# Patient Record
Sex: FEMALE | Race: WHITE | NOT HISPANIC OR LATINO | Employment: FULL TIME | URBAN - METROPOLITAN AREA
[De-identification: names, ages, dates, MRNs, and addresses within clinical notes are randomized per-mention and may not be internally consistent; named-entity substitution may affect disease eponyms.]

---

## 2017-06-30 ENCOUNTER — ALLSCRIPTS OFFICE VISIT (OUTPATIENT)
Dept: OTHER | Facility: OTHER | Age: 57
End: 2017-06-30

## 2017-07-03 ENCOUNTER — TRANSCRIBE ORDERS (OUTPATIENT)
Dept: ADMINISTRATIVE | Facility: HOSPITAL | Age: 57
End: 2017-07-03

## 2017-07-03 DIAGNOSIS — G47.30 SLEEP APNEA, UNSPECIFIED TYPE: Primary | ICD-10-CM

## 2017-07-11 ENCOUNTER — TRANSCRIBE ORDERS (OUTPATIENT)
Dept: SLEEP CENTER | Facility: CLINIC | Age: 57
End: 2017-07-11

## 2017-07-11 DIAGNOSIS — G47.33 OSA (OBSTRUCTIVE SLEEP APNEA): Primary | ICD-10-CM

## 2017-08-03 ENCOUNTER — TRANSCRIBE ORDERS (OUTPATIENT)
Dept: SLEEP CENTER | Facility: CLINIC | Age: 57
End: 2017-08-03

## 2017-08-03 ENCOUNTER — HOSPITAL ENCOUNTER (OUTPATIENT)
Dept: SLEEP CENTER | Facility: CLINIC | Age: 57
Discharge: HOME | End: 2017-08-03
Payer: COMMERCIAL

## 2017-08-03 DIAGNOSIS — G47.30 SLEEP APNEA, UNSPECIFIED TYPE: ICD-10-CM

## 2017-08-03 DIAGNOSIS — G47.33 OSA (OBSTRUCTIVE SLEEP APNEA): Primary | ICD-10-CM

## 2017-08-25 ENCOUNTER — HOSPITAL ENCOUNTER (OUTPATIENT)
Dept: SLEEP CENTER | Facility: CLINIC | Age: 57
Discharge: HOME | End: 2017-08-25
Payer: COMMERCIAL

## 2017-08-25 DIAGNOSIS — G47.33 OSA (OBSTRUCTIVE SLEEP APNEA): ICD-10-CM

## 2017-08-25 PROCEDURE — 95810 POLYSOM 6/> YRS 4/> PARAM: CPT

## 2017-08-30 ENCOUNTER — TRANSCRIBE ORDERS (OUTPATIENT)
Dept: SLEEP CENTER | Facility: CLINIC | Age: 57
End: 2017-08-30

## 2017-10-03 ENCOUNTER — TRANSCRIBE ORDERS (OUTPATIENT)
Dept: SLEEP CENTER | Facility: CLINIC | Age: 57
End: 2017-10-03

## 2017-10-03 ENCOUNTER — HOSPITAL ENCOUNTER (OUTPATIENT)
Dept: SLEEP CENTER | Facility: CLINIC | Age: 57
Discharge: HOME | End: 2017-10-03
Payer: COMMERCIAL

## 2017-10-03 DIAGNOSIS — G47.33 OSA (OBSTRUCTIVE SLEEP APNEA): Primary | ICD-10-CM

## 2017-10-03 DIAGNOSIS — G47.33 OSA (OBSTRUCTIVE SLEEP APNEA): ICD-10-CM

## 2017-10-03 NOTE — PROGRESS NOTES
Progress Note - Sleep Center   Nadine Gonzales [facility]:[birth year] MRN: [id number]      Reason for Visit:  59 y  o female here for follow-up after testing    Assessment:  Doing well on current therapy of CPAP 11 cm for obstructive sleep apnea  There is no clear explanation for the patient's dramatic improvement in apnea-hypopnea index compared with 2003  Never the less, she continues to drive benefit from using CPAP and I will make arrangements so that she may continue to use it  I will order her a new machine and set it at a range of APAP 11 to 15 cm  Plan:  New APAP with a range of 11 to 15 cm  Follow up: One year    History of Present Illness:  History of SJ on PAP therapy of 11 cm, increased from 9 cm empirically  She underwent repeat diagnostic polysomnography, her first since 14 years ago and had an insignificant degree of SJ  Her AHI = 3 9  Her previous AHI was 79  That study is not available  Since the increase in pressure, the patient feels much better  She reports that she cannot sleep without the CPAP  It seemed to have a beneficial effect on her hypertension  Historical Information    Past Medical History:   Past Medical History:   Diagnosis Date    Diabetes mellitus     type 2    Murmur, cardiac          Past Surgical History:   Past Surgical History:   Procedure Laterality Date    APPENDECTOMY      BREAST LUMPECTOMY      CHOLECYSTECTOMY      HYSTERECTOMY           Social History - see chart  History   Alcohol use: Not on file     History   Drug Use Not on file     History   Smoking Status    Not on file   Smokeless Tobacco    Not on file     Family History: No family history on file      Medications/Allergies:      Current Outpatient Prescriptions:     aspirin 81 mg chewable tablet, Chew 81 mg daily, Disp: , Rfl:     Cholecalciferol (VITAMIN D3) 1000 UNITS CAPS, Take 2,000 Units by mouth daily, Disp: , Rfl:     Cinnamon 500 MG TABS, Take 500 mg by mouth daily, Disp: , Rfl:    Cyanocobalamin (VITAMIN B12 PO), Take 2,500 mcg by mouth daily, Disp: , Rfl:     fenofibrate (TRIGLIDE) 160 MG tablet, Take 160 mg by mouth daily, Disp: , Rfl:     gabapentin (NEURONTIN) 300 mg capsule, Take 300 mg by mouth 3 (three) times a day, Disp: , Rfl:     glimepiride (AMARYL) 2 mg tablet, Take 2 mg by mouth 2 (two) times a day, Disp: , Rfl:     GLUTATHIONE PO, Take 250 mg by mouth 3 (three) times a day, Disp: , Rfl:     metFORMIN (GLUCOPHAGE) 500 mg tablet, Take 500 mg by mouth 2 (two) times a day with meals, Disp: , Rfl:     metoprolol tartrate (LOPRESSOR) 25 mg tablet, Take 50 mg by mouth daily, Disp: , Rfl:     multivitamin (THERAGRAN) TABS, Take 1 tablet by mouth daily, Disp: , Rfl:     potassium chloride (MICRO-K) 10 MEQ CR capsule, Take 10 mEq by mouth daily, Disp: , Rfl:     pravastatin (PRAVACHOL) 10 mg tablet, Take 10 mg by mouth daily, Disp: , Rfl:     pyridoxine (VITAMIN B6) 100 mg tablet, Take 100 mg by mouth daily, Disp: , Rfl:     Red Yeast Rice 600 MG TABS, Take 1,200 mg by mouth daily, Disp: , Rfl:       Objective    Vital Signs:   See Chart    [place] Sleepiness Scale:   3    Physical Exam:    General: Alert, appropriate, cooperative, overweight    Head: NC/AT, no retrognathia    Skin: Warm, dry    Neuro: No motor abnormalities, cranial nerves appear intact    Extremity: No clubbing, cyanosis    PAP settin cm  DME Provider: [facility]    Counseling / Coordination of Care  Total clinic time spent today 15 minutes  Greater than 50% of total time was spent with the patient and / or family counseling and / or coordination of care  A description of the counseling / coordination of care: Discussed equipment and response to treatment  DAVIE Lux    Board Certified Sleep Specialist

## 2018-01-15 NOTE — MISCELLANEOUS
Provider Comments  Pt no showed for appointment on 6/30/17        Signatures   Electronically signed by : Tee Wiseman, ; Jun 30 2017 11:23AM EST                       (Author)

## 2018-10-03 ENCOUNTER — OFFICE VISIT (OUTPATIENT)
Dept: SLEEP CENTER | Facility: CLINIC | Age: 58
End: 2018-10-03
Payer: COMMERCIAL

## 2018-10-03 VITALS
HEART RATE: 72 BPM | BODY MASS INDEX: 33.13 KG/M2 | HEIGHT: 63 IN | SYSTOLIC BLOOD PRESSURE: 128 MMHG | DIASTOLIC BLOOD PRESSURE: 60 MMHG | WEIGHT: 187 LBS | RESPIRATION RATE: 16 BRPM

## 2018-10-03 DIAGNOSIS — G47.33 OSA (OBSTRUCTIVE SLEEP APNEA): Primary | ICD-10-CM

## 2018-10-03 PROCEDURE — 99213 OFFICE O/P EST LOW 20 MIN: CPT | Performed by: INTERNAL MEDICINE

## 2018-10-03 RX ORDER — METOPROLOL TARTRATE 50 MG/1
50 TABLET, FILM COATED ORAL
COMMUNITY
Start: 2011-09-29 | End: 2018-10-03 | Stop reason: ALTCHOICE

## 2018-10-03 RX ORDER — FLUCONAZOLE 150 MG/1
TABLET ORAL
COMMUNITY
Start: 2011-03-07 | End: 2018-10-03 | Stop reason: ALTCHOICE

## 2018-10-03 RX ORDER — VALSARTAN AND HYDROCHLOROTHIAZIDE 80; 12.5 MG/1; MG/1
TABLET, FILM COATED ORAL
COMMUNITY
Start: 2011-05-17 | End: 2018-10-03 | Stop reason: ALTCHOICE

## 2018-10-03 RX ORDER — PRAVASTATIN SODIUM 10 MG
10 TABLET ORAL
COMMUNITY
End: 2018-10-03 | Stop reason: SDUPTHER

## 2018-10-03 RX ORDER — FENOFIBRATE 150 MG/1
CAPSULE ORAL
COMMUNITY
Start: 2011-05-17 | End: 2019-07-22

## 2018-10-03 NOTE — PROGRESS NOTES
Progress Note - Sleep Center   Santhosh Valladares [facility]:[birth year] MRN: [id number]      Reason for Visit:  59 y  o female here for annual follow-up    Assessment:  Doing well on current therapy of APAP 11 to 15 cm for very mild SJ (AHI = 3 9)  The patient had more severe SJ in the past   She feels that the pressure is too high however review of her pressure data shows that her ninetieth percentile is 14 7, suggesting that 15 cm is necessary  I will therefore change her to APAP of 9 to 15 cm  Plan:  Lower minimum APAP    Follow up: One year    History of Present Illness:  History of SJ on PAP therapy  Fully compliant and deriving benefit  She feels at times that the pressure is too high  Her breast cancer is in remission  Review of Systems      Genitourinary none   Cardiology none   Gastrointestinal none   Neurology numbness/tingling of an extremity   Constitutional none   Integumentary none   Psychiatry none   Musculoskeletal none   Pulmonary snoring   ENT throat clearing   Endocrine none   Hematological none         Historical Information    Past Medical History:   Past Medical History:   Diagnosis Date    Cancer ([facility] )     Diabetes mellitus ([facility] )     type 2    Murmur, cardiac          Past Surgical History:   Past Surgical History:   Procedure Laterality Date    APPENDECTOMY      BREAST LUMPECTOMY      CHOLECYSTECTOMY      HYSTERECTOMY         Social History:   Social History     Social History    Marital status: /Civil Union     Spouse name: N/A    Number of children: N/A    Years of education: N/A     Social History Main Topics    Smoking status: Never Smoker    Smokeless tobacco: Never Used    Alcohol use No    Drug use: No    Sexual activity: Yes     Partners: Male     Other Topics Concern    None     Social History Narrative    None       Family History: History reviewed  No pertinent family history      Medications/Allergies:      Current Outpatient Prescriptions:    aspirin 81 mg chewable tablet, Chew 81 mg daily, Disp: , Rfl:     Cholecalciferol (VITAMIN D3) 1000 UNITS CAPS, Take 2,000 Units by mouth daily, Disp: , Rfl:     Cyanocobalamin (VITAMIN B12 PO), Take 2,500 mcg by mouth daily, Disp: , Rfl:     Fenofibrate (LIPOFEN) 150 MG CAPS, Take by mouth, Disp: , Rfl:     glimepiride (AMARYL) 2 mg tablet, Take 2 mg by mouth 2 (two) times a day, Disp: , Rfl:     Insulin Glargine (TOUJEO) 300 units/mL CONCETRATED U-300 injection pen, Inject 20 Units under the skin, Disp: , Rfl:     multivitamin (THERAGRAN) TABS, Take 1 tablet by mouth daily, Disp: , Rfl:     pravastatin (PRAVACHOL) 10 mg tablet, Take 10 mg by mouth daily, Disp: , Rfl:     pyridoxine (VITAMIN B6) 100 mg tablet, Take 100 mg by mouth daily, Disp: , Rfl:           Objective      Vital Signs:   Vitals:    10/03/18 1500   BP: 128/60   Pulse: 72   Resp: 16     [place] Sleepiness Scale: Total score: 3        Physical Exam:    General: Alert, appropriate, cooperative, overweight    Head: NC/AT    Skin: Warm, dry    Neuro: No motor abnormalities, cranial nerves appear intact    Extremity: No clubbing, cyanosis      DME Provider: [facility]    Counseling / Coordination of Care  Total clinic time spent today 15 minutes  Greater than 50% of total time was spent with the patient and / or family counseling and / or coordination of care  A description of the counseling / coordination of care: Discussed equipment and response to treatment  DAVIE Haile    Board Certified Sleep Specialist

## 2018-10-04 ENCOUNTER — TELEPHONE (OUTPATIENT)
Dept: SLEEP CENTER | Facility: CLINIC | Age: 58
End: 2018-10-04

## 2019-06-05 ENCOUNTER — CONSULT (OUTPATIENT)
Dept: CARDIOLOGY CLINIC | Facility: CLINIC | Age: 59
End: 2019-06-05
Payer: COMMERCIAL

## 2019-06-05 VITALS
SYSTOLIC BLOOD PRESSURE: 148 MMHG | BODY MASS INDEX: 34.34 KG/M2 | HEART RATE: 75 BPM | HEIGHT: 63 IN | WEIGHT: 193.8 LBS | DIASTOLIC BLOOD PRESSURE: 76 MMHG

## 2019-06-05 DIAGNOSIS — E11.8 TYPE 2 DIABETES MELLITUS WITH COMPLICATION, WITHOUT LONG-TERM CURRENT USE OF INSULIN (HCC): ICD-10-CM

## 2019-06-05 DIAGNOSIS — E78.2 MIXED HYPERLIPIDEMIA: ICD-10-CM

## 2019-06-05 DIAGNOSIS — I35.0 AORTIC STENOSIS, MODERATE: Primary | ICD-10-CM

## 2019-06-05 DIAGNOSIS — R00.2 HEART PALPITATIONS: ICD-10-CM

## 2019-06-05 DIAGNOSIS — I10 ESSENTIAL HYPERTENSION: ICD-10-CM

## 2019-06-05 PROCEDURE — 93000 ELECTROCARDIOGRAM COMPLETE: CPT | Performed by: INTERNAL MEDICINE

## 2019-06-05 PROCEDURE — 99244 OFF/OP CNSLTJ NEW/EST MOD 40: CPT | Performed by: INTERNAL MEDICINE

## 2019-06-05 RX ORDER — METOPROLOL TARTRATE 50 MG/1
25 TABLET, FILM COATED ORAL EVERY 12 HOURS SCHEDULED
COMMUNITY
End: 2020-10-14

## 2019-06-05 RX ORDER — AMLODIPINE BESYLATE 2.5 MG/1
2.5 TABLET ORAL DAILY
Qty: 30 TABLET | Refills: 11 | Status: SHIPPED | OUTPATIENT
Start: 2019-06-05 | End: 2020-02-05 | Stop reason: SDUPTHER

## 2019-06-12 ENCOUNTER — PROCEDURE VISIT (OUTPATIENT)
Dept: CARDIOLOGY CLINIC | Facility: CLINIC | Age: 59
End: 2019-06-12
Payer: COMMERCIAL

## 2019-06-12 DIAGNOSIS — R00.2 PALPITATIONS: Primary | ICD-10-CM

## 2019-06-12 PROCEDURE — 93224 XTRNL ECG REC UP TO 48 HRS: CPT | Performed by: INTERNAL MEDICINE

## 2019-06-14 ENCOUNTER — HOSPITAL ENCOUNTER (OUTPATIENT)
Dept: NON INVASIVE DIAGNOSTICS | Facility: HOSPITAL | Age: 59
Discharge: HOME | End: 2019-06-14
Payer: COMMERCIAL

## 2019-06-14 DIAGNOSIS — R00.2 HEART PALPITATIONS: ICD-10-CM

## 2019-06-14 PROCEDURE — 93226 XTRNL ECG REC<48 HR SCAN A/R: CPT

## 2019-06-14 PROCEDURE — 93225 XTRNL ECG REC<48 HRS REC: CPT

## 2019-06-14 PROCEDURE — 93227 XTRNL ECG REC<48 HR R&I: CPT | Performed by: INTERNAL MEDICINE

## 2019-07-20 ENCOUNTER — APPOINTMENT (EMERGENCY)
Dept: RADIOLOGY | Facility: HOSPITAL | Age: 59
End: 2019-07-20
Payer: COMMERCIAL

## 2019-07-20 ENCOUNTER — HOSPITAL ENCOUNTER (EMERGENCY)
Facility: HOSPITAL | Age: 59
Discharge: HOME | End: 2019-07-20
Attending: EMERGENCY MEDICINE | Admitting: EMERGENCY MEDICINE
Payer: COMMERCIAL

## 2019-07-20 VITALS
RESPIRATION RATE: 18 BRPM | HEIGHT: 63 IN | HEART RATE: 87 BPM | DIASTOLIC BLOOD PRESSURE: 76 MMHG | BODY MASS INDEX: 34.34 KG/M2 | TEMPERATURE: 97.8 F | OXYGEN SATURATION: 93 % | SYSTOLIC BLOOD PRESSURE: 182 MMHG | WEIGHT: 193.78 LBS

## 2019-07-20 DIAGNOSIS — M25.511 ACUTE PAIN OF RIGHT SHOULDER: Primary | ICD-10-CM

## 2019-07-20 PROCEDURE — 73030 X-RAY EXAM OF SHOULDER: CPT

## 2019-07-20 PROCEDURE — 99283 EMERGENCY DEPT VISIT LOW MDM: CPT | Performed by: EMERGENCY MEDICINE

## 2019-07-20 PROCEDURE — 99283 EMERGENCY DEPT VISIT LOW MDM: CPT

## 2019-07-20 RX ORDER — NAPROXEN 250 MG/1
500 TABLET ORAL ONCE
Status: COMPLETED | OUTPATIENT
Start: 2019-07-20 | End: 2019-07-20

## 2019-07-20 RX ORDER — OXYCODONE HYDROCHLORIDE AND ACETAMINOPHEN 5; 325 MG/1; MG/1
1 TABLET ORAL ONCE
Status: COMPLETED | OUTPATIENT
Start: 2019-07-20 | End: 2019-07-20

## 2019-07-20 RX ORDER — METHYLPREDNISOLONE 4 MG/1
TABLET ORAL
Qty: 21 TABLET | Refills: 0 | Status: SHIPPED | OUTPATIENT
Start: 2019-07-20 | End: 2019-10-25 | Stop reason: ALTCHOICE

## 2019-07-20 RX ORDER — OXYCODONE HYDROCHLORIDE AND ACETAMINOPHEN 5; 325 MG/1; MG/1
1 TABLET ORAL EVERY 4 HOURS PRN
Qty: 10 TABLET | Refills: 0 | Status: SHIPPED | OUTPATIENT
Start: 2019-07-20 | End: 2019-07-30

## 2019-07-20 RX ORDER — NAPROXEN 500 MG/1
500 TABLET ORAL EVERY 12 HOURS PRN
Qty: 20 TABLET | Refills: 0 | Status: SHIPPED | OUTPATIENT
Start: 2019-07-20 | End: 2021-07-21

## 2019-07-20 RX ADMIN — NAPROXEN 500 MG: 250 TABLET ORAL at 19:31

## 2019-07-20 RX ADMIN — OXYCODONE HYDROCHLORIDE AND ACETAMINOPHEN 1 TABLET: 5; 325 TABLET ORAL at 19:31

## 2019-07-20 NOTE — DISCHARGE INSTRUCTIONS
Shoulder Pain   WHAT YOU NEED TO KNOW:   Shoulder pain is a common problem and can affect your daily activities  Pain can be caused by a problem within your shoulder  Shoulder pain may also be caused by pain that spreads to your shoulder from another part of your body  DISCHARGE INSTRUCTIONS:   Return to the emergency department if:   · You have severe pain  · You cannot move your arm or shoulder  · You have numbness or tingling in your shoulder or arm  Contact your healthcare provider if:   · Your pain gets worse or does not go away with treatment  · You have trouble moving your arm or shoulder  · You have questions or concerns about your condition or care  Medicines: You may need any of the following:  · Acetaminophen  decreases pain and fever  It is available without a doctor's order  Ask how much to take and how often to take it  Follow directions  Acetaminophen can cause liver damage if not taken correctly  · NSAIDs , such as ibuprofen, help decrease swelling, pain, and fever  This medicine is available with or without a doctor's order  NSAIDs can cause stomach bleeding or kidney problems in certain people  If you take blood thinner medicine, always ask your healthcare provider if NSAIDs are safe for you  Always read the medicine label and follow directions  · Take your medicine as directed  Contact your healthcare provider if you think your medicine is not helping or if you have side effects  Tell him of her if you are allergic to any medicine  Keep a list of the medicines, vitamins, and herbs you take  Include the amounts, and when and why you take them  Bring the list or the pill bottles to follow-up visits  Carry your medicine list with you in case of an emergency  Follow up with your healthcare provider or orthopedist as directed:  Write down your questions so you remember to ask them during your visits     Manage your symptoms:   · Apply ice  on your shoulder for 20 to 30 minutes every 2 hours or as directed  Use an ice pack, or put crushed ice in a plastic bag  Cover it with a towel  Ice helps prevent tissue damage and decreases swelling and pain  · Apply heat if ice does not help your symptoms  Apply heat on your shoulder for 20 to 30 minutes every 2 hours for as many days as directed  Heat helps decrease pain and muscle spasms  · Go to physical or occupational therapy as directed  A physical therapist teaches you exercises to help improve movement and strength, and to decrease pain  An occupational therapist teaches you skills to help with your daily activities  Prevent shoulder pain:   · Stretch and strengthen your shoulder  Use proper technique during exercises and sports  · Limit activities as directed  Try to avoid repeated overhead movements  © 2017 [facility] Information is for End User's use only and may not be sold, redistributed or otherwise used for commercial purposes  All illustrations and images included in CareNotes® are the copyrighted property of A D A M , Inc  or David Zambrano  The above information is an  only  It is not intended as medical advice for individual conditions or treatments  Talk to your doctor, nurse or pharmacist before following any medical regimen to see if it is safe and effective for you

## 2019-07-20 NOTE — ED PROVIDER NOTES
History  Chief Complaint   Patient presents with    Shoulder Pain     right shoulder pain  pt denies injury  pt denies pain radiating  Patient is a 75-year-old female with past medical history of insulin-dependent diabetes, right sided breast cancer status post partial mastectomy, lymph node resection with resulting right upper extremity lymphedema, status post chemo and radiation, in remission for the past 3 years, aortic stenosis secondary to radiation, hyperlipidemia, hypertension, appendectomy, cholecystectomy, hysterectomy, presents to the emergency department complaining of acute right shoulder pain that she 1st noticed this morning upon awakening  Patient denies any recent right shoulder upper extremity injury, heavy lifting or strenuous activity  She states she woke up and felt some mild pain in the right shoulder which has progressively worsened throughout the day  She states she cannot move the shoulder in any direction due to the pain  She localizes the pain to the anterior shoulder joint states occasionally she feels the pain in the posterior shoulder  Denies radiation of the pain  Denies any pain in the elbow, forearm or wrist joints  Denies any associated right upper extremity weakness or paresthesia  She states she wears compression stocking on the right arm chronically due to her lymphedema  She denies any swelling worse than baseline of the right upper extremity associated redness or warmth of the right shoulder joint or arm  She denies any fever, chills, headache, dizziness, neck or back pain, chest pain, palpitations, dyspnea, abdominal pain, nausea, vomiting, diarrhea, constipation, urinary symptoms, skin rash or color change or focal neurologic deficits  She states when she is being treated for her cancer she did develop adhesive capsulitis but denies any other prior shoulder problems  Patient is right hand dominant        History provided by:  Patient   used: No    Shoulder Pain   Associated symptoms: no back pain, no fever and no neck pain        Prior to Admission Medications   Prescriptions Last Dose Informant Patient Reported? Taking? Cholecalciferol (VITAMIN D3) 1000 UNITS CAPS  Self Yes No   Sig: Take 2,000 Units by mouth daily   Cyanocobalamin (VITAMIN B12 PO)  Self Yes No   Sig: Take 2,500 mcg by mouth daily   Empagliflozin-metFORMIN HCl ER (SYNJARDY XR)  MG TB24  Self Yes No   Sig: Take 1 tablet by mouth 2 (two) times a day   Fenofibrate (LIPOFEN) 150 MG CAPS  Self Yes No   Sig: Take by mouth    Icosapent Ethyl (VASCEPA PO)  Self Yes No   Sig: Take 2 capsules by mouth daily   Insulin Glargine (TOUJEO) 300 units/mL CONCETRATED U-300 injection pen  Self Yes No   Sig: Inject 40 Units under the skin daily at bedtime    amLODIPine (NORVASC) 2 5 mg tablet   No No   Sig: Take 1 tablet (2 5 mg total) by mouth daily   aspirin 81 mg chewable tablet  Self Yes No   Sig: Chew 81 mg daily   glimepiride (AMARYL) 2 mg tablet  Self Yes No   Sig: Take 2 mg by mouth 2 (two) times a day   metoprolol tartrate (LOPRESSOR) 50 mg tablet  Self Yes No   Sig: Take 25 mg by mouth every 12 (twelve) hours   multivitamin (THERAGRAN) TABS  Self Yes No   Sig: Take 1 tablet by mouth daily   pravastatin (PRAVACHOL) 10 mg tablet  Self Yes No   Sig: Take 10 mg by mouth daily   pyridoxine (VITAMIN B6) 100 mg tablet  Self Yes No   Sig: Take 100 mg by mouth daily      Facility-Administered Medications: None       Past Medical History:   Diagnosis Date    Cancer ([facility] [address] )     Diabetes mellitus (HCC)     type 2    Murmur, cardiac        Past Surgical History:   Procedure Laterality Date    APPENDECTOMY      BREAST LUMPECTOMY      CHOLECYSTECTOMY      HYSTERECTOMY         History reviewed  No pertinent family history  I have reviewed and agree with the history as documented      Social History     Tobacco Use    Smoking status: Never Smoker    Smokeless tobacco: Never Used   Substance Use Topics    Alcohol use: No    Drug use: No        Review of Systems   Constitutional: Negative for chills and fever  HENT: Negative for congestion, ear pain, rhinorrhea and sore throat  Eyes: Negative for pain and visual disturbance  Respiratory: Negative for cough, chest tightness, shortness of breath and wheezing  Cardiovascular: Negative for chest pain and palpitations  Gastrointestinal: Negative for abdominal pain, constipation, diarrhea, nausea and vomiting  Genitourinary: Negative for dysuria, flank pain, frequency and hematuria  Musculoskeletal: Positive for arthralgias  Negative for back pain, joint swelling and neck pain  +Right shoulder pain and decreased ROM  Skin: Negative for color change, pallor, rash and wound  Allergic/Immunologic: Negative for immunocompromised state  Neurological: Negative for dizziness, syncope, weakness, light-headedness, numbness and headaches  Hematological: Negative for adenopathy  Psychiatric/Behavioral: Negative for confusion and decreased concentration  All other systems reviewed and are negative  Physical Exam  Physical Exam   Constitutional: She is oriented to person, place, and time  She appears well-developed and well-nourished  No distress  HENT:   Head: Normocephalic and atraumatic  Mouth/Throat: Oropharynx is clear and moist    Eyes: Pupils are equal, round, and reactive to light  Conjunctivae and EOM are normal    Neck: Normal range of motion  Neck supple  No JVD present  Cardiovascular: Normal rate, regular rhythm, normal heart sounds and intact distal pulses  Exam reveals no gallop and no friction rub  No murmur heard  Pulmonary/Chest: Effort normal and breath sounds normal  No respiratory distress  She has no wheezes  She has no rales  She exhibits no tenderness  Abdominal: Soft  Bowel sounds are normal  She exhibits no distension  There is no tenderness  There is no rebound and no guarding  Musculoskeletal: She exhibits tenderness  She exhibits no edema or deformity  RIGHT UPPER EXTREMITY:  Right upper extremity neurovascularly intact with 2+ radial pulse  No gross motor or sensory deficits in the median, ulnar or radial nerve distribution  There is significant tenderness at the anterior and posterior shoulder joint  Significant decreased range of motion actively and passively in all planes at the right shoulder joint  All other joints of the right upper extremity has full range of motion and are nontender  Neurological: She is alert and oriented to person, place, and time  No gross motor or sensory deficits  Skin: Skin is warm and dry  No rash noted  She is not diaphoretic  No erythema  No pallor  Psychiatric: She has a normal mood and affect  Her behavior is normal    Nursing note and vitals reviewed  Vital Signs  ED Triage Vitals   Temperature Pulse Respirations Blood Pressure SpO2   07/20/19 1821 07/20/19 1819 07/20/19 1819 07/20/19 1819 07/20/19 1819   97 8 °F (36 6 °C) 87 18 (!) 182/76 93 %      Temp Source Heart Rate Source Patient Position - Orthostatic VS BP Location FiO2 (%)   07/20/19 1819 07/20/19 1819 07/20/19 1819 07/20/19 1819 --   Oral Monitor Sitting Left arm       Pain Score       07/20/19 1819       9         Vitals:    07/20/19 1819 07/20/19 1821   BP: (!) 182/76    BP Location: Left arm    Pulse: 87    Resp: 18    Temp:  97 8 °F (36 6 °C)   TempSrc: Oral Oral   SpO2: 93%    Weight: 87 9 kg (193 lb 12 6 oz)    Height: 5' 3" (1 6 m)        Visual Acuity      ED Medications  Medications   naproxen (NAPROSYN) tablet 500 mg (500 mg Oral Given 7/20/19 1931)   oxyCODONE-acetaminophen (PERCOCET) 5-325 mg per tablet 1 tablet (1 tablet Oral Given 7/20/19 1931)       Diagnostic Studies  Results Reviewed     None                 XR shoulder 2+ views RIGHT   ED Interpretation by Sandy Grayson DO (07/20 1952)   Degenerative changes but no acute osseous abnormality  Procedures  Procedures       ED Course  ED Course as of Jul 20 1957   Sat Jul 20, 2019 1956 Cautioned patient about Medrol Dosepak and it being a steroid that could temporarily increase her glucose  Patient is on insulin and advised her to check her glucose regularly and adjust her insulin as needed  If there are any complications or concerns she should stop the steroid immediately and consult with her primary care doctor or return to the ED  [facility]  Number of Diagnoses or Management Options  Diagnosis management comments: 57-year-old female presents with acute right shoulder pain and inability to move the shoulder without any inciting trauma or injury  Differential includes adhesive capsulitis, rotator cuff tear versus tendinitis, biceps tendinitis or tear, arthritis  Will obtain x-ray of the right shoulder, provide a sling for comfort but advised her not to use it 24/7 and to try to increase her range of motion as tolerated  Will give Naprosyn and Percocet for pain relief  Will ultimately refer to Sports Medicine and Orthopedics for further workup and possibly outpatient MRI  Amount and/or Complexity of Data Reviewed  Tests in the radiology section of CPT®: ordered and reviewed  Independent visualization of images, tracings, or specimens: yes        Disposition  Final diagnoses:   Acute pain of right shoulder     Time reflects when diagnosis was documented in both MDM as applicable and the Disposition within this note     Time User Action Codes Description Comment    7/20/2019  7:54 PM Marilynn WILKINSON Add [M27 [id number]] Acute pain of right shoulder       ED Disposition     ED Disposition Condition Date/Time Comment    Discharge Stable Sat Jul 20, 2019  7:54 PM Michelle Wade discharge to home/self care              Follow-up Information     Follow up With Specialties Details Why Contact Info Additional Information    Arleen Rendon DO General Practice Schedule an appointment as soon as possible for a visit   [address]  [facility] [address] [facility] Orthopedic Surgery Schedule an appointment as soon as possible for a visit   [address] [facility] [address]  [address] [facility], [id number] [place] [address], [address] [facility] Emergency Department Emergency Medicine Go to  If symptoms worsen [address] [facility] [address] [phone] [place] ED, [address] [place], [place], [address]          Patient's Medications   Discharge Prescriptions    METHYLPREDNISOLONE 4 MG TABLET THERAPY PACK    Use as directed on package       Start Date: 7/20/2019 End Date: --       Order Dose: --       Quantity: 21 tablet    Refills: 0    NAPROXEN (NAPROSYN) 500 MG TABLET    Take 1 tablet (500 mg total) by mouth every 12 (twelve) hours as needed for mild pain or moderate pain       Start Date: 7/20/2019 End Date: --       Order Dose: 500 mg       Quantity: 20 tablet    Refills: 0    OXYCODONE-ACETAMINOPHEN (PERCOCET) 5-325 MG PER TABLET    Take 1 tablet by mouth every 4 (four) hours as needed for severe pain for up to 10 daysMax Daily Amount: 6 tablets       Start Date: 7/20/2019 End Date: 7/30/2019       Order Dose: 1 tablet       Quantity: 10 tablet    Refills: 0     No discharge procedures on file      ED Provider  Electronically Signed by           Angie Bass,   07/20/19 [id number]

## 2019-07-22 ENCOUNTER — OFFICE VISIT (OUTPATIENT)
Dept: OBGYN CLINIC | Facility: MEDICAL CENTER | Age: 59
End: 2019-07-22
Payer: COMMERCIAL

## 2019-07-22 VITALS
WEIGHT: 190.6 LBS | HEART RATE: 89 BPM | SYSTOLIC BLOOD PRESSURE: 144 MMHG | DIASTOLIC BLOOD PRESSURE: 74 MMHG | HEIGHT: 63 IN | BODY MASS INDEX: 33.77 KG/M2

## 2019-07-22 DIAGNOSIS — M25.511 ACUTE PAIN OF RIGHT SHOULDER: ICD-10-CM

## 2019-07-22 DIAGNOSIS — M75.01 ADHESIVE CAPSULITIS OF RIGHT SHOULDER: Primary | ICD-10-CM

## 2019-07-22 PROCEDURE — 99203 OFFICE O/P NEW LOW 30 MIN: CPT | Performed by: FAMILY MEDICINE

## 2019-07-22 PROCEDURE — 20610 DRAIN/INJ JOINT/BURSA W/O US: CPT | Performed by: FAMILY MEDICINE

## 2019-07-22 RX ORDER — TRIAMCINOLONE ACETONIDE 40 MG/ML
40 INJECTION, SUSPENSION INTRA-ARTICULAR; INTRAMUSCULAR
Status: COMPLETED | OUTPATIENT
Start: 2019-07-22 | End: 2019-07-22

## 2019-07-22 RX ORDER — LIDOCAINE HYDROCHLORIDE 10 MG/ML
4 INJECTION, SOLUTION INFILTRATION; PERINEURAL
Status: COMPLETED | OUTPATIENT
Start: 2019-07-22 | End: 2019-07-22

## 2019-07-22 RX ADMIN — TRIAMCINOLONE ACETONIDE 40 MG: 40 INJECTION, SUSPENSION INTRA-ARTICULAR; INTRAMUSCULAR at 11:25

## 2019-07-22 RX ADMIN — LIDOCAINE HYDROCHLORIDE 4 ML: 10 INJECTION, SOLUTION INFILTRATION; PERINEURAL at 11:25

## 2019-07-22 NOTE — PROGRESS NOTES
Assessment:     1  Acute pain of right shoulder    2  Adhesive capsulitis of right shoulder        Plan:     Problem List Items Addressed This Visit        Musculoskeletal and Integument    Adhesive capsulitis of right shoulder    Relevant Orders    Ambulatory referral to Physical Therapy       Other    Acute pain of right shoulder - Primary    Relevant Orders    Ambulatory referral to Physical Therapy         Subjective:     Patient ID: Sherley Boeck is a 62 y o  female  Chief Complaint:  Patient is a 57-year-old female presenting today for evaluation of right shoulder pain  She reports beginning with pain approximately 3 days ago after mowing the lawn  Since that time she has had ongoing pain along the anterior lateral aspect the shoulder  Pain is reproduced with any attempted shoulder movements  Ice and anti-inflammatories provided minimal relief  She does have a history a frozen shoulder in the past and did respond well with a previous cortisone injection and physical therapy  She currently denies any shoulder joint swelling, numbness or tingling  She denies any warmth or crepitus      Allergy:  Allergies   Allergen Reactions    Fentanyl Rash    Niaspan [Niacin Er] Rash     Medications:  all current active meds have been reviewed  Past Medical History:  Past Medical History:   Diagnosis Date    [facility]     [facility]     type 2    Murmur, cardiac      Past Surgical History:  Past Surgical History:   Procedure Laterality Date    APPENDECTOMY      BREAST LUMPECTOMY      CHOLECYSTECTOMY      HYSTERECTOMY       Family History:  Family History   Problem Relation Age of Onset    COPD Mother     Heart disease Father     Heart disease Paternal Grandmother     Cancer Family      Social History:  Social History     Substance and Sexual Activity   Alcohol Use No     Social History     Substance and Sexual Activity   Drug Use No     Social History     Tobacco Use   Smoking Status Never Smoker   Smokeless Tobacco Never Used     Review of Systems      Objective:  BP Readings from Last 1 Encounters:   07/22/19 144/74      Wt Readings from Last 1 Encounters:   07/22/19 86 5 kg (190 lb 9 6 oz)      BMI:   Estimated body mass index is 33 76 kg/m² as calculated from the following:    Height as of this encounter: 5' 3" (1 6 m)  Weight as of this encounter: 86 5 kg (190 lb 9 6 oz)  BSA:   Estimated body surface area is 1 9 meters squared as calculated from the following:    Height as of this encounter: 5' 3" (1 6 m)  Weight as of this encounter: 86 5 kg (190 lb 9 6 oz)  Physical Exam   Constitutional: She is oriented to person, place, and time  Vital signs are normal  She appears well-developed  HENT:   Head: Normocephalic  Eyes: Pupils are equal, round, and reactive to light  Pulmonary/Chest: Effort normal    Neurological: She is alert and oriented to person, place, and time  Skin: Skin is warm and dry  Psychiatric: She has a normal mood and affect  Nursing note and vitals reviewed  Right Shoulder Exam     Tenderness   The patient is experiencing tenderness in the acromioclavicular joint, acromion and biceps tendon  Range of Motion   Active abduction: abnormal   Passive abduction: abnormal   Extension: abnormal   External rotation: abnormal   Forward flexion: abnormal   Internal rotation 0 degrees: abnormal   Internal rotation 90 degrees: abnormal     Muscle Strength   Abduction: 4/5   Internal rotation: 4/5   External rotation: 4/5   Supraspinatus: 4/5   Subscapularis: 4/5   Biceps: 4/5     Other   Erythema: absent      Left Shoulder Exam   Left shoulder exam is normal     Tenderness   The patient is experiencing no tenderness  Range of Motion   The patient has normal left shoulder ROM  I have personally reviewed pertinent films in PACS     No acute osseous abnormalities    Large joint arthrocentesis: R glenohumeral  Date/Time: 7/22/2019 11:25 AM  Consent given by: patient  Site marked: site marked  Supporting Documentation  Indications: pain and joint swelling   Procedure Details  Location: shoulder - R glenohumeral  Preparation: Patient was prepped and draped in the usual sterile fashion  Needle size: 22 G  Ultrasound guidance: no  Approach: posterior  Medications administered: 4 mL lidocaine 1 %; 40 mg triamcinolone acetonide 40 mg/mL    Patient tolerance: patient tolerated the procedure well with no immediate complications  Dressing:  Sterile dressing applied

## 2019-07-29 ENCOUNTER — EVALUATION (OUTPATIENT)
Dept: PHYSICAL THERAPY | Facility: CLINIC | Age: 59
End: 2019-07-29
Payer: COMMERCIAL

## 2019-07-29 DIAGNOSIS — M75.01 ADHESIVE CAPSULITIS OF RIGHT SHOULDER: ICD-10-CM

## 2019-07-29 DIAGNOSIS — M25.511 ACUTE PAIN OF RIGHT SHOULDER: ICD-10-CM

## 2019-07-29 PROCEDURE — 97112 NEUROMUSCULAR REEDUCATION: CPT | Performed by: PHYSICAL THERAPIST

## 2019-07-29 PROCEDURE — 97140 MANUAL THERAPY 1/> REGIONS: CPT | Performed by: PHYSICAL THERAPIST

## 2019-07-29 PROCEDURE — 97161 PT EVAL LOW COMPLEX 20 MIN: CPT | Performed by: PHYSICAL THERAPIST

## 2019-07-29 NOTE — LETTER
2019    [facility] [id number]    Patient: Hemal Emerson   YOB: 1960   Date of Visit: 2019     Encounter Diagnosis     ICD-10-CM    1  Acute pain of right shoulder M25 511 Ambulatory referral to Physical Therapy   2  Adhesive capsulitis of right shoulder M75 01 Ambulatory referral to Physical Therapy       Dear Dr Jose Galeana: Thank you for your recent referral of Hemal Emerson  Please review the attached evaluation summary from Michelle's recent visit  Please verify that you agree with the plan of care by signing the attached order  If you have any questions or concerns, please do not hesitate to call  I sincerely appreciate the opportunity to share in the care of one of your patients and hope to have another opportunity to work with you in the near future  Sincerely,    Juliana Spencer, PT      Referring Provider:      I certify that I have read the below Plan of Care and certify the need for these services furnished under this plan of treatment while under my care  DO Enrrique Membreno [id number]  [facility] [place]          PT Evaluation     Today's date: 2019  Patient name: Hemal Emerson  : 1960  MRN: [id number]  Referring provider: Gloria Garcia DO  Dx:   Encounter Diagnosis     ICD-10-CM    1  Acute pain of right shoulder M25 511 Ambulatory referral to Physical Therapy   2  Adhesive capsulitis of right shoulder M75 01 Ambulatory referral to Physical Therapy                  Assessment  Assessment details: Patient presents with right UE shoulder pain started 1 week ago  Had injection  And pain has decreased  Per MD adhesive capsulitis  No capsular pattern noted  Pain with IR aand tenderness at [facility] joint  Also trigger points in the hand and forearm    Patient instructed in HEP shoulder add and taping to decrease AC joint dysfunction  Patient can benefit from skilled PT to decrease pain and increase function  Impairments: poor posture     Goals  3 weeks  No pain with reaching to 120 degrees  Able to put jacket without pain  6 weeks  No pain with using computer mouse for 4-6 hours  Independent with HEP      Plan  Planned therapy interventions: manual therapy, strengthening, stretching and home exercise program  Frequency: 2x week  Duration in weeks: 6        Subjective Evaluation    History of Present Illness  Mechanism of injury: Increased right shoulder pain started 1 week ago at night  ER visit      Pain  Current pain ratin  At best pain ratin  At worst pain rating: 10  Quality: knife-like and dull ache  Aggravating factors: overhead activity    Treatments  Previous treatment: injection treatment  Patient Goals  Patient goals for therapy: decreased pain and return to sport/leisure activities          Objective     Active Range of Motion     Right Shoulder   Flexion: 150 degrees   Abduction: 130 degrees   External rotation 0°:  85 degrees   Internal rotation 0°:  50 degrees     Passive Range of Motion     Right Shoulder   Flexion: 160 degrees   Abduction: 150 degrees   External rotation 0°:  90 degrees   Internal rotation 0°:  50 degrees     Strength/Myotome Testing     Right Shoulder   Normal muscle strength             Precautions: Lymphedema right UE - no heat      Manual              MFR/ROM 10'                                                                    Exercise Diary              HEP stretching 15            shld add HEP            Pulleys to 90             UBE              grippers             Wrist 3-ways             Prone IT             laurie ball 4-ways             BTB rows 5 sec                                                                                                                                                                Modalities

## 2019-07-29 NOTE — PROGRESS NOTES
PT Evaluation     Today's date: 2019  Patient name: Marilynn Head  : 1960  MRN: [id number]  Referring provider: Cristo Marinelli DO  Dx:   Encounter Diagnosis     ICD-10-CM    1  Acute pain of right shoulder M25 511 Ambulatory referral to Physical Therapy   2  Adhesive capsulitis of right shoulder M75 01 Ambulatory referral to Physical Therapy                  Assessment  Assessment details: Patient presents with right UE shoulder pain started 1 week ago  Had injection  And pain has decreased  Per MD adhesive capsulitis  No capsular pattern noted  Pain with IR aand tenderness at [facility] joint  Also trigger points in the hand and forearm  Patient instructed in HEP shoulder add and taping to decrease AC joint dysfunction  Patient can benefit from skilled PT to decrease pain and increase function  Impairments: poor posture     Goals  3 weeks  No pain with reaching to 120 degrees  Able to put jacket without pain  6 weeks  No pain with using computer mouse for 4-6 hours  Independent with HEP      Plan  Planned therapy interventions: manual therapy, strengthening, stretching and home exercise program  Frequency: 2x week  Duration in weeks: 6        Subjective Evaluation    History of Present Illness  Mechanism of injury: Increased right shoulder pain started 1 week ago at night  ER visit      Pain  Current pain ratin  At best pain ratin  At worst pain rating: 10  Quality: knife-like and dull ache  Aggravating factors: overhead activity    Treatments  Previous treatment: injection treatment  Patient Goals  Patient goals for therapy: decreased pain and return to sport/leisure activities          Objective     Active Range of Motion     Right Shoulder   Flexion: 150 degrees   Abduction: 130 degrees   External rotation 0°: 85 degrees   Internal rotation 0°: 50 degrees     Passive Range of Motion     Right Shoulder   Flexion: 160 degrees   Abduction: 150 degrees   External rotation 0°: 90 degrees Internal rotation 0°: 50 degrees     Strength/Myotome Testing     Right Shoulder   Normal muscle strength             Precautions: Lymphedema right UE - no heat      Manual  7/29            MFR/ROM 10'                                                                    Exercise Diary  7/29            HEP stretching 15            shld add HEP            Pulleys to 90             UBE              grippers             Wrist 3-ways             Prone IT             laurie ball 4-ways             BTB rows 5 sec                                                                                                                                                                Modalities

## 2019-08-01 ENCOUNTER — OFFICE VISIT (OUTPATIENT)
Dept: PHYSICAL THERAPY | Facility: CLINIC | Age: 59
End: 2019-08-01
Payer: COMMERCIAL

## 2019-08-01 DIAGNOSIS — M75.01 ADHESIVE CAPSULITIS OF RIGHT SHOULDER: ICD-10-CM

## 2019-08-01 DIAGNOSIS — M25.511 ACUTE PAIN OF RIGHT SHOULDER: Primary | ICD-10-CM

## 2019-08-01 PROCEDURE — 97110 THERAPEUTIC EXERCISES: CPT | Performed by: PHYSICAL THERAPIST

## 2019-08-01 PROCEDURE — 97140 MANUAL THERAPY 1/> REGIONS: CPT | Performed by: PHYSICAL THERAPIST

## 2019-08-01 PROCEDURE — 97112 NEUROMUSCULAR REEDUCATION: CPT | Performed by: PHYSICAL THERAPIST

## 2019-08-01 NOTE — PROGRESS NOTES
Daily Note     Today's date: 2019  Patient name: Marilynn Head  : 1960  MRN: [id number]  Referring provider: Cristo Marinelli DO  Dx:   Encounter Diagnosis     ICD-10-CM    1  Acute pain of right shoulder M25 511    2  Adhesive capsulitis of right shoulder M75 01                   Subjective: Pt reports shoulder is feeling better  Objective: See treatment diary below    Assessment: Tolerated treatment well  Patient completed therex within restricted ROM with no pain  Tape reapplied  Added more laurie ball  IASTM to right shoulder UT to decrease pain and tightness  Plan: Continue per plan of care        Precautions: Lymphedema right UE - no heat      Manual             MFR/ROM 10'            IASTM  10'                                                      Exercise Diary             HEP stretching 15            shld add HEP            Pulleys to 90  30           UBE   3/2           grippers             Wrist 3-ways  20 3#           Prone IT             laurie ball 4-ways  20x           BTB rows 5 sec  5 sec  20x           BS rows  20                                                                                                                                                 Modalities              NONE Lymph

## 2019-08-06 ENCOUNTER — APPOINTMENT (OUTPATIENT)
Dept: PHYSICAL THERAPY | Facility: CLINIC | Age: 59
End: 2019-08-06
Payer: COMMERCIAL

## 2019-10-11 ENCOUNTER — ANESTHESIA EVENT (OUTPATIENT)
Dept: GASTROENTEROLOGY | Facility: AMBULARY SURGERY CENTER | Age: 59
End: 2019-10-11

## 2019-10-25 ENCOUNTER — HOSPITAL ENCOUNTER (OUTPATIENT)
Dept: GASTROENTEROLOGY | Facility: AMBULARY SURGERY CENTER | Age: 59
Setting detail: OUTPATIENT SURGERY
Discharge: HOME | End: 2019-10-25
Attending: COLON & RECTAL SURGERY
Payer: COMMERCIAL

## 2019-10-25 ENCOUNTER — ANESTHESIA (OUTPATIENT)
Dept: GASTROENTEROLOGY | Facility: AMBULARY SURGERY CENTER | Age: 59
End: 2019-10-25

## 2019-10-25 VITALS
HEART RATE: 69 BPM | TEMPERATURE: 97 F | SYSTOLIC BLOOD PRESSURE: 113 MMHG | BODY MASS INDEX: 32.6 KG/M2 | DIASTOLIC BLOOD PRESSURE: 56 MMHG | OXYGEN SATURATION: 94 % | HEIGHT: 63 IN | WEIGHT: 184 LBS | RESPIRATION RATE: 16 BRPM

## 2019-10-25 DIAGNOSIS — Z12.11 COLON CANCER SCREENING: ICD-10-CM

## 2019-10-25 LAB — GLUCOSE SERPL-MCNC: 139 MG/DL (ref 65–140)

## 2019-10-25 PROCEDURE — 82948 REAGENT STRIP/BLOOD GLUCOSE: CPT

## 2019-10-25 PROCEDURE — G0121 COLON CA SCRN NOT HI RSK IND: HCPCS | Performed by: COLON & RECTAL SURGERY

## 2019-10-25 PROCEDURE — 99213 OFFICE O/P EST LOW 20 MIN: CPT | Performed by: COLON & RECTAL SURGERY

## 2019-10-25 RX ORDER — PROPOFOL 10 MG/ML
INJECTION, EMULSION INTRAVENOUS AS NEEDED
Status: DISCONTINUED | OUTPATIENT
Start: 2019-10-25 | End: 2019-10-25 | Stop reason: SURG

## 2019-10-25 RX ORDER — SODIUM CHLORIDE, SODIUM LACTATE, POTASSIUM CHLORIDE, CALCIUM CHLORIDE 600; 310; 30; 20 MG/100ML; MG/100ML; MG/100ML; MG/100ML
125 INJECTION, SOLUTION INTRAVENOUS CONTINUOUS
Status: DISCONTINUED | OUTPATIENT
Start: 2019-10-25 | End: 2019-10-29 | Stop reason: HOSPADM

## 2019-10-25 RX ADMIN — SODIUM CHLORIDE, SODIUM LACTATE, POTASSIUM CHLORIDE, AND CALCIUM CHLORIDE 125 ML/HR: .6; .31; .03; .02 INJECTION, SOLUTION INTRAVENOUS at 07:32

## 2019-10-25 RX ADMIN — PROPOFOL 50 MG: 10 INJECTION, EMULSION INTRAVENOUS at 08:08

## 2019-10-25 RX ADMIN — PROPOFOL 30 MG: 10 INJECTION, EMULSION INTRAVENOUS at 08:12

## 2019-10-25 RX ADMIN — PROPOFOL 80 MG: 10 INJECTION, EMULSION INTRAVENOUS at 08:07

## 2019-10-25 RX ADMIN — SODIUM CHLORIDE, SODIUM LACTATE, POTASSIUM CHLORIDE, AND CALCIUM CHLORIDE: .6; .31; .03; .02 INJECTION, SOLUTION INTRAVENOUS at 08:03

## 2019-10-25 RX ADMIN — PROPOFOL 30 MG: 10 INJECTION, EMULSION INTRAVENOUS at 08:11

## 2019-10-25 RX ADMIN — PROPOFOL 50 MG: 10 INJECTION, EMULSION INTRAVENOUS at 08:10

## 2019-10-25 NOTE — ANESTHESIA POSTPROCEDURE EVALUATION
Post-Op Assessment Note    CV Status:  Stable  Pain Score: 0    Pain management: adequate     Mental Status:  Alert and awake   Hydration Status:  Euvolemic   PONV Controlled:  Controlled   Airway Patency:  Patent   Post Op Vitals Reviewed: Yes      Staff: Anesthesiologist, CRNA           BP   103/52   Temp     Pulse  76   Resp   14   SpO2   95

## 2019-10-25 NOTE — H&P
History and Physical   Colon and Rectal Surgery   Camryn Karimi 62 y o  female MRN: [id number]  Unit/Bed#:  Encounter: [id number]  10/25/19   @NOW    No chief complaint on file  History of Present Illness   HPI:  Camryn Karimi is a 62 y o  female who presents for screening colonoscopy  Denies any current symptoms    No significant family history      Historical Information   Past Medical History:   Diagnosis Date    [facility]     Diabetes mellitus ([facility] )     type 2    Diverticulitis     Murmur, cardiac      Past Surgical History:   Procedure Laterality Date    APPENDECTOMY      BREAST LUMPECTOMY      CHOLECYSTECTOMY      HYSTERECTOMY      KNEE ARTHROSCOPY Right     knee    TONSILLECTOMY         Meds/Allergies       (Not in a hospital admission)      Current Outpatient Medications:     amLODIPine (NORVASC) 2 5 mg tablet, Take 1 tablet (2 5 mg total) by mouth daily, Disp: 30 tablet, Rfl: 11    Cholecalciferol (VITAMIN D3) 1000 UNITS CAPS, Take 2,000 Units by mouth daily, Disp: , Rfl:     Cyanocobalamin (VITAMIN B12 PO), Take 2,500 mcg by mouth daily, Disp: , Rfl:     Empagliflozin-metFORMIN HCl ER (SYNJARDY XR)  MG TB24, Take 1 tablet by mouth 2 (two) times a day, Disp: , Rfl:     glimepiride (AMARYL) 2 mg tablet, Take 2 mg by mouth 2 (two) times a day, Disp: , Rfl:     Icosapent Ethyl (VASCEPA PO), Take 2 capsules by mouth daily, Disp: , Rfl:     Insulin Glargine (TOUJEO) 300 units/mL CONCETRATED U-300 injection pen, Inject 30 Units under the skin daily at bedtime , Disp: , Rfl:     multivitamin (THERAGRAN) TABS, Take 1 tablet by mouth daily, Disp: , Rfl:     pravastatin (PRAVACHOL) 10 mg tablet, Take 10 mg by mouth daily, Disp: , Rfl:     pyridoxine (VITAMIN B6) 100 mg tablet, Take 100 mg by mouth daily, Disp: , Rfl:     aspirin 81 mg chewable tablet, Chew 81 mg daily, Disp: , Rfl:     metoprolol tartrate (LOPRESSOR) 50 mg tablet, Take 25 mg by mouth every 12 (twelve) hours, Disp: , Rfl:     naproxen (NAPROSYN) 500 mg tablet, Take 1 tablet (500 mg total) by mouth every 12 (twelve) hours as needed for mild pain or moderate pain, Disp: 20 tablet, Rfl: 0    Current Facility-Administered Medications:     lactated ringers infusion, 125 mL/hr, Intravenous, Continuous, Paulina Pink MD, Last Rate: 125 mL/hr at 10/25/19 0732, 125 mL/hr at 10/25/19 0732    Allergies   Allergen Reactions    Fentanyl Rash    Niaspan [Niacin Er] Rash         Social History   Social History     Substance and Sexual Activity   Alcohol Use No     Social History     Substance and Sexual Activity   Drug Use No     Social History     Tobacco Use   Smoking Status Never Smoker   Smokeless Tobacco Never Used         Family History:   Family History   Problem Relation Age of Onset    COPD Mother     Heart disease Father     Heart disease Paternal Grandmother     Cancer Family          Objective     Current Vitals:   Blood Pressure: 131/64 (10/25/19 0725)  Pulse: 89 (10/25/19 0725)  Temperature: (!) 96 2 °F (35 7 °C) (10/25/19 0725)  Temp Source: Temporal (10/25/19 0725)  Respirations: 18 (10/25/19 0725)  Height: 5' 3" (160 cm) (10/25/19 0725)  Weight - Scale: 83 5 kg (184 lb) (10/25/19 0725)  SpO2: 96 % (10/25/19 0725)  No intake or output data in the 24 hours ending 10/25/19 0736    Physical Exam:  General:  Resting comfortably in bed   Eyes:Sclera anicteric  ENT: Trachea midline  Pulm:  Symmetric chest raise  No respiratory Distress  CV:  Regular on monitor  Abdomen:  Soft NT ND  Extremities:  No clubbing/ cyanosis/ edema    Lab Results: I have personally reviewed pertinent lab results  Imaging: I have personally reviewed pertinent reports  ASSESSMENT:  Maria Isabel Lawrence is a 62 y o  female who presents for outpatient colonoscopy        PLAN:  For colonoscopy    Risks/ Benefits reviewed to include but not limited to anesthesia, bleeding, missed lesions, and colonoscopic perforation requiring surgery

## 2019-10-25 NOTE — ANESTHESIA PREPROCEDURE EVALUATION
Review of Systems/Medical History  Patient summary reviewed  Chart reviewed  No history of anesthetic complications     Cardiovascular  Exercise tolerance (METS): >4,  Hyperlipidemia, Hypertension , Valvular heart disease (mild AS) , H/O Heart Murmur and aortic valve stenosis, No angina , No ZAMARRIPA,    Pulmonary  Not a smoker , No shortness of breath, No recent URI ,        GI/Hepatic    Bowel prep            Endo/Other  Diabetes type 2 Insulin,   Obesity    GYN    Breast cancer Right mastectomy and axillary node dissection       Hematology   Musculoskeletal    Arthritis     Neurology   Psychology           Physical Exam    Airway    Mallampati score: III  TM Distance: >3 FB  Neck ROM: full     Dental   upper dentures,     Cardiovascular  Cardiovascular exam normal    Pulmonary  Pulmonary exam normal     Other Findings        Anesthesia Plan  ASA Score- 3     Anesthesia Type- IV sedation with anesthesia with ASA Monitors  Additional Monitors:   Airway Plan:         Plan Factors-    Induction- intravenous  Postoperative Plan-     Informed Consent- Anesthetic plan and risks discussed with patient  I personally reviewed this patient with the CRNA  Discussed and agreed on the Anesthesia Plan with the CRNA  [place]

## 2019-12-11 ENCOUNTER — HOSPITAL ENCOUNTER (OUTPATIENT)
Dept: NON INVASIVE DIAGNOSTICS | Facility: CLINIC | Age: 59
Discharge: HOME | End: 2019-12-11
Payer: COMMERCIAL

## 2019-12-11 DIAGNOSIS — I35.0 AORTIC STENOSIS, MODERATE: ICD-10-CM

## 2019-12-11 PROCEDURE — 93306 TTE W/DOPPLER COMPLETE: CPT

## 2019-12-23 ENCOUNTER — TELEPHONE (OUTPATIENT)
Dept: CARDIOLOGY CLINIC | Facility: CLINIC | Age: 59
End: 2019-12-23

## 2019-12-24 NOTE — TELEPHONE ENCOUNTER
Let her know echo looks similar to old one  Aortic valve thickening is moderate same as before    1 year followup echo

## 2019-12-31 ENCOUNTER — OFFICE VISIT (OUTPATIENT)
Dept: URGENT CARE | Facility: CLINIC | Age: 59
End: 2019-12-31
Payer: COMMERCIAL

## 2019-12-31 ENCOUNTER — APPOINTMENT (OUTPATIENT)
Dept: RADIOLOGY | Facility: CLINIC | Age: 59
End: 2019-12-31
Payer: COMMERCIAL

## 2019-12-31 VITALS
TEMPERATURE: 98.7 F | OXYGEN SATURATION: 95 % | SYSTOLIC BLOOD PRESSURE: 160 MMHG | RESPIRATION RATE: 18 BRPM | DIASTOLIC BLOOD PRESSURE: 98 MMHG | HEIGHT: 63 IN | HEART RATE: 72 BPM | BODY MASS INDEX: 32.6 KG/M2 | WEIGHT: 184 LBS

## 2019-12-31 DIAGNOSIS — M79.645 PAIN OF FINGER OF LEFT HAND: ICD-10-CM

## 2019-12-31 DIAGNOSIS — M79.645 PAIN OF FINGER OF LEFT HAND: Primary | ICD-10-CM

## 2019-12-31 PROCEDURE — 99213 OFFICE O/P EST LOW 20 MIN: CPT | Performed by: PHYSICIAN ASSISTANT

## 2019-12-31 PROCEDURE — 73140 X-RAY EXAM OF FINGER(S): CPT

## 2019-12-31 NOTE — PROGRESS NOTES
[facility] [facility]        NAME: Camryn Karimi is a 62 y o  female  : 1960    MRN: [id number]  DATE: 2019  TIME: 12:24 PM    Assessment and Plan   Pain of finger of left hand [M79 645]  1  Pain of finger of left hand  XR finger left fourth digit-ring         Patient Instructions     Patient Instructions   1  Finger pain/swelling  -Xray shows arthritis  No acute fracture seen  If the radiology read differs I will call  You  -Do RICE protocol at home (rest, ice, compression, elevate)  -Use tylenol/motrin as needed  -Follow-up with PCP within 1 week if no improvement    Go to ER with worsening symptoms, worsening pain, or any signs of distress     Follow up with PCP in 3-5 days  Proceed to  ER if symptoms worsen  Chief Complaint     Chief Complaint   Patient presents with    Hand Pain     left ring finger pain that started yesterday morning, no injury          History of Present Illness       Patient is a 58-year-old female who presents today for evaluation of left 4th finger pain that started yesterday morning  Patient states that this morning she knows that her finger was swollen and she had trouble getting her rings off  She states that is  to the touch and she rates her pain as an 8/10 today  She is also having trouble bending her finger  She states that she is unsure of any injury or trauma however she states that on  she was doing a lot of cooking head is unsure if she may be injured it somehow  No fevers or chills  No numbness or tingling  Review of Systems   Review of Systems   Constitutional: Negative for chills and fever  Respiratory: Negative for shortness of breath  Cardiovascular: Negative for chest pain  Musculoskeletal: Positive for joint swelling  Skin: Negative for color change and rash  Neurological: Negative for weakness and numbness  All other systems reviewed and are negative          Current Medications       Current Outpatient Medications:     aspirin 81 mg chewable tablet, Chew 81 mg daily, Disp: , Rfl:     Cholecalciferol (VITAMIN D3) 1000 UNITS CAPS, Take 2,000 Units by mouth daily, Disp: , Rfl:     Cyanocobalamin (VITAMIN B12 PO), Take 2,500 mcg by mouth daily, Disp: , Rfl:     Empagliflozin-metFORMIN HCl ER (SYNJARDY XR)  MG TB24, Take 1 tablet by mouth 2 (two) times a day, Disp: , Rfl:     glimepiride (AMARYL) 2 mg tablet, Take 2 mg by mouth 2 (two) times a day, Disp: , Rfl:     Icosapent Ethyl (VASCEPA PO), Take 2 capsules by mouth daily, Disp: , Rfl:     Insulin Glargine (TOUJEO) 300 units/mL CONCETRATED U-300 injection pen, Inject 30 Units under the skin daily at bedtime , Disp: , Rfl:     multivitamin (THERAGRAN) TABS, Take 1 tablet by mouth daily, Disp: , Rfl:     pravastatin (PRAVACHOL) 10 mg tablet, Take 10 mg by mouth daily, Disp: , Rfl:     pyridoxine (VITAMIN B6) 100 mg tablet, Take 100 mg by mouth daily, Disp: , Rfl:     amLODIPine (NORVASC) 2 5 mg tablet, Take 1 tablet (2 5 mg total) by mouth daily (Patient not taking: Reported on 12/31/2019), Disp: 30 tablet, Rfl: 11    metoprolol tartrate (LOPRESSOR) 50 mg tablet, Take 25 mg by mouth every 12 (twelve) hours, Disp: , Rfl:     naproxen (NAPROSYN) 500 mg tablet, Take 1 tablet (500 mg total) by mouth every 12 (twelve) hours as needed for mild pain or moderate pain (Patient not taking: Reported on 12/31/2019), Disp: 20 tablet, Rfl: 0    Current Allergies     Allergies as of 12/31/2019 - Reviewed 12/31/2019   Allergen Reaction Noted    Fentanyl Rash 06/05/2019    Niaspan [niacin er] Rash 10/26/2016            The following portions of the patient's history were reviewed and updated as appropriate: allergies, current medications, past family history, past medical history, past social history, past surgical history and problem list      Past Medical History:   Diagnosis Date    [facility]     Diabetes mellitus ([facility] )     type 2    Diverticulitis     Murmur, cardiac        Past Surgical History:   Procedure Laterality Date    APPENDECTOMY      BREAST LUMPECTOMY      CHOLECYSTECTOMY      HYSTERECTOMY      KNEE ARTHROSCOPY Right     knee    TONSILLECTOMY         Family History   Problem Relation Age of Onset    COPD Mother     Heart disease Father     Heart disease Paternal Grandmother     Cancer Family          Medications have been verified  Objective   /98 (BP Location: Left arm, Patient Position: Sitting, Cuff Size: Standard)   Pulse 72   Temp 98 7 °F (37 1 °C) (Temporal)   Resp 18   Ht 5' 3" (1 6 m)   Wt 83 5 kg (184 lb)   SpO2 95%   BMI 32 59 kg/m²        Physical Exam     Physical Exam   Constitutional: She is oriented to person, place, and time  She appears well-developed and well-nourished  No distress  Cardiovascular: Normal rate  Murmur heard  Systolic murmur is present with a grade of 3/6  Pulmonary/Chest: Effort normal and breath sounds normal    Musculoskeletal:        Hands:  Neurological: She is alert and oriented to person, place, and time  She has normal strength  No sensory deficit  Skin: Skin is warm and dry  Capillary refill takes less than 2 seconds  Psychiatric: She has a normal mood and affect  Nursing note and vitals reviewed

## 2019-12-31 NOTE — PATIENT INSTRUCTIONS
1  Finger pain/swelling  -Xray shows arthritis  No acute fracture seen   If the radiology read differs I will call  You  -Do RICE protocol at home (rest, ice, compression, elevate)  -Use tylenol/motrin as needed  -Follow-up with PCP within 1 week if no improvement    Go to ER with worsening symptoms, worsening pain, or any signs of distress

## 2020-02-05 ENCOUNTER — OFFICE VISIT (OUTPATIENT)
Dept: CARDIOLOGY CLINIC | Facility: CLINIC | Age: 60
End: 2020-02-05
Payer: COMMERCIAL

## 2020-02-05 VITALS
WEIGHT: 187.1 LBS | DIASTOLIC BLOOD PRESSURE: 82 MMHG | BODY MASS INDEX: 33.15 KG/M2 | OXYGEN SATURATION: 97 % | SYSTOLIC BLOOD PRESSURE: 144 MMHG | HEART RATE: 91 BPM | HEIGHT: 63 IN

## 2020-02-05 DIAGNOSIS — I35.0 AORTIC STENOSIS, MODERATE: Primary | ICD-10-CM

## 2020-02-05 DIAGNOSIS — E78.2 MIXED HYPERLIPIDEMIA: ICD-10-CM

## 2020-02-05 DIAGNOSIS — I10 ESSENTIAL HYPERTENSION: ICD-10-CM

## 2020-02-05 DIAGNOSIS — R00.2 HEART PALPITATIONS: ICD-10-CM

## 2020-02-05 PROCEDURE — 99214 OFFICE O/P EST MOD 30 MIN: CPT | Performed by: INTERNAL MEDICINE

## 2020-02-05 RX ORDER — AMLODIPINE BESYLATE 5 MG/1
5 TABLET ORAL DAILY
Qty: 90 TABLET | Refills: 3 | Status: SHIPPED | OUTPATIENT
Start: 2020-02-05 | End: 2020-10-14 | Stop reason: SDUPTHER

## 2020-02-05 NOTE — PROGRESS NOTES
Nathaniel Julien  1960  [id number]  [facility] MARY Zhong  [phone]    1  Aortic stenosis, moderate     2  Essential hypertension  amLODIPine (NORVASC) 5 mg tablet   3  Heart palpitations     4  Mixed hyperlipidemia         Discussion/Summary:  Overall she has been doing well since her last visit  Aortic stenosis is moderate on recent echocardiogram   Follow-up yearly  Blood pressure remains a little elevated I have increased amlodipine to 5 mg a day  Lipids have been doing well  Palpitations have resolved  No further testing I will see her back in 8 months  Interval History:  53-year-old female with a history of moderate aortic stenosis, hypertension, hyperlipidemia, diabetes, statin intolerance presents to establish care with my practice  She is transitioning from a prior cardiologist who is retiring  Overall she has been doing well from a cardiac standpoint  She has good functional capacity and works long days  She denies any exertional chest pressure heaviness  Overall she has been doing well  She denies any chest pain, shortness of breath, palpitations, lightheadedness, dizziness, or syncope  There has been no lower extremity edema, PND, orthopnea  She does have some night sweats and is undergoing thyroid evaluation with biopsy  Problem List     Aortic stenosis, moderate    Essential hypertension    Mixed hyperlipidemia    Type 2 diabetes mellitus with complication, without long-term current use of insulin (HCC)    No results found for: HGBA1C    No results for input(s): POCGLU in the last 72 hours      Blood Sugar Average: Last 72 hrs:          Heart palpitations        Past Medical History:   Diagnosis Date    [facility] [address] [facility]     [facility]     type 2    Diverticulitis     Murmur, cardiac      Social History     Socioeconomic History    Marital status: /Civil Union Spouse name: Not on file    Number of children: Not on file    Years of education: Not on file    Highest education level: Not on file   Occupational History    Not on file   Social Needs    Financial resource strain: Not on file    Food insecurity:     Worry: Not on file     Inability: Not on file    Transportation needs:     Medical: Not on file     Non-medical: Not on file   Tobacco Use    Smoking status: Never Smoker    Smokeless tobacco: Never Used   Substance and Sexual Activity    Alcohol use: No    Drug use: No    Sexual activity: Yes     Partners: Male   Lifestyle    Physical activity:     Days per week: Not on file     Minutes per session: Not on file    Stress: Not on file   Relationships    Social connections:     Talks on phone: Not on file     Gets together: Not on file     Attends [employer] service: Not on file     Active member of club or organization: Not on file     Attends meetings of clubs or organizations: Not on file     Relationship status: Not on file    Intimate partner violence:     Fear of current or ex partner: Not on file     Emotionally abused: Not on file     Physically abused: Not on file     Forced sexual activity: Not on file   Other Topics Concern    Not on file   Social History Narrative    Not on file      Family History   Problem Relation Age of Onset    COPD Mother     Heart disease Father     Heart disease Paternal Grandmother     Cancer Family      Past Surgical History:   Procedure Laterality Date    APPENDECTOMY      BREAST LUMPECTOMY      CHOLECYSTECTOMY      HYSTERECTOMY      KNEE ARTHROSCOPY Right     knee    TONSILLECTOMY         Current Outpatient Medications:     amLODIPine (NORVASC) 5 mg tablet, Take 1 tablet (5 mg total) by mouth daily, Disp: 90 tablet, Rfl: 3    aspirin 81 mg chewable tablet, Chew 81 mg daily, Disp: , Rfl:     Cholecalciferol (VITAMIN D3) 1000 UNITS CAPS, Take 2,000 Units by mouth daily, Disp: , Rfl:    Cyanocobalamin (VITAMIN B12 PO), Take 2,500 mcg by mouth daily, Disp: , Rfl:     Empagliflozin-metFORMIN HCl ER (SYNJARDY XR)  MG TB24, Take 1 tablet by mouth daily , Disp: , Rfl:     glimepiride (AMARYL) 2 mg tablet, Take 2 mg by mouth 2 (two) times a day, Disp: , Rfl:     Icosapent Ethyl (VASCEPA PO), Take 2 capsules by mouth daily, Disp: , Rfl:     Insulin Glargine (TOUJEO) 300 units/mL CONCETRATED U-300 injection pen, Inject 40 Units under the skin daily at bedtime , Disp: , Rfl:     multivitamin (THERAGRAN) TABS, Take 1 tablet by mouth daily, Disp: , Rfl:     pravastatin (PRAVACHOL) 10 mg tablet, Take 10 mg by mouth daily, Disp: , Rfl:     pyridoxine (VITAMIN B6) 100 mg tablet, Take 100 mg by mouth daily, Disp: , Rfl:     metoprolol tartrate (LOPRESSOR) 50 mg tablet, Take 25 mg by mouth every 12 (twelve) hours, Disp: , Rfl:     naproxen (NAPROSYN) 500 mg tablet, Take 1 tablet (500 mg total) by mouth every 12 (twelve) hours as needed for mild pain or moderate pain (Patient not taking: Reported on 12/31/2019), Disp: 20 tablet, Rfl: 0  Allergies   Allergen Reactions    Fentanyl Rash    Niaspan [Niacin Er] Rash       Labs:     Chemistry    No results found for: NA, K, CL, CO2, BUN, CREATININE No results found for: CALCIUM, ALKPHOS, AST, ALT, BILITOT         No results found for: CHOL  No results found for: HDL  No results found for: LDLCALC  No results found for: TRIG  No results found for: CHOLHDL    Imaging: No results found  ECG:  Sinus rhythm nonspecific T-wave changes      Review of Systems   Constitution: Negative  HENT: Negative  Eyes: Negative  Cardiovascular: Positive for palpitations  Negative for chest pain  Respiratory: Negative  Endocrine: Negative  Hematologic/Lymphatic: Negative  Skin: Negative  Musculoskeletal: Negative  Gastrointestinal: Negative  Genitourinary: Negative  Neurological: Negative  Psychiatric/Behavioral: Negative          Vitals: 02/05/20 1655   BP: 144/82   Pulse: 91   SpO2: 97%     Vitals:    02/05/20 1655   Weight: 84 9 kg (187 lb 1 6 oz)     Height: 5' 3" (160 cm)   Body mass index is 33 14 kg/m²  Physical Exam:  Vital signs reviewed  General:  Alert and cooperative, appears stated age, no acute distress  HEENT:  PERRLA, EOMI, no scleral icterus, no conjunctival pallor  Neck:  No lymphadenopathy, no thyromegaly, no carotid bruits, no elevated JVP  Heart:  Regular rate and rhythm, normal S1/S2, no G1/A6,   To a 6 systolic ejection murmur, rubs or gallops  PMI nondisplaced  Lungs:  Clear to auscultation bilaterally, no wheezes rales or rhonchi  Abdomen:  Soft, non-tender, positive bowel sounds, no rebound or guarding,   no organomegaly   Extremities:  Normal range of motion    No clubbing, cyanosis or edema   Vascular:  2+ pedal pulses  Skin:  No rashes or lesions on exposed skin  Neurologic:  Cranial nerves II-XII grossly intact without focal deficits

## 2020-12-08 ENCOUNTER — HOSPITAL ENCOUNTER (OUTPATIENT)
Dept: NON INVASIVE DIAGNOSTICS | Facility: CLINIC | Age: 60
Discharge: HOME | End: 2020-12-08
Payer: COMMERCIAL

## 2020-12-08 DIAGNOSIS — I35.0 AORTIC STENOSIS, MODERATE: ICD-10-CM

## 2020-12-08 PROCEDURE — 93306 TTE W/DOPPLER COMPLETE: CPT | Performed by: INTERNAL MEDICINE

## 2020-12-08 PROCEDURE — 93306 TTE W/DOPPLER COMPLETE: CPT

## 2021-07-20 ENCOUNTER — APPOINTMENT (EMERGENCY)
Dept: CT IMAGING | Facility: HOSPITAL | Age: 61
End: 2021-07-20
Payer: COMMERCIAL

## 2021-07-20 ENCOUNTER — HOSPITAL ENCOUNTER (EMERGENCY)
Facility: HOSPITAL | Age: 61
Discharge: HOME | End: 2021-07-21
Attending: EMERGENCY MEDICINE | Admitting: EMERGENCY MEDICINE
Payer: COMMERCIAL

## 2021-07-20 DIAGNOSIS — K57.92 DIVERTICULITIS: Primary | ICD-10-CM

## 2021-07-20 LAB
ALBUMIN SERPL BCP-MCNC: 3.7 G/DL (ref 3.5–5)
ALP SERPL-CCNC: 127 U/L (ref 46–116)
ALT SERPL W P-5'-P-CCNC: 26 U/L (ref 12–78)
ANION GAP SERPL CALCULATED.3IONS-SCNC: 9 MMOL/L (ref 4–13)
AST SERPL W P-5'-P-CCNC: 14 U/L (ref 5–45)
BASOPHILS # BLD AUTO: 0.06 THOUSANDS/ΜL (ref 0–0.1)
BASOPHILS NFR BLD AUTO: 1 % (ref 0–1)
BILIRUB SERPL-MCNC: 0.44 MG/DL (ref 0.2–1)
BILIRUB UR QL STRIP: NEGATIVE
BUN SERPL-MCNC: 14 MG/DL (ref 5–25)
CALCIUM SERPL-MCNC: 8.9 MG/DL (ref 8.3–10.1)
CHLORIDE SERPL-SCNC: 102 MMOL/L (ref 100–108)
CLARITY UR: CLEAR
CO2 SERPL-SCNC: 27 MMOL/L (ref 21–32)
COLOR UR: YELLOW
CREAT SERPL-MCNC: 0.72 MG/DL (ref 0.6–1.3)
EOSINOPHIL # BLD AUTO: 0.22 THOUSAND/ΜL (ref 0–0.61)
EOSINOPHIL NFR BLD AUTO: 2 % (ref 0–6)
ERYTHROCYTE [DISTWIDTH] IN BLOOD BY AUTOMATED COUNT: 13.4 % (ref 11.6–15.1)
GFR SERPL CREATININE-BSD FRML MDRD: 89 ML/MIN/1.73SQ M
GLUCOSE SERPL-MCNC: 148 MG/DL (ref 65–140)
GLUCOSE UR STRIP-MCNC: ABNORMAL MG/DL
HCT VFR BLD AUTO: 40.2 % (ref 34.8–46.1)
HGB BLD-MCNC: 13.1 G/DL (ref 11.5–15.4)
HGB UR QL STRIP.AUTO: ABNORMAL
IMM GRANULOCYTES # BLD AUTO: 0.04 THOUSAND/UL (ref 0–0.2)
IMM GRANULOCYTES NFR BLD AUTO: 0 % (ref 0–2)
KETONES UR STRIP-MCNC: NEGATIVE MG/DL
LEUKOCYTE ESTERASE UR QL STRIP: ABNORMAL
LIPASE SERPL-CCNC: 100 U/L (ref 73–393)
LYMPHOCYTES # BLD AUTO: 2.9 THOUSANDS/ΜL (ref 0.6–4.47)
LYMPHOCYTES NFR BLD AUTO: 27 % (ref 14–44)
MCH RBC QN AUTO: 28.5 PG (ref 26.8–34.3)
MCHC RBC AUTO-ENTMCNC: 32.6 G/DL (ref 31.4–37.4)
MCV RBC AUTO: 88 FL (ref 82–98)
MONOCYTES # BLD AUTO: 0.83 THOUSAND/ΜL (ref 0.17–1.22)
MONOCYTES NFR BLD AUTO: 8 % (ref 4–12)
NEUTROPHILS # BLD AUTO: 6.54 THOUSANDS/ΜL (ref 1.85–7.62)
NEUTS SEG NFR BLD AUTO: 62 % (ref 43–75)
NITRITE UR QL STRIP: NEGATIVE
NRBC BLD AUTO-RTO: 0 /100 WBCS
PH UR STRIP.AUTO: 5.5 [PH]
PLATELET # BLD AUTO: 209 THOUSANDS/UL (ref 149–390)
PMV BLD AUTO: 11 FL (ref 8.9–12.7)
POTASSIUM SERPL-SCNC: 3.5 MMOL/L (ref 3.5–5.3)
PROT SERPL-MCNC: 8.1 G/DL (ref 6.4–8.2)
PROT UR STRIP-MCNC: NEGATIVE MG/DL
RBC # BLD AUTO: 4.59 MILLION/UL (ref 3.81–5.12)
SODIUM SERPL-SCNC: 138 MMOL/L (ref 136–145)
SP GR UR STRIP.AUTO: 1.01 (ref 1–1.03)
UROBILINOGEN UR QL STRIP.AUTO: 0.2 E.U./DL
WBC # BLD AUTO: 10.59 THOUSAND/UL (ref 4.31–10.16)

## 2021-07-20 PROCEDURE — 81001 URINALYSIS AUTO W/SCOPE: CPT | Performed by: EMERGENCY MEDICINE

## 2021-07-20 PROCEDURE — 36415 COLL VENOUS BLD VENIPUNCTURE: CPT | Performed by: EMERGENCY MEDICINE

## 2021-07-20 PROCEDURE — 85025 COMPLETE CBC W/AUTO DIFF WBC: CPT | Performed by: EMERGENCY MEDICINE

## 2021-07-20 PROCEDURE — 80053 COMPREHEN METABOLIC PANEL: CPT | Performed by: EMERGENCY MEDICINE

## 2021-07-20 PROCEDURE — 74177 CT ABD & PELVIS W/CONTRAST: CPT

## 2021-07-20 PROCEDURE — 83690 ASSAY OF LIPASE: CPT | Performed by: EMERGENCY MEDICINE

## 2021-07-20 PROCEDURE — 99284 EMERGENCY DEPT VISIT MOD MDM: CPT

## 2021-07-20 PROCEDURE — 96375 TX/PRO/DX INJ NEW DRUG ADDON: CPT

## 2021-07-20 PROCEDURE — 99284 EMERGENCY DEPT VISIT MOD MDM: CPT | Performed by: EMERGENCY MEDICINE

## 2021-07-20 RX ORDER — ONDANSETRON 2 MG/ML
4 INJECTION INTRAMUSCULAR; INTRAVENOUS ONCE
Status: COMPLETED | OUTPATIENT
Start: 2021-07-20 | End: 2021-07-20

## 2021-07-20 RX ORDER — HYDROMORPHONE HCL/PF 1 MG/ML
0.5 SYRINGE (ML) INJECTION ONCE
Status: COMPLETED | OUTPATIENT
Start: 2021-07-20 | End: 2021-07-20

## 2021-07-20 RX ADMIN — IOHEXOL 100 ML: 350 INJECTION, SOLUTION INTRAVENOUS at 23:47

## 2021-07-20 RX ADMIN — HYDROMORPHONE HYDROCHLORIDE 0.5 MG: 1 INJECTION, SOLUTION INTRAMUSCULAR; INTRAVENOUS; SUBCUTANEOUS at 23:21

## 2021-07-20 RX ADMIN — ONDANSETRON 4 MG: 2 INJECTION INTRAMUSCULAR; INTRAVENOUS at 23:21

## 2021-07-20 NOTE — Clinical Note
Maira Hany was seen and treated in our emergency department on 7/20/2021  Diagnosis:     [employer]  may return to work on return date  She may return on this date: 07/22/2021         If you have any questions or concerns, please don't hesitate to call        Trenton Hopkins RN    ______________________________           _______________          _______________  Hospital Representative                              Date                                Time

## 2021-07-21 VITALS
SYSTOLIC BLOOD PRESSURE: 136 MMHG | HEART RATE: 72 BPM | OXYGEN SATURATION: 94 % | HEIGHT: 63 IN | WEIGHT: 187 LBS | DIASTOLIC BLOOD PRESSURE: 63 MMHG | TEMPERATURE: 98.3 F | BODY MASS INDEX: 33.13 KG/M2 | RESPIRATION RATE: 18 BRPM

## 2021-07-21 LAB
BACTERIA UR QL AUTO: NORMAL /HPF
NON-SQ EPI CELLS URNS QL MICRO: NORMAL /HPF
RBC #/AREA URNS AUTO: NORMAL /HPF
WBC #/AREA URNS AUTO: NORMAL /HPF

## 2021-07-21 PROCEDURE — 96376 TX/PRO/DX INJ SAME DRUG ADON: CPT

## 2021-07-21 PROCEDURE — 96365 THER/PROPH/DIAG IV INF INIT: CPT

## 2021-07-21 RX ORDER — AMOXICILLIN AND CLAVULANATE POTASSIUM 875; 125 MG/1; MG/1
1 TABLET, FILM COATED ORAL EVERY 8 HOURS
Qty: 30 TABLET | Refills: 0 | Status: SHIPPED | OUTPATIENT
Start: 2021-07-21 | End: 2021-07-31

## 2021-07-21 RX ORDER — FLUCONAZOLE 150 MG/1
150 TABLET ORAL ONCE AS NEEDED
Qty: 1 TABLET | Refills: 0 | Status: SHIPPED | OUTPATIENT
Start: 2021-07-21 | End: 2021-08-04

## 2021-07-21 RX ORDER — NAPROXEN 500 MG/1
500 TABLET ORAL 2 TIMES DAILY PRN
Qty: 20 TABLET | Refills: 0 | Status: SHIPPED | OUTPATIENT
Start: 2021-07-21

## 2021-07-21 RX ORDER — ONDANSETRON 2 MG/ML
4 INJECTION INTRAMUSCULAR; INTRAVENOUS ONCE
Status: COMPLETED | OUTPATIENT
Start: 2021-07-21 | End: 2021-07-21

## 2021-07-21 RX ORDER — METRONIDAZOLE 500 MG/1
500 TABLET ORAL ONCE
Status: COMPLETED | OUTPATIENT
Start: 2021-07-21 | End: 2021-07-21

## 2021-07-21 RX ORDER — HYDROMORPHONE HCL/PF 1 MG/ML
0.5 SYRINGE (ML) INJECTION ONCE
Status: COMPLETED | OUTPATIENT
Start: 2021-07-21 | End: 2021-07-21

## 2021-07-21 RX ORDER — MORPHINE SULFATE 15 MG/1
15 TABLET ORAL EVERY 6 HOURS PRN
Qty: 7 TABLET | Refills: 0 | Status: SHIPPED | OUTPATIENT
Start: 2021-07-21 | End: 2021-07-28

## 2021-07-21 RX ORDER — ONDANSETRON 4 MG/1
4 TABLET, ORALLY DISINTEGRATING ORAL EVERY 8 HOURS PRN
Qty: 10 TABLET | Refills: 0 | Status: SHIPPED | OUTPATIENT
Start: 2021-07-21

## 2021-07-21 RX ADMIN — ONDANSETRON 4 MG: 2 INJECTION INTRAMUSCULAR; INTRAVENOUS at 01:42

## 2021-07-21 RX ADMIN — METRONIDAZOLE 500 MG: 500 TABLET ORAL at 01:42

## 2021-07-21 RX ADMIN — CEFTRIAXONE SODIUM 1000 MG: 10 INJECTION, POWDER, FOR SOLUTION INTRAVENOUS at 01:58

## 2021-07-21 RX ADMIN — HYDROMORPHONE HYDROCHLORIDE 0.5 MG: 1 INJECTION, SOLUTION INTRAMUSCULAR; INTRAVENOUS; SUBCUTANEOUS at 01:42

## 2021-07-21 NOTE — ED PROVIDER NOTES
History  Chief Complaint   Patient presents with    Abdominal Pain     Patient reports LLQ abd pain that began yesterday and has since got worse  Patient reports nausea  59 y o  female presents with one days of left lower quadrants abdominal pain without radiation  Patient describes pain like "my diverticulitis" that came on gradually in the morning, worsening and continues in the ER  Patient states movement aggravates the pain and nothing alleviates it  Patient notes prior history of diverticulitis but no complications  Patient states she took an amoxicillin this morning without improvement  Patient affirms nausea without vomiting  Patient denies diarrhea  Patient notes last bowel movement was in the morning and typical     Patient notes ongoing episodes of urinary frequency that are chronic and unchanged  Patient denies any dysuria or hematuria  Patient denies vaginal bleeding or discharge  ROS: Patient associates belching but denies fever/chills, dyspnea, chest pain, constipation, diaphoresis, groin pain, dysuria, hematuria, melena, or back/neck pain  All other systems reviewed and negative  Patient denies contacts with similar symptoms  Patient took an amoxicillin this morning; denies any recent use of other antibiotics, international travel, or trauma  Patient notes history of cholecystectomy, appendectomy, and hysterectomy  Focused Objective Exam:  Abdomen:  Inspection of an obese abdomen with previous abdominal surgical incisions noted without erythema, rashes or ecchymosis noted  No abdominal pulsations noted  Normal bowel sounds with no bruit auscultated  Soft abdomen  Palpitation noted tenderness in the left lower quadrant with tenderness with tenderness in the remainder that refers to the left lower quadrants; tenderness not over McBurney's point  No masses or pulsatile aorta noted on examination    No rebound or guarding noted on examination, non-peritoneal exam  Back:  No rash or signs of herpes zoster  Skin:  No ecchymosis of the umbilicus (negative Hamilton's sign) or flank (negative Grey Brunson's sign)  Warm and dry  Medical Decision Making  Abdominal pain with a broad differential   Will establish IV access and make patient NPO considering possibility of surgical intervention required  Will initiate symptomatic management  Differential includes significant likelihood of intra-abdominal pathology, including concerns for potential diverticulitis  Will obtain CBC to evaluate for anemia and leukocytosis  Will obtain CMP to evaluate electrolytes, renal, biliary, and hepatic function  Will obtain lipase to evaluate for potential pancreatitis  Will obtain urinalysis to evaluate for possible urinary infectious sources and ketones to evaluate potential hydration state  Based on patient's age, history, physical exam and presenting complaint, will obtain contrast enhanced CT imaging of patient's abdomen and pelvis to further evaluate for possible intraabdominal pathology  History provided by:  Patient  Abdominal Pain  Pain location:  LLQ  Pain quality comment:  "diverticulitis"  Pain radiates to:  Does not radiate  Pain severity:  Moderate  Onset quality:  Gradual  Timing:  Constant  Progression:  Worsening  Relieved by:  Nothing  Worsened by: Movement  Associated symptoms: belching and nausea    Associated symptoms: no anorexia, no chest pain, no chills, no constipation, no cough, no diarrhea, no dysuria, no fatigue, no fever, no hematemesis, no hematochezia, no hematuria, no melena, no shortness of breath, no sore throat, no vaginal bleeding, no vaginal discharge and no vomiting        Prior to Admission Medications   Prescriptions Last Dose Informant Patient Reported? Taking?    Cholecalciferol (VITAMIN D3) 1000 UNITS CAPS  Self Yes No   Sig: Take 2,000 Units by mouth daily   Cyanocobalamin (VITAMIN B12 PO)  Self Yes No   Sig: Take 2,500 mcg by mouth daily   Empagliflozin-metFORMIN HCl ER (SYNJARDY XR)  MG TB24  Self Yes No   Sig: Take 1 tablet by mouth daily    Icosapent Ethyl (VASCEPA PO)  Self Yes No   Sig: Take 2 capsules by mouth daily   Insulin Glargine (TOUJEO) 300 units/mL CONCETRATED U-300 injection pen  Self Yes No   Sig: Inject 40 Units under the skin daily at bedtime    amLODIPine (NORVASC) 5 mg tablet   No No   Sig: Take 1 tablet (5 mg total) by mouth daily   aspirin 81 mg chewable tablet  Self Yes No   Sig: Chew 81 mg daily   carvedilol (COREG) 6 25 mg tablet   No No   Sig: Take 1 tablet (6 25 mg total) by mouth 2 (two) times a day with meals   glimepiride (AMARYL) 2 mg tablet  Self Yes No   Sig: Take 4 mg by mouth 2 (two) times a day    multivitamin (THERAGRAN) TABS  Self Yes No   Sig: Take 1 tablet by mouth daily   pravastatin (PRAVACHOL) 20 mg tablet   No No   Sig: Take 1 tablet (20 mg total) by mouth daily   pyridoxine (VITAMIN B6) 100 mg tablet  Self Yes No   Sig: Take 100 mg by mouth daily      Facility-Administered Medications: None       Past Medical History:   Diagnosis Date    [facility] [address] [facility]     Diabetes mellitus (HCC)     type 2    Diverticulitis     Murmur, cardiac        Past Surgical History:   Procedure Laterality Date    APPENDECTOMY      BREAST LUMPECTOMY      CHOLECYSTECTOMY      HYSTERECTOMY      KNEE ARTHROSCOPY Right     knee    TONSILLECTOMY         Family History   Problem Relation Age of Onset    COPD Mother     Heart disease Father     Heart disease Paternal Grandmother     Cancer Family      I have reviewed and agree with the history as documented  E-Cigarette/Vaping     E-Cigarette/Vaping Substances     Social History     Tobacco Use    Smoking status: Never Smoker    Smokeless tobacco: Never Used   Substance Use Topics    Alcohol use: No    Drug use: No       Review of Systems   Constitutional: Negative for chills, fatigue and fever  HENT: Negative for sore throat      Respiratory: Negative for cough and shortness of breath  Cardiovascular: Negative for chest pain  Gastrointestinal: Positive for abdominal pain and nausea  Negative for anorexia, constipation, diarrhea, hematemesis, hematochezia, melena and vomiting  Genitourinary: Negative for dysuria, hematuria, vaginal bleeding and vaginal discharge  All other systems reviewed and are negative  Physical Exam  Physical Exam  Vitals reviewed  Constitutional:       Appearance: She is well-developed  Eyes:      Pupils: Pupils are equal, round, and reactive to light  Cardiovascular:      Rate and Rhythm: Normal rate and regular rhythm  Pulmonary:      Effort: Pulmonary effort is normal       Breath sounds: Normal breath sounds  Abdominal:      Palpations: Abdomen is soft  Tenderness: There is abdominal tenderness in the left lower quadrant  There is no guarding or rebound  Musculoskeletal:      Cervical back: Neck supple  Skin:     General: Skin is warm and dry  Neurological:      General: No focal deficit present  Mental Status: She is alert     Psychiatric:         Mood and Affect: Mood normal          Vital Signs  ED Triage Vitals   Temperature Pulse Respirations Blood Pressure SpO2   07/20/21 2148 07/20/21 2148 07/20/21 2148 07/20/21 2148 07/20/21 2148   98 3 °F (36 8 °C) 83 19 170/79 91 %      Temp Source Heart Rate Source Patient Position - Orthostatic VS BP Location FiO2 (%)   07/20/21 2148 07/20/21 2148 07/20/21 2148 07/20/21 2148 --   Oral Monitor Sitting Left arm       Pain Score       07/20/21 2321       9           Vitals:    07/20/21 2148 07/20/21 2335 07/21/21 0130   BP: 170/79 155/67 136/63   Pulse: 83 68 72   Patient Position - Orthostatic VS: Sitting Sitting Sitting         Visual Acuity      ED Medications  Medications   HYDROmorphone (DILAUDID) injection 0 5 mg (0 5 mg Intravenous Given 7/20/21 2321)   ondansetron (ZOFRAN) injection 4 mg (4 mg Intravenous Given 7/20/21 2321)   iohexol (OMNIPAQUE) 350 MG/ML injection (SINGLE-DOSE) 100 mL (100 mL Intravenous Given 7/20/21 2347)   ceftriaxone (ROCEPHIN) 1 g/50 mL in dextrose IVPB (0 mg Intravenous Stopped 7/21/21 0228)   metroNIDAZOLE (FLAGYL) tablet 500 mg (500 mg Oral Given 7/21/21 0142)   HYDROmorphone (DILAUDID) injection 0 5 mg (0 5 mg Intravenous Given 7/21/21 0142)   ondansetron (ZOFRAN) injection 4 mg (4 mg Intravenous Given 7/21/21 0142)       Diagnostic Studies  Results Reviewed     Procedure Component Value Units Date/Time    Urine Microscopic [[id number]]  (Normal) Collected: 07/20/21 2324    Lab Status: Final result Specimen: Urine, Clean Catch Updated: 07/21/21 0003     RBC, UA 2-4 /hpf      WBC, UA None Seen /hpf      Epithelial Cells Occasional /hpf      Bacteria, UA None Seen /hpf     CMP [[id number]]  (Abnormal) Collected: 07/20/21 2324    Lab Status: Final result Specimen: Blood from Arm, Right Updated: 07/20/21 2348     Sodium 138 mmol/L      Potassium 3 5 mmol/L      Chloride 102 mmol/L      CO2 27 mmol/L      ANION GAP 9 mmol/L      BUN 14 mg/dL      Creatinine 0 72 mg/dL      Glucose 148 mg/dL      Calcium 8 9 mg/dL      AST 14 U/L      ALT 26 U/L      Alkaline Phosphatase 127 U/L      Total Protein 8 1 g/dL      Albumin 3 7 g/dL      Total Bilirubin 0 44 mg/dL      eGFR 89 ml/min/1 73sq m     Narrative:      [place] guidelines for Chronic Kidney Disease (CKD):     Stage 1 with normal or high GFR (GFR > 90 mL/min/1 73 square meters)    Stage 2 Mild CKD (GFR = 60-89 mL/min/1 73 square meters)    Stage 3A Moderate CKD (GFR = 45-59 mL/min/1 73 square meters)    Stage 3B Moderate CKD (GFR = 30-44 mL/min/1 73 square meters)    Stage 4 Severe CKD (GFR = 15-29 mL/min/1 73 square meters)    Stage 5 End Stage CKD (GFR <15 mL/min/1 73 square meters)  Note: GFR calculation is accurate only with a steady state creatinine    Lipase [[id number]]  (Normal) Collected: 07/20/21 2324    Lab Status: Final result Specimen: Blood from Arm, Right Updated: 07/20/21 2348     Lipase 100 u/L     UA w Reflex to Microscopic w Reflex to Culture [[id number]]  (Abnormal) Collected: 07/20/21 2324    Lab Status: Final result Specimen: Urine, Clean Catch Updated: 07/20/21 2342     Color, UA Yellow     Clarity, UA Clear     Specific Gravity, UA 1 015     pH, UA 5 5     Leukocytes, UA Elevated glucose may cause decreased leukocyte values  See urine microscopic for [facility] result/     Nitrite, UA Negative     Protein, UA Negative mg/dl      Glucose, UA >=1000 (1%) mg/dl      Ketones, UA Negative mg/dl      Urobilinogen, UA 0 2 E U /dl      Bilirubin, UA Negative     Blood, UA Small    CBC and differential [[id number]]  (Abnormal) Collected: 07/20/21 2324    Lab Status: Final result Specimen: Blood from Arm, Right Updated: 07/20/21 2333     WBC 10 59 Thousand/uL      RBC 4 59 Million/uL      Hemoglobin 13 1 g/dL      Hematocrit 40 2 %      MCV 88 fL      MCH 28 5 pg      MCHC 32 6 g/dL      RDW 13 4 %      MPV 11 0 fL      Platelets 764 Thousands/uL      nRBC 0 /100 WBCs      Neutrophils Relative 62 %      Immat GRANS % 0 %      Lymphocytes Relative 27 %      Monocytes Relative 8 %      Eosinophils Relative 2 %      Basophils Relative 1 %      Neutrophils Absolute 6 54 Thousands/µL      Immature Grans Absolute 0 04 Thousand/uL      Lymphocytes Absolute 2 90 Thousands/µL      Monocytes Absolute 0 83 Thousand/µL      Eosinophils Absolute 0 22 Thousand/µL      Basophils Absolute 0 06 Thousands/µL                  CT abdomen pelvis with contrast   Final Result by Tito Xie MD (07/21 0034)      Acute uncomplicated diverticulitis of the distal descending colon/sigmoid colon  Workstation performed: [id number]                    Procedures  Procedures         ED Course  ED Course as of Jul 22 1658   Wed Jul 21, 2021   0137 Patient's CT with findings consistent with acute uncomplicated diverticulitis  Patient with minimal leukocytosis  Patient afebrile with otherwise normal vital signs  Will treat patient with single dose of antibiotics in the emergency room as is overnight and she will be unable to fill a prescription until the morning  Will continue patient on Augmentin  Discussed risks and benefits of all antibiotics with the patient  Discussed use of probiotics for avoidance antibiotic associated diarrhea  Discussed symptomatic management with the patient  Discussed use of anti-inflammatory medications and morphine as needed for breakthrough pain  Discussed risks of these medications, including specific risks associated with opiate pain medications  Discussed follow-up with gastroenterology for evaluation of repeat colonoscopy after symptoms improved  Discussed and emphasized return precautions in detail  SBIRT 22yo+      Most Recent Value   SBIRT (24 yo +)   In order to provide better care to our patients, we are screening all of our patients for alcohol and drug use  Would it be okay to ask you these screening questions? Yes Filed at: 07/21/2021 9916   Initial Alcohol Screen: US AUDIT-C    1  How often do you have a drink containing alcohol?  0 Filed at: 07/21/2021 0238   2  How many drinks containing alcohol do you have on a typical day you are drinking? 0 Filed at: 07/21/2021 0238   3a  Male UNDER 65: How often do you have five or more drinks on one occasion? 0 Filed at: 07/21/2021 0238   3b  FEMALE Any Age, or MALE 65+: How often do you have 4 or more drinks on one occassion? 0 Filed at: 07/21/2021 0238   Audit-C Score  0 Filed at: 07/21/2021 3240   FERNANDO: How many times in the past year have you    Used an illegal drug or used a prescription medication for non-medical reasons?   Never Filed at: 07/21/2021 9648                    MDM    Disposition  Final diagnoses:   Diverticulitis     Time reflects when diagnosis was documented in both MDM as applicable and the Disposition within this note     Time User Action Codes Description Comment    7/21/2021  1:23 AM Star Rodriguez [Y40 [id number]] Diverticulitis       ED Disposition     ED Disposition Condition Date/Time Comment    Discharge Stable Wed Jul 21, 2021  1:22 AM Hemal Emerson discharge to home/self care  Follow-up Information     Follow up With Specialties Details Why Contact Info Additional [address] [facility] Emergency Department Emergency Medicine Go to  If symptoms worsen [address] [facility] [id number] [facility] Emergency Department, [address] [place], [place], [address] Wellington Jiménez MD Colon and Rectal Surgery Call  After symptoms improved to discuss timing or evaluation for repeat colonoscopy   [address] [id number] [facility]             Discharge Medication List as of 7/21/2021  1:41 AM      START taking these medications    Details   amoxicillin-clavulanate (AUGMENTIN) 875-125 mg per tablet Take 1 tablet by mouth every 8 (eight) hours for 10 days, Starting Wed 7/21/2021, Until Sat 7/31/2021, Print      morphine (MSIR) 15 mg tablet Take 1 tablet (15 mg total) by mouth every 6 (six) hours as needed for severe pain for up to 7 daysMax Daily Amount: 60 mg, Starting Wed 7/21/2021, Until Wed 7/28/2021 at 2359, Print      naproxen (NAPROSYN) 500 mg tablet Take 1 tablet (500 mg total) by mouth 2 (two) times a day as needed for moderate pain for up to 20 doses, Starting Wed 7/21/2021, Print      ondansetron (ZOFRAN-ODT) 4 mg disintegrating tablet Take 1 tablet (4 mg total) by mouth every 8 (eight) hours as needed for nausea or vomiting, Starting Wed 7/21/2021, Print         CONTINUE these medications which have NOT CHANGED    Details   amLODIPine (NORVASC) 5 mg tablet Take 1 tablet (5 mg total) by mouth daily, Starting Wed 10/14/2020, Normal      aspirin 81 mg chewable tablet Chew 81 mg daily, Historical Med carvedilol (COREG) 6 25 mg tablet Take 1 tablet (6 25 mg total) by mouth 2 (two) times a day with meals, Starting Wed 10/14/2020, Normal      Cholecalciferol (VITAMIN D3) 1000 UNITS CAPS Take 2,000 Units by mouth daily, Historical Med      Cyanocobalamin (VITAMIN B12 PO) Take 2,500 mcg by mouth daily, Historical Med      Empagliflozin-metFORMIN HCl ER (SYNJARDY XR)  MG TB24 Take 1 tablet by mouth daily , Historical Med      glimepiride (AMARYL) 2 mg tablet Take 4 mg by mouth 2 (two) times a day , Historical Med      Icosapent Ethyl (VASCEPA PO) Take 2 capsules by mouth daily, Historical Med      Insulin Glargine (TOUJEO) 300 units/mL CONCETRATED U-300 injection pen Inject 40 Units under the skin daily at bedtime , Historical Med      multivitamin (THERAGRAN) TABS Take 1 tablet by mouth daily, Historical Med      pravastatin (PRAVACHOL) 20 mg tablet Take 1 tablet (20 mg total) by mouth daily, Starting Wed 10/14/2020, Normal      pyridoxine (VITAMIN B6) 100 mg tablet Take 100 mg by mouth daily, Historical Med           No discharge procedures on file      PDMP Review       Value Time User    PDMP Reviewed  Yes 7/21/2021  1:37 AM Nlie Gonzalez MD          ED Provider  Electronically Signed by           Nile Gonzalez MD  07/22/21 5628

## 2021-07-21 NOTE — ED NOTES
Patient returned from [facility], [address] [place] [address]  07/20/21 5154

## 2021-09-28 ENCOUNTER — OFFICE VISIT (OUTPATIENT)
Dept: CARDIOLOGY CLINIC | Facility: CLINIC | Age: 61
End: 2021-09-28
Payer: COMMERCIAL

## 2021-09-28 VITALS
OXYGEN SATURATION: 96 % | BODY MASS INDEX: 32.6 KG/M2 | SYSTOLIC BLOOD PRESSURE: 142 MMHG | WEIGHT: 184 LBS | HEIGHT: 63 IN | HEART RATE: 78 BPM | DIASTOLIC BLOOD PRESSURE: 64 MMHG

## 2021-09-28 DIAGNOSIS — I35.0 AORTIC STENOSIS, MODERATE: Primary | ICD-10-CM

## 2021-09-28 DIAGNOSIS — R42 LIGHTHEADEDNESS: ICD-10-CM

## 2021-09-28 DIAGNOSIS — R00.2 HEART PALPITATIONS: ICD-10-CM

## 2021-09-28 DIAGNOSIS — I10 ESSENTIAL HYPERTENSION: ICD-10-CM

## 2021-09-28 DIAGNOSIS — E78.2 MIXED HYPERLIPIDEMIA: ICD-10-CM

## 2021-09-28 PROCEDURE — 93000 ELECTROCARDIOGRAM COMPLETE: CPT | Performed by: INTERNAL MEDICINE

## 2021-09-28 PROCEDURE — 99214 OFFICE O/P EST MOD 30 MIN: CPT | Performed by: INTERNAL MEDICINE

## 2021-09-28 NOTE — PROGRESS NOTES
Chandana Julien  1960  [id number]  [facility] MARY Zhong  [phone]    1  Aortic stenosis, moderate  POCT ECG    ECHO COMPLETE WITH CONTRAST IF INDICATED (order if scheduling before 10/11/21)    ECHO COMPLETE WITH CONTRAST IF INDICATED (order if scheduling before 10/11/21)    Holter monitor - 48 hour   2  Essential hypertension  ECHO COMPLETE WITH CONTRAST IF INDICATED (order if scheduling before 10/11/21)    Holter monitor - 48 hour   3  Heart palpitations  ECHO COMPLETE WITH CONTRAST IF INDICATED (order if scheduling before 10/11/21)    Holter monitor - 48 hour   4  Mixed hyperlipidemia     5  Lightheadedness  Holter monitor - 48 hour       Discussion/Summary:  She has had some episodes of lightheadedness that sound more like vertigo when we discussed it  However she does have moderate to severe aortic stenosis on echo a year ago and she needs a repeat for follow-up  Blood pressures been well controlled  I have also ordered a Holter monitor to rule any José dysrhythmias  Continue current medications will follow-up after the studies  Interval History:  68-year-old female with a history of moderate aortic stenosis, hypertension, hyperlipidemia, diabetes, statin intolerance presents to establish care with my practice  She is transitioning from a prior cardiologist who is retiring  Overall she has been doing well from a cardiac standpoint  She has good functional capacity and works long days  She denies any exertional chest pressure heaviness  Overall she has been doing well  Functional capacity has been quite good  Over the last 2 weeks she has had some episodes that she describes as lightheadedness but it sounds to be more off balance and sometimes a sense of rotation  She feels like she is leaning sometimes and she has fallen  There has been no true syncope    Denies any lower extremity edema, PND, orthopnea  No anginal sounding symptoms  Problem List     Aortic stenosis, moderate    Essential hypertension    Mixed hyperlipidemia    Type 2 diabetes mellitus with complication, without long-term current use of insulin (HCC)    Lab Results   Component Value Date    HGBA1C 7 6 (H) 07/24/2021       No results for input(s): POCGLU in the last 72 hours  Blood Sugar Average: Last 72 hrs:          Heart palpitations        Past Medical History:   Diagnosis Date    Breast cancer ([facility] )     Diabetes mellitus ([facility] )     type 2    Diverticulitis     Murmur, cardiac      Social History     Socioeconomic History    Marital status: /Civil Union     Spouse name: Not on file    Number of children: Not on file    Years of education: Not on file    Highest education level: Not on file   Occupational History    Not on file   Tobacco Use    Smoking status: Never Smoker    Smokeless tobacco: Never Used   Substance and Sexual Activity    Alcohol use: No    Drug use: No    Sexual activity: Yes     Partners: Male   Other Topics Concern    Not on file   Social History Narrative    Not on file     Social Determinants of Health     Financial Resource Strain:     Difficulty of Paying Living Expenses:    Food Insecurity:     Worried About Running Out of Food in the Last Year:     [address] in the Last Year:    Transportation Needs:     Lack of Transportation (Medical):      Lack of Transportation (Non-Medical):    Physical Activity:     Days of Exercise per Week:     Minutes of Exercise per Session:    Stress:     Feeling of Stress :    Social Connections:     Frequency of Communication with Friends and Family:     Frequency of Social Gatherings with Friends and Family:     Attends Buddhist Services:     Active Member of Clubs or Organizations:     Attends Club or Organization Meetings:     Marital Status:    Intimate Partner Violence:     Fear of Current or Ex-Partner:     Emotionally Abused:     Physically Abused:     Sexually Abused:       Family History   Problem Relation Age of Onset    COPD Mother     Heart disease Father     Heart disease Paternal Grandmother     Cancer Family      Past Surgical History:   Procedure Laterality Date    APPENDECTOMY      BREAST LUMPECTOMY      CHOLECYSTECTOMY      HYSTERECTOMY      KNEE ARTHROSCOPY Right     knee    TONSILLECTOMY         Current Outpatient Medications:     amLODIPine (NORVASC) 5 mg tablet, Take 1 tablet (5 mg total) by mouth daily, Disp: 90 tablet, Rfl: 3    aspirin 81 mg chewable tablet, Chew 81 mg daily, Disp: , Rfl:     carvedilol (COREG) 6 25 mg tablet, Take 1 tablet (6 25 mg total) by mouth 2 (two) times a day with meals, Disp: 180 tablet, Rfl: 3    Cholecalciferol (VITAMIN D3) 1000 UNITS CAPS, Take 2,000 Units by mouth daily, Disp: , Rfl:     Cyanocobalamin (VITAMIN B12 PO), Take 2,500 mcg by mouth daily, Disp: , Rfl:     Empagliflozin-metFORMIN HCl ER (SYNJARDY XR)  MG TB24, Take 1 tablet by mouth daily , Disp: , Rfl:     glimepiride (AMARYL) 2 mg tablet, Take 4 mg by mouth 2 (two) times a day , Disp: , Rfl:     Icosapent Ethyl (VASCEPA PO), Take 2 capsules by mouth daily, Disp: , Rfl:     Insulin Glargine (TOUJEO) 300 units/mL CONCETRATED U-300 injection pen, Inject 20 Units under the skin daily at bedtime , Disp: , Rfl:     multivitamin (THERAGRAN) TABS, Take 1 tablet by mouth daily, Disp: , Rfl:     pravastatin (PRAVACHOL) 20 mg tablet, Take 1 tablet (20 mg total) by mouth daily, Disp: 90 tablet, Rfl: 3    pyridoxine (VITAMIN B6) 100 mg tablet, Take 100 mg by mouth daily, Disp: , Rfl:     naproxen (NAPROSYN) 500 mg tablet, Take 1 tablet (500 mg total) by mouth 2 (two) times a day as needed for moderate pain for up to 20 doses (Patient not taking: Reported on 9/28/2021), Disp: 20 tablet, Rfl: 0    ondansetron (ZOFRAN-ODT) 4 mg disintegrating tablet, Take 1 tablet (4 mg total) by mouth every 8 (eight) hours as needed for nausea or vomiting (Patient not taking: Reported on 9/28/2021), Disp: 10 tablet, Rfl: 0  Allergies   Allergen Reactions    Fentanyl Rash    Niaspan [Niacin Er] Rash       Labs:     Chemistry        Component Value Date/Time    K 3 5 07/20/2021 2324     07/20/2021 2324    CO2 27 07/20/2021 2324    BUN 14 07/20/2021 2324    CREATININE 0 72 07/20/2021 2324        Component Value Date/Time    CALCIUM 8 9 07/20/2021 2324    ALKPHOS 127 (H) 07/20/2021 2324    AST 14 07/20/2021 2324    ALT 26 07/20/2021 2324            No results found for: CHOL  No results found for: HDL  No results found for: LDLCALC  No results found for: TRIG  No results found for: CHOLHDL    Imaging: No results found  ECG:  Sinus rhythm nonspecific T-wave changes      Review of Systems   Constitutional: Negative  HENT: Negative  Eyes: Negative  Cardiovascular: Positive for palpitations  Negative for chest pain  Respiratory: Negative  Endocrine: Negative  Hematologic/Lymphatic: Negative  Skin: Negative  Musculoskeletal: Negative  Gastrointestinal: Negative  Genitourinary: Negative  Neurological: Negative  Psychiatric/Behavioral: Negative  Vitals:    09/28/21 1536   BP: 142/64   Pulse: 78   SpO2: 96%     Vitals:    09/28/21 1536   Weight: 83 5 kg (184 lb)     Height: 5' 3" (160 cm)   Body mass index is 32 59 kg/m²  Physical Exam:  Vital signs reviewed  General:  Alert and cooperative, appears stated age, no acute distress  HEENT:  PERRLA, EOMI, no scleral icterus, no conjunctival pallor  Neck:  No lymphadenopathy, no thyromegaly, no carotid bruits, no elevated JVP  Heart:  Regular rate and rhythm, normal S1/S2, no S3/S4,   2/6 late-peaking systolic ejection murmur    PMI nondisplaced  Lungs:  Clear to auscultation bilaterally, no wheezes rales or rhonchi  Abdomen:  Soft, non-tender, positive bowel sounds, no rebound or guarding,   no organomegaly   Extremities:  Normal range of motion    No clubbing, cyanosis or edema   Vascular:  2+ pedal pulses  Skin:  No rashes or lesions on exposed skin  Neurologic:  Cranial nerves II-XII grossly intact without focal deficits  Psych:  Normal mood and affect

## 2021-10-17 DIAGNOSIS — I35.0 AORTIC STENOSIS, MODERATE: ICD-10-CM

## 2021-10-17 DIAGNOSIS — I10 ESSENTIAL HYPERTENSION: ICD-10-CM

## 2021-10-18 RX ORDER — AMLODIPINE BESYLATE 5 MG/1
TABLET ORAL
Qty: 90 TABLET | Refills: 3 | Status: SHIPPED | OUTPATIENT
Start: 2021-10-18

## 2021-10-18 RX ORDER — PRAVASTATIN SODIUM 20 MG
TABLET ORAL
Qty: 90 TABLET | Refills: 3 | Status: SHIPPED | OUTPATIENT
Start: 2021-10-18

## 2021-10-18 RX ORDER — CARVEDILOL 6.25 MG/1
TABLET ORAL
Qty: 180 TABLET | Refills: 3 | Status: SHIPPED | OUTPATIENT
Start: 2021-10-18

## 2021-11-10 ENCOUNTER — HOSPITAL ENCOUNTER (OUTPATIENT)
Dept: NON INVASIVE DIAGNOSTICS | Facility: CLINIC | Age: 61
Discharge: HOME | End: 2021-11-10
Payer: COMMERCIAL

## 2021-11-10 VITALS
HEIGHT: 63 IN | SYSTOLIC BLOOD PRESSURE: 142 MMHG | DIASTOLIC BLOOD PRESSURE: 64 MMHG | HEART RATE: 69 BPM | BODY MASS INDEX: 32.6 KG/M2 | WEIGHT: 184 LBS

## 2021-11-10 DIAGNOSIS — R00.2 HEART PALPITATIONS: ICD-10-CM

## 2021-11-10 DIAGNOSIS — I35.0 AORTIC STENOSIS, MODERATE: ICD-10-CM

## 2021-11-10 DIAGNOSIS — I10 ESSENTIAL HYPERTENSION: ICD-10-CM

## 2021-11-10 DIAGNOSIS — R42 LIGHTHEADEDNESS: ICD-10-CM

## 2021-11-10 LAB
AORTIC ROOT: 3.1 CM
AORTIC VALVE MEAN VELOCITY: 26.3 M/S
APICAL FOUR CHAMBER EJECTION FRACTION: 64 %
ASCENDING AORTA: 3.3 CM
AV AREA BY CONTINUOUS VTI: 0.7 CM2
AV AREA PEAK VELOCITY: 0.8 CM2
AV LVOT MEAN GRADIENT: 2 MMHG
AV LVOT PEAK GRADIENT: 4 MMHG
AV MEAN GRADIENT: 32 MMHG
AV PEAK GRADIENT: 55 MMHG
AV REGURGITATION PRESSURE HALF TIME: 0.45 MS
AV VALVE AREA: 0.73 CM2
AV VELOCITY RATIO: 0.26
DOP CALC AO PEAK VEL: 3.8 M/S
DOP CALC AO VTI: 97.13 CM
DOP CALC LVOT AREA: 3.14 CM2
DOP CALC LVOT DIAMETER: 2 CM
DOP CALC LVOT PEAK VEL VTI: 22.43 CM
DOP CALC LVOT PEAK VEL: 0.99 M/S
DOP CALC LVOT STROKE INDEX: 35.8 ML/M2
DOP CALC LVOT STROKE VOLUME: 70.43 CM3
DOP CALC MV VTI: 34.58 CM
E WAVE DECELERATION TIME: 179 MS
FRACTIONAL SHORTENING: 39 % (ref 28–44)
INTERVENTRICULAR SEPTUM IN DIASTOLE (PARASTERNAL SHORT AXIS VIEW): 1.5 CM
LEFT ATRIUM AREA SYSTOLE SINGLE PLANE A4C: 20.8 CM2
LEFT INTERNAL DIMENSION IN SYSTOLE: 2.7 CM (ref 2.1–4)
LEFT VENTRICULAR INTERNAL DIMENSION IN DIASTOLE: 4.4 CM (ref 4.6–6.85)
LEFT VENTRICULAR POSTERIOR WALL IN END DIASTOLE: 1.3 CM
LEFT VENTRICULAR STROKE VOLUME: 59 ML
MV E'TISSUE VEL-SEP: 8 CM/S
MV MEAN GRADIENT: 3 MMHG
MV PEAK A VEL: 0.86 M/S
MV PEAK E VEL: 103 CM/S
MV PEAK GRADIENT: 9 MMHG
MV STENOSIS PRESSURE HALF TIME: 0 MS
MV VALVE AREA BY CONTINUITY EQUATION: 2.04 CM2
RIGHT ATRIUM AREA SYSTOLE A4C: 16 CM2
RIGHT VENTRICLE ID DIMENSION: 3.2 CM
SL CV AV DECELERATION TIME RETROGRADE: 1538 MS
SL CV AV PEAK GRADIENT RETROGRADE: 60 MMHG
SL CV LV EF: 65
SL CV PED ECHO LEFT VENTRICLE DIASTOLIC VOLUME (MOD BIPLANE) 2D: 86 ML
SL CV PED ECHO LEFT VENTRICLE SYSTOLIC VOLUME (MOD BIPLANE) 2D: 27 ML
TRICUSPID VALVE PEAK REGURGITATION VELOCITY: 2.63 M/S
TRICUSPID VALVE S': 1 CM/S
TV PEAK GRADIENT: 28 MMHG
Z-SCORE OF LEFT VENTRICULAR DIMENSION IN END SYSTOLE: -2.43

## 2021-11-10 PROCEDURE — 93225 XTRNL ECG REC<48 HRS REC: CPT

## 2021-11-10 PROCEDURE — 93226 XTRNL ECG REC<48 HR SCAN A/R: CPT

## 2021-11-10 PROCEDURE — 93306 TTE W/DOPPLER COMPLETE: CPT

## 2021-11-10 PROCEDURE — 93306 TTE W/DOPPLER COMPLETE: CPT | Performed by: INTERNAL MEDICINE

## 2021-11-22 PROCEDURE — 93227 XTRNL ECG REC<48 HR R&I: CPT | Performed by: INTERNAL MEDICINE

## 2022-02-17 ENCOUNTER — APPOINTMENT (EMERGENCY)
Dept: CT IMAGING | Facility: HOSPITAL | Age: 62
End: 2022-02-17
Payer: COMMERCIAL

## 2022-02-17 ENCOUNTER — HOSPITAL ENCOUNTER (EMERGENCY)
Facility: HOSPITAL | Age: 62
Discharge: HOME | End: 2022-02-17
Attending: EMERGENCY MEDICINE | Admitting: EMERGENCY MEDICINE
Payer: COMMERCIAL

## 2022-02-17 VITALS
HEART RATE: 84 BPM | RESPIRATION RATE: 16 BRPM | TEMPERATURE: 99.1 F | OXYGEN SATURATION: 98 % | SYSTOLIC BLOOD PRESSURE: 140 MMHG | DIASTOLIC BLOOD PRESSURE: 72 MMHG

## 2022-02-17 DIAGNOSIS — K57.92 DIVERTICULITIS: Primary | ICD-10-CM

## 2022-02-17 LAB
ALBUMIN SERPL BCP-MCNC: 3.9 G/DL (ref 3.5–5)
ALP SERPL-CCNC: 137 U/L (ref 46–116)
ALT SERPL W P-5'-P-CCNC: 27 U/L (ref 12–78)
ANION GAP SERPL CALCULATED.3IONS-SCNC: 8 MMOL/L (ref 4–13)
AST SERPL W P-5'-P-CCNC: 16 U/L (ref 5–45)
BASOPHILS # BLD AUTO: 0.07 THOUSANDS/ΜL (ref 0–0.1)
BASOPHILS NFR BLD AUTO: 1 % (ref 0–1)
BILIRUB SERPL-MCNC: 0.9 MG/DL (ref 0.2–1)
BUN SERPL-MCNC: 11 MG/DL (ref 5–25)
CALCIUM SERPL-MCNC: 9.2 MG/DL (ref 8.3–10.1)
CHLORIDE SERPL-SCNC: 101 MMOL/L (ref 100–108)
CO2 SERPL-SCNC: 26 MMOL/L (ref 21–32)
CREAT SERPL-MCNC: 0.59 MG/DL (ref 0.6–1.3)
EOSINOPHIL # BLD AUTO: 0.13 THOUSAND/ΜL (ref 0–0.61)
EOSINOPHIL NFR BLD AUTO: 1 % (ref 0–6)
ERYTHROCYTE [DISTWIDTH] IN BLOOD BY AUTOMATED COUNT: 14.1 % (ref 11.6–15.1)
GFR SERPL CREATININE-BSD FRML MDRD: 97 ML/MIN/1.73SQ M
GLUCOSE SERPL-MCNC: 102 MG/DL (ref 65–140)
HCT VFR BLD AUTO: 42.1 % (ref 34.8–46.1)
HGB BLD-MCNC: 13.6 G/DL (ref 11.5–15.4)
IMM GRANULOCYTES # BLD AUTO: 0.05 THOUSAND/UL (ref 0–0.2)
IMM GRANULOCYTES NFR BLD AUTO: 0 % (ref 0–2)
LIPASE SERPL-CCNC: 57 U/L (ref 73–393)
LYMPHOCYTES # BLD AUTO: 2.25 THOUSANDS/ΜL (ref 0.6–4.47)
LYMPHOCYTES NFR BLD AUTO: 15 % (ref 14–44)
MAGNESIUM SERPL-MCNC: 2.1 MG/DL (ref 1.6–2.6)
MCH RBC QN AUTO: 28.4 PG (ref 26.8–34.3)
MCHC RBC AUTO-ENTMCNC: 32.3 G/DL (ref 31.4–37.4)
MCV RBC AUTO: 88 FL (ref 82–98)
MONOCYTES # BLD AUTO: 1.01 THOUSAND/ΜL (ref 0.17–1.22)
MONOCYTES NFR BLD AUTO: 7 % (ref 4–12)
NEUTROPHILS # BLD AUTO: 11.3 THOUSANDS/ΜL (ref 1.85–7.62)
NEUTS SEG NFR BLD AUTO: 76 % (ref 43–75)
NRBC BLD AUTO-RTO: 0 /100 WBCS
PLATELET # BLD AUTO: 268 THOUSANDS/UL (ref 149–390)
PMV BLD AUTO: 10.7 FL (ref 8.9–12.7)
POTASSIUM SERPL-SCNC: 3.9 MMOL/L (ref 3.5–5.3)
PROT SERPL-MCNC: 8.5 G/DL (ref 6.4–8.2)
RBC # BLD AUTO: 4.79 MILLION/UL (ref 3.81–5.12)
SODIUM SERPL-SCNC: 135 MMOL/L (ref 136–145)
WBC # BLD AUTO: 14.81 THOUSAND/UL (ref 4.31–10.16)

## 2022-02-17 PROCEDURE — 99284 EMERGENCY DEPT VISIT MOD MDM: CPT

## 2022-02-17 PROCEDURE — 96376 TX/PRO/DX INJ SAME DRUG ADON: CPT

## 2022-02-17 PROCEDURE — 80053 COMPREHEN METABOLIC PANEL: CPT | Performed by: EMERGENCY MEDICINE

## 2022-02-17 PROCEDURE — 99285 EMERGENCY DEPT VISIT HI MDM: CPT | Performed by: EMERGENCY MEDICINE

## 2022-02-17 PROCEDURE — 74177 CT ABD & PELVIS W/CONTRAST: CPT

## 2022-02-17 PROCEDURE — 96375 TX/PRO/DX INJ NEW DRUG ADDON: CPT

## 2022-02-17 PROCEDURE — 85025 COMPLETE CBC W/AUTO DIFF WBC: CPT | Performed by: EMERGENCY MEDICINE

## 2022-02-17 PROCEDURE — 83690 ASSAY OF LIPASE: CPT | Performed by: EMERGENCY MEDICINE

## 2022-02-17 PROCEDURE — 96374 THER/PROPH/DIAG INJ IV PUSH: CPT

## 2022-02-17 PROCEDURE — 96361 HYDRATE IV INFUSION ADD-ON: CPT

## 2022-02-17 PROCEDURE — 36415 COLL VENOUS BLD VENIPUNCTURE: CPT | Performed by: EMERGENCY MEDICINE

## 2022-02-17 PROCEDURE — 83735 ASSAY OF MAGNESIUM: CPT | Performed by: EMERGENCY MEDICINE

## 2022-02-17 RX ORDER — MORPHINE SULFATE 4 MG/ML
4 INJECTION, SOLUTION INTRAMUSCULAR; INTRAVENOUS ONCE
Status: COMPLETED | OUTPATIENT
Start: 2022-02-17 | End: 2022-02-17

## 2022-02-17 RX ORDER — ONDANSETRON 2 MG/ML
4 INJECTION INTRAMUSCULAR; INTRAVENOUS ONCE
Status: COMPLETED | OUTPATIENT
Start: 2022-02-17 | End: 2022-02-17

## 2022-02-17 RX ORDER — AMOXICILLIN AND CLAVULANATE POTASSIUM 875; 125 MG/1; MG/1
1 TABLET, FILM COATED ORAL EVERY 12 HOURS
Qty: 20 TABLET | Refills: 0 | Status: SHIPPED | OUTPATIENT
Start: 2022-02-17 | End: 2022-02-27

## 2022-02-17 RX ORDER — MORPHINE SULFATE 15 MG/1
15 TABLET ORAL EVERY 4 HOURS PRN
Qty: 12 TABLET | Refills: 0 | Status: SHIPPED | OUTPATIENT
Start: 2022-02-17

## 2022-02-17 RX ORDER — AMOXICILLIN AND CLAVULANATE POTASSIUM 875; 125 MG/1; MG/1
1 TABLET, FILM COATED ORAL ONCE
Status: COMPLETED | OUTPATIENT
Start: 2022-02-17 | End: 2022-02-17

## 2022-02-17 RX ORDER — MORPHINE SULFATE 15 MG/1
15 TABLET ORAL EVERY 4 HOURS PRN
Qty: 12 TABLET | Refills: 0 | Status: SHIPPED | OUTPATIENT
Start: 2022-02-17 | End: 2022-02-17 | Stop reason: SDUPTHER

## 2022-02-17 RX ORDER — FLUCONAZOLE 150 MG/1
150 TABLET ORAL ONCE
Qty: 1 TABLET | Refills: 0 | Status: SHIPPED | OUTPATIENT
Start: 2022-02-17 | End: 2022-02-17

## 2022-02-17 RX ADMIN — MORPHINE SULFATE 4 MG: 4 INJECTION INTRAVENOUS at 16:26

## 2022-02-17 RX ADMIN — IOHEXOL 100 ML: 350 INJECTION, SOLUTION INTRAVENOUS at 15:28

## 2022-02-17 RX ADMIN — SODIUM CHLORIDE 1000 ML: 0.9 INJECTION, SOLUTION INTRAVENOUS at 14:40

## 2022-02-17 RX ADMIN — AMOXICILLIN AND CLAVULANATE POTASSIUM 1 TABLET: 875; 125 TABLET, FILM COATED ORAL at 16:26

## 2022-02-17 RX ADMIN — MORPHINE SULFATE 4 MG: 4 INJECTION INTRAVENOUS at 14:39

## 2022-02-17 RX ADMIN — ONDANSETRON 4 MG: 2 INJECTION INTRAMUSCULAR; INTRAVENOUS at 14:40

## 2022-02-17 NOTE — Clinical Note
Florecita Mcfarland was seen and treated in our emergency department on 2/17/2022  Diagnosis:     Milady  may return to work on return date  She may return on this date: 02/20/2022         If you have any questions or concerns, please don't hesitate to call        Kenrick Grove DO    ______________________________           _______________          _______________  Hospital Representative                              Date                                Time

## 2022-02-17 NOTE — ED PROVIDER NOTES
History  Chief Complaint   Patient presents with    Abdominal Pain     pt reports left lower abd pain that started last night, hx of diverticulitis  Patient is a 17-year-old female past medical history diabetes, hypertension, hyperlipidemia, breast cancer, aortic stenosis, diverticulitis presenting with abdominal pain  Patient states that beginning yesterday evening she began having left lower quadrant abdominal pain which has been constant, aching, nonradiating, worsening since that time  Not relieved by Tylenol or Aleve yesterday  Notes nausea but denies vomiting, constipation but does know 2 episodes of nonbloody diarrhea yesterday  Feels like her prior bouts of diverticulitis  Denies any fevers, chest pain, shortness breath, dizziness, urinary symptoms, vaginal discharge, vaginal bleeding, vision changes, rashes  Prior to Admission Medications   Prescriptions Last Dose Informant Patient Reported? Taking?    Cholecalciferol (VITAMIN D3) 1000 UNITS CAPS  Self Yes No   Sig: Take 2,000 Units by mouth daily   Cyanocobalamin (VITAMIN B12 PO)  Self Yes No   Sig: Take 2,500 mcg by mouth daily   Empagliflozin-metFORMIN HCl ER (SYNJARDY XR)  MG TB24  Self Yes No   Sig: Take 1 tablet by mouth daily    Icosapent Ethyl (VASCEPA PO)  Self Yes No   Sig: Take 2 capsules by mouth daily   Insulin Glargine (TOUJEO) 300 units/mL CONCETRATED U-300 injection pen  Self Yes No   Sig: Inject 20 Units under the skin daily at bedtime    amLODIPine (NORVASC) 5 mg tablet   No No   Sig: TAKE ONE TABLET BY MOUTH EVERY DAY   aspirin 81 mg chewable tablet  Self Yes No   Sig: Chew 81 mg daily   carvedilol (COREG) 6 25 mg tablet   No No   Sig: TAKE ONE TABLET BY MOUTH TWICE A DAY WITH MEALS   glimepiride (AMARYL) 2 mg tablet  Self Yes No   Sig: Take 4 mg by mouth 2 (two) times a day    multivitamin (THERAGRAN) TABS  Self Yes No   Sig: Take 1 tablet by mouth daily   naproxen (NAPROSYN) 500 mg tablet   No No   Sig: Take 1 tablet (500 mg total) by mouth 2 (two) times a day as needed for moderate pain for up to 20 doses   Patient not taking: Reported on 9/28/2021   ondansetron (ZOFRAN-ODT) 4 mg disintegrating tablet   No No   Sig: Take 1 tablet (4 mg total) by mouth every 8 (eight) hours as needed for nausea or vomiting   Patient not taking: Reported on 9/28/2021   pravastatin (PRAVACHOL) 20 mg tablet   No No   Sig: TAKE ONE TABLET BY MOUTH EVERY DAY   pyridoxine (VITAMIN B6) 100 mg tablet  Self Yes No   Sig: Take 100 mg by mouth daily      Facility-Administered Medications: None       Past Medical History:   Diagnosis Date    [facility] ([facility] )     Diabetes mellitus ([facility] )     type 2    Diverticulitis     Murmur, cardiac        Past Surgical History:   Procedure Laterality Date    APPENDECTOMY      BREAST LUMPECTOMY      CHOLECYSTECTOMY      HYSTERECTOMY      KNEE ARTHROSCOPY Right     knee    TONSILLECTOMY         Family History   Problem Relation Age of Onset    COPD Mother     Heart disease Father     Heart disease Paternal Grandmother     Cancer Family      I have reviewed and agree with the history as documented  E-Cigarette/Vaping     E-Cigarette/Vaping Substances     Social History     Tobacco Use    Smoking status: Never Smoker    Smokeless tobacco: Never Used   Substance Use Topics    Alcohol use: No    Drug use: No       Review of Systems   All other systems reviewed and are negative  Physical Exam  Physical Exam  Vitals reviewed  Constitutional:       General: She is not in acute distress  Appearance: Normal appearance  She is not ill-appearing  HENT:      Mouth/Throat:      Mouth: Mucous membranes are moist    Eyes:      Conjunctiva/sclera: Conjunctivae normal       Comments: Normal conjunctiva   Cardiovascular:      Rate and Rhythm: Normal rate and regular rhythm  Heart sounds: Normal heart sounds     Pulmonary:      Effort: Pulmonary effort is normal       Breath sounds: Normal breath sounds  Abdominal:      General: Abdomen is flat  Palpations: Abdomen is soft  Tenderness: There is abdominal tenderness in the left lower quadrant  There is no right CVA tenderness, left CVA tenderness or guarding  Musculoskeletal:         General: No swelling  Normal range of motion  Cervical back: Neck supple  Skin:     General: Skin is warm and dry  Neurological:      General: No focal deficit present  Mental Status: She is alert     Psychiatric:         Mood and Affect: Mood normal          Vital Signs  ED Triage Vitals   Temperature Pulse Respirations Blood Pressure SpO2   02/17/22 1323 02/17/22 1323 02/17/22 1323 02/17/22 1323 02/17/22 1323   99 1 °F (37 3 °C) 88 16 145/67 96 %      Temp src Heart Rate Source Patient Position - Orthostatic VS BP Location FiO2 (%)   -- 02/17/22 1644 -- -- --    Monitor         Pain Score       02/17/22 1439       7           Vitals:    02/17/22 1323 02/17/22 1644   BP: 145/67 140/72   Pulse: 88 84         Visual Acuity      ED Medications  Medications   sodium chloride 0 9 % bolus 1,000 mL (0 mL Intravenous Stopped 2/17/22 1636)   morphine (PF) 4 mg/mL injection 4 mg (4 mg Intravenous Given 2/17/22 1439)   ondansetron (ZOFRAN) injection 4 mg (4 mg Intravenous Given 2/17/22 1440)   iohexol (OMNIPAQUE) 350 MG/ML injection (SINGLE-DOSE) 100 mL (100 mL Intravenous Given 2/17/22 1528)   amoxicillin-clavulanate (AUGMENTIN) 875-125 mg per tablet 1 tablet (1 tablet Oral Given 2/17/22 1626)   morphine (PF) 4 mg/mL injection 4 mg (4 mg Intravenous Given 2/17/22 1626)       Diagnostic Studies  Results Reviewed     Procedure Component Value Units Date/Time    Comprehensive metabolic panel [[id number]]  (Abnormal) Collected: 02/17/22 1439    Lab Status: Final result Specimen: Blood from Arm, Left Updated: 02/17/22 1502     Sodium 135 mmol/L      Potassium 3 9 mmol/L      Chloride 101 mmol/L      CO2 26 mmol/L      ANION GAP 8 mmol/L      BUN 11 mg/dL Creatinine 0 59 mg/dL      Glucose 102 mg/dL      Calcium 9 2 mg/dL      AST 16 U/L      ALT 27 U/L      Alkaline Phosphatase 137 U/L      Total Protein 8 5 g/dL      Albumin 3 9 g/dL      Total Bilirubin 0 90 mg/dL      eGFR 97 ml/min/1 73sq m     Narrative:      [place] guidelines for Chronic Kidney Disease (CKD):     Stage 1 with normal or high GFR (GFR > 90 mL/min/1 73 square meters)    Stage 2 Mild CKD (GFR = 60-89 mL/min/1 73 square meters)    Stage 3A Moderate CKD (GFR = 45-59 mL/min/1 73 square meters)    Stage 3B Moderate CKD (GFR = 30-44 mL/min/1 73 square meters)    Stage 4 Severe CKD (GFR = 15-29 mL/min/1 73 square meters)    Stage 5 End Stage CKD (GFR <15 mL/min/1 73 square meters)  Note: GFR calculation is accurate only with a steady state creatinine    Magnesium [[id number]]  (Normal) Collected: 02/17/22 1439    Lab Status: Final result Specimen: Blood from Arm, Left Updated: 02/17/22 1502     Magnesium 2 1 mg/dL     Lipase [[id number]]  (Abnormal) Collected: 02/17/22 1439    Lab Status: Final result Specimen: Blood from Arm, Left Updated: 02/17/22 1502     Lipase 57 u/L     CBC and differential [[id number]]  (Abnormal) Collected: 02/17/22 1439    Lab Status: Final result Specimen: Blood from Arm, Left Updated: 02/17/22 1445     WBC 14 81 Thousand/uL      RBC 4 79 Million/uL      Hemoglobin 13 6 g/dL      Hematocrit 42 1 %      MCV 88 fL      MCH 28 4 pg      MCHC 32 3 g/dL      RDW 14 1 %      MPV 10 7 fL      Platelets 850 Thousands/uL      nRBC 0 /100 WBCs      Neutrophils Relative 76 %      Immat GRANS % 0 %      Lymphocytes Relative 15 %      Monocytes Relative 7 %      Eosinophils Relative 1 %      Basophils Relative 1 %      Neutrophils Absolute 11 30 Thousands/µL      Immature Grans Absolute 0 05 Thousand/uL      Lymphocytes Absolute 2 25 Thousands/µL      Monocytes Absolute 1 01 Thousand/µL      Eosinophils Absolute 0 13 Thousand/µL      Basophils Absolute 0 07 Thousands/µL                  CT abdomen pelvis with contrast   Final Result by Samantha Sullivan MD (02/17 7682)      Mild sigmoid diverticulitis  Follow-up colonoscopy recommended after the acute episode  The study was marked in [facility] for immediate notification            Workstation performed: [id number]                    Procedures  Procedures         ED Course  ED Course as of 02/17/22 2042   Thu Feb 17, 2022   1614 Patient with uncomplicated diverticulitis, will give pain control, antibiotics and discharge with gastroenterology follow-up  MDM  Number of Diagnoses or Management Options  Diagnosis management comments: Patient is a 66-year-old female past medical history diabetes, hypertension, hyperlipidemia, aortic stenosis, breast cancer, diverticulitis presenting with abdominal pain  Patient is well-appearing bedside stable vitals and in no acute distress  She has left lower quadrant tenderness without rebound or guarding and no CVA tenderness, no other significant physical exam findings  Will obtain labs, CT abdomen pelvis and give symptomatic management  Disposition  Final diagnoses:   Diverticulitis     Time reflects when diagnosis was documented in both MDM as applicable and the Disposition within this note     Time User Action Codes Description Comment    2/17/2022  4:13 PM Chandan Oconnor Add [K57 92] Diverticulitis       ED Disposition     ED Disposition Condition Date/Time Comment    Discharge Stable u Feb 17, 2022  4:12 PM Michelle Orourke discharge to home/self care              Follow-up Information     Follow up With Specialties Details Why Contact Info Additional [address] [facility], DO General Practice In 1 week  [address] [address] [phone]       [facility] Gastroenterology Schedule an appointment as soon as possible for a visit   [address] [facility] [id number]  [facility] [address]  [address] [facility], [place], [place], [address] [facility]          Discharge Medication List as of 2/17/2022  4:14 PM      START taking these medications    Details   amoxicillin-clavulanate (AUGMENTIN) 875-125 mg per tablet Take 1 tablet by mouth every 12 (twelve) hours for 10 days, Starting Thu 2/17/2022, Until Sun 2/27/2022, Normal      morphine (MSIR) 15 mg tablet Take 1 tablet (15 mg total) by mouth every 4 (four) hours as needed for severe pain for up to 12 doses Max Daily Amount: 90 mg, Starting Thu 2/17/2022, Print         CONTINUE these medications which have NOT CHANGED    Details   amLODIPine (NORVASC) 5 mg tablet TAKE ONE TABLET BY MOUTH EVERY DAY, Normal      aspirin 81 mg chewable tablet Chew 81 mg daily, Historical Med      carvedilol (COREG) 6 25 mg tablet TAKE ONE TABLET BY MOUTH TWICE A DAY WITH MEALS, Normal      Cholecalciferol (VITAMIN D3) 1000 UNITS CAPS Take 2,000 Units by mouth daily, Historical Med      Cyanocobalamin (VITAMIN B12 PO) Take 2,500 mcg by mouth daily, Historical Med      Empagliflozin-metFORMIN HCl ER (SYNJARDY XR)  MG TB24 Take 1 tablet by mouth daily , Historical Med      glimepiride (AMARYL) 2 mg tablet Take 4 mg by mouth 2 (two) times a day , Historical Med      Icosapent Ethyl (VASCEPA PO) Take 2 capsules by mouth daily, Historical Med      Insulin Glargine (TOUJEO) 300 units/mL CONCETRATED U-300 injection pen Inject 20 Units under the skin daily at bedtime , Historical Med      multivitamin (THERAGRAN) TABS Take 1 tablet by mouth daily, Historical Med      naproxen (NAPROSYN) 500 mg tablet Take 1 tablet (500 mg total) by mouth 2 (two) times a day as needed for moderate pain for up to 20 doses, Starting Wed 7/21/2021, Print      ondansetron (ZOFRAN-ODT) 4 mg disintegrating tablet Take 1 tablet (4 mg total) by mouth every 8 (eight) hours as needed for nausea or vomiting, Starting Wed 7/21/2021, Print      pravastatin (PRAVACHOL) 20 mg tablet TAKE ONE TABLET BY MOUTH EVERY DAY, Normal      pyridoxine (VITAMIN B6) 100 mg tablet Take 100 mg by mouth daily, Historical Med             No discharge procedures on file      PDMP Review       Value Time User    PDMP Reviewed  Yes 7/21/2021  1:37 AM Rodolfo Jolly MD          ED Provider  Electronically Signed by           Eusebio Eisenmenger, DO  02/17/22 2043

## 2022-03-04 ENCOUNTER — OFFICE VISIT (OUTPATIENT)
Dept: URGENT CARE | Facility: CLINIC | Age: 62
End: 2022-03-04
Payer: COMMERCIAL

## 2022-03-04 ENCOUNTER — APPOINTMENT (OUTPATIENT)
Dept: RADIOLOGY | Facility: CLINIC | Age: 62
End: 2022-03-04
Payer: COMMERCIAL

## 2022-03-04 VITALS
DIASTOLIC BLOOD PRESSURE: 72 MMHG | BODY MASS INDEX: 32.59 KG/M2 | OXYGEN SATURATION: 98 % | SYSTOLIC BLOOD PRESSURE: 124 MMHG | WEIGHT: 184 LBS | HEART RATE: 74 BPM

## 2022-03-04 DIAGNOSIS — M25.562 ACUTE PAIN OF LEFT KNEE: ICD-10-CM

## 2022-03-04 DIAGNOSIS — M25.562 ACUTE PAIN OF LEFT KNEE: Primary | ICD-10-CM

## 2022-03-04 PROCEDURE — 73564 X-RAY EXAM KNEE 4 OR MORE: CPT

## 2022-03-04 PROCEDURE — 99213 OFFICE O/P EST LOW 20 MIN: CPT | Performed by: PHYSICIAN ASSISTANT

## 2022-03-04 NOTE — PROGRESS NOTES
[address] [facility]        NAME: Fidelia Pete is a 57 y o  female  : 1960    MRN: [id number]  DATE: 2022  TIME: 12:57 PM    Assessment and Plan   Acute pain of left knee [M25 562]  1  Acute pain of left knee  XR knee 4+ vw left injury    Ambulatory Referral to Orthopedic Surgery         Patient Instructions     Patient Instructions   Degenerative changes seen, appearance of air in medial joint  Rest, ice and elevation  Hinged knee brace  Tylenol otc  biofreeze otc  Refer to ortho  Follow up with Primary Care Physician  Return to clinic or go to the nearest Emergency Department with new or worsening symptoms as discussed  **Portions of the record may have been created with voice recognition software  Occasional wrong word or "sound a like" substitutions may have occurred due to the inherent limitations of voice recognition software  Read the chart carefully and recognize, using context, where substitutions have occurred  **     Chief Complaint     Chief Complaint   Patient presents with    Knee Pain     pt c/o knee cap pain, getting worse for a few days         History of Present Illness     54yo female presents to clinic with complaints of left knee pain x a few days  States it is progressively worsening with no known injury  She feels unstable on the knee and has a "crunching" sensation with movement  She denies numbness, weakness, redness, bruising, swelling, locking  She is not taking any otc medications  She has been resting it and applying ice  Review of Systems     Review of Systems   Respiratory: Negative for shortness of breath  Cardiovascular: Negative for chest pain  Musculoskeletal: Positive for arthralgias  Negative for back pain, gait problem, joint swelling, myalgias and neck pain  Skin: Negative for wound  Neurological: Negative for dizziness, weakness, numbness and headaches           Current Medications     Current Outpatient Medications:    amLODIPine (NORVASC) 5 mg tablet, TAKE ONE TABLET BY MOUTH EVERY DAY, Disp: 90 tablet, Rfl: 3    aspirin 81 mg chewable tablet, Chew 81 mg daily, Disp: , Rfl:     carvedilol (COREG) 6 25 mg tablet, TAKE ONE TABLET BY MOUTH TWICE A DAY WITH MEALS, Disp: 180 tablet, Rfl: 3    Cholecalciferol (VITAMIN D3) 1000 UNITS CAPS, Take 2,000 Units by mouth daily, Disp: , Rfl:     Cyanocobalamin (VITAMIN B12 PO), Take 2,500 mcg by mouth daily, Disp: , Rfl:     Empagliflozin-metFORMIN HCl ER (SYNJARDY XR)  MG TB24, Take 1 tablet by mouth daily , Disp: , Rfl:     glimepiride (AMARYL) 2 mg tablet, Take 4 mg by mouth 2 (two) times a day , Disp: , Rfl:     Icosapent Ethyl (VASCEPA PO), Take 2 capsules by mouth daily, Disp: , Rfl:     Insulin Glargine (TOUJEO) 300 units/mL CONCETRATED U-300 injection pen, Inject 20 Units under the skin daily at bedtime  (Patient not taking: Reported on 3/4/2022 ), Disp: , Rfl:     morphine (MSIR) 15 mg tablet, Take 1 tablet (15 mg total) by mouth every 4 (four) hours as needed for severe pain for up to 12 doses Max Daily Amount: 90 mg (Patient not taking: Reported on 3/4/2022 ), Disp: 12 tablet, Rfl: 0    multivitamin (THERAGRAN) TABS, Take 1 tablet by mouth daily (Patient not taking: Reported on 3/4/2022 ), Disp: , Rfl:     naproxen (NAPROSYN) 500 mg tablet, Take 1 tablet (500 mg total) by mouth 2 (two) times a day as needed for moderate pain for up to 20 doses (Patient not taking: Reported on 9/28/2021), Disp: 20 tablet, Rfl: 0    ondansetron (ZOFRAN-ODT) 4 mg disintegrating tablet, Take 1 tablet (4 mg total) by mouth every 8 (eight) hours as needed for nausea or vomiting (Patient not taking: Reported on 9/28/2021), Disp: 10 tablet, Rfl: 0    pravastatin (PRAVACHOL) 20 mg tablet, TAKE ONE TABLET BY MOUTH EVERY DAY, Disp: 90 tablet, Rfl: 3    pyridoxine (VITAMIN B6) 100 mg tablet, Take 100 mg by mouth daily, Disp: , Rfl:     Current Allergies     Allergies as of 03/04/2022 - Reviewed 03/04/2022   Allergen Reaction Noted    Fentanyl Rash 06/05/2019    Niaspan [niacin er] Rash 10/26/2016    Prednisone Rash 02/17/2022            The following portions of the patient's history were reviewed and updated as appropriate: allergies, current medications, past family history, past medical history, past social history, past surgical history and problem list      Past Medical History:   Diagnosis Date    [facility] ([facility] )     Diabetes mellitus ([facility] )     type 2    Diverticulitis     Murmur, cardiac        Past Surgical History:   Procedure Laterality Date    APPENDECTOMY      BREAST LUMPECTOMY      CHOLECYSTECTOMY      HYSTERECTOMY      KNEE ARTHROSCOPY Right     knee    TONSILLECTOMY         Family History   Problem Relation Age of Onset    COPD Mother     Heart disease Father     Heart disease Paternal Grandmother     Cancer Family          Medications have been verified  Objective     /72   Pulse 74   Wt 83 5 kg (184 lb)   LMP  (LMP Unknown)   SpO2 98%   BMI 32 59 kg/m²        Physical Exam     Physical Exam  Vitals and nursing note reviewed  Constitutional:       Appearance: Normal appearance  HENT:      Head: Normocephalic and atraumatic  Cardiovascular:      Rate and Rhythm: Normal rate and regular rhythm  Pulses: Normal pulses  Pulmonary:      Effort: Pulmonary effort is normal       Breath sounds: Normal breath sounds  Musculoskeletal:      Left knee: Crepitus present  No swelling, deformity, erythema, ecchymosis or bony tenderness  Normal range of motion  No tenderness  No LCL laxity, MCL laxity, ACL laxity or PCL laxity  Normal meniscus and normal patellar mobility  Normal pulse  Skin:     General: Skin is warm and dry  Capillary Refill: Capillary refill takes less than 2 seconds  Findings: No bruising or erythema  Neurological:      Mental Status: She is alert  Sensory: No sensory deficit

## 2022-03-04 NOTE — LETTER
March 4, 2022     Patient: Anthony Paiz   YOB: 1960   Date of Visit: 3/4/2022       To Whom it May Concern:    Amanda Barbosa was seen in my clinic on 3/4/2022  She may return to work on 03/07/2022  If you have any questions or concerns, please don't hesitate to call           Sincerely,          Scarlet oMe PA-C        CC: No Recipients

## 2022-03-04 NOTE — PATIENT INSTRUCTIONS
Degenerative changes seen, appearance of air in medial joint  Rest, ice and elevation  Hinged knee brace  Tylenol otc  biofreeze otc  Refer to ortho  Follow up with Primary Care Physician  Return to clinic or go to the nearest Emergency Department with new or worsening symptoms as discussed

## 2022-03-07 ENCOUNTER — OFFICE VISIT (OUTPATIENT)
Dept: OBGYN CLINIC | Facility: CLINIC | Age: 62
End: 2022-03-07
Payer: COMMERCIAL

## 2022-03-07 VITALS
BODY MASS INDEX: 32.25 KG/M2 | HEIGHT: 63 IN | SYSTOLIC BLOOD PRESSURE: 127 MMHG | HEART RATE: 79 BPM | DIASTOLIC BLOOD PRESSURE: 71 MMHG | WEIGHT: 182 LBS

## 2022-03-07 DIAGNOSIS — R29.898 WEAKNESS OF BOTH HIPS: ICD-10-CM

## 2022-03-07 DIAGNOSIS — M62.9 HAMSTRING TIGHTNESS OF BOTH LOWER EXTREMITIES: ICD-10-CM

## 2022-03-07 DIAGNOSIS — M17.12 PRIMARY OSTEOARTHRITIS OF LEFT KNEE: Primary | ICD-10-CM

## 2022-03-07 DIAGNOSIS — M22.2X2 PATELLOFEMORAL SYNDROME OF LEFT KNEE: ICD-10-CM

## 2022-03-07 PROCEDURE — 20610 DRAIN/INJ JOINT/BURSA W/O US: CPT | Performed by: FAMILY MEDICINE

## 2022-03-07 PROCEDURE — 99214 OFFICE O/P EST MOD 30 MIN: CPT | Performed by: FAMILY MEDICINE

## 2022-03-07 RX ORDER — BUPIVACAINE HYDROCHLORIDE 2.5 MG/ML
2 INJECTION, SOLUTION INFILTRATION; PERINEURAL
Status: COMPLETED | OUTPATIENT
Start: 2022-03-07 | End: 2022-03-07

## 2022-03-07 RX ORDER — LIDOCAINE HYDROCHLORIDE 10 MG/ML
3 INJECTION, SOLUTION INFILTRATION; PERINEURAL
Status: COMPLETED | OUTPATIENT
Start: 2022-03-07 | End: 2022-03-07

## 2022-03-07 RX ORDER — TRIAMCINOLONE ACETONIDE 40 MG/ML
40 INJECTION, SUSPENSION INTRA-ARTICULAR; INTRAMUSCULAR
Status: COMPLETED | OUTPATIENT
Start: 2022-03-07 | End: 2022-03-07

## 2022-03-07 RX ORDER — LIDOCAINE HYDROCHLORIDE 10 MG/ML
2 INJECTION, SOLUTION INFILTRATION; PERINEURAL
Status: COMPLETED | OUTPATIENT
Start: 2022-03-07 | End: 2022-03-07

## 2022-03-07 RX ADMIN — TRIAMCINOLONE ACETONIDE 40 MG: 40 INJECTION, SUSPENSION INTRA-ARTICULAR; INTRAMUSCULAR at 13:39

## 2022-03-07 RX ADMIN — BUPIVACAINE HYDROCHLORIDE 2 ML: 2.5 INJECTION, SOLUTION INFILTRATION; PERINEURAL at 13:39

## 2022-03-07 RX ADMIN — LIDOCAINE HYDROCHLORIDE 2 ML: 10 INJECTION, SOLUTION INFILTRATION; PERINEURAL at 13:39

## 2022-03-07 RX ADMIN — LIDOCAINE HYDROCHLORIDE 3 ML: 10 INJECTION, SOLUTION INFILTRATION; PERINEURAL at 13:39

## 2022-03-07 NOTE — PROGRESS NOTES
Assessment/Plan:  Assessment/Plan   Diagnoses and all orders for this visit:    Primary osteoarthritis of left knee  -     Ambulatory Referral to Orthopedic Surgery  -     Ambulatory Referral to Physical Therapy; Future  -     Large joint arthrocentesis: L knee    Patellofemoral syndrome of left knee  -     Ambulatory Referral to Physical Therapy; Future    Hamstring tightness of both lower extremities  -     Ambulatory Referral to Physical Therapy; Future    Weakness of both hips  -     Ambulatory Referral to Physical Therapy; Future        17-year-old female with left knee pain and swelling 1 week duration  Discussed with patient physical exam, radiographs, impression and plan  X-rays of left noted for degenerative changes  Physical exam is noted for mild swelling of the knee  She has tenderness at the medial and lateral joint line and patellar facet  She has full extension and flexion to 120°  There is laxity with valgus stress  There is positive patellar inhibition and grind  There is no groin pain with GREER and FADDIR maneuvers of the hips  She has weakness both hips with abduction and hamstring tightness both lower extremities  Clinical impression is that she is symptomatic combination of degenerative changes and abnormal patellofemoral mechanics  I discussed regimen of supplements, topical anti-inflammatory, corticosteroid injection, and formal therapy  I administered mixture of 2 cc 1% lidocaine, 2 cc 0 25% bupivacaine, and 1 cc Kenalog to left knee without complication  She is to start taking tumeric 500 twice daily, tart cherry at least 1000 mg daily, and glucosamine-chondroitin 2 to 3 times a day  She may apply topical diclofenac gel 3 to 4 times a day  She may continue wearing hinged knee brace  She is to start physical therapy as soon as possible and do home exercises directed  She will follow up as needed  Subjective:   Patient ID: Joel Lee is a 57 y o  female    Chief Complaint   Patient presents with    Left Knee - Pain       26-year-old female presents for evaluation left knee pain and swelling 1 week duration  She denies any trauma or inciting event  Pain described as gradual in onset, localized to the anterior aspect knee, nonradiating, worse with rising from seated position, worse with stairs, associated with swelling, and improved resting  She presented to Urgent Care where x-ray evaluation was noted for degenerative changes  She was provided with hinged knee brace and advised to follow up with orthopedic care  She has been applying topical Solutions and taking Tylenol to help with pain  Knee Pain  This is a new problem  The current episode started 1 to 4 weeks ago  The problem occurs daily  The problem has been gradually improving  Associated symptoms include arthralgias and joint swelling  Pertinent negatives include no abdominal pain, chest pain, chills, fever, numbness, rash, sore throat or weakness  The symptoms are aggravated by bending (Stairs)  She has tried rest, position changes and acetaminophen (Knee brace) for the symptoms  The treatment provided moderate relief  The following portions of the patient's history were reviewed and updated as appropriate: She  has a past medical history of Breast cancer ([facility] [facility] ), Diabetes mellitus ([facility] [place] [facility] ), Diverticulitis, and Murmur, cardiac  She  has a past surgical history that includes Breast lumpectomy; Appendectomy; Cholecystectomy; Hysterectomy; Tonsillectomy; and Knee arthroscopy (Right)  Her family history includes COPD in her mother; Cancer in her family; Heart disease in her father and paternal grandmother  She  reports that she has never smoked  She has never used smokeless tobacco  She reports that she does not drink alcohol and does not use drugs  She is allergic to fentanyl, niaspan [niacin er], and prednisone       Review of Systems   Constitutional: Negative for chills and fever     HENT: Negative for sore throat  Eyes: Negative for visual disturbance  Respiratory: Negative for shortness of breath  Cardiovascular: Negative for chest pain  Gastrointestinal: Negative for abdominal pain  Genitourinary: Negative for flank pain  Musculoskeletal: Positive for arthralgias and joint swelling  Skin: Negative for rash and wound  Neurological: Negative for weakness and numbness  Hematological: Does not bruise/bleed easily  Psychiatric/Behavioral: Negative for self-injury  Objective:  Vitals:    03/07/22 1313   BP: 127/71   Pulse: 79   Weight: 82 6 kg (182 lb)   Height: 5' 3" (1 6 m)     Right Ankle Exam     Muscle Strength   Dorsiflexion:  5/5  Plantar flexion:  5/5      Left Ankle Exam     Muscle Strength   Dorsiflexion:  5/5   Plantar flexion:  5/5       Left Knee Exam     Tenderness   The patient is experiencing tenderness in the lateral joint line and medial joint line (Patellar facet)  Range of Motion   Extension: normal   Flexion: 120     Tests   Varus: negative Valgus: positive    Other   Swelling: mild    Comments:  Positive patellar inhibition and grind      Right Hip Exam     Muscle Strength   Abduction: 4/5   Flexion: 5/5     Tests   GREER: negative    Comments:  Negative FADDIR        Left Hip Exam     Muscle Strength   Abduction: 4/5   Flexion: 5/5     Tests   GREER: negative    Comments:  Negative FADDIR  Hamstring tightness          Strength/Myotome Testing     Left Ankle/Foot   Dorsiflexion: 5  Plantar flexion: 5    Right Ankle/Foot   Dorsiflexion: 5  Plantar flexion: 5      Physical Exam  Vitals and nursing note reviewed  Constitutional:       General: She is not in acute distress  Appearance: She is well-developed  She is not ill-appearing or diaphoretic  HENT:      Head: Normocephalic  Right Ear: External ear normal       Left Ear: External ear normal    Eyes:      Conjunctiva/sclera: Conjunctivae normal    Neck:      Trachea: No tracheal deviation  Cardiovascular:      Rate and Rhythm: Normal rate  Pulmonary:      Effort: Pulmonary effort is normal  No respiratory distress  Abdominal:      General: There is no distension  Musculoskeletal:         General: Swelling and tenderness present  No deformity or signs of injury  Skin:     General: Skin is warm and dry  Coloration: Skin is not jaundiced or pale  Neurological:      Mental Status: She is alert and oriented to person, place, and time  Psychiatric:         Mood and Affect: Mood normal          Behavior: Behavior normal          Thought Content: Thought content normal          Judgment: Judgment normal            I have personally reviewed pertinent films in PACS and my interpretation is Medial and patellofemoral joint space degenerative changes  Large joint arthrocentesis: L knee  Universal Protocol:  Consent: Verbal consent obtained  Risks and benefits: risks, benefits and alternatives were discussed  Consent given by: patient  Time out: Immediately prior to procedure a "time out" was called to verify the correct patient, procedure, equipment, support staff and site/side marked as required  Patient understanding: patient states understanding of the procedure being performed  Patient consent: the patient's understanding of the procedure matches consent given  Procedure consent: procedure consent matches procedure scheduled  Relevant documents: relevant documents present and verified  Test results: test results available and properly labeled  Site marked: the operative site was marked  Radiology Images displayed and confirmed   If images not available, report reviewed: imaging studies available  Required items: required blood products, implants, devices, and special equipment available  Patient identity confirmed: verbally with patient    Supporting Documentation  Indications: pain   Procedure Details  Location: knee - L knee  Preparation: Patient was prepped and draped in the usual sterile fashion  Needle size: 22 G  Ultrasound guidance: no  Approach: anterolateral  Medications administered: 2 mL bupivacaine 0 25 %; 2 mL lidocaine 1 %; 3 mL lidocaine 1 %; 40 mg triamcinolone acetonide 40 mg/mL    Patient tolerance: patient tolerated the procedure well with no immediate complications  Dressing:  Sterile dressing applied

## 2022-03-28 ENCOUNTER — EVALUATION (OUTPATIENT)
Dept: PHYSICAL THERAPY | Facility: CLINIC | Age: 62
End: 2022-03-28
Payer: COMMERCIAL

## 2022-03-28 DIAGNOSIS — R29.898 WEAKNESS OF BOTH HIPS: ICD-10-CM

## 2022-03-28 DIAGNOSIS — M22.2X2 PATELLOFEMORAL SYNDROME OF LEFT KNEE: ICD-10-CM

## 2022-03-28 DIAGNOSIS — M62.9 HAMSTRING TIGHTNESS OF BOTH LOWER EXTREMITIES: ICD-10-CM

## 2022-03-28 DIAGNOSIS — M17.12 PRIMARY OSTEOARTHRITIS OF LEFT KNEE: Primary | ICD-10-CM

## 2022-03-28 PROCEDURE — 97535 SELF CARE MNGMENT TRAINING: CPT | Performed by: PHYSICAL THERAPIST

## 2022-03-28 PROCEDURE — 97110 THERAPEUTIC EXERCISES: CPT | Performed by: PHYSICAL THERAPIST

## 2022-03-28 PROCEDURE — 97161 PT EVAL LOW COMPLEX 20 MIN: CPT | Performed by: PHYSICAL THERAPIST

## 2022-03-28 NOTE — PROGRESS NOTES
PT Evaluation     Today's date: 3/28/2022  Patient name: Sergey Powers  : 1960  MRN: [id number]  Referring provider: Angelika Celaya DO  Dx:   Encounter Diagnosis     ICD-10-CM    1  Primary osteoarthritis of left knee  M17 12    2  Patellofemoral syndrome of left knee  M22 2X2    3  Weakness of both hips  R29 898    4  Hamstring tightness of both lower extremities  M62 9                   Assessment  Assessment details: The patient is a 58 y/o female who presents to PT with diagnosis of L knee OA, L knee patellofemoral syndrome, B hip weakness and B hamstring tightness  She has complaints of constant L knee pain with most pain being around her kneecap  She does note decreased pain since getting the injection and using her knee brace  She demonstrates deficits with decreased ROM and strength, decreased flexibility, antalgic gait with use of knee brace, decreased balance and proprioception, pain with stair negotiation and pain with completing her ADLs and tasks at home  She remains I with all her ADLs though she has pain with completing them  More pain noted with increased time on her feet  She ambulates without AD but with use of hinged knee brace during the day and to sleep  She ambulates with slow brandon, no significant gait deviations noted  She can go up and down the steps with reciprocal gait pattern though with pain in her knee  TTP is noted around her patella and quad tendon  Secondary to pain and above deficits she is limited with her overall mobility and function  The patient would benefit from continued PT to address deficits and improve function  Tx to include ROM, stretching, strengthening, modalities, HEP, pt education, postural ed, lifting/body mechanics, neuro re-ed, balance/proprioception Te, MT and equipment        Impairments: abnormal gait, abnormal or restricted ROM, activity intolerance, impaired balance, impaired physical strength, lacks appropriate home exercise program, pain with function and poor body mechanics  Other impairment: decreased flexibility  Functional limitations: difficulty with stair negotiationUnderstanding of Dx/Px/POC: good   Prognosis: good    Goals  STGs:  1  Initiate and complete HEP with verbal cues  2   Decrease L knee pain by > 25% in 4 weeks  3   Improve L knee ROM by 10-15 degrees in 4 weeks  4   Improve LLE strength by 1/2-1 grade in 4 weeks  LTGs:  1  Patient to be I with HEP in 8 weeks  2  Improve L knee ROM to 0-115 degrees in 8 weeks to improve function  3  Improve LLE strength to 4+ to 5/5 t/o in 8 weeks to improve function  4  Decrease L knee pain to < or = to 2-3/10 with activity in 8 weeks to improve function  5   Patient to ambulate with normalized gait pattern in 8 weeks  6   Stair negotiation is improved to PLOF in 8 weeks  7   Recreational performance is improved to PLOF in 8 weeks  8   ADL performance is improved to PLOF in 8 weeks  9   Work performance is improved to maximal level of function in 8 weeks  Plan  Plan details: Modalities and therapy interventions prn  Patient would benefit from: skilled physical therapy  Planned modality interventions: cryotherapy and thermotherapy: hydrocollator packs  Planned therapy interventions: manual therapy, balance, balance/weight bearing training, neuromuscular re-education, patient education, postural training, self care, strengthening, stretching, therapeutic activities, therapeutic exercise, flexibility, gait training and home exercise program  Frequency: 2x week (1-2 times/week)  Duration in weeks: 8  Plan of Care beginning date: 3/28/2022  Plan of Care expiration date: 5/23/2022  Treatment plan discussed with: patient        Subjective Evaluation    History of Present Illness  Mechanism of injury: The patient states that she had woke up one morning with increased L knee pain, no known cause    She notes that she had "babied it" for a few days but pain had been getting worse   She had then gone to Urgent Care on 3/4/22 and she had x-rays taken  Per patient it showed arthritis  She was given a knee brace to wear and referred to the orthopedic doctor and got into see Dr Johnnie Paez the following week  She was to see Dr Johnnie Paez on 3/7/22  She was given a cortisone shot in her knee with some relief noted  She was also referred to OPPT and she now presents for her evaluation  She will be going back to see the doctor as needed for her follow up appointment  She notes that she feels better with use of knee brace - she has been wearing it during the day and to sleep at night  Pain is around her kneecap  She denies any N&T in her knee  Pain  At best pain rating: 3  At worst pain ratin  Location: L Knee  Quality: dull ache and sharp  Relieving factors: rest and ice  Aggravating factors: walking    Social Support  Steps to enter house: yes (Has handicap ramp also)  2  Stairs in house: yes   Lives in: multiple-level home (Bi Level)  Lives with: spouse    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)    Diagnostic Tests  X-ray: abnormal (Per patient - arthritis)  Treatments  Previous treatment: injection treatment  Patient Goals  Patient goals for therapy: increased motion, decreased pain and increased strength  Patient goal: "To help lessen the pain, to be able to stop wearing the brace "          Objective     Tenderness   Left Knee   Tenderness in the inferior patella, lateral patella, medial patella, quadriceps tendon and superior patella       Neurological Testing     Sensation     Knee   Left Knee   Intact: light touch    Right Knee   Intact: light touch     Active Range of Motion   Left Knee   Flexion: 100 degrees with pain  Extension: 0 degrees with pain    Right Knee   Flexion: 115 degrees   Extension: 0 degrees     Additional Active Range of Motion Details  L SLR: 55  R SLR: 60    Mobility   Patellar Mobility:   Left Knee   WFL: medial and lateral      Right Knee   WFL: medial and lateral    Strength/Myotome Testing     Left Knee   Flexion: 4-  Extension: 4  Quadriceps contraction: fair    Right Knee   Flexion: WFL  Extension: WFL  Quadriceps contraction: good    Tests     Left Knee   Negative anterior drawer, valgus stress test at 30 degrees and varus stress test at 30 degrees  Swelling     Left Knee Girth Measurement (cm)   Joint line: 36 cm    Right Knee Girth Measurement (cm)   Joint line: 36 cm    Ambulation   Weight-Bearing Status   Assistive device used: none    Additional Weight-Bearing Status Details  Using hinged knee brace during the day and to sleep  Ambulation: Level Surfaces   Ambulation without assistive device: independent    Ambulation: Stairs   Ascend stairs: independent  Pattern: reciprocal  Descend stairs: independent  Pattern: reciprocal    Additional Stairs Ambulation Details  Reciprocal gait pattern though with pain  Observational Gait   Decreased walking speed  Precautions: None       Manuals 3/28       LLE                                Neuro Re-Ed         BOSU Lunges        SLS        Tandem Stance        Sidestepping w/TBand        Monster Walks                        Ther Ex        NuStep L2 10 mins       HR/TR HEP       SLR x 3 HEP       Squats        LAQ HEP       SAQ        SLR        Heel Slides        S/L Hip Abd        Clamshells                                Ther Activity        Stepups F/L        Stepdowns        Gait Training                        Modalities        HP/CP prn                           Access Code: [email or web address]  URL: [email or web address]  Date: 03/28/2022  Prepared by: Uma Wells    Exercises  · Standing Hip Flexion AROM - 1 x daily - 7 x weekly - 1 sets - 10 reps  · Standing Hip Abduction AROM - 1 x daily - 7 x weekly - 1 sets - 10 reps  · Standing Hip Extension with Chair - 1 x daily - 7 x weekly - 1 sets - 10 reps  · Standing Heel Raise with Support - 1 x daily - 7 x weekly - 1 sets - 10 reps  · Seated Long Arc Quad - 1 x daily - 7 x weekly - 1 sets - 10 reps

## 2022-03-30 PROBLEM — K57.32 DIVERTICULITIS OF LARGE INTESTINE WITHOUT PERFORATION OR ABSCESS WITHOUT BLEEDING: Status: ACTIVE | Noted: 2022-03-30

## 2022-03-31 ENCOUNTER — OFFICE VISIT (OUTPATIENT)
Dept: PHYSICAL THERAPY | Facility: CLINIC | Age: 62
End: 2022-03-31
Payer: COMMERCIAL

## 2022-03-31 DIAGNOSIS — M17.12 PRIMARY OSTEOARTHRITIS OF LEFT KNEE: Primary | ICD-10-CM

## 2022-03-31 DIAGNOSIS — R29.898 WEAKNESS OF BOTH HIPS: ICD-10-CM

## 2022-03-31 DIAGNOSIS — M62.9 HAMSTRING TIGHTNESS OF BOTH LOWER EXTREMITIES: ICD-10-CM

## 2022-03-31 DIAGNOSIS — M22.2X2 PATELLOFEMORAL SYNDROME OF LEFT KNEE: ICD-10-CM

## 2022-03-31 PROCEDURE — 97112 NEUROMUSCULAR REEDUCATION: CPT | Performed by: PHYSICAL THERAPIST

## 2022-03-31 PROCEDURE — 97110 THERAPEUTIC EXERCISES: CPT | Performed by: PHYSICAL THERAPIST

## 2022-03-31 NOTE — PROGRESS NOTES
Daily Note     Today's date: 3/31/2022  Patient name: Gilmer Ahuja  : 1960  MRN: [id number]  Referring provider: Sanjuana Lin DO  Dx:   Encounter Diagnosis     ICD-10-CM    1  Primary osteoarthritis of left knee  M17 12    2  Patellofemoral syndrome of left knee  M22 2X2    3  Weakness of both hips  R29 898    4  Hamstring tightness of both lower extremities  M62 9                   Subjective: Patient states her knee has been feeling very good  Shoulder was sore after last session  Objective: See treatment diary below      Assessment: Tolerated treatment well  Patient would benefit from continued PT   Very fatigued after session  Some soreness at patella with squats and forward step-ups  Given red T-band at home  Plan: Continue per plan of care           Precautions: None       Manuals 3/28 3/31      LLE                                Neuro Re-Ed         BOSU Lunges  x15      SLS  2x30"      Tandem Stance        Sidestepping w/TBand  RTB 1 lap      Monster Walks  RTB  1 lap                      Ther Ex        NuStep L2 10 mins L2 10 mins      HR/TR HEP       SLR x 3 HEP       Squats  x10      LAQ HEP       SAQ  3"x20      SLR  3"x20      Heel Slides        S/L Hip Abd  3" x20      Clamshells                                Ther Activity        Stepups F/L  6"x20      Stepdowns  6"x20      Gait Training                        Modalities        HP/CP prn

## 2022-04-06 ENCOUNTER — OFFICE VISIT (OUTPATIENT)
Dept: PHYSICAL THERAPY | Facility: CLINIC | Age: 62
End: 2022-04-06
Payer: COMMERCIAL

## 2022-04-06 DIAGNOSIS — M22.2X2 PATELLOFEMORAL SYNDROME OF LEFT KNEE: ICD-10-CM

## 2022-04-06 DIAGNOSIS — R29.898 WEAKNESS OF BOTH HIPS: ICD-10-CM

## 2022-04-06 DIAGNOSIS — M62.9 HAMSTRING TIGHTNESS OF BOTH LOWER EXTREMITIES: ICD-10-CM

## 2022-04-06 DIAGNOSIS — M17.12 PRIMARY OSTEOARTHRITIS OF LEFT KNEE: Primary | ICD-10-CM

## 2022-04-06 PROCEDURE — 97110 THERAPEUTIC EXERCISES: CPT | Performed by: PHYSICAL THERAPIST

## 2022-04-06 PROCEDURE — 97112 NEUROMUSCULAR REEDUCATION: CPT | Performed by: PHYSICAL THERAPIST

## 2022-04-06 PROCEDURE — 97530 THERAPEUTIC ACTIVITIES: CPT | Performed by: PHYSICAL THERAPIST

## 2022-04-06 NOTE — PROGRESS NOTES
Daily Note     Today's date: 2022  Patient name: Yvonne Cole  : 1960  MRN: [id number]  Referring provider: Marita Freeman DO  Dx:   Encounter Diagnosis     ICD-10-CM    1  Primary osteoarthritis of left knee  M17 12    2  Patellofemoral syndrome of left knee  M22 2X2    3  Weakness of both hips  R29 898    4  Hamstring tightness of both lower extremities  M62 9        Start Time: 1701  Stop Time: 1800  Total time in clinic (min): 59 minutes    Subjective: Patient reports that her knee has been doing well with less pain  Objective: See treatment diary below      Assessment: Tolerated treatment well  Patient demonstrated fatigue post treatment and would benefit from continued PT  Patient reported mild right knee pain during LSD off 6" step  Plan: Continue per plan of care  Progress treatment as tolerated  Precautions: None       Manuals 3/28 3/31 4/6     LLE                                Neuro Re-Ed         BOSU Lunges  x15 x20     SLS  2x30"      Tandem Stance        Sidestepping w/TBand  RTB 1 lap Held     Monster Walks  RTB  1 lap RTB 2 laps                     Ther Ex        NuStep L2 10 mins L2 10 mins L3 10 min     HR/TR HEP       SLR x 3 HEP       Squats  x10      LAQ HEP       SAQ  3"x20      SLR  3"x20 3x10     Heel Slides        S/L Hip Abd  3" x20 2x10     Clamshells        Prone quad str     3x30"                     Ther Activity        Stepups F/L  6"x20 L: 6" 2x10  F: 8" 2x10     Stepdowns  6"x20      Total gym: squats   L22 2x10     Gait Training                        Modalities        HP/CP prn

## 2022-04-11 ENCOUNTER — APPOINTMENT (OUTPATIENT)
Dept: PHYSICAL THERAPY | Facility: CLINIC | Age: 62
End: 2022-04-11
Payer: COMMERCIAL

## 2022-04-13 ENCOUNTER — APPOINTMENT (OUTPATIENT)
Dept: PHYSICAL THERAPY | Facility: CLINIC | Age: 62
End: 2022-04-13
Payer: COMMERCIAL

## 2022-04-20 ENCOUNTER — OFFICE VISIT (OUTPATIENT)
Dept: PHYSICAL THERAPY | Facility: CLINIC | Age: 62
End: 2022-04-20
Payer: COMMERCIAL

## 2022-04-20 DIAGNOSIS — R29.898 WEAKNESS OF BOTH HIPS: ICD-10-CM

## 2022-04-20 DIAGNOSIS — M22.2X2 PATELLOFEMORAL SYNDROME OF LEFT KNEE: ICD-10-CM

## 2022-04-20 DIAGNOSIS — M62.9 HAMSTRING TIGHTNESS OF BOTH LOWER EXTREMITIES: ICD-10-CM

## 2022-04-20 DIAGNOSIS — M17.12 PRIMARY OSTEOARTHRITIS OF LEFT KNEE: Primary | ICD-10-CM

## 2022-04-20 PROCEDURE — 97530 THERAPEUTIC ACTIVITIES: CPT | Performed by: PHYSICAL THERAPIST

## 2022-04-20 PROCEDURE — 97110 THERAPEUTIC EXERCISES: CPT | Performed by: PHYSICAL THERAPIST

## 2022-04-20 NOTE — PROGRESS NOTES
Daily Note     Today's date: 2022  Patient name: Ila Lees  : 1960  MRN: [id number]  Referring provider: Vesna Pepe DO  Dx:   Encounter Diagnosis     ICD-10-CM    1  Primary osteoarthritis of left knee  M17 12    2  Patellofemoral syndrome of left knee  M22 2X2    3  Weakness of both hips  R29 898    4  Hamstring tightness of both lower extremities  M62 9        Start Time: 1700  Stop Time: 1745  Total time in clinic (min): 45 minutes    Subjective: Patient reports that her left knee is currently functioning at approximately 90% of her premorbid norm  Patient states that she has had less pain and improved stability  Objective: See treatment diary below      Assessment: Tolerated treatment well  Patient demonstrated fatigue post treatment and would benefit from continued PT  Updated HEP  Patient with mild left patellofemoral pain during LSD  Plan: Continue per plan of care  Progress note during next visit  Potential discharge next visit  Progress treatment as tolerated  Precautions: None       Manuals 3/28 3/31 4/6 4/20    LLE                                Neuro Re-Ed         BOSU Lunges  x15 x20 x26    SLS  2x30"      Tandem Stance        Sidestepping w/TBand  RTB 1 lap Held GTB 2 laps    Monster Walks  RTB  1 lap RTB 2 laps                     Ther Ex        NuStep L2 10 mins L2 10 mins L3 10 min L5 10 min    HR/TR HEP       SLR x 3 HEP       Squats  x10      LAQ HEP       SAQ  3"x20      SLR  3"x20 3x10     Heel Slides        S/L Hip Abd  3" x20 2x10     Clamshells        Prone quad str  3x30" Supine hip flexor str  3x30"    Standing hip abd, ext with TB    RTB 2x10 ea      Patient education: HEP review    GR    Ther Activity        Stepups F/L  6"x20 L: 6" 2x10  F: 8" 2x10     Stepdowns  6"x20  LSD 4" x15    Total gym: squats   L22 2x10 L22 3x10    Gait Training                        Modalities        HP/CP prn                   Access Code: [email or web address]  URL: [email or web address]  Date: 04/20/2022  Prepared by: Wesley Griffith    Exercises  · Prone Quadriceps Stretch with Strap - 1 x daily - 4 x weekly - 3 sets - 1 reps - 30 seconds hold  · Supine Active Straight Leg Raise - 1 x daily - 4 x weekly - 3 sets - 10 reps  · Sidelying Hip Abduction - 1 x daily - 4 x weekly - 3 sets - 10 reps  · Side Stepping with Resistance at Ankles - 1 x daily - 4 x weekly - 2 sets - 10 reps  · Standing Hip Flexion with Resistance Loop - 1 x daily - 4 x weekly - 2 sets - 10 reps  · Hip Abduction with Resistance Loop - 1 x daily - 4 x weekly - 2 sets - 10 reps  · Hip Extension with Resistance Loop - 1 x daily - 4 x weekly - 2 sets - 10 reps  · Single Leg Stance - 1 x daily - 4 x weekly - 3 sets - 1 reps - 30 seconds hold  · Standard Lunge - 1 x daily - 4 x weekly - 2 sets - 10 reps  · Lateral Step Down - 1 x daily - 4 x weekly - 3 sets - 10 reps

## 2022-04-22 ENCOUNTER — APPOINTMENT (EMERGENCY)
Dept: CT IMAGING | Facility: HOSPITAL | Age: 62
End: 2022-04-22
Payer: COMMERCIAL

## 2022-04-22 ENCOUNTER — HOSPITAL ENCOUNTER (EMERGENCY)
Facility: HOSPITAL | Age: 62
Discharge: HOME | End: 2022-04-22
Attending: EMERGENCY MEDICINE
Payer: COMMERCIAL

## 2022-04-22 VITALS
TEMPERATURE: 97 F | BODY MASS INDEX: 32.24 KG/M2 | SYSTOLIC BLOOD PRESSURE: 136 MMHG | HEIGHT: 63 IN | DIASTOLIC BLOOD PRESSURE: 69 MMHG | OXYGEN SATURATION: 93 % | RESPIRATION RATE: 18 BRPM | HEART RATE: 72 BPM

## 2022-04-22 DIAGNOSIS — M54.50 ACUTE LOW BACK PAIN: Primary | ICD-10-CM

## 2022-04-22 LAB
ALBUMIN SERPL BCP-MCNC: 4.1 G/DL (ref 3.5–5)
ALP SERPL-CCNC: 86 U/L (ref 34–104)
ALT SERPL W P-5'-P-CCNC: 11 U/L (ref 7–52)
ANION GAP SERPL CALCULATED.3IONS-SCNC: 9 MMOL/L (ref 4–13)
AST SERPL W P-5'-P-CCNC: 10 U/L (ref 13–39)
BASOPHILS # BLD AUTO: 0.07 THOUSANDS/ΜL (ref 0–0.1)
BASOPHILS NFR BLD AUTO: 1 % (ref 0–1)
BILIRUB SERPL-MCNC: 0.46 MG/DL (ref 0.2–1)
BILIRUB UR QL STRIP: NEGATIVE
BUN SERPL-MCNC: 12 MG/DL (ref 5–25)
CALCIUM SERPL-MCNC: 9.3 MG/DL (ref 8.4–10.2)
CHLORIDE SERPL-SCNC: 103 MMOL/L (ref 96–108)
CLARITY UR: CLEAR
CO2 SERPL-SCNC: 25 MMOL/L (ref 21–32)
COLOR UR: YELLOW
CREAT SERPL-MCNC: 0.63 MG/DL (ref 0.6–1.3)
EOSINOPHIL # BLD AUTO: 0.18 THOUSAND/ΜL (ref 0–0.61)
EOSINOPHIL NFR BLD AUTO: 2 % (ref 0–6)
ERYTHROCYTE [DISTWIDTH] IN BLOOD BY AUTOMATED COUNT: 13.8 % (ref 11.6–15.1)
GFR SERPL CREATININE-BSD FRML MDRD: 95 ML/MIN/1.73SQ M
GLUCOSE SERPL-MCNC: 86 MG/DL (ref 65–140)
GLUCOSE UR STRIP-MCNC: ABNORMAL MG/DL
HCT VFR BLD AUTO: 38.3 % (ref 34.8–46.1)
HGB BLD-MCNC: 12.6 G/DL (ref 11.5–15.4)
HGB UR QL STRIP.AUTO: NEGATIVE
KETONES UR STRIP-MCNC: NEGATIVE MG/DL
LEUKOCYTE ESTERASE UR QL STRIP: NEGATIVE
LIPASE SERPL-CCNC: 35 U/L (ref 11–82)
LYMPHOCYTES # BLD AUTO: 2.93 THOUSANDS/ΜL (ref 0.6–4.47)
LYMPHOCYTES NFR BLD AUTO: 37 % (ref 14–44)
MCH RBC QN AUTO: 29.7 PG (ref 26.8–34.3)
MCHC RBC AUTO-ENTMCNC: 32.9 G/DL (ref 31.4–37.4)
MCV RBC AUTO: 90 FL (ref 82–98)
MONOCYTES # BLD AUTO: 0.64 THOUSAND/ΜL (ref 0.17–1.22)
MONOCYTES NFR BLD AUTO: 8 % (ref 4–12)
NEUTROPHILS # BLD AUTO: 4.19 THOUSANDS/ΜL (ref 1.85–7.62)
NEUTS SEG NFR BLD AUTO: 52 % (ref 43–75)
NITRITE UR QL STRIP: NEGATIVE
NRBC BLD AUTO-RTO: 0 /100 WBCS
PH UR STRIP.AUTO: 5.5 [PH]
PLATELET # BLD AUTO: 210 THOUSANDS/UL (ref 149–390)
PMV BLD AUTO: 10.7 FL (ref 8.9–12.7)
POTASSIUM SERPL-SCNC: 3.6 MMOL/L (ref 3.5–5.3)
PROT SERPL-MCNC: 7.2 G/DL (ref 6.4–8.4)
PROT UR STRIP-MCNC: NEGATIVE MG/DL
RBC # BLD AUTO: 4.24 MILLION/UL (ref 3.81–5.12)
SODIUM SERPL-SCNC: 137 MMOL/L (ref 135–147)
SP GR UR STRIP.AUTO: 1.02 (ref 1–1.03)
UROBILINOGEN UR QL STRIP.AUTO: 0.2 E.U./DL
WBC # BLD AUTO: 8.03 THOUSAND/UL (ref 4.31–10.16)

## 2022-04-22 PROCEDURE — 36415 COLL VENOUS BLD VENIPUNCTURE: CPT | Performed by: PHYSICIAN ASSISTANT

## 2022-04-22 PROCEDURE — 74176 CT ABD & PELVIS W/O CONTRAST: CPT

## 2022-04-22 PROCEDURE — 96374 THER/PROPH/DIAG INJ IV PUSH: CPT

## 2022-04-22 PROCEDURE — 81003 URINALYSIS AUTO W/O SCOPE: CPT | Performed by: PHYSICIAN ASSISTANT

## 2022-04-22 PROCEDURE — 96361 HYDRATE IV INFUSION ADD-ON: CPT

## 2022-04-22 PROCEDURE — 99284 EMERGENCY DEPT VISIT MOD MDM: CPT | Performed by: PHYSICIAN ASSISTANT

## 2022-04-22 PROCEDURE — 80053 COMPREHEN METABOLIC PANEL: CPT | Performed by: PHYSICIAN ASSISTANT

## 2022-04-22 PROCEDURE — 99284 EMERGENCY DEPT VISIT MOD MDM: CPT

## 2022-04-22 PROCEDURE — 85025 COMPLETE CBC W/AUTO DIFF WBC: CPT | Performed by: PHYSICIAN ASSISTANT

## 2022-04-22 PROCEDURE — 83690 ASSAY OF LIPASE: CPT | Performed by: PHYSICIAN ASSISTANT

## 2022-04-22 RX ORDER — NAPROXEN 375 MG/1
375 TABLET ORAL 2 TIMES DAILY WITH MEALS
Qty: 20 TABLET | Refills: 0 | Status: SHIPPED | OUTPATIENT
Start: 2022-04-22

## 2022-04-22 RX ORDER — KETOROLAC TROMETHAMINE 30 MG/ML
15 INJECTION, SOLUTION INTRAMUSCULAR; INTRAVENOUS ONCE
Status: COMPLETED | OUTPATIENT
Start: 2022-04-22 | End: 2022-04-22

## 2022-04-22 RX ORDER — METHOCARBAMOL 500 MG/1
500 TABLET, FILM COATED ORAL 2 TIMES DAILY PRN
Qty: 20 TABLET | Refills: 0 | Status: SHIPPED | OUTPATIENT
Start: 2022-04-22

## 2022-04-22 RX ADMIN — SODIUM CHLORIDE 1000 ML: 0.9 INJECTION, SOLUTION INTRAVENOUS at 19:38

## 2022-04-22 RX ADMIN — KETOROLAC TROMETHAMINE 15 MG: 30 INJECTION, SOLUTION INTRAMUSCULAR at 19:37

## 2022-04-22 NOTE — ED PROVIDER NOTES
History  Chief Complaint   Patient presents with    Back Injury     lower left sided by kidney pain  started tuesday and progressivley getting worse  endocrinologist sent patient here  18-year-old female history of type 2 diabetes, diverticulitis and breast cancer currently in remission presents accompanied by her  complaining of left flank pain  Patient reports that she had a colonoscopy done 1 week ago  Reports feeling run down are on Sunday, 2 days after her colonoscopy thinking she was just dehydrated  Patient reports she then started to develop left-sided flank pain on Tuesday which has been progressively getting worse  Pain is constant, nonradiating that she describes as a dull ache, worse with movement  She denies any fevers, chills, chest pain, shortness of breath, pleuritic pain, abdominal pain, vomiting, urinary burning or frequency or hematuria or any other complaints or concerns at this time  Denies saddle anesthesia, bowel or bladder incontinence, urinary symptoms, trauma, lower extremity weakness or history of IV drug use  Prior to Admission Medications   Prescriptions Last Dose Informant Patient Reported? Taking?    Cholecalciferol (VITAMIN D3) 1000 UNITS CAPS  Self Yes No   Sig: Take 2,000 Units by mouth daily   Cyanocobalamin (VITAMIN B12 PO)  Self Yes No   Sig: Take 2,500 mcg by mouth daily   Empagliflozin-metFORMIN HCl ER (SYNJARDY XR)  MG TB24  Self Yes No   Sig: Take 1 tablet by mouth daily    Icosapent Ethyl (VASCEPA PO)  Self Yes No   Sig: Take 2 capsules by mouth daily   Patient not taking: Reported on 3/7/2022    Insulin Glargine (TOUJEO) 300 units/mL CONCETRATED U-300 injection pen  Self Yes No   Sig: Inject 20 Units under the skin daily at bedtime    Patient not taking: Reported on 3/4/2022    amLODIPine (NORVASC) 5 mg tablet  Self No No   Sig: TAKE ONE TABLET BY MOUTH EVERY DAY   aspirin 81 mg chewable tablet  Self Yes No   Sig: Chew 81 mg daily carvedilol (COREG) 6 25 mg tablet  Self No No   Sig: TAKE ONE TABLET BY MOUTH TWICE A DAY WITH MEALS   glimepiride (AMARYL) 2 mg tablet  Self Yes No   Sig: Take 4 mg by mouth 2 (two) times a day    morphine (MSIR) 15 mg tablet  Self No No   Sig: Take 1 tablet (15 mg total) by mouth every 4 (four) hours as needed for severe pain for up to 12 doses Max Daily Amount: 90 mg   Patient not taking: Reported on 3/4/2022    multivitamin (THERAGRAN) TABS  Self Yes No   Sig: Take 1 tablet by mouth daily   Patient not taking: Reported on 3/4/2022    naproxen (NAPROSYN) 500 mg tablet  Self No No   Sig: Take 1 tablet (500 mg total) by mouth 2 (two) times a day as needed for moderate pain for up to 20 doses   Patient not taking: Reported on 9/28/2021   ondansetron (ZOFRAN-ODT) 4 mg disintegrating tablet  Self No No   Sig: Take 1 tablet (4 mg total) by mouth every 8 (eight) hours as needed for nausea or vomiting   Patient not taking: Reported on 9/28/2021   pravastatin (PRAVACHOL) 20 mg tablet  Self No No   Sig: TAKE ONE TABLET BY MOUTH EVERY DAY   pyridoxine (VITAMIN B6) 100 mg tablet  Self Yes No   Sig: Take 100 mg by mouth daily      Facility-Administered Medications: None       Past Medical History:   Diagnosis Date    [facility] cancer ([facility] [address] )     Diabetes mellitus (HCC)     type 2    Diverticulitis     Murmur, cardiac        Past Surgical History:   Procedure Laterality Date    APPENDECTOMY      BREAST LUMPECTOMY      CHOLECYSTECTOMY      HYSTERECTOMY      KNEE ARTHROSCOPY Right     knee    TONSILLECTOMY         Family History   Problem Relation Age of Onset    COPD Mother     Heart disease Father     Heart disease Paternal Grandmother     Cancer Family     Colon cancer Neg Hx      I have reviewed and agree with the history as documented      E-Cigarette/Vaping    E-Cigarette Use Never User      E-Cigarette/Vaping Substances     Social History     Tobacco Use    Smoking status: Never Smoker    Smokeless tobacco: Never Used   Vaping Use    Vaping Use: Never used   Substance Use Topics    Alcohol use: No    Drug use: No       Review of Systems   Constitutional: Negative for chills, fatigue and fever  HENT: Negative for ear pain and sore throat  Eyes: Negative for pain  Respiratory: Negative for cough, shortness of breath and wheezing  Cardiovascular: Negative for chest pain, palpitations and leg swelling  Gastrointestinal: Negative for abdominal pain, constipation, diarrhea, nausea and vomiting  Endocrine: Negative for polyuria  Genitourinary: Positive for flank pain  Negative for dysuria and pelvic pain  Musculoskeletal: Negative for arthralgias, myalgias, neck pain and neck stiffness  Skin: Negative for rash  Neurological: Negative for dizziness, syncope, light-headedness and headaches  All other systems reviewed and are negative  Physical Exam  Physical Exam  Constitutional:       General: She is not in acute distress  Appearance: Normal appearance  She is well-developed  HENT:      Head: Normocephalic and atraumatic  Right Ear: External ear normal       Left Ear: External ear normal       Mouth/Throat:      Pharynx: No oropharyngeal exudate  Eyes:      Extraocular Movements: Extraocular movements intact  Cardiovascular:      Rate and Rhythm: Normal rate and regular rhythm  Heart sounds: Normal heart sounds  Pulmonary:      Effort: Pulmonary effort is normal       Breath sounds: Normal breath sounds  Abdominal:      General: Bowel sounds are normal       Palpations: Abdomen is soft  Tenderness: There is no abdominal tenderness  Musculoskeletal:         General: Normal range of motion  Cervical back: Normal range of motion  Skin:     General: Skin is warm  Capillary Refill: Capillary refill takes less than 2 seconds  Neurological:      General: No focal deficit present  Mental Status: She is alert and oriented to person, place, and time  Psychiatric:         Mood and Affect: Mood normal          Vital Signs  ED Triage Vitals   Temperature Pulse Respirations Blood Pressure SpO2   04/22/22 1755 04/22/22 1755 04/22/22 1755 04/22/22 1757 04/22/22 1755   (!) 97 °F (36 1 °C) 79 19 139/70 98 %      Temp Source Heart Rate Source Patient Position - Orthostatic VS BP Location FiO2 (%)   04/22/22 1755 04/22/22 1755 04/22/22 1755 04/22/22 1755 --   Temporal Monitor Sitting Left arm       Pain Score       04/22/22 1755       6           Vitals:    04/22/22 1755 04/22/22 1757 04/22/22 2113   BP:  139/70 136/69   Pulse: 79  72   Patient Position - Orthostatic VS: Sitting  Sitting         Visual Acuity      ED Medications  Medications   sodium chloride 0 9 % bolus 1,000 mL (0 mL Intravenous Stopped 4/22/22 2109)   ketorolac (TORADOL) injection 15 mg (15 mg Intravenous Given 4/22/22 1937)       Diagnostic Studies  Results Reviewed     Procedure Component Value Units Date/Time    CMP [[id number]]  (Abnormal) Collected: 04/22/22 1936    Lab Status: Final result Specimen: Blood from Arm, Left Updated: 04/22/22 2017     Sodium 137 mmol/L      Potassium 3 6 mmol/L      Chloride 103 mmol/L      CO2 25 mmol/L      ANION GAP 9 mmol/L      BUN 12 mg/dL      Creatinine 0 63 mg/dL      Glucose 86 mg/dL      Calcium 9 3 mg/dL      AST 10 U/L      ALT 11 U/L      Alkaline Phosphatase 86 U/L      Total Protein 7 2 g/dL      Albumin 4 1 g/dL      Total Bilirubin 0 46 mg/dL      eGFR 95 ml/min/1 73sq m     Narrative:      Harish guidelines for Chronic Kidney Disease (CKD):     Stage 1 with normal or high GFR (GFR > 90 mL/min/1 73 square meters)    Stage 2 Mild CKD (GFR = 60-89 mL/min/1 73 square meters)    Stage 3A Moderate CKD (GFR = 45-59 mL/min/1 73 square meters)    Stage 3B Moderate CKD (GFR = 30-44 mL/min/1 73 square meters)    Stage 4 Severe CKD (GFR = 15-29 mL/min/1 73 square meters)    Stage 5 End Stage CKD (GFR <15 mL/min/1 73 square meters)  Note: GFR calculation is accurate only with a steady state creatinine    Lipase [[id number]]  (Normal) Collected: 04/22/22 1936    Lab Status: Final result Specimen: Blood from Arm, Left Updated: 04/22/22 2014     Lipase 35 u/L     UA w Reflex to Microscopic w Reflex to Culture [[id number]]  (Abnormal) Collected: 04/22/22 1942    Lab Status: Final result Specimen: Urine, Clean Catch Updated: 04/22/22 2012     Color, UA Yellow     Clarity, UA Clear     Specific Gravity, UA 1 025     pH, UA 5 5     Leukocytes, UA Negative     Nitrite, UA Negative     Protein, UA Negative mg/dl      Glucose, UA 3+ mg/dl      Ketones, UA Negative mg/dl      Urobilinogen, UA 0 2 E U /dl      Bilirubin, UA Negative     Blood, UA Negative    CBC and differential [[id number]] Collected: 04/22/22 1936    Lab Status: Final result Specimen: Blood from Arm, Left Updated: 04/22/22 2003     WBC 8 03 Thousand/uL      RBC 4 24 Million/uL      Hemoglobin 12 6 g/dL      Hematocrit 38 3 %      MCV 90 fL      MCH 29 7 pg      MCHC 32 9 g/dL      RDW 13 8 %      MPV 10 7 fL      Platelets 194 Thousands/uL      nRBC 0 /100 WBCs      Neutrophils Relative 52 %      Lymphocytes Relative 37 %      Monocytes Relative 8 %      Eosinophils Relative 2 %      Basophils Relative 1 %      Neutrophils Absolute 4 19 Thousands/µL      Lymphocytes Absolute 2 93 Thousands/µL      Monocytes Absolute 0 64 Thousand/µL      Eosinophils Absolute 0 18 Thousand/µL      Basophils Absolute 0 07 Thousands/µL                  CT renal stone study abdomen pelvis without contrast   Final Result by Aniceto Gonsalez DO (04/22 2054)      1  No definite acute intra-abdominal abnormality  Specifically, no urinary tract calculi  2  No convincing acute diverticulitis  Changes of sigmoid diverticulitis seen on previous study have essentially resolved  3  Fatty liver        Workstation performed: [id number]                    Procedures  Procedures         ED Course SBIRT 20yo+      Most Recent Value   SBIRT (24 yo +)    In order to provide better care to our patients, we are screening all of our patients for alcohol and drug use  Would it be okay to ask you these screening questions? No Filed at: 04/22/2022 1947                    [facility]  Number of Diagnoses or Management Options  Acute low back pain  Diagnosis management comments: I estimate there is LOW risk for AAA, Cauda Equina, Epidural Mass Lesion, Spinal Stenosis, or herniated disk causing severe stenosis, thus I consider the discharge disposition reasonable  Urinalysis consistent with UTI  Doubt pyelonephritis  Negative for renal stones  Suspect likely osteoarthritis  We have discussed the diagnosis and risks, and we agree with discharging home to follow-up with their primary doctor and following up with [facility]  We also discussed returning to the Emergency Department immediately if new or worsening symptoms occur  We have discussed the symptoms which are most concerning (e g , saddle anesthesia, urinary or bowel incontinence or retention, fevers, changing or worsening pain) that necessitate immediate return  Disposition  Final diagnoses:   Acute low back pain     Time reflects when diagnosis was documented in both MDM as applicable and the Disposition within this note     Time User Action Codes Description Comment    4/22/2022  9:09 PM Feroz Rodriguez [M54 50] Acute low back pain       ED Disposition     ED Disposition Condition Date/Time Comment    Discharge Stable Fri Apr 22, 2022  9:09 PM Michelle Mclean discharge to home/self care              Follow-up Information     Follow up With Specialties Details Why Contact Info    Adali Carroll DO General Practice In 3 days  [address] [facility] [address]  [phone]            Patient's Medications   Discharge Prescriptions    METHOCARBAMOL (ROBAXIN) 500 MG TABLET    Take 1 tablet (500 mg total) by mouth 2 (two) times a day as needed for muscle spasms       Start Date: 4/22/2022 End Date: --       Order Dose: 500 mg       Quantity: 20 tablet    Refills: 0    NAPROXEN (NAPROSYN) 375 MG TABLET    Take 1 tablet (375 mg total) by mouth 2 (two) times a day with meals       Start Date: 4/22/2022 End Date: --       Order Dose: 375 mg       Quantity: 20 tablet    Refills: 0           PDMP Review       Value Time User    PDMP Reviewed  Yes 7/21/2021  1:37 AM Reese Lew MD          ED Provider  Electronically Signed by           Keith Cowden, PA-C  04/22/22 7592

## 2022-04-23 NOTE — DISCHARGE INSTRUCTIONS
Take naproxen twice daily while your pain lasts  Do not consume other anti-inflammatory medications such as ibuprofen, Motrin, Aleve while on naproxen  Take Robaxin as needed for muscle spasms  It may make you drowsy so do not consume alcohol, drive or operate machinery while on this medication  You may also take up to a 1000 mg of Tylenol every 8 hours  It is safe to take the other medications  You may also use lidocaine patches that can be found over-the-counter  Ice or moist heat may also help your pain  Follow up with [facility]  You should be receiving a call  Return to the ER with any loss of bowel or bladder control, fevers, or significant leg weakness

## 2022-04-25 ENCOUNTER — APPOINTMENT (OUTPATIENT)
Dept: PHYSICAL THERAPY | Facility: CLINIC | Age: 62
End: 2022-04-25
Payer: COMMERCIAL

## 2022-04-25 ENCOUNTER — TELEPHONE (OUTPATIENT)
Dept: PHYSICAL THERAPY | Facility: OTHER | Age: 62
End: 2022-04-25

## 2022-04-27 ENCOUNTER — APPOINTMENT (OUTPATIENT)
Dept: PHYSICAL THERAPY | Facility: CLINIC | Age: 62
End: 2022-04-27
Payer: COMMERCIAL

## 2022-04-27 NOTE — PROGRESS NOTES
PT Re-Evaluation  and PT Discharge    Today's date: 2022  Patient name: Mark Arias  : 1960  MRN: [id number]  Referring provider: Diane Acosta DO  Dx:   Encounter Diagnosis     ICD-10-CM    1  Primary osteoarthritis of left knee  M17 12    2  Patellofemoral syndrome of left knee  M22 2X2    3  Weakness of both hips  R29 898    4  Hamstring tightness of both lower extremities  M62 9                   Assessment  Assessment details: Since beginning physical therapy, Giuseppe Marrufo has attended a total number of 5 visits and has maintained excellent compliance with established POC  Patient has made significant improvements in all areas, including decreased pain, increased strength, improved quality of gait, increased ROM, improved flexibility, improved joint mobility and improved overall level of function  Patient is reporting improved ability to perform a/iadls, recreational activities, work-related activities and engaging in social activities  Patient is independent with comprehensive HEP  Patient has been instructed to contact PT if she begins to notice a decline in function or has any questions or concerns  Patient will be discharged at this time  Patient is in agreement with plan  Understanding of Dx/Px/POC: excellent  Goals  STGs:  1  Initiate and complete HEP with verbal cues  2   Decrease L knee pain by > 25% in 4 weeks  3   Improve L knee ROM by 10-15 degrees in 4 weeks  4   Improve LLE strength by 1/2-1 grade in 4 weeks  LTGs:  1  Patient to be I with HEP in 8 weeks  2  Improve L knee ROM to 0-115 degrees in 8 weeks to improve function  3  Improve LLE strength to 4+ to 5/5 t/o in 8 weeks to improve function  4  Decrease L knee pain to < or = to 2-3/10 with activity in 8 weeks to improve function  5   Patient to ambulate with normalized gait pattern in 8 weeks  6   Stair negotiation is improved to PLOF in 8 weeks  7   Recreational performance is improved to PLOF in 8 weeks    8   ADL performance is improved to PLOF in 8 weeks  9   Work performance is improved to maximal level of function in 8 weeks  Plan  Planned therapy interventions: home exercise program  Duration in visits: 1  Plan of Care beginning date: 4/27/2022  Plan of Care expiration date: 4/27/2022  Treatment plan discussed with: patient        Subjective Evaluation    Treatments  Current treatment: physical therapy  Patient Goals  Patient goals for therapy: increased motion, decreased pain and increased strength  Patient goal: "To help lessen the pain, to be able to stop wearing the brace "          Objective           Precautions: None       Manuals 3/28 3/31 4/6 4/20 4/27   LLE                                Neuro Re-Ed         BOSU Lunges  x15 x20 x26    SLS  2x30"      Tandem Stance        Sidestepping w/TBand  RTB 1 lap Held GTB 2 laps    Monster Walks  RTB  1 lap RTB 2 laps                     Ther Ex        NuStep L2 10 mins L2 10 mins L3 10 min L5 10 min    HR/TR HEP       SLR x 3 HEP       Squats  x10      LAQ HEP       SAQ  3"x20      SLR  3"x20 3x10     Heel Slides        S/L Hip Abd  3" x20 2x10     Clamshells        Prone quad str  3x30" Supine hip flexor str  3x30"    Standing hip abd, ext with TB    RTB 2x10 ea  Patient education: HEP review    GR    Ther Activity        Stepups F/L  6"x20 L: 6" 2x10  F: 8" 2x10     Stepdowns  6"x20  LSD 4" x15    Total gym: squats   L22 2x10 L22 3x10    Gait Training                        Modalities        HP/CP prn                   Access Code: [email or web address]  URL: [email or web address]  Date: 04/20/2022  Prepared by: Dharmesh Lyn    Exercises  · Prone Quadriceps Stretch with Strap - 1 x daily - 4 x weekly - 3 sets - 1 reps - 30 seconds hold  · Supine Active Straight Leg Raise - 1 x daily - 4 x weekly - 3 sets - 10 reps  · Sidelying Hip Abduction - 1 x daily - 4 x weekly - 3 sets - 10 reps  · Side Stepping with Resistance at Ankles - 1 x daily - 4 x weekly - 2 sets - 10 reps  · Standing Hip Flexion with Resistance Loop - 1 x daily - 4 x weekly - 2 sets - 10 reps  · Hip Abduction with Resistance Loop - 1 x daily - 4 x weekly - 2 sets - 10 reps  · Hip Extension with Resistance Loop - 1 x daily - 4 x weekly - 2 sets - 10 reps  · Single Leg Stance - 1 x daily - 4 x weekly - 3 sets - 1 reps - 30 seconds hold  · Standard Lunge - 1 x daily - 4 x weekly - 2 sets - 10 reps  · Lateral Step Down - 1 x daily - 4 x weekly - 3 sets - 10 reps

## 2022-04-27 NOTE — PROGRESS NOTES
Nicho Wiseman has attended a total of 4 physical therapy appointments to date  Patient was last treated on 4/20 and has cancelled all remaining appointments scheduled  Goals, objective and subjective information unable to be updated at this time  Patient reports that her knee is feeling great and she is very happy with her progress to date  Patient is independent with comprehensive HEP  patient has been instructed to contact PT if she has any questions/concerns or if she notices a decline in function   Patient will be discharged from physical therapy per request

## 2022-04-28 NOTE — TELEPHONE ENCOUNTER
Message left for patient regarding referral entered for  [facility]    Contact information, hours of operation and VM instructions left  Nurse requested patient to CB if needed and leave Full Name &  on VM       Referral closed per protocol

## 2022-06-07 ENCOUNTER — OFFICE VISIT (OUTPATIENT)
Dept: CARDIOLOGY CLINIC | Facility: CLINIC | Age: 62
End: 2022-06-07
Payer: COMMERCIAL

## 2022-06-07 VITALS
HEIGHT: 63 IN | HEART RATE: 81 BPM | WEIGHT: 189.4 LBS | SYSTOLIC BLOOD PRESSURE: 126 MMHG | BODY MASS INDEX: 33.56 KG/M2 | DIASTOLIC BLOOD PRESSURE: 68 MMHG | OXYGEN SATURATION: 97 %

## 2022-06-07 DIAGNOSIS — I10 ESSENTIAL HYPERTENSION: ICD-10-CM

## 2022-06-07 DIAGNOSIS — R00.2 HEART PALPITATIONS: ICD-10-CM

## 2022-06-07 DIAGNOSIS — E78.2 MIXED HYPERLIPIDEMIA: ICD-10-CM

## 2022-06-07 DIAGNOSIS — R42 LIGHTHEADEDNESS: ICD-10-CM

## 2022-06-07 DIAGNOSIS — I35.0 AORTIC STENOSIS, MODERATE: Primary | ICD-10-CM

## 2022-06-07 DIAGNOSIS — E11.8 TYPE 2 DIABETES MELLITUS WITH COMPLICATION, WITHOUT LONG-TERM CURRENT USE OF INSULIN (HCC): ICD-10-CM

## 2022-06-07 PROCEDURE — 99214 OFFICE O/P EST MOD 30 MIN: CPT | Performed by: INTERNAL MEDICINE

## 2022-06-07 NOTE — PROGRESS NOTES
[place] Cynthia [place]  1960  [id number]  [facility] [address]  [phone]    1  Aortic stenosis, moderate  Echo complete w/ contrast if indicated   2  Essential hypertension  Echo complete w/ contrast if indicated   3  Heart palpitations     4  Lightheadedness  Echo complete w/ contrast if indicated   5  Mixed hyperlipidemia     6  Type 2 diabetes mellitus with complication, without long-term current use of insulin ([facility])         Discussion/Summary:  She is starting to get some more shortness of breath with activity and lightheadedness  I reviewed her echocardiogram from the fall was just about at severe level for aortic stenosis will check another echocardiogram now but I suspect will need TAVR are or surgical valve replacement in the coming months  Blood pressure is well controlled I encouraged her to remain well hydrated will make further recommendations after I review the echo  Interval History:  47-year-old female with a history of moderate aortic stenosis, hypertension, hyperlipidemia, diabetes, statin intolerance presents to establish care with my practice  She is transitioning from a prior cardiologist who is retiring  Overall she has been doing well from a cardiac standpoint  She has good functional capacity and works long days  She denies any exertional chest pressure heaviness  Since her last visit she has had a little more shortness of breath with exertion some palpitations and lightheadedness  Prior monitors did not show anything significant  Aortic valve is close to severe on recent echo based on physical exam this has been progressing as well  Denies any PND orthopnea  There has been no anginal chest pain  No signs decompensated heart failure      Problem List     Aortic stenosis, moderate    Essential hypertension    Mixed hyperlipidemia    Type 2 diabetes mellitus with complication, without long-term current use of insulin (HCC)    Lab Results   Component Value Date    HGBA1C 7 5 (H) 04/09/2022       No results for input(s): POCGLU in the last 72 hours      Blood Sugar Average: Last 72 hrs:          Heart palpitations        Past Medical History:   Diagnosis Date    Breast cancer ([facility] )     Diabetes mellitus ([place] [address] )     type 2    Diverticulitis     Murmur, cardiac      Social History     Socioeconomic History    Marital status: /Civil Union     Spouse name: Not on file    Number of children: Not on file    Years of education: Not on file    Highest education level: Not on file   Occupational History    Not on file   Tobacco Use    Smoking status: Never Smoker    Smokeless tobacco: Never Used   Vaping Use    Vaping Use: Never used   Substance and Sexual Activity    Alcohol use: No    Drug use: No    Sexual activity: Yes     Partners: Male   Other Topics Concern    Not on file   Social History Narrative    Not on file     Social Determinants of Health     Financial Resource Strain: Not on file   Food Insecurity: Not on file   Transportation Needs: Not on file   Physical Activity: Not on file   Stress: Not on file   Social Connections: Not on file   Intimate Partner Violence: Not on file   Housing Stability: Not on file      Family History   Problem Relation Age of Onset    COPD Mother     Heart disease Father     Heart disease Paternal Grandmother     Cancer Family     Colon cancer Neg Hx      Past Surgical History:   Procedure Laterality Date    APPENDECTOMY      BREAST LUMPECTOMY      CHOLECYSTECTOMY      HYSTERECTOMY      KNEE ARTHROSCOPY Right     knee    TONSILLECTOMY         Current Outpatient Medications:     amLODIPine (NORVASC) 5 mg tablet, TAKE ONE TABLET BY MOUTH EVERY DAY, Disp: 90 tablet, Rfl: 3    aspirin 81 mg chewable tablet, Chew 81 mg daily, Disp: , Rfl:     carvedilol (COREG) 6 25 mg tablet, TAKE ONE TABLET BY MOUTH TWICE A DAY WITH MEALS, Disp: 180 tablet, Rfl: 3    Cholecalciferol (VITAMIN D3) 1000 UNITS CAPS, Take 2,000 Units by mouth daily, Disp: , Rfl:     Cyanocobalamin (VITAMIN B12 PO), Take 2,500 mcg by mouth daily, Disp: , Rfl:     Empagliflozin-metFORMIN HCl ER  MG TB24, Take 1 tablet by mouth daily , Disp: , Rfl:     glimepiride (AMARYL) 2 mg tablet, Take 4 mg by mouth 2 (two) times a day , Disp: , Rfl:     Icosapent Ethyl (VASCEPA PO), Take 2 capsules by mouth daily, Disp: , Rfl:     Insulin Glargine (TOUJEO) 300 units/mL CONCETRATED U-300 injection pen, Inject 20 Units under the skin daily at bedtime, Disp: , Rfl:     multivitamin (THERAGRAN) TABS, Take 1 tablet by mouth daily, Disp: , Rfl:     pravastatin (PRAVACHOL) 20 mg tablet, TAKE ONE TABLET BY MOUTH EVERY DAY, Disp: 90 tablet, Rfl: 3    pyridoxine (VITAMIN B6) 100 mg tablet, Take 100 mg by mouth daily, Disp: , Rfl:     TART CHERRY PO, Take 1 capsule by mouth 2 (two) times a day, Disp: , Rfl:     methocarbamol (ROBAXIN) 500 mg tablet, Take 1 tablet (500 mg total) by mouth 2 (two) times a day as needed for muscle spasms (Patient not taking: No sig reported), Disp: 20 tablet, Rfl: 0    morphine (MSIR) 15 mg tablet, Take 1 tablet (15 mg total) by mouth every 4 (four) hours as needed for severe pain for up to 12 doses Max Daily Amount: 90 mg (Patient not taking: Reported on 3/4/2022 ), Disp: 12 tablet, Rfl: 0    naproxen (NAPROSYN) 375 mg tablet, Take 1 tablet (375 mg total) by mouth 2 (two) times a day with meals (Patient not taking: No sig reported), Disp: 20 tablet, Rfl: 0    naproxen (NAPROSYN) 500 mg tablet, Take 1 tablet (500 mg total) by mouth 2 (two) times a day as needed for moderate pain for up to 20 doses (Patient not taking: Reported on 9/28/2021), Disp: 20 tablet, Rfl: 0    ondansetron (ZOFRAN-ODT) 4 mg disintegrating tablet, Take 1 tablet (4 mg total) by mouth every 8 (eight) hours as needed for nausea or vomiting (Patient not taking: Reported on 9/28/2021), Disp: 10 tablet, Rfl: 0  Allergies   Allergen Reactions    Fentanyl Rash    Niaspan [Niacin Er] Rash    Prednisone Rash       Labs:     Chemistry        Component Value Date/Time    K 3 6 04/22/2022 1936     04/22/2022 1936    CO2 25 04/22/2022 1936    BUN 12 04/22/2022 1936    CREATININE 0 63 04/22/2022 1936        Component Value Date/Time    CALCIUM 9 3 04/22/2022 1936    ALKPHOS 86 04/22/2022 1936    AST 10 (L) 04/22/2022 1936    ALT 11 04/22/2022 1936            No results found for: CHOL  No results found for: HDL  No results found for: LDLCALC  No results found for: TRIG  No results found for: CHOLHDL    Imaging: No results found  ECG:  Sinus rhythm nonspecific T-wave changes      Review of Systems   Constitutional: Negative  HENT: Negative  Eyes: Negative  Cardiovascular: Negative for chest pain and palpitations  Respiratory: Negative  Endocrine: Negative  Hematologic/Lymphatic: Negative  Skin: Negative  Musculoskeletal: Negative  Gastrointestinal: Negative  Genitourinary: Negative  Neurological: Negative  Psychiatric/Behavioral: Negative  Vitals:    06/07/22 1626   BP: 126/68   Pulse: 81   SpO2: 97%     Vitals:    06/07/22 1626   Weight: 85 9 kg (189 lb 6 4 oz)     Height: 5' 3" (160 cm)   Body mass index is 33 55 kg/m²  Physical Exam:  Vital signs reviewed  General:  Alert and cooperative, appears stated age, no acute distress  HEENT:  PERRLA, EOMI, no scleral icterus, no conjunctival pallor  Neck:  No lymphadenopathy, no thyromegaly, no carotid bruits, no elevated JVP  Heart:  Regular rate and rhythm, normal S1/S2, no S3/S4, 2/6 RADHA rubs or gallops  PMI nondisplaced  Lungs:  Clear to auscultation bilaterally, no wheezes rales or rhonchi  Abdomen:  Soft, non-tender, positive bowel sounds, no rebound or guarding,   no organomegaly   Extremities:  Normal range of motion    No clubbing, cyanosis or edema   Vascular:  2+ pedal pulses  Skin:  No rashes or lesions on exposed skin  Neurologic:  Cranial nerves II-XII grossly intact without focal deficits  Psych:  Normal mood and affect

## 2022-07-07 ENCOUNTER — HOSPITAL ENCOUNTER (OUTPATIENT)
Dept: NON INVASIVE DIAGNOSTICS | Facility: CLINIC | Age: 62
Discharge: HOME | End: 2022-07-07
Payer: COMMERCIAL

## 2022-07-07 VITALS
SYSTOLIC BLOOD PRESSURE: 126 MMHG | WEIGHT: 189 LBS | HEIGHT: 63 IN | HEART RATE: 81 BPM | BODY MASS INDEX: 33.49 KG/M2 | DIASTOLIC BLOOD PRESSURE: 68 MMHG

## 2022-07-07 DIAGNOSIS — I10 ESSENTIAL HYPERTENSION: ICD-10-CM

## 2022-07-07 DIAGNOSIS — I35.0 AORTIC STENOSIS, MODERATE: ICD-10-CM

## 2022-07-07 DIAGNOSIS — R42 LIGHTHEADEDNESS: ICD-10-CM

## 2022-07-07 LAB
AORTIC ROOT: 2.8 CM
AORTIC VALVE MEAN VELOCITY: 29.5 M/S
APICAL FOUR CHAMBER EJECTION FRACTION: 82 %
ASCENDING AORTA: 3.4 CM
AV AREA BY CONTINUOUS VTI: 1 CM2
AV AREA PEAK VELOCITY: 0.8 CM2
AV LVOT MEAN GRADIENT: 3 MMHG
AV LVOT PEAK GRADIENT: 4 MMHG
AV MEAN GRADIENT: 38 MMHG
AV PEAK GRADIENT: 58 MMHG
AV REGURGITATION PRESSURE HALF TIME: 565 MS
AV VALVE AREA: 1.01 CM2
AV VELOCITY RATIO: 0.26
DOP CALC AO PEAK VEL: 3.81 M/S
DOP CALC AO VTI: 97.61 CM
DOP CALC LVOT AREA: 3.14 CM2
DOP CALC LVOT DIAMETER: 2 CM
DOP CALC LVOT PEAK VEL VTI: 31.45 CM
DOP CALC LVOT PEAK VEL: 0.99 M/S
DOP CALC LVOT STROKE INDEX: 52.4 ML/M2
DOP CALC LVOT STROKE VOLUME: 98.75 CM3
E WAVE DECELERATION TIME: 235 MS
FRACTIONAL SHORTENING: 35 % (ref 28–44)
INTERVENTRICULAR SEPTUM IN DIASTOLE (PARASTERNAL SHORT AXIS VIEW): 1.4 CM
INTERVENTRICULAR SEPTUM: 1.4 CM (ref 0.6–1.1)
LAAS-AP4: 24.1 CM2
LEFT ATRIUM SIZE: 3.9 CM
LEFT INTERNAL DIMENSION IN SYSTOLE: 2.4 CM (ref 2.1–4)
LEFT VENTRICULAR INTERNAL DIMENSION IN DIASTOLE: 3.7 CM (ref 3.5–6)
LEFT VENTRICULAR POSTERIOR WALL IN END DIASTOLE: 1.4 CM
LEFT VENTRICULAR STROKE VOLUME: 40 ML
LVSV (TEICH): 40 ML
MV E'TISSUE VEL-SEP: 8 CM/S
MV PEAK A VEL: 0.87 M/S
MV PEAK E VEL: 82 CM/S
MV STENOSIS PRESSURE HALF TIME: 68 MS
MV VALVE AREA P 1/2 METHOD: 3.24 CM2
RIGHT ATRIUM AREA SYSTOLE A4C: 9.5 CM2
RIGHT VENTRICLE ID DIMENSION: 2.5 CM
SL CV AV DECELERATION TIME RETROGRADE: 1948 MS
SL CV AV PEAK GRADIENT RETROGRADE: 97 MMHG
SL CV LV EF: 55
SL CV PED ECHO LEFT VENTRICLE DIASTOLIC VOLUME (MOD BIPLANE) 2D: 59 ML
SL CV PED ECHO LEFT VENTRICLE SYSTOLIC VOLUME (MOD BIPLANE) 2D: 19 ML
TR MAX PG: 17 MMHG
TR PEAK VELOCITY: 2.1 M/S
TRICUSPID VALVE PEAK REGURGITATION VELOCITY: 2.09 M/S

## 2022-07-07 PROCEDURE — 93306 TTE W/DOPPLER COMPLETE: CPT

## 2022-07-07 PROCEDURE — 93306 TTE W/DOPPLER COMPLETE: CPT | Performed by: INTERNAL MEDICINE

## 2022-07-08 ENCOUNTER — TELEPHONE (OUTPATIENT)
Dept: CARDIOLOGY CLINIC | Facility: CLINIC | Age: 62
End: 2022-07-08

## 2022-07-08 NOTE — TELEPHONE ENCOUNTER
I spoke to the patient her aortic stenosis is now severe can you please call the cardiothoracic surgeon's office and see if they can get her in for evaluation for aortic stenosis

## 2022-07-13 DIAGNOSIS — I35.0 SEVERE AORTIC STENOSIS: Primary | ICD-10-CM

## 2022-07-13 NOTE — TELEPHONE ENCOUNTER
LMOM of pt after talking with the surgeons office  They need to review her chart and then they will call with an appt

## 2022-07-14 ENCOUNTER — TELEPHONE (OUTPATIENT)
Dept: CARDIAC SURGERY | Facility: CLINIC | Age: 62
End: 2022-07-14

## 2022-07-27 ENCOUNTER — OFFICE VISIT (OUTPATIENT)
Dept: CARDIAC SURGERY | Facility: CLINIC | Age: 62
End: 2022-07-27
Payer: COMMERCIAL

## 2022-07-27 VITALS
DIASTOLIC BLOOD PRESSURE: 64 MMHG | OXYGEN SATURATION: 96 % | WEIGHT: 189 LBS | BODY MASS INDEX: 33.49 KG/M2 | HEIGHT: 63 IN | HEART RATE: 74 BPM | RESPIRATION RATE: 18 BRPM | SYSTOLIC BLOOD PRESSURE: 138 MMHG

## 2022-07-27 DIAGNOSIS — I35.0 AORTIC STENOSIS, MODERATE: Primary | ICD-10-CM

## 2022-07-27 DIAGNOSIS — I35.0 SEVERE AORTIC STENOSIS: Primary | ICD-10-CM

## 2022-07-27 DIAGNOSIS — R42 LIGHTHEADEDNESS: ICD-10-CM

## 2022-07-27 PROCEDURE — 99204 OFFICE O/P NEW MOD 45 MIN: CPT | Performed by: PHYSICIAN ASSISTANT

## 2022-07-27 NOTE — PROGRESS NOTES
Consultation - Cardiothoracic Surgery   Michelle Weaver 57 y o  female MRN: [id number]    Physician Requesting Consult: No att  providers found    Reason for Consult / Principal Problem: Aortic stenosis, Non-Rheumatic    History of Present Illness: Horacio Farooq is a 57y o  year old female who presents for initial outpatient surgical consultation for symptomatic severe aortic stenosis  She has been following with Dr Richmond Rangel for many years for her aortic stenosis, which started out as mild and without symptoms and has now progressed to severe  Michelle's history is notable for insulin dependent Type 2 DM, HTN, diverticulitis, HLD, right breast cancer s/p radiation and lumpectomy with resultant right UE lymphedema  She sees sleep medicine for obstructive sleep apnea and has used a CPAP at night for about 15 years  Surgical history is notable for appendectomy, cholecystectomy, right knee arthroscopy, and hysterectomy  Tabitha Ruiz reports that her symptoms are notable for lightheadedness, palpitations, fatigue and shortness of breath with inclines  She works as a  for [employer], which she states is stressful, and when she gets home from work, she needs to nap because she feels so tired  She denies lower extremity edema, and denies syncope  Her most recent ECHO on 7/7 demonstrates severe aortic stenosis, THEO 1 0 cm2, mean gradient of 38 mmHg  Tabitha Ruiz is up to date with dental care - she has full top dentures and partial bottom dentures, and therefore goes to the dentist for cleanings once a year  She is COVID vaccinated x3, but does not have her card with her today       Past Medical History:  Past Medical History:   Diagnosis Date    [facility]     Diabetes mellitus ([facility] )     type 2    Diverticulitis     History of colonoscopy 2022    Lipedema 2017    Murmur, cardiac          Past Surgical History:   Past Surgical History:   Procedure Laterality Date    APPENDECTOMY      BREAST LUMPECTOMY      CHOLECYSTECTOMY      HYSTERECTOMY      KNEE ARTHROSCOPY Right     knee    TONSILLECTOMY           Family History:  Family History   Problem Relation Age of Onset    COPD Mother     Heart disease Father     Heart disease Paternal Grandmother     Cancer Family     Colon cancer Neg Hx        Social History:  Social History     Substance and Sexual Activity   Alcohol Use No     Social History     Substance and Sexual Activity   Drug Use No     Social History     Tobacco Use   Smoking Status Never Smoker   Smokeless Tobacco Never Used         Home Medications:   Prior to Admission medications    Medication Sig Start Date End Date Taking?  Authorizing Provider   amLODIPine (NORVASC) 5 mg tablet TAKE ONE TABLET BY MOUTH EVERY DAY 10/18/21  Yes Arnold Rodriguez DO   aspirin 81 mg chewable tablet Chew 81 mg daily   Yes Historical Provider, MD   carvedilol (COREG) 6 25 mg tablet TAKE ONE TABLET BY MOUTH TWICE A DAY WITH MEALS 10/18/21  Yes Arnold Rodriguez DO   Cholecalciferol (VITAMIN D3) 1000 UNITS CAPS Take 2,000 Units by mouth daily   Yes Historical Provider, MD   Cyanocobalamin (VITAMIN B12 PO) Take 2,500 mcg by mouth daily   Yes Historical Provider, MD   glimepiride (AMARYL) 2 mg tablet Take 4 mg by mouth 2 (two) times a day    Yes Historical Provider, MD   Icosapent Ethyl (VASCEPA PO) Take 2 capsules by mouth daily   Yes Historical Provider, MD   Insulin Glargine (TOUJEO) 300 units/mL CONCETRATED U-300 injection pen Inject 20 Units under the skin daily at bedtime   Yes Historical Provider, MD   multivitamin (THERAGRAN) TABS Take 1 tablet by mouth daily   Yes Historical Provider, MD   pravastatin (PRAVACHOL) 20 mg tablet TAKE ONE TABLET BY MOUTH EVERY DAY 10/18/21  Yes Arnold Rodriguez DO   pyridoxine (VITAMIN B6) 100 mg tablet Take 100 mg by mouth daily   Yes Historical Provider, MD   TART CHERRY PO Take 1 capsule by mouth 2 (two) times a day   Yes Historical Provider, MD Empagliflozin-metFORMIN HCl ER  MG TB24 Take 1 tablet by mouth daily     Historical Provider, MD   methocarbamol (ROBAXIN) 500 mg tablet Take 1 tablet (500 mg total) by mouth 2 (two) times a day as needed for muscle spasms  Patient not taking: No sig reported 4/22/22   Pradeep Hall PA-C   morphine (MSIR) 15 mg tablet Take 1 tablet (15 mg total) by mouth every 4 (four) hours as needed for severe pain for up to 12 doses Max Daily Amount: 90 mg  Patient not taking: Reported on 3/4/2022  2/17/22   Janeth Haynes DO   naproxen (NAPROSYN) 375 mg tablet Take 1 tablet (375 mg total) by mouth 2 (two) times a day with meals  Patient not taking: No sig reported 4/22/22   Pradeep Hall PA-C   naproxen (NAPROSYN) 500 mg tablet Take 1 tablet (500 mg total) by mouth 2 (two) times a day as needed for moderate pain for up to 20 doses  Patient not taking: Reported on 9/28/2021 7/21/21   Annabel Griffiths MD   ondansetron (ZOFRAN-ODT) 4 mg disintegrating tablet Take 1 tablet (4 mg total) by mouth every 8 (eight) hours as needed for nausea or vomiting  Patient not taking: Reported on 9/28/2021 7/21/21   Annabel Griffiths MD       Allergies:   Allergies   Allergen Reactions    Fentanyl Rash    Niaspan [Niacin Er] Rash    Prednisone Rash       Review of Systems:  Review of Systems - History obtained from the patient  General ROS: positive for  - fatigue  Psychological ROS: negative  Ophthalmic ROS: negative  ENT ROS: negative  Allergy and Immunology ROS: negative  Hematological and Lymphatic ROS: negative  Endocrine ROS: negative  Breast ROS: negative  Respiratory ROS: positive for - shortness of breath  Cardiovascular ROS: positive for - dyspnea on exertion and lightheadedness  Gastrointestinal ROS: no abdominal pain, change in bowel habits, or black or bloody stools  Genito-Urinary ROS: no dysuria, trouble voiding, or hematuria  Musculoskeletal ROS: negative  Neurological ROS: no TIA or stroke symptoms  Dermatological ROS: negative    Vital Signs:     Vitals:    07/27/22 1131   BP: 138/64   BP Location: Left arm   Patient Position: Sitting   Cuff Size: Standard   Pulse: 74   Resp: 18   SpO2: 96%   Weight: 85 7 kg (189 lb)   Height: 5' 3" (1 6 m)       Physical Exam:    General: pleasant, NAD  HEENT/NECK:  PERRL  No jugular venous distention  Cardiac:Regular rate and rhythm, grade IV/VI systolic murmur, heard best at RSB  Carotid arteries: brisk, radiation of murmur vs bruit  Pulmonary:  Breath sounds clear bilaterally  Abdomen:  Non-tender, Non-distended  Positive bowel sounds  Upper extremities: 2+ radial pulses; brisk capillary refill  Lower extremities: Extremities warm/dry  PT/DP pulses 2+ bilaterally  No edema B/L  Neuro: Alert and oriented X 3  Sensation is grossly intact  No focal deficits  Musculoskeletal: LYNN   Skin: Warm/Dry, without rashes or lesions  Lab Results:               Invalid input(s): LABGLOM      Lab Results   Component Value Date    HGBA1C 7 5 (H) 04/09/2022     No results found for: CKTOTAL, CKMB, CKMBINDEX, TROPONINI    Imaging Studies:     Echocardiogram: 7/7/22  Findings    Left Ventricle Left ventricular cavity size is normal  Wall thickness is moderately increased  The left ventricular ejection fraction is 55%  Systolic function is normal   Wall motion is normal  Diastolic function is mildly abnormal, consistent with grade I (abnormal) relaxation  Right Ventricle Right ventricular cavity size is normal  Systolic function is normal  Wall thickness is normal    Left Atrium The atrium is mildly dilated  Right Atrium The atrium is normal in size  Aortic Valve The aortic valve is trileaflet  The leaflets are not thickened  The leaflets are moderately calcified  There is moderately reduced mobility  There is mild regurgitation  There is severe stenosis  The aortic valve velocity is increased due to stenosis     Mitral Valve The mitral valve has normal structure and function  There is mild calcification  There is mild regurgitation  There is no evidence of stenosis  Tricuspid Valve Tricuspid valve structure is normal  There is mild regurgitation  There is no evidence of stenosis  Pulmonic Valve Pulmonic valve structure is normal  There is no evidence of regurgitation  There is no evidence of stenosis  Ascending Aorta The aortic root is normal in size  IVC/SVC The inferior vena cava is normal in size  Pericardium There is no pericardial effusion  The pericardium is normal in appearance  I have personally reviewed pertinent reports  TAVR evaluation Assessment:     Mariel Kate: III    Aortic Stenosis Stage: D1    5 Meter Walk:   1  8 sec  2  8 sec  3  8 sec     STS risk score (preliminary): 1 5%    KCCQ-12 completed    Assessment:  Patient Active Problem List    Diagnosis Date Noted    Diverticulitis of large intestine without perforation or abscess without bleeding 03/30/2022    Lightheadedness 09/28/2021    Acute pain of right shoulder 07/22/2019    Adhesive capsulitis of right shoulder 07/22/2019    Aortic stenosis, moderate 06/05/2019    Essential hypertension 06/05/2019    Mixed hyperlipidemia 06/05/2019    Type 2 diabetes mellitus with complication, without long-term current use of insulin ([facility] ) 06/05/2019    Heart palpitations 06/05/2019     Severe aortic stenosis; Ongoing TAVR workup    Plan:    Emi Hanna has symptomatic severe aortic stenosis  They will undergo the following testing for transcatheter aortic valve replacement: Gated CTA of the chest/abdomen/pelvis, cardiac catheterization, dental clearance and carotid artery ultrasound  Once these studies have been completed, Emi Hanna will follow up in our office to review the results and to be evaluated to confirm the suitability of proceeding with transcatheter aortic valve replacment       Emi Hanna was comfortable with our recommendations, and their questions were answered to their satisfaction  Thank you for allowing us to participate in the care of this patient  The patient recently had a screening colonoscopy in 2022  Therefore GI referral is not indicated at this time       SIGNATURE: Conor Escobedo PA-C  DATE: July 27, 2022  TIME: 1:26 PM

## 2022-07-27 NOTE — LETTER
July 27, 2022     Arjun Graham [address] [place] [address]    Patient: Mansoor Gilliam   YOB: 1960   Date of Visit: 7/27/2022       Dear Dr Jesus Gordillo: Thank you for referring Latisha Urbano to me for evaluation  Below are my notes for this consultation  If you have questions, please do not hesitate to call me  I look forward to following your patient along with you  Sincerely,        Pia Rincon,         CC: DO Mirta Cardenas PA-C  7/27/2022  2:45 PM  Attested  Consultation - Cardiothoracic Surgery   Michelle Shay 57 y o  female MRN: [id number]    Physician Requesting Consult: No att  providers found    Reason for Consult / Principal Problem: Aortic stenosis, Non-Rheumatic    History of Present Illness: Mansoor Gilliam is a 57y o  year old female who presents for initial outpatient surgical consultation for symptomatic severe aortic stenosis  She has been following with Dr Jesus Gordillo for many years for her aortic stenosis, which started out as mild and without symptoms and has now progressed to severe  Michelle's history is notable for insulin dependent Type 2 DM, HTN, diverticulitis, HLD, right breast cancer s/p radiation and lumpectomy with resultant right UE lymphedema  She sees sleep medicine for obstructive sleep apnea and has used a CPAP at night for about 15 years  Surgical history is notable for appendectomy, cholecystectomy, right knee arthroscopy, and hysterectomy  Trinity Stephenson reports that her symptoms are notable for lightheadedness, palpitations, fatigue and shortness of breath with inclines  She works as a  for [employer], which she states is stressful, and when she gets home from work, she needs to nap because she feels so tired  She denies lower extremity edema, and denies syncope  Her most recent ECHO on 7/7 demonstrates severe aortic stenosis, THEO 1 0 cm2, mean gradient of 38 mmHg   Trinity Stephenson is up to date with dental care - she has full top dentures and partial bottom dentures, and therefore goes to the dentist for cleanings once a year  She is COVID vaccinated x3, but does not have her card with her today  Past Medical History:  Past Medical History:   Diagnosis Date    [facility]     Diabetes mellitus ([facility] )     type 2    Diverticulitis     History of colonoscopy 2022    Lipedema 2017    Murmur, cardiac          Past Surgical History:   Past Surgical History:   Procedure Laterality Date    APPENDECTOMY      BREAST LUMPECTOMY      CHOLECYSTECTOMY      HYSTERECTOMY      KNEE ARTHROSCOPY Right     knee    TONSILLECTOMY           Family History:  Family History   Problem Relation Age of Onset    COPD Mother     Heart disease Father     Heart disease Paternal Grandmother     Cancer Family     Colon cancer Neg Hx        Social History:  Social History     Substance and Sexual Activity   Alcohol Use No     Social History     Substance and Sexual Activity   Drug Use No     Social History     Tobacco Use   Smoking Status Never Smoker   Smokeless Tobacco Never Used         Home Medications:   Prior to Admission medications    Medication Sig Start Date End Date Taking?  Authorizing Provider   amLODIPine (NORVASC) 5 mg tablet TAKE ONE TABLET BY MOUTH EVERY DAY 10/18/21  Yes Arnold Rodriguez DO   aspirin 81 mg chewable tablet Chew 81 mg daily   Yes Historical Provider, MD   carvedilol (COREG) 6 25 mg tablet TAKE ONE TABLET BY MOUTH TWICE A DAY WITH MEALS 10/18/21  Yes Arnold Rodriguez DO   Cholecalciferol (VITAMIN D3) 1000 UNITS CAPS Take 2,000 Units by mouth daily   Yes Historical Provider, MD   Cyanocobalamin (VITAMIN B12 PO) Take 2,500 mcg by mouth daily   Yes Historical Provider, MD   glimepiride (AMARYL) 2 mg tablet Take 4 mg by mouth 2 (two) times a day    Yes Historical Provider, MD   Icosapent Ethyl (VASCEPA PO) Take 2 capsules by mouth daily   Yes Historical Provider, MD Insulin Glargine (TOUJEO) 300 units/mL CONCETRATED U-300 injection pen Inject 20 Units under the skin daily at bedtime   Yes Historical Provider, MD   multivitamin (THERAGRAN) TABS Take 1 tablet by mouth daily   Yes Historical Provider, MD   pravastatin (PRAVACHOL) 20 mg tablet TAKE ONE TABLET BY MOUTH EVERY DAY 10/18/21  Yes Arnold Rodriguez DO   pyridoxine (VITAMIN B6) 100 mg tablet Take 100 mg by mouth daily   Yes Historical Provider, MD   TART CHERRY PO Take 1 capsule by mouth 2 (two) times a day   Yes Historical Provider, MD   Empagliflozin-metFORMIN HCl ER  MG TB24 Take 1 tablet by mouth daily     Historical Provider, MD   methocarbamol (ROBAXIN) 500 mg tablet Take 1 tablet (500 mg total) by mouth 2 (two) times a day as needed for muscle spasms  Patient not taking: No sig reported 4/22/22   Mayelin Hall PA-C   morphine (MSIR) 15 mg tablet Take 1 tablet (15 mg total) by mouth every 4 (four) hours as needed for severe pain for up to 12 doses Max Daily Amount: 90 mg  Patient not taking: Reported on 3/4/2022  2/17/22   Janeth Haynes DO   naproxen (NAPROSYN) 375 mg tablet Take 1 tablet (375 mg total) by mouth 2 (two) times a day with meals  Patient not taking: No sig reported 4/22/22   Mayelin Hall PA-C   naproxen (NAPROSYN) 500 mg tablet Take 1 tablet (500 mg total) by mouth 2 (two) times a day as needed for moderate pain for up to 20 doses  Patient not taking: Reported on 9/28/2021 7/21/21   Nir Agrawal MD   ondansetron (ZOFRAN-ODT) 4 mg disintegrating tablet Take 1 tablet (4 mg total) by mouth every 8 (eight) hours as needed for nausea or vomiting  Patient not taking: Reported on 9/28/2021 7/21/21   Nir Agrawal MD       Allergies:   Allergies   Allergen Reactions    Fentanyl Rash    Niaspan [Niacin Er] Rash    Prednisone Rash       Review of Systems:  Review of Systems - History obtained from the patient  General ROS: positive for  - fatigue  Psychological ROS: negative  Ophthalmic ROS: negative  ENT ROS: negative  Allergy and Immunology ROS: negative  Hematological and Lymphatic ROS: negative  Endocrine ROS: negative  Breast ROS: negative  Respiratory ROS: positive for - shortness of breath  Cardiovascular ROS: positive for - dyspnea on exertion and lightheadedness  Gastrointestinal ROS: no abdominal pain, change in bowel habits, or black or bloody stools  Genito-Urinary ROS: no dysuria, trouble voiding, or hematuria  Musculoskeletal ROS: negative  Neurological ROS: no TIA or stroke symptoms  Dermatological ROS: negative    Vital Signs:     Vitals:    07/27/22 1131   BP: 138/64   BP Location: Left arm   Patient Position: Sitting   Cuff Size: Standard   Pulse: 74   Resp: 18   SpO2: 96%   Weight: 85 7 kg (189 lb)   Height: 5' 3" (1 6 m)       Physical Exam:    General: pleasant, NAD  HEENT/NECK:  PERRL  No jugular venous distention  Cardiac:Regular rate and rhythm, grade IV/VI systolic murmur, heard best at RSB  Carotid arteries: brisk, radiation of murmur vs bruit  Pulmonary:  Breath sounds clear bilaterally  Abdomen:  Non-tender, Non-distended  Positive bowel sounds  Upper extremities: 2+ radial pulses; brisk capillary refill  Lower extremities: Extremities warm/dry  PT/DP pulses 2+ bilaterally  No edema B/L  Neuro: Alert and oriented X 3  Sensation is grossly intact  No focal deficits  Musculoskeletal: LYNN   Skin: Warm/Dry, without rashes or lesions  Lab Results:               Invalid input(s): LABGLOM      Lab Results   Component Value Date    HGBA1C 7 5 (H) 04/09/2022     No results found for: CKTOTAL, CKMB, CKMBINDEX, TROPONINI    Imaging Studies:     Echocardiogram: 7/7/22  Findings    Left Ventricle Left ventricular cavity size is normal  Wall thickness is moderately increased  The left ventricular ejection fraction is 55%   Systolic function is normal   Wall motion is normal  Diastolic function is mildly abnormal, consistent with grade I (abnormal) relaxation  Right Ventricle Right ventricular cavity size is normal  Systolic function is normal  Wall thickness is normal    Left Atrium The atrium is mildly dilated  Right Atrium The atrium is normal in size  Aortic Valve The aortic valve is trileaflet  The leaflets are not thickened  The leaflets are moderately calcified  There is moderately reduced mobility  There is mild regurgitation  There is severe stenosis  The aortic valve velocity is increased due to stenosis  Mitral Valve The mitral valve has normal structure and function  There is mild calcification  There is mild regurgitation  There is no evidence of stenosis  Tricuspid Valve Tricuspid valve structure is normal  There is mild regurgitation  There is no evidence of stenosis  Pulmonic Valve Pulmonic valve structure is normal  There is no evidence of regurgitation  There is no evidence of stenosis  Ascending Aorta The aortic root is normal in size  IVC/SVC The inferior vena cava is normal in size  Pericardium There is no pericardial effusion  The pericardium is normal in appearance  I have personally reviewed pertinent reports  TAVR evaluation Assessment:     Katlyn Daft: III    Aortic Stenosis Stage: D1    5 Meter Walk:   1  8 sec  2  8 sec  3  8 sec     STS risk score (preliminary): 1 5%    KCCQ-12 completed    Assessment:  Patient Active Problem List    Diagnosis Date Noted    Diverticulitis of large intestine without perforation or abscess without bleeding 03/30/2022    Lightheadedness 09/28/2021    Acute pain of right shoulder 07/22/2019    Adhesive capsulitis of right shoulder 07/22/2019    Aortic stenosis, moderate 06/05/2019    Essential hypertension 06/05/2019    Mixed hyperlipidemia 06/05/2019    Type 2 diabetes mellitus with complication, without long-term current use of insulin ([facility] [address] ) 06/05/2019    Heart palpitations 06/05/2019     Severe aortic stenosis;  Ongoing TAVR workup    Plan:    Sim Gordillo has symptomatic severe aortic stenosis  They will undergo the following testing for transcatheter aortic valve replacement: Gated CTA of the chest/abdomen/pelvis, cardiac catheterization, dental clearance and carotid artery ultrasound  Once these studies have been completed, Sanchez Powers will follow up in our office to review the results and to be evaluated to confirm the suitability of proceeding with transcatheter aortic valve replacment  Sanchez Powers was comfortable with our recommendations, and their questions were answered to their satisfaction  Thank you for allowing us to participate in the care of this patient  The patient recently had a screening colonoscopy in 2022  Therefore GI referral is not indicated at this time  SIGNATURE: Jaylan Kenyon PA-C  DATE: July 27, 2022  TIME: 1:26 PM  Attestation signed by Victorina Luke DO at 7/27/2022  4:06 PM:  I supervised the Advanced Practitioner  ? I performed, in its entirety, the assessment/plan component of the visit  I agree with the Advanced Practitioner's note with the following additions/exceptions:      Mrs Sylvia Manriquez was seen in the office today for evaluation of aortic stenosis  She has been closely followed over the years, and currently has had increasing gradients of her echocardiogram, with increasing severity of her symptoms of shortness of breath with exertion  Her echocardiogram was reviewed by myself personally findings shared with her peers demonstrates severe aortic stenosis  We discussed treatment options  At this point recommend TAVR  If she undergoes cardiac catheterization as multivessel coronary disease then I would consider surgical intervention with AVR and CABG  This will be determined after further testing is completed      Victorian Luke DO 07/27/22

## 2022-07-27 NOTE — H&P (VIEW-ONLY)
Consultation - Cardiothoracic Surgery   Michelle Orourke 57 y o  female MRN: [id number]    Physician Requesting Consult: No att  providers found    Reason for Consult / Principal Problem: Aortic stenosis, Non-Rheumatic    History of Present Illness: Alfredo Moreno is a 57y o  year old female who presents for initial outpatient surgical consultation for symptomatic severe aortic stenosis  She has been following with Dr Ashu Joiner for many years for her aortic stenosis, which started out as mild and without symptoms and has now progressed to severe  Michelle's history is notable for insulin dependent Type 2 DM, HTN, diverticulitis, HLD, right breast cancer s/p radiation and lumpectomy with resultant right UE lymphedema  She sees sleep medicine for obstructive sleep apnea and has used a CPAP at night for about 15 years  Surgical history is notable for appendectomy, cholecystectomy, right knee arthroscopy, and hysterectomy  Khushi Liu reports that her symptoms are notable for lightheadedness, palpitations, fatigue and shortness of breath with inclines  She works as a  for [employer] which she states is stressful, and when she gets home from work, she needs to nap because she feels so tired  She denies lower extremity edema, and denies syncope  Her most recent ECHO on 7/7 demonstrates severe aortic stenosis, THEO 1 0 cm2, mean gradient of 38 mmHg  Khushi Liu is up to date with dental care - she has full top dentures and partial bottom dentures, and therefore goes to the dentist for cleanings once a year  She is COVID vaccinated x3, but does not have her card with her today       Past Medical History:  Past Medical History:   Diagnosis Date    [facility]     Diabetes mellitus ([facility] )     type 2    Diverticulitis     History of colonoscopy 2022    Lipedema 2017    Murmur, cardiac          Past Surgical History:   Past Surgical History:   Procedure Laterality Date    APPENDECTOMY      BREAST LUMPECTOMY      CHOLECYSTECTOMY      HYSTERECTOMY      KNEE ARTHROSCOPY Right     knee    TONSILLECTOMY           Family History:  Family History   Problem Relation Age of Onset    COPD Mother     Heart disease Father     Heart disease Paternal Grandmother     Cancer Family     Colon cancer Neg Hx        Social History:  Social History     Substance and Sexual Activity   Alcohol Use No     Social History     Substance and Sexual Activity   Drug Use No     Social History     Tobacco Use   Smoking Status Never Smoker   Smokeless Tobacco Never Used         Home Medications:   Prior to Admission medications    Medication Sig Start Date End Date Taking?  Authorizing Provider   amLODIPine (NORVASC) 5 mg tablet TAKE ONE TABLET BY MOUTH EVERY DAY 10/18/21  Yes Arnold Rodriguez DO   aspirin 81 mg chewable tablet Chew 81 mg daily   Yes Historical Provider, MD   carvedilol (COREG) 6 25 mg tablet TAKE ONE TABLET BY MOUTH TWICE A DAY WITH MEALS 10/18/21  Yes Arnold Rodriguez DO   Cholecalciferol (VITAMIN D3) 1000 UNITS CAPS Take 2,000 Units by mouth daily   Yes Historical Provider, MD   Cyanocobalamin (VITAMIN B12 PO) Take 2,500 mcg by mouth daily   Yes Historical Provider, MD   glimepiride (AMARYL) 2 mg tablet Take 4 mg by mouth 2 (two) times a day    Yes Historical Provider, MD   Icosapent Ethyl (VASCEPA PO) Take 2 capsules by mouth daily   Yes Historical Provider, MD   Insulin Glargine (TOUJEO) 300 units/mL CONCETRATED U-300 injection pen Inject 20 Units under the skin daily at bedtime   Yes Historical Provider, MD   multivitamin (THERAGRAN) TABS Take 1 tablet by mouth daily   Yes Historical Provider, MD   pravastatin (PRAVACHOL) 20 mg tablet TAKE ONE TABLET BY MOUTH EVERY DAY 10/18/21  Yes Arnold Rodriguez DO   pyridoxine (VITAMIN B6) 100 mg tablet Take 100 mg by mouth daily   Yes Historical Provider, MD   TART CHERRY PO Take 1 capsule by mouth 2 (two) times a day   Yes Historical Provider, MD Empagliflozin-metFORMIN HCl ER  MG TB24 Take 1 tablet by mouth daily     Historical Provider, MD   methocarbamol (ROBAXIN) 500 mg tablet Take 1 tablet (500 mg total) by mouth 2 (two) times a day as needed for muscle spasms  Patient not taking: No sig reported 4/22/22   Kwasi Hall PA-C   morphine (MSIR) 15 mg tablet Take 1 tablet (15 mg total) by mouth every 4 (four) hours as needed for severe pain for up to 12 doses Max Daily Amount: 90 mg  Patient not taking: Reported on 3/4/2022  2/17/22   Janeth Haynes DO   naproxen (NAPROSYN) 375 mg tablet Take 1 tablet (375 mg total) by mouth 2 (two) times a day with meals  Patient not taking: No sig reported 4/22/22   Kwasi Hall PA-C   naproxen (NAPROSYN) 500 mg tablet Take 1 tablet (500 mg total) by mouth 2 (two) times a day as needed for moderate pain for up to 20 doses  Patient not taking: Reported on 9/28/2021 7/21/21   Annette Chavis MD   ondansetron (ZOFRAN-ODT) 4 mg disintegrating tablet Take 1 tablet (4 mg total) by mouth every 8 (eight) hours as needed for nausea or vomiting  Patient not taking: Reported on 9/28/2021 7/21/21   Annette Chavis MD       Allergies:   Allergies   Allergen Reactions    Fentanyl Rash    Niaspan [Niacin Er] Rash    Prednisone Rash       Review of Systems:  Review of Systems - History obtained from the patient  General ROS: positive for  - fatigue  Psychological ROS: negative  Ophthalmic ROS: negative  ENT ROS: negative  Allergy and Immunology ROS: negative  Hematological and Lymphatic ROS: negative  Endocrine ROS: negative  Breast ROS: negative  Respiratory ROS: positive for - shortness of breath  Cardiovascular ROS: positive for - dyspnea on exertion and lightheadedness  Gastrointestinal ROS: no abdominal pain, change in bowel habits, or black or bloody stools  Genito-Urinary ROS: no dysuria, trouble voiding, or hematuria  Musculoskeletal ROS: negative  Neurological ROS: no TIA or stroke symptoms  Dermatological ROS: negative    Vital Signs:     Vitals:    07/27/22 1131   BP: 138/64   BP Location: Left arm   Patient Position: Sitting   Cuff Size: Standard   Pulse: 74   Resp: 18   SpO2: 96%   Weight: 85 7 kg (189 lb)   Height: 5' 3" (1 6 m)       Physical Exam:    General: pleasant, NAD  HEENT/NECK:  PERRL  No jugular venous distention  Cardiac:Regular rate and rhythm, grade IV/VI systolic murmur, heard best at RSB  Carotid arteries: brisk, radiation of murmur vs bruit  Pulmonary:  Breath sounds clear bilaterally  Abdomen:  Non-tender, Non-distended  Positive bowel sounds  Upper extremities: 2+ radial pulses; brisk capillary refill  Lower extremities: Extremities warm/dry  PT/DP pulses 2+ bilaterally  No edema B/L  Neuro: Alert and oriented X 3  Sensation is grossly intact  No focal deficits  Musculoskeletal: LYNN   Skin: Warm/Dry, without rashes or lesions  Lab Results:               Invalid input(s): LABGLOM      Lab Results   Component Value Date    HGBA1C 7 5 (H) 04/09/2022     No results found for: CKTOTAL, CKMB, CKMBINDEX, TROPONINI    Imaging Studies:     Echocardiogram: 7/7/22  Findings    Left Ventricle Left ventricular cavity size is normal  Wall thickness is moderately increased  The left ventricular ejection fraction is 55%  Systolic function is normal   Wall motion is normal  Diastolic function is mildly abnormal, consistent with grade I (abnormal) relaxation  Right Ventricle Right ventricular cavity size is normal  Systolic function is normal  Wall thickness is normal    Left Atrium The atrium is mildly dilated  Right Atrium The atrium is normal in size  Aortic Valve The aortic valve is trileaflet  The leaflets are not thickened  The leaflets are moderately calcified  There is moderately reduced mobility  There is mild regurgitation  There is severe stenosis  The aortic valve velocity is increased due to stenosis     Mitral Valve The mitral valve has normal structure and function  There is mild calcification  There is mild regurgitation  There is no evidence of stenosis  Tricuspid Valve Tricuspid valve structure is normal  There is mild regurgitation  There is no evidence of stenosis  Pulmonic Valve Pulmonic valve structure is normal  There is no evidence of regurgitation  There is no evidence of stenosis  Ascending Aorta The aortic root is normal in size  IVC/SVC The inferior vena cava is normal in size  Pericardium There is no pericardial effusion  The pericardium is normal in appearance  I have personally reviewed pertinent reports  TAVR evaluation Assessment:     Gab Sieving: III    Aortic Stenosis Stage: D1    5 Meter Walk:   1  8 sec  2  8 sec  3  8 sec     STS risk score (preliminary): 1 5%    KCCQ-12 completed    Assessment:  Patient Active Problem List    Diagnosis Date Noted    Diverticulitis of large intestine without perforation or abscess without bleeding 03/30/2022    Lightheadedness 09/28/2021    Acute pain of right shoulder 07/22/2019    Adhesive capsulitis of right shoulder 07/22/2019    Aortic stenosis, moderate 06/05/2019    Essential hypertension 06/05/2019    Mixed hyperlipidemia 06/05/2019    Type 2 diabetes mellitus with complication, without long-term current use of insulin ([facility] [address] ) 06/05/2019    Heart palpitations 06/05/2019     Severe aortic stenosis; Ongoing TAVR workup    Plan:    Joel Lee has symptomatic severe aortic stenosis  They will undergo the following testing for transcatheter aortic valve replacement: Gated CTA of the chest/abdomen/pelvis, cardiac catheterization, dental clearance and carotid artery ultrasound  Once these studies have been completed, Joel Lee will follow up in our office to review the results and to be evaluated to confirm the suitability of proceeding with transcatheter aortic valve replacment       Joel Lee was comfortable with our recommendations, and their questions were answered to their satisfaction  Thank you for allowing us to participate in the care of this patient  The patient recently had a screening colonoscopy in 2022  Therefore GI referral is not indicated at this time       SIGNATURE: Luciana Morgan PA-C  DATE: July 27, 2022  TIME: 1:26 PM

## 2022-07-29 ENCOUNTER — PREP FOR PROCEDURE (OUTPATIENT)
Dept: CARDIOLOGY CLINIC | Facility: CLINIC | Age: 62
End: 2022-07-29

## 2022-07-29 ENCOUNTER — TELEPHONE (OUTPATIENT)
Dept: CARDIOLOGY CLINIC | Facility: CLINIC | Age: 62
End: 2022-07-29

## 2022-07-29 DIAGNOSIS — I35.0 AORTIC STENOSIS, MODERATE: Primary | ICD-10-CM

## 2022-07-29 NOTE — TELEPHONE ENCOUNTER
Pt is schedule for R+LHC on 8/19/2022 at [facility] with Dr Mooney    Pt is aware of the instructions (mychart)    No allergies to meds     Lab ordered (no later than 8/16/2022  Already had the CMP done today) Goes to [facility] faxed the script @ [phone]  · CBC    Med hold  Toujeo - 1/2 dose the night prior  Metformin - Empagliflozin - Do not take morning of procedure    Can I get an auth?

## 2022-08-05 ENCOUNTER — OFFICE VISIT (OUTPATIENT)
Dept: URGENT CARE | Facility: CLINIC | Age: 62
End: 2022-08-05
Payer: COMMERCIAL

## 2022-08-05 ENCOUNTER — HOSPITAL ENCOUNTER (OUTPATIENT)
Dept: RADIOLOGY | Facility: HOSPITAL | Age: 62
Discharge: HOME | End: 2022-08-05
Payer: COMMERCIAL

## 2022-08-05 ENCOUNTER — HOSPITAL ENCOUNTER (OUTPATIENT)
Dept: NON INVASIVE DIAGNOSTICS | Facility: HOSPITAL | Age: 62
Discharge: HOME | End: 2022-08-05
Payer: COMMERCIAL

## 2022-08-05 ENCOUNTER — TELEPHONE (OUTPATIENT)
Dept: RADIOLOGY | Facility: HOSPITAL | Age: 62
End: 2022-08-05

## 2022-08-05 VITALS
SYSTOLIC BLOOD PRESSURE: 146 MMHG | HEIGHT: 63 IN | DIASTOLIC BLOOD PRESSURE: 86 MMHG | TEMPERATURE: 97.4 F | BODY MASS INDEX: 33.49 KG/M2 | OXYGEN SATURATION: 96 % | WEIGHT: 189 LBS | RESPIRATION RATE: 18 BRPM | HEART RATE: 74 BPM

## 2022-08-05 DIAGNOSIS — I35.0 SEVERE AORTIC STENOSIS: ICD-10-CM

## 2022-08-05 DIAGNOSIS — T80.1XXA IV INFILTRATE, INITIAL ENCOUNTER: Primary | ICD-10-CM

## 2022-08-05 DIAGNOSIS — R42 LIGHTHEADEDNESS: ICD-10-CM

## 2022-08-05 PROCEDURE — 74174 CTA ABD&PLVS W/CONTRAST: CPT

## 2022-08-05 PROCEDURE — 75572 CT HRT W/3D IMAGE: CPT

## 2022-08-05 PROCEDURE — 93880 EXTRACRANIAL BILAT STUDY: CPT

## 2022-08-05 PROCEDURE — 99213 OFFICE O/P EST LOW 20 MIN: CPT

## 2022-08-05 PROCEDURE — G1004 CDSM NDSC: HCPCS

## 2022-08-05 PROCEDURE — 93880 EXTRACRANIAL BILAT STUDY: CPT | Performed by: SURGERY

## 2022-08-05 RX ORDER — IODIXANOL 320 MG/ML
120 INJECTION, SOLUTION INTRAVASCULAR
Status: COMPLETED | OUTPATIENT
Start: 2022-08-05 | End: 2022-08-05

## 2022-08-05 RX ADMIN — IODIXANOL 120 ML: 320 INJECTION, SOLUTION INTRAVASCULAR at 10:13

## 2022-08-05 NOTE — PROGRESS NOTES
[id number] [facility]        NAME: Sherrell Severance is a 57 y o  female  : 1960    MRN: [id number]  DATE: 2022  TIME: 1:56 PM    Assessment and Plan   IV infiltrate, initial encounter [T80  1XXA]  1  IV infiltrate, initial encounter           Patient Instructions     Patient Instructions   Recommend elevation of left arm and application of warm compresses  Monitor site for any new or worsening symptoms such as increased swelling, redness, pain, or fevers  If present, return or follow up with PCP  If symptoms severe, proceed to ER  Follow up with PCP in 3-5 days  Proceed to  ER if symptoms worsen  Chief Complaint     Chief Complaint   Patient presents with    Arm Swelling     Right arm swelling, had CT scan today          History of Present Illness       HPI   Sherrell Severance is a 57 y o  female who presents today for evaluation of left upper arm swelling  Patient a CT scan with contrast at 9:30 a m  this morning  Patient reports after that she had an ultrasound study done  She didn't notice the left arm swelling until she was driving home  Patient denies any shortness of breath, chest tightness, rashes or erythema  Denies prior allergic reaction to contrast dye  Patient notified radiology of the left arm swelling and was told to come to urgent care for evaluation  Patient did not take any medications PTA  Review of Systems   Review of Systems   Constitutional: Negative for chills and fever  Respiratory: Negative for cough and shortness of breath  Cardiovascular: Negative for chest pain and palpitations  Gastrointestinal: Negative for abdominal pain, nausea and vomiting  Musculoskeletal: Negative for arthralgias and joint swelling  Skin: Positive for wound (LAC IV puncture)  Negative for color change and rash  Neurological: Negative for weakness and numbness           Current Medications       Current Outpatient Medications:     amLODIPine (NORVASC) 5 mg tablet, TAKE ONE TABLET BY MOUTH EVERY DAY, Disp: 90 tablet, Rfl: 3    aspirin 81 mg chewable tablet, Chew 81 mg daily, Disp: , Rfl:     carvedilol (COREG) 6 25 mg tablet, TAKE ONE TABLET BY MOUTH TWICE A DAY WITH MEALS, Disp: 180 tablet, Rfl: 3    Cholecalciferol (VITAMIN D3) 1000 UNITS CAPS, Take 2,000 Units by mouth daily, Disp: , Rfl:     Cyanocobalamin (VITAMIN B12 PO), Take 2,500 mcg by mouth daily, Disp: , Rfl:     Empagliflozin-metFORMIN HCl ER  MG TB24, Take 1 tablet by mouth daily , Disp: , Rfl:     glimepiride (AMARYL) 2 mg tablet, Take 4 mg by mouth 2 (two) times a day , Disp: , Rfl:     Icosapent Ethyl (VASCEPA PO), Take 2 capsules by mouth daily, Disp: , Rfl:     Insulin Glargine (TOUJEO) 300 units/mL CONCETRATED U-300 injection pen, Inject 20 Units under the skin daily at bedtime, Disp: , Rfl:     methocarbamol (ROBAXIN) 500 mg tablet, Take 1 tablet (500 mg total) by mouth 2 (two) times a day as needed for muscle spasms (Patient not taking: No sig reported), Disp: 20 tablet, Rfl: 0    morphine (MSIR) 15 mg tablet, Take 1 tablet (15 mg total) by mouth every 4 (four) hours as needed for severe pain for up to 12 doses Max Daily Amount: 90 mg (Patient not taking: Reported on 3/4/2022 ), Disp: 12 tablet, Rfl: 0    multivitamin (THERAGRAN) TABS, Take 1 tablet by mouth daily, Disp: , Rfl:     naproxen (NAPROSYN) 375 mg tablet, Take 1 tablet (375 mg total) by mouth 2 (two) times a day with meals (Patient not taking: No sig reported), Disp: 20 tablet, Rfl: 0    naproxen (NAPROSYN) 500 mg tablet, Take 1 tablet (500 mg total) by mouth 2 (two) times a day as needed for moderate pain for up to 20 doses (Patient not taking: Reported on 9/28/2021), Disp: 20 tablet, Rfl: 0    ondansetron (ZOFRAN-ODT) 4 mg disintegrating tablet, Take 1 tablet (4 mg total) by mouth every 8 (eight) hours as needed for nausea or vomiting (Patient not taking: Reported on 9/28/2021), Disp: 10 tablet, Rfl: 0    pravastatin (PRAVACHOL) 20 mg tablet, TAKE ONE TABLET BY MOUTH EVERY DAY, Disp: 90 tablet, Rfl: 3    pyridoxine (VITAMIN B6) 100 mg tablet, Take 100 mg by mouth daily, Disp: , Rfl:     TART CHERRY PO, Take 1 capsule by mouth 2 (two) times a day, Disp: , Rfl:   No current facility-administered medications for this visit  Current Allergies     Allergies as of 08/05/2022 - Reviewed 08/05/2022   Allergen Reaction Noted    Fentanyl Rash 06/05/2019    Niaspan [niacin er] Rash 10/26/2016    Prednisone Rash 02/17/2022            The following portions of the patient's history were reviewed and updated as appropriate: allergies, current medications, past family history, past medical history, past social history, past surgical history and problem list      Past Medical History:   Diagnosis Date    Aortic valve disease     [facility] [address] [facility]     [facility]     type 2    Diverticulitis     History of colonoscopy 2022    Lipedema 2017    Murmur, cardiac        Past Surgical History:   Procedure Laterality Date    APPENDECTOMY      BREAST LUMPECTOMY      CHOLECYSTECTOMY      HYSTERECTOMY      KNEE ARTHROSCOPY Right     knee    TONSILLECTOMY         Family History   Problem Relation Age of Onset    COPD Mother     Heart disease Father     Heart disease Paternal Grandmother     Cancer Family     Colon cancer Neg Hx          Medications have been verified  Objective   /86   Pulse 74   Temp (!) 97 4 °F (36 3 °C) (Temporal)   Resp 18   Ht 5' 3" (1 6 m)   Wt 85 7 kg (189 lb)   LMP  (LMP Unknown)   SpO2 96%   BMI 33 48 kg/m²        Physical Exam     Physical Exam  Vitals and nursing note reviewed  Constitutional:       General: She is not in acute distress  Appearance: Normal appearance  HENT:      Head: Normocephalic and atraumatic  Mouth/Throat:      Mouth: Mucous membranes are moist    Cardiovascular:      Rate and Rhythm: Normal rate and regular rhythm        Pulses: Radial pulses are 2+ on the left side  Heart sounds: Murmur heard  Pulmonary:      Effort: Pulmonary effort is normal       Breath sounds: Normal breath sounds  No wheezing, rhonchi or rales  Lymphadenopathy:      Cervical: No cervical adenopathy  Skin:     General: Skin is warm and dry  Capillary Refill: Capillary refill takes less than 2 seconds  Findings: Ecchymosis and wound present  Comments: IV puncture noted in left antecubital area  Mild ecchymosis  There is an area of approximately 10 cm x 7 cm of swelling with pale area of skin above IV puncture site  Soft to touch  Non-tender  No erythema  Circumference of arm measures 33 cm  NVI distally  Psychiatric:         Behavior: Behavior normal  Behavior is cooperative

## 2022-08-05 NOTE — TELEPHONE ENCOUNTER
Patient called to let us know that she has arm swelling from the elbow up and fingers are tingling/numb  Per Tech that scanned pt  There was no obvious infiltrate, contrast was seen on the scan  I spoke with Dr Sarai Weinberg from IR and he recommended pt being seen due to symptoms  She stated she is currently in the waiting room [facility]

## 2022-08-05 NOTE — PATIENT INSTRUCTIONS
Recommend elevation of left arm and application of warm compresses  Monitor site for any new or worsening symptoms such as increased swelling, redness, pain, or fevers  If present, return or follow up with PCP  If symptoms severe, proceed to ER

## 2022-08-18 ENCOUNTER — APPOINTMENT (OUTPATIENT)
Dept: LAB | Facility: HOSPITAL | Age: 62
End: 2022-08-18
Payer: COMMERCIAL

## 2022-08-18 DIAGNOSIS — I35.0 AORTIC STENOSIS, MODERATE: ICD-10-CM

## 2022-08-18 LAB
BASOPHILS # BLD AUTO: 0.07 THOUSANDS/ΜL (ref 0–0.1)
BASOPHILS NFR BLD AUTO: 1 % (ref 0–1)
EOSINOPHIL # BLD AUTO: 0.2 THOUSAND/ΜL (ref 0–0.61)
EOSINOPHIL NFR BLD AUTO: 3 % (ref 0–6)
ERYTHROCYTE [DISTWIDTH] IN BLOOD BY AUTOMATED COUNT: 13.8 % (ref 11.6–15.1)
HCT VFR BLD AUTO: 42.9 % (ref 34.8–46.1)
HGB BLD-MCNC: 13.7 G/DL (ref 11.5–15.4)
IMM GRANULOCYTES # BLD AUTO: 0.03 THOUSAND/UL (ref 0–0.2)
IMM GRANULOCYTES NFR BLD AUTO: 0 % (ref 0–2)
LYMPHOCYTES # BLD AUTO: 2.36 THOUSANDS/ΜL (ref 0.6–4.47)
LYMPHOCYTES NFR BLD AUTO: 30 % (ref 14–44)
MCH RBC QN AUTO: 28.6 PG (ref 26.8–34.3)
MCHC RBC AUTO-ENTMCNC: 31.9 G/DL (ref 31.4–37.4)
MCV RBC AUTO: 90 FL (ref 82–98)
MONOCYTES # BLD AUTO: 0.56 THOUSAND/ΜL (ref 0.17–1.22)
MONOCYTES NFR BLD AUTO: 7 % (ref 4–12)
NEUTROPHILS # BLD AUTO: 4.73 THOUSANDS/ΜL (ref 1.85–7.62)
NEUTS SEG NFR BLD AUTO: 59 % (ref 43–75)
NRBC BLD AUTO-RTO: 0 /100 WBCS
PLATELET # BLD AUTO: 243 THOUSANDS/UL (ref 149–390)
PMV BLD AUTO: 10.8 FL (ref 8.9–12.7)
RBC # BLD AUTO: 4.79 MILLION/UL (ref 3.81–5.12)
WBC # BLD AUTO: 7.95 THOUSAND/UL (ref 4.31–10.16)

## 2022-08-18 PROCEDURE — 85025 COMPLETE CBC W/AUTO DIFF WBC: CPT

## 2022-08-18 PROCEDURE — 36415 COLL VENOUS BLD VENIPUNCTURE: CPT

## 2022-08-19 ENCOUNTER — HOSPITAL ENCOUNTER (OUTPATIENT)
Facility: HOSPITAL | Age: 62
Setting detail: OUTPATIENT SURGERY
Discharge: HOME | End: 2022-08-19
Attending: INTERNAL MEDICINE | Admitting: INTERNAL MEDICINE
Payer: COMMERCIAL

## 2022-08-19 VITALS
TEMPERATURE: 97.9 F | HEIGHT: 62 IN | SYSTOLIC BLOOD PRESSURE: 122 MMHG | WEIGHT: 189 LBS | DIASTOLIC BLOOD PRESSURE: 58 MMHG | BODY MASS INDEX: 34.78 KG/M2 | RESPIRATION RATE: 18 BRPM | HEART RATE: 72 BPM | OXYGEN SATURATION: 94 %

## 2022-08-19 DIAGNOSIS — I35.0 AORTIC STENOSIS, MODERATE: Primary | ICD-10-CM

## 2022-08-19 LAB
ANION GAP SERPL CALCULATED.3IONS-SCNC: 7 MMOL/L (ref 4–13)
ATRIAL RATE: 73 BPM
BUN SERPL-MCNC: 13 MG/DL (ref 5–25)
CALCIUM SERPL-MCNC: 9.1 MG/DL (ref 8.3–10.1)
CHLORIDE SERPL-SCNC: 107 MMOL/L (ref 96–108)
CO2 SERPL-SCNC: 24 MMOL/L (ref 21–32)
CREAT SERPL-MCNC: 0.64 MG/DL (ref 0.6–1.3)
ERYTHROCYTE [DISTWIDTH] IN BLOOD BY AUTOMATED COUNT: 14.1 % (ref 11.6–15.1)
GFR SERPL CREATININE-BSD FRML MDRD: 94 ML/MIN/1.73SQ M
GLUCOSE P FAST SERPL-MCNC: 167 MG/DL (ref 65–99)
GLUCOSE SERPL-MCNC: 167 MG/DL (ref 65–140)
HCT VFR BLD AUTO: 40.4 % (ref 34.8–46.1)
HGB BLD-MCNC: 13 G/DL (ref 11.5–15.4)
MCH RBC QN AUTO: 28 PG (ref 26.8–34.3)
MCHC RBC AUTO-ENTMCNC: 32.2 G/DL (ref 31.4–37.4)
MCV RBC AUTO: 87 FL (ref 82–98)
P AXIS: 38 DEGREES
PLATELET # BLD AUTO: 216 THOUSANDS/UL (ref 149–390)
PMV BLD AUTO: 10.8 FL (ref 8.9–12.7)
POTASSIUM SERPL-SCNC: 3.9 MMOL/L (ref 3.5–5.3)
PR INTERVAL: 172 MS
QRS AXIS: 60 DEGREES
QRSD INTERVAL: 76 MS
QT INTERVAL: 402 MS
QTC INTERVAL: 442 MS
RBC # BLD AUTO: 4.64 MILLION/UL (ref 3.81–5.12)
SODIUM SERPL-SCNC: 138 MMOL/L (ref 135–147)
T WAVE AXIS: 131 DEGREES
VENTRICULAR RATE: 73 BPM
WBC # BLD AUTO: 6.47 THOUSAND/UL (ref 4.31–10.16)

## 2022-08-19 PROCEDURE — C1894 INTRO/SHEATH, NON-LASER: HCPCS | Performed by: INTERNAL MEDICINE

## 2022-08-19 PROCEDURE — 85027 COMPLETE CBC AUTOMATED: CPT | Performed by: INTERNAL MEDICINE

## 2022-08-19 PROCEDURE — 99153 MOD SED SAME PHYS/QHP EA: CPT | Performed by: INTERNAL MEDICINE

## 2022-08-19 PROCEDURE — 93010 ELECTROCARDIOGRAM REPORT: CPT | Performed by: INTERNAL MEDICINE

## 2022-08-19 PROCEDURE — 99152 MOD SED SAME PHYS/QHP 5/>YRS: CPT | Performed by: INTERNAL MEDICINE

## 2022-08-19 PROCEDURE — 93005 ELECTROCARDIOGRAM TRACING: CPT

## 2022-08-19 PROCEDURE — C1769 GUIDE WIRE: HCPCS | Performed by: INTERNAL MEDICINE

## 2022-08-19 PROCEDURE — 80048 BASIC METABOLIC PNL TOTAL CA: CPT | Performed by: INTERNAL MEDICINE

## 2022-08-19 PROCEDURE — 93454 CORONARY ARTERY ANGIO S&I: CPT | Performed by: INTERNAL MEDICINE

## 2022-08-19 RX ORDER — SODIUM CHLORIDE 9 MG/ML
125 INJECTION, SOLUTION INTRAVENOUS CONTINUOUS
Status: DISCONTINUED | OUTPATIENT
Start: 2022-08-19 | End: 2022-08-19

## 2022-08-19 RX ORDER — ONDANSETRON 2 MG/ML
4 INJECTION INTRAMUSCULAR; INTRAVENOUS EVERY 6 HOURS PRN
Status: DISCONTINUED | OUTPATIENT
Start: 2022-08-19 | End: 2022-08-19 | Stop reason: HOSPADM

## 2022-08-19 RX ORDER — NITROGLYCERIN 20 MG/100ML
INJECTION INTRAVENOUS AS NEEDED
Status: DISCONTINUED | OUTPATIENT
Start: 2022-08-19 | End: 2022-08-19 | Stop reason: HOSPADM

## 2022-08-19 RX ORDER — VERAPAMIL HCL 2.5 MG/ML
AMPUL (ML) INTRAVENOUS AS NEEDED
Status: DISCONTINUED | OUTPATIENT
Start: 2022-08-19 | End: 2022-08-19 | Stop reason: HOSPADM

## 2022-08-19 RX ORDER — MIDAZOLAM HYDROCHLORIDE 2 MG/2ML
INJECTION, SOLUTION INTRAMUSCULAR; INTRAVENOUS AS NEEDED
Status: DISCONTINUED | OUTPATIENT
Start: 2022-08-19 | End: 2022-08-19 | Stop reason: HOSPADM

## 2022-08-19 RX ORDER — HEPARIN SODIUM 1000 [USP'U]/ML
INJECTION, SOLUTION INTRAVENOUS; SUBCUTANEOUS AS NEEDED
Status: DISCONTINUED | OUTPATIENT
Start: 2022-08-19 | End: 2022-08-19 | Stop reason: HOSPADM

## 2022-08-19 RX ORDER — ASPIRIN 81 MG/1
324 TABLET, CHEWABLE ORAL ONCE
Status: COMPLETED | OUTPATIENT
Start: 2022-08-19 | End: 2022-08-19

## 2022-08-19 RX ORDER — ACETAMINOPHEN 325 MG/1
650 TABLET ORAL EVERY 4 HOURS PRN
Status: DISCONTINUED | OUTPATIENT
Start: 2022-08-19 | End: 2022-08-19 | Stop reason: HOSPADM

## 2022-08-19 RX ORDER — SODIUM CHLORIDE 9 MG/ML
75 INJECTION, SOLUTION INTRAVENOUS CONTINUOUS
Status: DISCONTINUED | OUTPATIENT
Start: 2022-08-19 | End: 2022-08-19 | Stop reason: HOSPADM

## 2022-08-19 RX ORDER — LIDOCAINE HYDROCHLORIDE 10 MG/ML
INJECTION, SOLUTION EPIDURAL; INFILTRATION; INTRACAUDAL; PERINEURAL AS NEEDED
Status: DISCONTINUED | OUTPATIENT
Start: 2022-08-19 | End: 2022-08-19 | Stop reason: HOSPADM

## 2022-08-19 RX ADMIN — SODIUM CHLORIDE 125 ML/HR: 0.9 INJECTION, SOLUTION INTRAVENOUS at 07:19

## 2022-08-19 RX ADMIN — ASPIRIN 81 MG 324 MG: 81 TABLET ORAL at 07:10

## 2022-08-19 NOTE — INTERVAL H&P NOTE
H&P reviewed  After examining the patient I find no changes in the patients condition since the H&P had been written      For pre-TAVR cath (THEO 1 cm2, 38 mmHg, 3 8 m/s, DVI 0 26) but SAVR/CABG if significant CAD    Vitals:    08/19/22 0700   BP: 152/68   Pulse: 73   Resp: 18   Temp: 97 8 °F (36 6 °C)   SpO2: 94%     Dulce Trevino MD / 08/19/22 / 7:13 AM

## 2022-08-19 NOTE — DISCHARGE INSTRUCTIONS
1  Please see the post cardiac catheterization dishcarge instructions  No heavy lifting, greater than 10 lbs  or strenuous  activity for 48 hrs  2 Remove band aid tomorrow  Shower and wash area- wrist gently with soap and water- beginning tomorrow  Rinse and pat dry  Apply new water seal band aid  Repeat this process for 5 days  No powders, creams lotions or antibiotic ointments  for 5 days  No tub baths, hot tubs or swimming for 5 days  3  Please call our office ([phone]) if you have any fever, redness, swelling, discharge from your wrist access site      4 No driving for 1 day

## 2022-09-29 ENCOUNTER — OFFICE VISIT (OUTPATIENT)
Dept: CARDIAC SURGERY | Facility: CLINIC | Age: 62
End: 2022-09-29
Payer: COMMERCIAL

## 2022-09-29 VITALS
DIASTOLIC BLOOD PRESSURE: 70 MMHG | BODY MASS INDEX: 34.78 KG/M2 | HEIGHT: 62 IN | WEIGHT: 189 LBS | TEMPERATURE: 97.8 F | SYSTOLIC BLOOD PRESSURE: 140 MMHG | HEART RATE: 98 BPM | OXYGEN SATURATION: 96 %

## 2022-09-29 DIAGNOSIS — Z01.812 ENCOUNTER FOR PRE-OPERATIVE LABORATORY TESTING: ICD-10-CM

## 2022-09-29 DIAGNOSIS — Z01.810 PRE-OPERATIVE CARDIOVASCULAR EXAMINATION: ICD-10-CM

## 2022-09-29 DIAGNOSIS — I35.0 AORTIC STENOSIS, MODERATE: Primary | ICD-10-CM

## 2022-09-29 PROCEDURE — 99214 OFFICE O/P EST MOD 30 MIN: CPT | Performed by: PHYSICIAN ASSISTANT

## 2022-09-29 RX ORDER — CEFAZOLIN SODIUM 2 G/50ML
2000 SOLUTION INTRAVENOUS ONCE
Status: CANCELLED | OUTPATIENT
Start: 2022-09-29 | End: 2022-09-29

## 2022-09-29 RX ORDER — CHLORHEXIDINE GLUCONATE 0.12 MG/ML
15 RINSE ORAL ONCE
Status: CANCELLED | OUTPATIENT
Start: 2022-09-29 | End: 2022-09-29

## 2022-09-29 NOTE — H&P (VIEW-ONLY)
Preop History and Physical - Cardiothoracic Surgery   Erick Liz 57 y o  female MRN: [id number]    Physician Requesting Consult: No att  providers found    Reason for Consult / Principal Problem: Aortic stenosis, Non-Rheumatic    History of Present Illness: Erick Liz is a 57y o  year old female who was previously evaluated in our office by BELEM Noel  for transcatheter aortic valve replacement  During this initial consultation, arrangements were made for the following studies to be completed: Gated CTA of the chest/abdomen/pelvis, 3D TRAY, cardiac catheterization, dental clearance, and carotid artery ultrasound  Erick Liz now presents to review the results of these tests and obtain a second surgeon to confirm the suitability of proceeding with transcatheter aortic valve replacement  In review, patient has been following with Dr Gloria To for several years for her aortic stenosis, which was initially mild without symptoms, but has now progressed to severe symptomatic  She admits to symptoms of dyspnea with exertion (inclines), lightheadedness, palpitations and fatigue  She follows with sleep medicine for SJ and uses a CPAP at night, and has for about 15 years  Medical history significant for Type 2 DM (oral meds and lantus), HTN, diverticulitis, HLD, right breast cancer s/p radiation and lumpectomy with resultant right upper extremity lymphedema  Surgical history notable for appendectomy cholecystectomy, right knee arthroscopy and hysterectomy  Her most recent ECHO on 7/7 demonstrates severe aortic stenosis, THEO 1 0 cm2, mean gradient of 38 mmHg  Nguyễn Flores is up to date with dental care - she has full top dentures and partial bottom dentures, and therefore goes to the dentist for cleanings once a year  She is COVID vaccinated x3, but does not have her card with her today       Past Medical History:  Past Medical History:   Diagnosis Date   • Aortic valve disease    • Breast cancer ([facility] [address] ) • Diabetes mellitus ([facility] [address] )     type 2   • Diverticulitis    • History of colonoscopy 2022   • Lipedema 2017   • Murmur, cardiac          Past Surgical History:   Past Surgical History:   Procedure Laterality Date   • APPENDECTOMY     • BREAST LUMPECTOMY     • CARDIAC CATHETERIZATION N/A 8/19/2022    Procedure: Cardiac RHC/LHC; Surgeon: Bernadette Guerrier DO;  Location: [facility]; Service: Cardiology   • CHOLECYSTECTOMY     • HYSTERECTOMY     • KNEE ARTHROSCOPY Right     knee   • TONSILLECTOMY           Family History:  Family History   Problem Relation Age of Onset   • COPD Mother    • Heart disease Father    • Heart disease Paternal Grandmother    • Cancer Family    • Colon cancer Neg Hx          Social History:    Social History     Substance and Sexual Activity   Alcohol Use No     Social History     Substance and Sexual Activity   Drug Use No     Social History     Tobacco Use   Smoking Status Never Smoker   Smokeless Tobacco Never Used       Home Medications:   Prior to Admission medications    Medication Sig Start Date End Date Taking?  Authorizing Provider   aspirin 81 mg chewable tablet Chew 81 mg daily   Yes Historical Provider, MD   carvedilol (COREG) 6 25 mg tablet TAKE ONE TABLET BY MOUTH TWICE A DAY WITH MEALS 10/18/21  Yes Arnold Rodriguez DO   Cholecalciferol (VITAMIN D3) 1000 UNITS CAPS Take 2,000 Units by mouth daily   Yes Historical Provider, MD   Cyanocobalamin (VITAMIN B12 PO) Take 2,500 mcg by mouth daily   Yes Historical Provider, MD   glimepiride (AMARYL) 2 mg tablet Take 4 mg by mouth 2 (two) times a day    Yes Historical Provider, MD   Icosapent Ethyl (VASCEPA PO) Take 2 capsules by mouth daily   Yes Historical Provider, MD   Insulin Glargine (TOUJEO) 300 units/mL CONCETRATED U-300 injection pen Inject 20 Units under the skin daily at bedtime   Yes Historical Provider, MD   multivitamin (THERAGRAN) TABS Take 1 tablet by mouth daily   Yes Historical Provider, MD pravastatin (PRAVACHOL) 20 mg tablet TAKE ONE TABLET BY MOUTH EVERY DAY 10/18/21  Yes Arnold Rodriguez DO   pyridoxine (VITAMIN B6) 100 mg tablet Take 100 mg by mouth daily   Yes Historical Provider, MD   TART CHERRY PO Take 1 capsule by mouth 2 (two) times a day   Yes Historical Provider, MD       Allergies: Allergies   Allergen Reactions   • Fentanyl Rash   • Niaspan [Niacin Er] Rash   • Prednisone Rash       Review of Systems:  Review of Systems - History obtained from the patient  General ROS: positive for  - fatigue  Psychological ROS: negative  Ophthalmic ROS: negative  ENT ROS: negative  Allergy and Immunology ROS: negative  Hematological and Lymphatic ROS: negative  Endocrine ROS: negative  Breast ROS: negative  Respiratory ROS: positive for - shortness of breath  Cardiovascular ROS: positive for - dyspnea on exertion, palpitations and shortness of breath  Gastrointestinal ROS: no abdominal pain, change in bowel habits, or black or bloody stools  Genito-Urinary ROS: no dysuria, trouble voiding, or hematuria  Musculoskeletal ROS: negative  Neurological ROS: no TIA or stroke symptoms  Dermatological ROS: negative    Vital Signs:     Vitals:    09/29/22 1244   BP: 140/70   BP Location: Left arm   Patient Position: Sitting   Cuff Size: Adult   Pulse: 98   Temp: 97 8 °F (36 6 °C)   SpO2: 96%   Weight: 85 7 kg (189 lb)   Height: 5' 2" (1 575 m)       Physical Exam:    General: NAD, alert, oriented   HEENT/NECK:  PERRL  No jugular venous distention  Cardiac:Regular rate and rhythm, grade 4/6 systolic Murmur  Carotids: 2+, no bruits   Pulmonary:  Breath sounds clear bilaterally  Abdomen:  Non-tender, Non-distended  Positive bowel sounds  Upper extremities: 2+ radial pulses; brisk capillary refill  Lower extremities: Extremities warm/dry  PT/DP pulses 2+ bilaterally  No edema B/L  Neuro: Alert and oriented X 3  Sensation is grossly intact  No focal deficits    Musculoskeletal: LYNN   Skin: Warm/Dry, without rashes or lesions  Lab Results:               Invalid input(s): LABGLOM      Lab Results   Component Value Date    HGBA1C 7 5 (H) 04/09/2022     No results found for: CKTOTAL, CKMB, CKMBINDEX, TROPONINI    Imaging Studies:     Echocardiogram: 7/7/22  Study Details    This transthoracic echocardiogram was performed in the echo lab  This was a routine, outpatient study  Study quality was adequate  A complete 2D, color flow Doppler, spectral Doppler, 2D, color flow Doppler and spectral Doppler transthoracic echocardiogram was performed  The apical, parasternal, subcostal and suprasternal views were obtained  Patient exhibited sinus rhythm  Indications  Priority: Routine  Dx: Aortic stenosis, moderate [I35 0 (ICD-10-CM)]; Essential hypertension [I10 (ICD-10-CM)]; Lightheadedness [R15 (ICD-10-CM)]       History    Moderate AS; HTN; DM2; Palpitations; HLD; Breast cancer       Interpretation Summary       •  Left Ventricle: Left ventricular cavity size is normal  Wall thickness is moderately increased  The left ventricular ejection fraction is 55%  Systolic function is normal  Wall motion is normal  Diastolic function is mildly abnormal, consistent with grade I (abnormal) relaxation  •  Left Atrium: The atrium is mildly dilated  •  Aortic Valve: The aortic valve is trileaflet  The leaflets are not thickened  The leaflets are moderately calcified  There is moderately reduced mobility  There is mild regurgitation  There is severe stenosis  The aortic valve velocity is increased due to stenosis  •  Mitral Valve: There is mild regurgitation  •  Tricuspid Valve: There is mild regurgitation          Findings    Left Ventricle Left ventricular cavity size is normal  Wall thickness is moderately increased  The left ventricular ejection fraction is 55%  Systolic function is normal   Wall motion is normal  Diastolic function is mildly abnormal, consistent with grade I (abnormal) relaxation     Right Ventricle Right ventricular cavity size is normal  Systolic function is normal  Wall thickness is normal    Left Atrium The atrium is mildly dilated  Right Atrium The atrium is normal in size  Aortic Valve The aortic valve is trileaflet  The leaflets are not thickened  The leaflets are moderately calcified  There is moderately reduced mobility  There is mild regurgitation  There is severe stenosis  The aortic valve velocity is increased due to stenosis  Mitral Valve The mitral valve has normal structure and function  There is mild calcification  There is mild regurgitation  There is no evidence of stenosis  Tricuspid Valve Tricuspid valve structure is normal  There is mild regurgitation  There is no evidence of stenosis  Pulmonic Valve Pulmonic valve structure is normal  There is no evidence of regurgitation  There is no evidence of stenosis  Ascending Aorta The aortic root is normal in size  IVC/SVC The inferior vena cava is normal in size  Pericardium There is no pericardial effusion  The pericardium is normal in appearance                 Left Ventricle Measurements    Function/Volumes   A4C EF 82 %         LVOT stroke volume 98 75 cm3         LVOT stroke volume index 52 4 ml/m2         Dimensions   LVIDd 3 7 cm         LVIDS 2 4 cm         IVSd 1 4 cm         LVPWd 1 4 cm         LVOT area 3 14 cm2         FS 35 %         Diastolic Filling   MV E' Tissue Velocity Septal 8 cm/s         E wave deceleration time 235 ms         MV Peak E Sid 82 cm/s         MV Peak A Sid 0 87 m/s          Report Measurements   AV LVOT peak gradient 4 mmHg              Interventricular Septum Measurements    Shunt Ratio   LVOT peak VTI 31 45 cm         LVOT peak sid 0 99 m/s              Right Ventricle Measurements    Dimensions   RVID d 2 5 cm               Left Atrium Measurements    Dimensions   LA size 3 9 cm               Right Atrium Measurements    Dimensions   RAA A4C 9 5 cm2               Atrial Septum Measurements    Shunt Ratio   LVOT peak VTI 31 45 cm         LVOT peak sid 0 99 m/s               Aortic Valve Measurements    Stenosis   Aortic valve peak velocity 3 81 m/s         LVOT peak sid 0 99 m/s         Ao VTI 97 61 cm         LVOT peak VTI 31 45 cm         AV mean gradient 38 mmHg         LVOT mn grad 3 mmHg         AV peak gradient 58 mmHg         AV LVOT peak gradient 4 mmHg         Dimensionless velociy index 0 26          Regurgitation   AV peak gradient 97 mmHg         AV Deceleration Time 1,948 ms         AV regurgitation pressure 1/2 time 565 ms         Area/Dimensions   AV valve area 1 01 cm2         AV area by cont VTI 1 cm2         AV area peak sid 0 8 cm2         LVOT diameter 2 cm         LVOT area 3 14 cm2               Mitral Valve Measurements    Stenosis   MV stenosis pressure 1/2 time 68 ms         MV valve area p 1/2 method 3 24 cm2               Tricuspid Valve Measurements    RVSP Parameters   TR Peak Sid 2 1 m/s         Triscuspid Valve Regurgitation Peak Gradient 17 mmHg               Aorta Measurements    Aortic Dimensions   Ao root 2 8 cm         Asc Ao 3 4 cm                  Gated CTA of the chest/abdomen/pelvis: 8/5/22  INDICATION:  Preoperative evaluation for TAVR     COMPARISON: CT of the pelvis without contrast April 22, 2022     TECHNIQUE:  CT angiogram examination of the chest, abdomen and pelvis was performed according to standard protocol with cardiac gating  Axial, sagittal, and coronal reformatted images were submitted for interpretation  3D reconstructions were performed   an independent workstation, and are supplied for review          Radiation dose length product (DLP) for this visit:  3390 63 mGy-cm     This examination, like all CT scans performed in the [facility], was performed utilizing techniques to minimize radiation dose exposure, including the use of   iterative reconstruction and automated exposure control      IV Contrast:  120 mL of iodixanol (VISIPAQUE)  Enteric Contrast: Enteric contrast was not administered      FINDINGS:     VASCULAR STRUCTURES:       Annulus: diameter 28 x 21 mm      area: 462 sq mm    Annulus to LCA: 13 mm    Annulus to RCA: 16 mm    Minimal diameter right iliofemoral segment: 7 mm    Minimal diameter left iliofemoral segment: 7 mm     The ascending aorta is nonaneurysmal measuring 33 mm with no significant atherosclerosis  There is a type 1 aortic arch with classic branching anatomy and no stenosis in the visualized great vessels  The aortic arch is nonaneurysmal with mild    atherosclerosis  The descending thoracic aorta is nonaneurysmal with mild  atherosclerosis      The abdominal aorta mildly atherosclerotic  The celiac artery, superior mesenteric artery, and inferior mesenteric artery are patent  Single renal arteries are patent bilaterally  The right and left iliofemoral segments and internal iliac arteries are   patent with minimal atherosclerosis      Cardiac findings: There is mild calcification of the aortic aortic valve and root  The left ventricle is normal   The ventricular septum is normal   No prior valvular surgery  No prior bypass surgery  No pericardial effusion  No cardiac mass or thrombus      OTHER FINDINGS:      CHEST     LUNGS:  Lungs are clear  There is no tracheal or endobronchial lesion      PLEURA:  Unremarkable      MEDIASTINUM AND JOANNA:  Unremarkable      CHEST WALL AND LOWER NECK:   Unremarkable      ABDOMEN     LIVER/BILIARY TREE:  Liver is diffusely decreased in density consistent with fatty change  No CT evidence of suspicious hepatic mass  Normal hepatic contours  No biliary dilatation      GALLBLADDER:  Gallbladder is surgically absent      SPLEEN:  Unremarkable      PANCREAS:  Unremarkable      ADRENAL GLANDS:  7 mm fat-containing benign left adrenal nodule      KIDNEYS/URETERS:  Unremarkable   No hydronephrosis      STOMACH AND BOWEL:  There is colonic diverticulosis without evidence of acute diverticulitis      APPENDIX:  No findings to suggest appendicitis      ABDOMINOPELVIC CAVITY:  No ascites or free intraperitoneal air  No lymphadenopathy  Stable right lower quadrant calcification anterior to the psoas muscle is stable from the prior study consistent with a calcified lymph node      PELVIS     REPRODUCTIVE ORGANS:  Patient is status post hysterectomy      URINARY BLADDER:  Unremarkable      ABDOMINAL WALL/INGUINAL REGIONS:  Unremarkable      OSSEOUS STRUCTURES:  There are age appropriate degenerative changes  No acute fracture or destructive osseous lesion      IMPRESSION:     TVAR measurements as above      Hepatic steatosis      Benign left adrenal nodule      Diverticulosis  Cardiac catheterization: 8/19/22  · No significant obstructive epicardial CAD      Carotid artery ultrasound: 8/5/22  Impression     RIGHT:  There is <50% stenosis noted in the internal carotid artery  Plaque is  homogenous and smooth  Vertebral artery flow is antegrade  There is no significant subclavian artery  disease  LEFT:  There is <50% stenosis noted in the internal carotid artery  Plaque is  heterogenous and smooth  Vertebral artery flow is antegrade  There is no significant subclavian artery  disease  I have personally reviewed pertinent reports        TAVR evaluation Assessment:     5 Meter Walk Test:      Attempt 1: 8   Attempt 2: 8   Attempt 3: 8    STS Risk Score: 1 5%    Aortic Stenosis Stage: D1    [facility]: III    [id number] completed    Assessment:  Patient Active Problem List    Diagnosis Date Noted   • Diverticulitis of large intestine without perforation or abscess without bleeding 03/30/2022   • Lightheadedness 09/28/2021   • Acute pain of right shoulder 07/22/2019   • Adhesive capsulitis of right shoulder 07/22/2019   • Aortic stenosis, moderate 06/05/2019   • Essential hypertension 06/05/2019   • Mixed hyperlipidemia 06/05/2019   • Type 2 diabetes mellitus with complication, without long-term current use of insulin ([facility] [address] ) 06/05/2019   • Heart palpitations 06/05/2019     Severe aortic stenosis; Ongoing TAVR workup    Plan:    Celestine Narvaez has symptomatic severe aortic stenosis  They have undergone testing for transcatheter aortic valve replacement  The results of these studies have been interpreted by the multidisciplinary TAVR team who have determined the patient to be Low risk for open aortic valve replacement based on other pre-existing comobidities not reflected in the STS risk score  The risks, benefits, and alternatives to TAVR were discussed in detail with the patient today  They understand and wish to proceed with transfemoral transcatheter aortic valve replacement  Informed consent was obtained and a date for the intervention has been set  Celestine Narvaez was comfortable with our recommendations, and their questions were answered to their satisfaction  The following preoperative instructions were provided at the conclusion of their consultation:     1  You will receive a phone call from the hospital between 2:00 PM and 8:00 PM the day prior to surgery to confirm arrival time and location  For surgery on Mondays, you will receive a call on Friday  2  Do not drink or eat anything after midnight the night before surgery  That includes no water, candy, gum, lozenges, Lifesavers, etc  We recommend you not eat any salty or fatty snack food, consume alcohol or smoke the night before surgery  3  Continue taking aspirin but only 81 mg daily  4  If you take Coumadin and/or Plavix, discontinue it 5 days before surgery  5  If you are diabetic, do not take any of your diabetic pills the morning of surgery  If you take Lantus insulin, you may take it at your regularly scheduled time the day before surgery  Do not take any other insulins the morning of surgery    6  The 2 nights before surgery, take a shower each night using the special antiseptic soap or soap sponges you received from the office or [facility] your hair with regular shampoo and rinse completely before using the antiseptic sponges  Use the sponge to wash from your neck down, with special attention to the armpits and groin area  Do not use any other soap or cleanser on your skin  Do not use lotions, powder, deodorant or perfume of any kind on your skin after you shower  Use clean bed linens and wear clean pajamas after your shower  7  You will be prescribed Mupirocin nasal ointment  Apply to both nostrils twice a day for 5 days prior to surgery  8  Do not take a shower the morning of her surgery; you'll be given a special" bath" at the hospital   9  Notify the CT surgery office if you develop a cold, sore throat, cough, fever or other health issues before your surgery  10  Other medication changes included the following: TAKE HALF DOSE OF HS INSULIN THE NIGHT BEFORE SURGERY  HOLD GLIMEPERIDE 3 DAYS BEFORE SURGERY  HOLD MULTIVITAMIN 7 DAYS BEFORE SURGERY        SIGNATURE: Alison Ramirez  DATE: September 29, 2022  TIME: 12:52 PM

## 2022-09-29 NOTE — PROGRESS NOTES
Consultation - Cardiothoracic Surgery   Michelle Harris Mow 57 y o  female MRN: [id number]    Physician Requesting Consult: No att  providers found    Reason for Consult / Principal Problem: Aortic stenosis, Non-Rheumatic    History of Present Illness: Omid Cornelius is a 57y o  year old female who was previously evaluated in our office by BELEM Galloway  for transcatheter aortic valve replacement  During this initial consultation, arrangements were made for the following studies to be completed: Gated CTA of the chest/abdomen/pelvis, 3D TRAY, cardiac catheterization, dental clearance, and carotid artery ultrasound  Omid Cornelius now presents to review the results of these tests and obtain a second surgeon to confirm the suitability of proceeding with transcatheter aortic valve replacement  In review, patient has been following with Dr Evelina Cason for several years for her aortic stenosis, which was initially mild without symptoms, but has now progressed to severe symptomatic  She admits to symptoms of dyspnea with exertion (inclines), lightheadedness, palpitations and fatigue  She follows with sleep medicine for SJ and uses a CPAP at night, and has for about 15 years  Medical history significant for Type 2 DM (oral meds and lantus), HTN, diverticulitis, HLD, right breast cancer s/p radiation and lumpectomy with resultant right upper extremity lymphedema  Surgical history notable for appendectomy cholecystectomy, right knee arthroscopy and hysterectomy  Her most recent ECHO on 7/7 demonstrates severe aortic stenosis, THEO 1 0 cm2, mean gradient of 38 mmHg  Fiorella Sales is up to date with dental care - she has full top dentures and partial bottom dentures, and therefore goes to the dentist for cleanings once a year  She is COVID vaccinated x3, but does not have her card with her today       Past Medical History:  Past Medical History:   Diagnosis Date    Aortic valve disease     Breast cancer ([facility] [address] )     Diabetes mellitus ([facility] [address] )     type 2    Diverticulitis     History of colonoscopy 2022    Lipedema 2017    Murmur, cardiac          Past Surgical History:   Past Surgical History:   Procedure Laterality Date    APPENDECTOMY      BREAST LUMPECTOMY      CARDIAC CATHETERIZATION N/A 8/19/2022    Procedure: Cardiac RHC/LHC; Surgeon: Eri Simon DO;  Location: [facility]; Service: Cardiology    CHOLECYSTECTOMY      HYSTERECTOMY      KNEE ARTHROSCOPY Right     knee    TONSILLECTOMY           Family History:  Family History   Problem Relation Age of Onset    COPD Mother     Heart disease Father     Heart disease Paternal Grandmother     Cancer Family     Colon cancer Neg Hx          Social History:    Social History     Substance and Sexual Activity   Alcohol Use No     Social History     Substance and Sexual Activity   Drug Use No     Social History     Tobacco Use   Smoking Status Never Smoker   Smokeless Tobacco Never Used       Home Medications:   Prior to Admission medications    Medication Sig Start Date End Date Taking?  Authorizing Provider   aspirin 81 mg chewable tablet Chew 81 mg daily   Yes Historical Provider, MD   carvedilol (COREG) 6 25 mg tablet TAKE ONE TABLET BY MOUTH TWICE A DAY WITH MEALS 10/18/21  Yes Arnold Rodriguez DO   Cholecalciferol (VITAMIN D3) 1000 UNITS CAPS Take 2,000 Units by mouth daily   Yes Historical Provider, MD   Cyanocobalamin (VITAMIN B12 PO) Take 2,500 mcg by mouth daily   Yes Historical Provider, MD   glimepiride (AMARYL) 2 mg tablet Take 4 mg by mouth 2 (two) times a day    Yes Historical Provider, MD   Icosapent Ethyl (VASCEPA PO) Take 2 capsules by mouth daily   Yes Historical Provider, MD   Insulin Glargine (TOUJEO) 300 units/mL CONCETRATED U-300 injection pen Inject 20 Units under the skin daily at bedtime   Yes Historical Provider, MD   multivitamin (THERAGRAN) TABS Take 1 tablet by mouth daily   Yes Historical Provider, MD   pravastatin (PRAVACHOL) 20 mg tablet TAKE ONE TABLET BY MOUTH EVERY DAY 10/18/21  Yes Arnold Rodriguez DO   pyridoxine (VITAMIN B6) 100 mg tablet Take 100 mg by mouth daily   Yes Historical Provider, MD   TART CHERRY PO Take 1 capsule by mouth 2 (two) times a day   Yes Historical Provider, MD       Allergies: Allergies   Allergen Reactions    Fentanyl Rash    Niaspan [Niacin Er] Rash    Prednisone Rash       Review of Systems:  Review of Systems - History obtained from the patient  General ROS: positive for  - fatigue  Psychological ROS: negative  Ophthalmic ROS: negative  ENT ROS: negative  Allergy and Immunology ROS: negative  Hematological and Lymphatic ROS: negative  Endocrine ROS: negative  Breast ROS: negative  Respiratory ROS: positive for - shortness of breath  Cardiovascular ROS: positive for - dyspnea on exertion, palpitations and shortness of breath  Gastrointestinal ROS: no abdominal pain, change in bowel habits, or black or bloody stools  Genito-Urinary ROS: no dysuria, trouble voiding, or hematuria  Musculoskeletal ROS: negative  Neurological ROS: no TIA or stroke symptoms  Dermatological ROS: negative    Vital Signs:     Vitals:    09/29/22 1244   BP: 140/70   BP Location: Left arm   Patient Position: Sitting   Cuff Size: Adult   Pulse: 98   Temp: 97 8 °F (36 6 °C)   SpO2: 96%   Weight: 85 7 kg (189 lb)   Height: 5' 2" (1 575 m)       Physical Exam:    General: NAD, alert, oriented   HEENT/NECK:  PERRL  No jugular venous distention  Cardiac:Regular rate and rhythm, grade 4/6 systolic Murmur  Carotids: 2+, no bruits   Pulmonary:  Breath sounds clear bilaterally  Abdomen:  Non-tender, Non-distended  Positive bowel sounds  Upper extremities: 2+ radial pulses; brisk capillary refill  Lower extremities: Extremities warm/dry  PT/DP pulses 2+ bilaterally  No edema B/L  Neuro: Alert and oriented X 3  Sensation is grossly intact  No focal deficits    Musculoskeletal: LYNN   Skin: Warm/Dry, without rashes or lesions  Lab Results:               Invalid input(s): LABGLOM      Lab Results   Component Value Date    HGBA1C 7 5 (H) 04/09/2022     No results found for: CKTOTAL, CKMB, CKMBINDEX, TROPONINI    Imaging Studies:     Echocardiogram: 7/7/22  Study Details    This transthoracic echocardiogram was performed in the echo lab  This was a routine, outpatient study  Study quality was adequate  A complete 2D, color flow Doppler, spectral Doppler, 2D, color flow Doppler and spectral Doppler transthoracic echocardiogram was performed  The apical, parasternal, subcostal and suprasternal views were obtained  Patient exhibited sinus rhythm  Indications  Priority: Routine  Dx: Aortic stenosis, moderate [I35 0 (ICD-10-CM)]; Essential hypertension [I10 (ICD-10-CM)]; Lightheadedness [X15 (ICD-10-CM)]       History    Moderate AS; HTN; DM2; Palpitations; HLD; Breast cancer       Interpretation Summary         Left Ventricle: Left ventricular cavity size is normal  Wall thickness is moderately increased  The left ventricular ejection fraction is 55%  Systolic function is normal  Wall motion is normal  Diastolic function is mildly abnormal, consistent with grade I (abnormal) relaxation    Left Atrium: The atrium is mildly dilated    Aortic Valve: The aortic valve is trileaflet  The leaflets are not thickened  The leaflets are moderately calcified  There is moderately reduced mobility  There is mild regurgitation  There is severe stenosis  The aortic valve velocity is increased due to stenosis    Mitral Valve: There is mild regurgitation    Tricuspid Valve: There is mild regurgitation          Findings    Left Ventricle Left ventricular cavity size is normal  Wall thickness is moderately increased  The left ventricular ejection fraction is 55%  Systolic function is normal   Wall motion is normal  Diastolic function is mildly abnormal, consistent with grade I (abnormal) relaxation     Right Ventricle Right ventricular cavity size is normal  Systolic function is normal  Wall thickness is normal    Left Atrium The atrium is mildly dilated  Right Atrium The atrium is normal in size  Aortic Valve The aortic valve is trileaflet  The leaflets are not thickened  The leaflets are moderately calcified  There is moderately reduced mobility  There is mild regurgitation  There is severe stenosis  The aortic valve velocity is increased due to stenosis  Mitral Valve The mitral valve has normal structure and function  There is mild calcification  There is mild regurgitation  There is no evidence of stenosis  Tricuspid Valve Tricuspid valve structure is normal  There is mild regurgitation  There is no evidence of stenosis  Pulmonic Valve Pulmonic valve structure is normal  There is no evidence of regurgitation  There is no evidence of stenosis  Ascending Aorta The aortic root is normal in size  IVC/SVC The inferior vena cava is normal in size  Pericardium There is no pericardial effusion  The pericardium is normal in appearance                 Left Ventricle Measurements    Function/Volumes   A4C EF 82 %         LVOT stroke volume 98 75 cm3         LVOT stroke volume index 52 4 ml/m2         Dimensions   LVIDd 3 7 cm         LVIDS 2 4 cm         IVSd 1 4 cm         LVPWd 1 4 cm         LVOT area 3 14 cm2         FS 35 %         Diastolic Filling   MV E' Tissue Velocity Septal 8 cm/s         E wave deceleration time 235 ms         MV Peak E Sid 82 cm/s         MV Peak A Sid 0 87 m/s          Report Measurements   AV LVOT peak gradient 4 mmHg              Interventricular Septum Measurements    Shunt Ratio   LVOT peak VTI 31 45 cm         LVOT peak sid 0 99 m/s              Right Ventricle Measurements    Dimensions   RVID d 2 5 cm               Left Atrium Measurements    Dimensions   LA size 3 9 cm               Right Atrium Measurements    Dimensions   RAA A4C 9 5 cm2               Atrial Septum Measurements    Shunt Ratio   LVOT peak VTI 31 45 cm         LVOT peak sid 0 99 m/s               Aortic Valve Measurements    Stenosis   Aortic valve peak velocity 3 81 m/s         LVOT peak sid 0 99 m/s         Ao VTI 97 61 cm         LVOT peak VTI 31 45 cm         AV mean gradient 38 mmHg         LVOT mn grad 3 mmHg         AV peak gradient 58 mmHg         AV LVOT peak gradient 4 mmHg         Dimensionless velociy index 0 26          Regurgitation   AV peak gradient 97 mmHg         AV Deceleration Time 1,948 ms         AV regurgitation pressure 1/2 time 565 ms         Area/Dimensions   AV valve area 1 01 cm2         AV area by cont VTI 1 cm2         AV area peak sid 0 8 cm2         LVOT diameter 2 cm         LVOT area 3 14 cm2               Mitral Valve Measurements    Stenosis   MV stenosis pressure 1/2 time 68 ms         MV valve area p 1/2 method 3 24 cm2               Tricuspid Valve Measurements    RVSP Parameters   TR Peak Sid 2 1 m/s         Triscuspid Valve Regurgitation Peak Gradient 17 mmHg               Aorta Measurements    Aortic Dimensions   Ao root 2 8 cm         Asc Ao 3 4 cm                  Gated CTA of the chest/abdomen/pelvis: 8/5/22  INDICATION:  Preoperative evaluation for TAVR     COMPARISON: CT of the pelvis without contrast April 22, 2022     TECHNIQUE:  CT angiogram examination of the chest, abdomen and pelvis was performed according to standard protocol with cardiac gating  Axial, sagittal, and coronal reformatted images were submitted for interpretation  3D reconstructions were performed   an independent workstation, and are supplied for review          Radiation dose length product (DLP) for this visit:  3390 63 mGy-cm     This examination, like all CT scans performed in the [facility], was performed utilizing techniques to minimize radiation dose exposure, including the use of   iterative reconstruction and automated exposure control      IV Contrast:  120 mL of iodixanol (VISIPAQUE)  Enteric Contrast: Enteric contrast was not administered      FINDINGS:     VASCULAR STRUCTURES:       Annulus: diameter 28 x 21 mm      area: 462 sq mm    Annulus to LCA: 13 mm    Annulus to RCA: 16 mm    Minimal diameter right iliofemoral segment: 7 mm    Minimal diameter left iliofemoral segment: 7 mm     The ascending aorta is nonaneurysmal measuring 33 mm with no significant atherosclerosis  There is a type 1 aortic arch with classic branching anatomy and no stenosis in the visualized great vessels  The aortic arch is nonaneurysmal with mild    atherosclerosis  The descending thoracic aorta is nonaneurysmal with mild  atherosclerosis      The abdominal aorta mildly atherosclerotic  The celiac artery, superior mesenteric artery, and inferior mesenteric artery are patent  Single renal arteries are patent bilaterally  The right and left iliofemoral segments and internal iliac arteries are   patent with minimal atherosclerosis      Cardiac findings: There is mild calcification of the aortic aortic valve and root  The left ventricle is normal   The ventricular septum is normal   No prior valvular surgery  No prior bypass surgery  No pericardial effusion  No cardiac mass or thrombus      OTHER FINDINGS:      CHEST     LUNGS:  Lungs are clear  There is no tracheal or endobronchial lesion      PLEURA:  Unremarkable      MEDIASTINUM AND JOANNA:  Unremarkable      CHEST WALL AND LOWER NECK:   Unremarkable      ABDOMEN     LIVER/BILIARY TREE:  Liver is diffusely decreased in density consistent with fatty change  No CT evidence of suspicious hepatic mass  Normal hepatic contours  No biliary dilatation      GALLBLADDER:  Gallbladder is surgically absent      SPLEEN:  Unremarkable      PANCREAS:  Unremarkable      ADRENAL GLANDS:  7 mm fat-containing benign left adrenal nodule      KIDNEYS/URETERS:  Unremarkable   No hydronephrosis      STOMACH AND BOWEL:  There is colonic diverticulosis without evidence of acute diverticulitis      APPENDIX:  No findings to suggest appendicitis      ABDOMINOPELVIC CAVITY:  No ascites or free intraperitoneal air  No lymphadenopathy  Stable right lower quadrant calcification anterior to the psoas muscle is stable from the prior study consistent with a calcified lymph node      PELVIS     REPRODUCTIVE ORGANS:  Patient is status post hysterectomy      URINARY BLADDER:  Unremarkable      ABDOMINAL WALL/INGUINAL REGIONS:  Unremarkable      OSSEOUS STRUCTURES:  There are age appropriate degenerative changes  No acute fracture or destructive osseous lesion      IMPRESSION:     TVAR measurements as above      Hepatic steatosis      Benign left adrenal nodule      Diverticulosis  Cardiac catheterization: 8/19/22  · No significant obstructive epicardial CAD      Carotid artery ultrasound: 8/5/22  Impression     RIGHT:  There is <50% stenosis noted in the internal carotid artery  Plaque is  homogenous and smooth  Vertebral artery flow is antegrade  There is no significant subclavian artery  disease  LEFT:  There is <50% stenosis noted in the internal carotid artery  Plaque is  heterogenous and smooth  Vertebral artery flow is antegrade  There is no significant subclavian artery  disease  I have personally reviewed pertinent reports        TAVR evaluation Assessment:     5 Meter Walk Test:      Attempt 1: 8   Attempt 2: 8   Attempt 3: 8    STS Risk Score: 1 5%    Aortic Stenosis Stage: D1    NYHC: III    KCCQ-12 completed    Assessment:  Patient Active Problem List    Diagnosis Date Noted    Diverticulitis of large intestine without perforation or abscess without bleeding 03/30/2022    Lightheadedness 09/28/2021    Acute pain of right shoulder 07/22/2019    Adhesive capsulitis of right shoulder 07/22/2019    Aortic stenosis, moderate 06/05/2019    Essential hypertension 06/05/2019    Mixed hyperlipidemia 06/05/2019    Type 2 diabetes mellitus with complication, without long-term current use of insulin ([facility] [address] ) 06/05/2019    Heart palpitations 06/05/2019     Severe aortic stenosis; Ongoing TAVR workup    Plan:    Maye Bean has symptomatic severe aortic stenosis  They have undergone testing for transcatheter aortic valve replacement  The results of these studies have been interpreted by the multidisciplinary TAVR team who have determined the patient to be Low risk for open aortic valve replacement based on other pre-existing comobidities not reflected in the STS risk score  The risks, benefits, and alternatives to TAVR were discussed in detail with the patient today  They understand and wish to proceed with transfemoral transcatheter aortic valve replacement  Informed consent was obtained and a date for the intervention has been set  Maye Bean was comfortable with our recommendations, and their questions were answered to their satisfaction  The following preoperative instructions were provided at the conclusion of their consultation:     1  You will receive a phone call from the hospital between 2:00 PM and 8:00 PM the day prior to surgery to confirm arrival time and location  For surgery on Mondays, you will receive a call on Friday  2  Do not drink or eat anything after midnight the night before surgery  That includes no water, candy, gum, lozenges, Lifesavers, etc  We recommend you not eat any salty or fatty snack food, consume alcohol or smoke the night before surgery  3  Continue taking aspirin but only 81 mg daily  4  If you take Coumadin and/or Plavix, discontinue it 5 days before surgery  5  If you are diabetic, do not take any of your diabetic pills the morning of surgery  If you take Lantus insulin, you may take it at your regularly scheduled time the day before surgery  Do not take any other insulins the morning of surgery    6  The 2 nights before surgery, take a shower each night using the special antiseptic soap or soap sponges you received from the office or hospital  Laura Cowboy your hair with regular shampoo and rinse completely before using the antiseptic sponges  Use the sponge to wash from your neck down, with special attention to the armpits and groin area  Do not use any other soap or cleanser on your skin  Do not use lotions, powder, deodorant or perfume of any kind on your skin after you shower  Use clean bed linens and wear clean pajamas after your shower  7  You will be prescribed Mupirocin nasal ointment  Apply to both nostrils twice a day for 5 days prior to surgery  8  Do not take a shower the morning of her surgery; you'll be given a special" bath" at the hospital   9  Notify the CT surgery office if you develop a cold, sore throat, cough, fever or other health issues before your surgery  10  Other medication changes included the following: TAKE HALF DOSE OF HS INSULIN THE NIGHT BEFORE SURGERY  HOLD GLIMEPERIDE 3 DAYS BEFORE SURGERY  HOLD MULTIVITAMIN 7 DAYS BEFORE SURGERY        SIGNATURE: Michelle Terrell  DATE: September 29, 2022  TIME: 12:52 PM [Procedure: _________] : a [unfilled] procedure visit [Colonoscopy] : a colonoscopy [Family Member] : family member

## 2022-09-29 NOTE — LETTER
September 29, 2022     Haroon Dimas[address] [place] [address]    Patient: Toñito Gordillo   YOB: 1960   Date of Visit: 9/29/2022       Dear Dr Priscilla Charles: Thank you for referring Charlotte Campos to me for evaluation  Below are my notes for this consultation  If you have questions, please do not hesitate to call me  I look forward to following your patient along with you  Sincerely,        Anmol Forbes MD        CC: Feli Gallegos PA-C  9/29/2022  2:06 PM  Attested  Consultation - Cardiothoracic Surgery   Michelle Mckeon 57 y o  female MRN: [id number]    Physician Requesting Consult: No att  providers found    Reason for Consult / Principal Problem: Aortic stenosis, Non-Rheumatic    History of Present Illness: Toñito Gordillo is a 57y o  year old female who was previously evaluated in our office by BELEM Belle  for transcatheter aortic valve replacement  During this initial consultation, arrangements were made for the following studies to be completed: Gated CTA of the chest/abdomen/pelvis, 3D TRAY, cardiac catheterization, dental clearance, and carotid artery ultrasound  Toñito Gordillo now presents to review the results of these tests and obtain a second surgeon to confirm the suitability of proceeding with transcatheter aortic valve replacement  In review, patient has been following with Dr Priscilla Charles for several years for her aortic stenosis, which was initially mild without symptoms, but has now progressed to severe symptomatic  She admits to symptoms of dyspnea with exertion (inclines), lightheadedness, palpitations and fatigue  She follows with sleep medicine for SJ and uses a CPAP at night, and has for about 15 years  Medical history significant for Type 2 DM (oral meds and lantus), HTN, diverticulitis, HLD, right breast cancer s/p radiation and lumpectomy with resultant right upper extremity lymphedema   Surgical history notable for appendectomy cholecystectomy, right knee arthroscopy and hysterectomy  Her most recent ECHO on 7/7 demonstrates severe aortic stenosis, THEO 1 0 cm2, mean gradient of 38 mmHg  Fiorella Sales is up to date with dental care - she has full top dentures and partial bottom dentures, and therefore goes to the dentist for cleanings once a year  She is COVID vaccinated x3, but does not have her card with her today  Past Medical History:  Past Medical History:   Diagnosis Date    Aortic valve disease     [facility] [address] [facility]     Diabetes mellitus ([facility] [address] )     type 2    Diverticulitis     History of colonoscopy 2022    Lipedema 2017    Murmur, cardiac          Past Surgical History:   Past Surgical History:   Procedure Laterality Date    APPENDECTOMY      BREAST LUMPECTOMY      CARDIAC CATHETERIZATION N/A 8/19/2022    Procedure: Cardiac RHC/LHC; Surgeon: Laura Santo DO;  Location: [facility]; Service: Cardiology    CHOLECYSTECTOMY      HYSTERECTOMY      KNEE ARTHROSCOPY Right     knee    TONSILLECTOMY           Family History:  Family History   Problem Relation Age of Onset    COPD Mother     Heart disease Father     Heart disease Paternal Grandmother     Cancer Family     Colon cancer Neg Hx          Social History:    Social History     Substance and Sexual Activity   Alcohol Use No     Social History     Substance and Sexual Activity   Drug Use No     Social History     Tobacco Use   Smoking Status Never Smoker   Smokeless Tobacco Never Used       Home Medications:   Prior to Admission medications    Medication Sig Start Date End Date Taking?  Authorizing Provider   aspirin 81 mg chewable tablet Chew 81 mg daily   Yes Historical Provider, MD   carvedilol (COREG) 6 25 mg tablet TAKE ONE TABLET BY MOUTH TWICE A DAY WITH MEALS 10/18/21  Yes Arnold Rodriguez DO   Cholecalciferol (VITAMIN D3) 1000 UNITS CAPS Take 2,000 Units by mouth daily   Yes Historical Provider, MD Cyanocobalamin (VITAMIN B12 PO) Take 2,500 mcg by mouth daily   Yes Historical Provider, MD   glimepiride (AMARYL) 2 mg tablet Take 4 mg by mouth 2 (two) times a day    Yes Historical Provider, MD   Icosapent Ethyl (VASCEPA PO) Take 2 capsules by mouth daily   Yes Historical Provider, MD   Insulin Glargine (TOUJEO) 300 units/mL CONCETRATED U-300 injection pen Inject 20 Units under the skin daily at bedtime   Yes Historical Provider, MD   multivitamin (THERAGRAN) TABS Take 1 tablet by mouth daily   Yes Historical Provider, MD   pravastatin (PRAVACHOL) 20 mg tablet TAKE ONE TABLET BY MOUTH EVERY DAY 10/18/21  Yes Arnold Rodriguez DO   pyridoxine (VITAMIN B6) 100 mg tablet Take 100 mg by mouth daily   Yes Historical Provider, MD   TART CHERRY PO Take 1 capsule by mouth 2 (two) times a day   Yes Historical Provider, MD       Allergies:   Allergies   Allergen Reactions    Fentanyl Rash    Niaspan [Niacin Er] Rash    Prednisone Rash       Review of Systems:  Review of Systems - History obtained from the patient  General ROS: positive for  - fatigue  Psychological ROS: negative  Ophthalmic ROS: negative  ENT ROS: negative  Allergy and Immunology ROS: negative  Hematological and Lymphatic ROS: negative  Endocrine ROS: negative  Breast ROS: negative  Respiratory ROS: positive for - shortness of breath  Cardiovascular ROS: positive for - dyspnea on exertion, palpitations and shortness of breath  Gastrointestinal ROS: no abdominal pain, change in bowel habits, or black or bloody stools  Genito-Urinary ROS: no dysuria, trouble voiding, or hematuria  Musculoskeletal ROS: negative  Neurological ROS: no TIA or stroke symptoms  Dermatological ROS: negative    Vital Signs:     Vitals:    09/29/22 1244   BP: 140/70   BP Location: Left arm   Patient Position: Sitting   Cuff Size: Adult   Pulse: 98   Temp: 97 8 °F (36 6 °C)   SpO2: 96%   Weight: 85 7 kg (189 lb)   Height: 5' 2" (1 575 m)       Physical Exam:    General: NAD, alert, oriented   HEENT/NECK:  PERRL  No jugular venous distention  Cardiac:Regular rate and rhythm, grade 4/6 systolic Murmur  Carotids: 2+, no bruits   Pulmonary:  Breath sounds clear bilaterally  Abdomen:  Non-tender, Non-distended  Positive bowel sounds  Upper extremities: 2+ radial pulses; brisk capillary refill  Lower extremities: Extremities warm/dry  PT/DP pulses 2+ bilaterally  No edema B/L  Neuro: Alert and oriented X 3  Sensation is grossly intact  No focal deficits  Musculoskeletal: LYNN   Skin: Warm/Dry, without rashes or lesions  Lab Results:               Invalid input(s): LABGLOM      Lab Results   Component Value Date    HGBA1C 7 5 (H) 04/09/2022     No results found for: CKTOTAL, CKMB, CKMBINDEX, TROPONINI    Imaging Studies:     Echocardiogram: 7/7/22  Study Details    This transthoracic echocardiogram was performed in the echo lab  This was a routine, outpatient study  Study quality was adequate  A complete 2D, color flow Doppler, spectral Doppler, 2D, color flow Doppler and spectral Doppler transthoracic echocardiogram was performed  The apical, parasternal, subcostal and suprasternal views were obtained  Patient exhibited sinus rhythm  Indications  Priority: Routine  Dx: Aortic stenosis, moderate [I35 0 (ICD-10-CM)]; Essential hypertension [I10 (ICD-10-CM)]; Lightheadedness [D75 (ICD-10-CM)]       History    Moderate AS; HTN; DM2; Palpitations; HLD; Breast cancer       Interpretation Summary         Left Ventricle: Left ventricular cavity size is normal  Wall thickness is moderately increased  The left ventricular ejection fraction is 55%  Systolic function is normal  Wall motion is normal  Diastolic function is mildly abnormal, consistent with grade I (abnormal) relaxation    Left Atrium: The atrium is mildly dilated    Aortic Valve: The aortic valve is trileaflet  The leaflets are not thickened  The leaflets are moderately calcified   There is moderately reduced mobility  There is mild regurgitation  There is severe stenosis  The aortic valve velocity is increased due to stenosis    Mitral Valve: There is mild regurgitation    Tricuspid Valve: There is mild regurgitation          Findings    Left Ventricle Left ventricular cavity size is normal  Wall thickness is moderately increased  The left ventricular ejection fraction is 55%  Systolic function is normal   Wall motion is normal  Diastolic function is mildly abnormal, consistent with grade I (abnormal) relaxation  Right Ventricle Right ventricular cavity size is normal  Systolic function is normal  Wall thickness is normal    Left Atrium The atrium is mildly dilated  Right Atrium The atrium is normal in size  Aortic Valve The aortic valve is trileaflet  The leaflets are not thickened  The leaflets are moderately calcified  There is moderately reduced mobility  There is mild regurgitation  There is severe stenosis  The aortic valve velocity is increased due to stenosis  Mitral Valve The mitral valve has normal structure and function  There is mild calcification  There is mild regurgitation  There is no evidence of stenosis  Tricuspid Valve Tricuspid valve structure is normal  There is mild regurgitation  There is no evidence of stenosis  Pulmonic Valve Pulmonic valve structure is normal  There is no evidence of regurgitation  There is no evidence of stenosis  Ascending Aorta The aortic root is normal in size  IVC/SVC The inferior vena cava is normal in size  Pericardium There is no pericardial effusion  The pericardium is normal in appearance                 Left Ventricle Measurements    Function/Volumes   A4C EF 82 %         LVOT stroke volume 98 75 cm3         LVOT stroke volume index 52 4 ml/m2         Dimensions   LVIDd 3 7 cm         LVIDS 2 4 cm         IVSd 1 4 cm         LVPWd 1 4 cm         LVOT area 3 14 cm2         FS 35 %         Diastolic Filling   MV E' Tissue Velocity Septal 8 cm/s E wave deceleration time 235 ms         MV Peak E Sid 82 cm/s         MV Peak A Sid 0 87 m/s          Report Measurements   AV LVOT peak gradient 4 mmHg              Interventricular Septum Measurements    Shunt Ratio   LVOT peak VTI 31 45 cm         LVOT peak sid 0 99 m/s              Right Ventricle Measurements    Dimensions   RVID d 2 5 cm               Left Atrium Measurements    Dimensions   LA size 3 9 cm               Right Atrium Measurements    Dimensions   RAA A4C 9 5 cm2               Atrial Septum Measurements    Shunt Ratio   LVOT peak VTI 31 45 cm         LVOT peak sid 0 99 m/s               Aortic Valve Measurements    Stenosis   Aortic valve peak velocity 3 81 m/s         LVOT peak sid 0 99 m/s         Ao VTI 97 61 cm         LVOT peak VTI 31 45 cm         AV mean gradient 38 mmHg         LVOT mn grad 3 mmHg         AV peak gradient 58 mmHg         AV LVOT peak gradient 4 mmHg         Dimensionless velociy index 0 26          Regurgitation   AV peak gradient 97 mmHg         AV Deceleration Time 1,948 ms         AV regurgitation pressure 1/2 time 565 ms         Area/Dimensions   AV valve area 1 01 cm2         AV area by cont VTI 1 cm2         AV area peak sid 0 8 cm2         LVOT diameter 2 cm         LVOT area 3 14 cm2               Mitral Valve Measurements    Stenosis   MV stenosis pressure 1/2 time 68 ms         MV valve area p 1/2 method 3 24 cm2               Tricuspid Valve Measurements    RVSP Parameters   TR Peak Sid 2 1 m/s         Triscuspid Valve Regurgitation Peak Gradient 17 mmHg               Aorta Measurements    Aortic Dimensions   Ao root 2 8 cm         Asc Ao 3 4 cm                  Gated CTA of the chest/abdomen/pelvis: 8/5/22  INDICATION:  Preoperative evaluation for TAVR     COMPARISON: CT of the pelvis without contrast April 22, 2022     TECHNIQUE:  CT angiogram examination of the chest, abdomen and pelvis was performed according to standard protocol with cardiac gating  Axial, sagittal, and coronal reformatted images were submitted for interpretation  3D reconstructions were performed   an independent workstation, and are supplied for review          Radiation dose length product (DLP) for this visit:  3390 63 mGy-cm   This examination, like all CT scans performed in the [facility], was performed utilizing techniques to minimize radiation dose exposure, including the use of   iterative reconstruction and automated exposure control      IV Contrast:  120 mL of iodixanol (VISIPAQUE)  Enteric Contrast: Enteric contrast was not administered      FINDINGS:     VASCULAR STRUCTURES:       Annulus: diameter 28 x 21 mm      area: 462 sq mm    Annulus to LCA: 13 mm    Annulus to RCA: 16 mm    Minimal diameter right iliofemoral segment: 7 mm    Minimal diameter left iliofemoral segment: 7 mm     The ascending aorta is nonaneurysmal measuring 33 mm with no significant atherosclerosis  There is a type 1 aortic arch with classic branching anatomy and no stenosis in the visualized great vessels  The aortic arch is nonaneurysmal with mild    atherosclerosis  The descending thoracic aorta is nonaneurysmal with mild  atherosclerosis      The abdominal aorta mildly atherosclerotic  The celiac artery, superior mesenteric artery, and inferior mesenteric artery are patent  Single renal arteries are patent bilaterally  The right and left iliofemoral segments and internal iliac arteries are   patent with minimal atherosclerosis      Cardiac findings: There is mild calcification of the aortic aortic valve and root  The left ventricle is normal   The ventricular septum is normal   No prior valvular surgery  No prior bypass surgery  No pericardial effusion  No cardiac mass or thrombus      OTHER FINDINGS:      CHEST     LUNGS:  Lungs are clear    There is no tracheal or endobronchial lesion      PLEURA:  Unremarkable      MEDIASTINUM AND JOANNA:  Unremarkable      CHEST WALL AND LOWER NECK: Unremarkable      ABDOMEN     LIVER/BILIARY TREE:  Liver is diffusely decreased in density consistent with fatty change  No CT evidence of suspicious hepatic mass  Normal hepatic contours  No biliary dilatation      GALLBLADDER:  Gallbladder is surgically absent      SPLEEN:  Unremarkable      PANCREAS:  Unremarkable      ADRENAL GLANDS:  7 mm fat-containing benign left adrenal nodule      KIDNEYS/URETERS:  Unremarkable  No hydronephrosis      STOMACH AND BOWEL:  There is colonic diverticulosis without evidence of acute diverticulitis      APPENDIX:  No findings to suggest appendicitis      ABDOMINOPELVIC CAVITY:  No ascites or free intraperitoneal air  No lymphadenopathy  Stable right lower quadrant calcification anterior to the psoas muscle is stable from the prior study consistent with a calcified lymph node      PELVIS     REPRODUCTIVE ORGANS:  Patient is status post hysterectomy      URINARY BLADDER:  Unremarkable      ABDOMINAL WALL/INGUINAL REGIONS:  Unremarkable      OSSEOUS STRUCTURES:  There are age appropriate degenerative changes  No acute fracture or destructive osseous lesion      IMPRESSION:     TVAR measurements as above      Hepatic steatosis      Benign left adrenal nodule      Diverticulosis  Cardiac catheterization: 8/19/22  · No significant obstructive epicardial CAD      Carotid artery ultrasound: 8/5/22  Impression     RIGHT:  There is <50% stenosis noted in the internal carotid artery  Plaque is  homogenous and smooth  Vertebral artery flow is antegrade  There is no significant subclavian artery  disease  LEFT:  There is <50% stenosis noted in the internal carotid artery  Plaque is  heterogenous and smooth  Vertebral artery flow is antegrade  There is no significant subclavian artery  disease  I have personally reviewed pertinent reports        TAVR evaluation Assessment:     5 Meter Walk Test:      Attempt 1: 8   Attempt 2: 8   Attempt 3: 8    STS Risk Score: 1 5%    Aortic Stenosis Stage: D1    [facility]: III    [id number] completed    Assessment:  Patient Active Problem List    Diagnosis Date Noted    Diverticulitis of large intestine without perforation or abscess without bleeding 03/30/2022    Lightheadedness 09/28/2021    Acute pain of right shoulder 07/22/2019    Adhesive capsulitis of right shoulder 07/22/2019    Aortic stenosis, moderate 06/05/2019    Essential hypertension 06/05/2019    Mixed hyperlipidemia 06/05/2019    Type 2 diabetes mellitus with complication, without long-term current use of insulin ([facility] [address] ) 06/05/2019    Heart palpitations 06/05/2019     Severe aortic stenosis; Ongoing TAVR workup    Plan:    Ashley Orantes has symptomatic severe aortic stenosis  They have undergone testing for transcatheter aortic valve replacement  The results of these studies have been interpreted by the multidisciplinary TAVR team who have determined the patient to be Low risk for open aortic valve replacement based on other pre-existing comobidities not reflected in the STS risk score  The risks, benefits, and alternatives to TAVR were discussed in detail with the patient today  They understand and wish to proceed with transfemoral transcatheter aortic valve replacement  Informed consent was obtained and a date for the intervention has been set  Ashley Orantes was comfortable with our recommendations, and their questions were answered to their satisfaction  The following preoperative instructions were provided at the conclusion of their consultation:     1  You will receive a phone call from the hospital between 2:00 PM and 8:00 PM the day prior to surgery to confirm arrival time and location  For surgery on Mondays, you will receive a call on Friday  2  Do not drink or eat anything after midnight the night before surgery   That includes no water, candy, gum, lozenges, Lifesavers, etc  We recommend you not eat any salty or fatty snack food, consume alcohol or smoke the night before surgery  3  Continue taking aspirin but only 81 mg daily  4  If you take Coumadin and/or Plavix, discontinue it 5 days before surgery  5  If you are diabetic, do not take any of your diabetic pills the morning of surgery  If you take Lantus insulin, you may take it at your regularly scheduled time the day before surgery  Do not take any other insulins the morning of surgery  6  The 2 nights before surgery, take a shower each night using the special antiseptic soap or soap sponges you received from the office or hospital  Penelope Gaunt your hair with regular shampoo and rinse completely before using the antiseptic sponges  Use the sponge to wash from your neck down, with special attention to the armpits and groin area  Do not use any other soap or cleanser on your skin  Do not use lotions, powder, deodorant or perfume of any kind on your skin after you shower  Use clean bed linens and wear clean pajamas after your shower  7  You will be prescribed Mupirocin nasal ointment  Apply to both nostrils twice a day for 5 days prior to surgery  8  Do not take a shower the morning of her surgery; you'll be given a special" bath" at the hospital   9  Notify the CT surgery office if you develop a cold, sore throat, cough, fever or other health issues before your surgery  10  Other medication changes included the following: TAKE HALF DOSE OF HS INSULIN THE NIGHT BEFORE SURGERY  HOLD GLIMEPERIDE 3 DAYS BEFORE SURGERY  HOLD MULTIVITAMIN 7 DAYS BEFORE SURGERY  SIGNATURE: Kristen Jean  DATE: September 29, 2022  TIME: 12:52 PM  Attestation signed by Abigail Pérez MD at 9/29/2022  2:50 PM:  Patient seen and evaluated with [place] / BRANDEE  I agree with the above assessment and plan with the following additions  The patient is a 78-year-old female with symptomatic severe aortic stenosis, she was evaluated in our program and she was recommended TAVR    She has completed preoperative testing and return to schedule surgery  I reviewed the diagnosis once again and indication for surgery, I explained the procedure, benefits, risks and possible complications  She understands and agrees to proceed with surgery  She will return for an elective TAVR  This note was completed in part utilizing m-modal fluency direct voice recognition software  Grammatical errors, random word insertion, spelling mistakes, and incomplete sentences may be an occasional consequence of the system secondary to software limitations, ambient noise and hardware issues  At the time of dictation, efforts were made to edit, clarify and /or correct errors  Please read the chart carefully and recognize, using context, where substitutions have occurred  If you have any questions or concerns about the context, text or information contained within the body of this dictation, please contact myself, the provider, for further clarification

## 2022-09-29 NOTE — H&P
Preop History and Physical - Cardiothoracic Surgery   Josi Salas 57 y o  female MRN: [id number]    Physician Requesting Consult: No att  providers found    Reason for Consult / Principal Problem: Aortic stenosis, Non-Rheumatic    History of Present Illness: Josi Salas is a 57y o  year old female who was previously evaluated in our office by BELEM Blanchard  for transcatheter aortic valve replacement  During this initial consultation, arrangements were made for the following studies to be completed: Gated CTA of the chest/abdomen/pelvis, 3D TRAY, cardiac catheterization, dental clearance, and carotid artery ultrasound  Josi Salas now presents to review the results of these tests and obtain a second surgeon to confirm the suitability of proceeding with transcatheter aortic valve replacement  In review, patient has been following with Dr Lulú Pang for several years for her aortic stenosis, which was initially mild without symptoms, but has now progressed to severe symptomatic  She admits to symptoms of dyspnea with exertion (inclines), lightheadedness, palpitations and fatigue  She follows with sleep medicine for SJ and uses a CPAP at night, and has for about 15 years  Medical history significant for Type 2 DM (oral meds and lantus), HTN, diverticulitis, HLD, right breast cancer s/p radiation and lumpectomy with resultant right upper extremity lymphedema  Surgical history notable for appendectomy cholecystectomy, right knee arthroscopy and hysterectomy  Her most recent ECHO on 7/7 demonstrates severe aortic stenosis, THEO 1 0 cm2, mean gradient of 38 mmHg  [facility] is up to date with dental care - she has full top dentures and partial bottom dentures, and therefore goes to the dentist for cleanings once a year  She is COVID vaccinated x3, but does not have her card with her today       Past Medical History:  Past Medical History:   Diagnosis Date    Aortic valve disease     Breast cancer ([facility] [address] )  Diabetes mellitus ([facility] [address] )     type 2    Diverticulitis     History of colonoscopy 2022    Lipedema 2017    Murmur, cardiac          Past Surgical History:   Past Surgical History:   Procedure Laterality Date    APPENDECTOMY      BREAST LUMPECTOMY      CARDIAC CATHETERIZATION N/A 8/19/2022    Procedure: Cardiac RHC/LHC; Surgeon: Margot De La Cruz DO;  Location:  CARDIAC CATH LAB; Service: Cardiology    CHOLECYSTECTOMY      HYSTERECTOMY      KNEE ARTHROSCOPY Right     knee    TONSILLECTOMY           Family History:  Family History   Problem Relation Age of Onset    COPD Mother     Heart disease Father     Heart disease Paternal Grandmother     Cancer Family     Colon cancer Neg Hx          Social History:    Social History     Substance and Sexual Activity   Alcohol Use No     Social History     Substance and Sexual Activity   Drug Use No     Social History     Tobacco Use   Smoking Status Never Smoker   Smokeless Tobacco Never Used       Home Medications:   Prior to Admission medications    Medication Sig Start Date End Date Taking?  Authorizing Provider   aspirin 81 mg chewable tablet Chew 81 mg daily   Yes Historical Provider, MD   carvedilol (COREG) 6 25 mg tablet TAKE ONE TABLET BY MOUTH TWICE A DAY WITH MEALS 10/18/21  Yes Arnold Rodriguez DO   Cholecalciferol (VITAMIN D3) 1000 UNITS CAPS Take 2,000 Units by mouth daily   Yes Historical Provider, MD   Cyanocobalamin (VITAMIN B12 PO) Take 2,500 mcg by mouth daily   Yes Historical Provider, MD   glimepiride (AMARYL) 2 mg tablet Take 4 mg by mouth 2 (two) times a day    Yes Historical Provider, MD   Icosapent Ethyl (VASCEPA PO) Take 2 capsules by mouth daily   Yes Historical Provider, MD   Insulin Glargine (TOUJEO) 300 units/mL CONCETRATED U-300 injection pen Inject 20 Units under the skin daily at bedtime   Yes Historical Provider, MD   multivitamin (THERAGRAN) TABS Take 1 tablet by mouth daily   Yes Historical Provider, MD pravastatin (PRAVACHOL) 20 mg tablet TAKE ONE TABLET BY MOUTH EVERY DAY 10/18/21  Yes Arnold Rodriguez DO   pyridoxine (VITAMIN B6) 100 mg tablet Take 100 mg by mouth daily   Yes Historical Provider, MD   TART CHERRY PO Take 1 capsule by mouth 2 (two) times a day   Yes Historical Provider, MD       Allergies: Allergies   Allergen Reactions    Fentanyl Rash    Niaspan [Niacin Er] Rash    Prednisone Rash       Review of Systems:  Review of Systems - History obtained from the patient  General ROS: positive for  - fatigue  Psychological ROS: negative  Ophthalmic ROS: negative  ENT ROS: negative  Allergy and Immunology ROS: negative  Hematological and Lymphatic ROS: negative  Endocrine ROS: negative  Breast ROS: negative  Respiratory ROS: positive for - shortness of breath  Cardiovascular ROS: positive for - dyspnea on exertion, palpitations and shortness of breath  Gastrointestinal ROS: no abdominal pain, change in bowel habits, or black or bloody stools  Genito-Urinary ROS: no dysuria, trouble voiding, or hematuria  Musculoskeletal ROS: negative  Neurological ROS: no TIA or stroke symptoms  Dermatological ROS: negative    Vital Signs:     Vitals:    09/29/22 1244   BP: 140/70   BP Location: Left arm   Patient Position: Sitting   Cuff Size: Adult   Pulse: 98   Temp: 97 8 °F (36 6 °C)   SpO2: 96%   Weight: 85 7 kg (189 lb)   Height: 5' 2" (1 575 m)       Physical Exam:    General: NAD, alert, oriented   HEENT/NECK:  PERRL  No jugular venous distention  Cardiac:Regular rate and rhythm, grade 4/6 systolic Murmur  Carotids: 2+, no bruits   Pulmonary:  Breath sounds clear bilaterally  Abdomen:  Non-tender, Non-distended  Positive bowel sounds  Upper extremities: 2+ radial pulses; brisk capillary refill  Lower extremities: Extremities warm/dry  PT/DP pulses 2+ bilaterally  No edema B/L  Neuro: Alert and oriented X 3  Sensation is grossly intact  No focal deficits    Musculoskeletal: LYNN   Skin: Warm/Dry, without rashes or lesions  Lab Results:               Invalid input(s): LABGLOM      Lab Results   Component Value Date    HGBA1C 7 5 (H) 04/09/2022     No results found for: CKTOTAL, CKMB, CKMBINDEX, TROPONINI    Imaging Studies:     Echocardiogram: 7/7/22  Study Details    This transthoracic echocardiogram was performed in the echo lab  This was a routine, outpatient study  Study quality was adequate  A complete 2D, color flow Doppler, spectral Doppler, 2D, color flow Doppler and spectral Doppler transthoracic echocardiogram was performed  The apical, parasternal, subcostal and suprasternal views were obtained  Patient exhibited sinus rhythm  Indications  Priority: Routine  Dx: Aortic stenosis, moderate [I35 0 (ICD-10-CM)]; Essential hypertension [I10 (ICD-10-CM)]; Lightheadedness [U74 (ICD-10-CM)]       History    Moderate AS; HTN; DM2; Palpitations; HLD; Breast cancer       Interpretation Summary         Left Ventricle: Left ventricular cavity size is normal  Wall thickness is moderately increased  The left ventricular ejection fraction is 55%  Systolic function is normal  Wall motion is normal  Diastolic function is mildly abnormal, consistent with grade I (abnormal) relaxation    Left Atrium: The atrium is mildly dilated    Aortic Valve: The aortic valve is trileaflet  The leaflets are not thickened  The leaflets are moderately calcified  There is moderately reduced mobility  There is mild regurgitation  There is severe stenosis  The aortic valve velocity is increased due to stenosis    Mitral Valve: There is mild regurgitation    Tricuspid Valve: There is mild regurgitation          Findings    Left Ventricle Left ventricular cavity size is normal  Wall thickness is moderately increased  The left ventricular ejection fraction is 55%  Systolic function is normal   Wall motion is normal  Diastolic function is mildly abnormal, consistent with grade I (abnormal) relaxation     Right Ventricle Right ventricular cavity size is normal  Systolic function is normal  Wall thickness is normal    Left Atrium The atrium is mildly dilated  Right Atrium The atrium is normal in size  Aortic Valve The aortic valve is trileaflet  The leaflets are not thickened  The leaflets are moderately calcified  There is moderately reduced mobility  There is mild regurgitation  There is severe stenosis  The aortic valve velocity is increased due to stenosis  Mitral Valve The mitral valve has normal structure and function  There is mild calcification  There is mild regurgitation  There is no evidence of stenosis  Tricuspid Valve Tricuspid valve structure is normal  There is mild regurgitation  There is no evidence of stenosis  Pulmonic Valve Pulmonic valve structure is normal  There is no evidence of regurgitation  There is no evidence of stenosis  Ascending Aorta The aortic root is normal in size  IVC/SVC The inferior vena cava is normal in size  Pericardium There is no pericardial effusion  The pericardium is normal in appearance                 Left Ventricle Measurements    Function/Volumes   A4C EF 82 %         LVOT stroke volume 98 75 cm3         LVOT stroke volume index 52 4 ml/m2         Dimensions   LVIDd 3 7 cm         LVIDS 2 4 cm         IVSd 1 4 cm         LVPWd 1 4 cm         LVOT area 3 14 cm2         FS 35 %         Diastolic Filling   MV E' Tissue Velocity Septal 8 cm/s         E wave deceleration time 235 ms         MV Peak E Sid 82 cm/s         MV Peak A Sid 0 87 m/s          Report Measurements   AV LVOT peak gradient 4 mmHg              Interventricular Septum Measurements    Shunt Ratio   LVOT peak VTI 31 45 cm         LVOT peak sid 0 99 m/s              Right Ventricle Measurements    Dimensions   RVID d 2 5 cm               Left Atrium Measurements    Dimensions   LA size 3 9 cm               Right Atrium Measurements    Dimensions   RAA A4C 9 5 cm2               Atrial Septum Measurements    Shunt Ratio   LVOT peak VTI 31 45 cm         LVOT peak sid 0 99 m/s               Aortic Valve Measurements    Stenosis   Aortic valve peak velocity 3 81 m/s         LVOT peak sid 0 99 m/s         Ao VTI 97 61 cm         LVOT peak VTI 31 45 cm         AV mean gradient 38 mmHg         LVOT mn grad 3 mmHg         AV peak gradient 58 mmHg         AV LVOT peak gradient 4 mmHg         Dimensionless velociy index 0 26          Regurgitation   AV peak gradient 97 mmHg         AV Deceleration Time 1,948 ms         AV regurgitation pressure 1/2 time 565 ms         Area/Dimensions   AV valve area 1 01 cm2         AV area by cont VTI 1 cm2         AV area peak sid 0 8 cm2         LVOT diameter 2 cm         LVOT area 3 14 cm2               Mitral Valve Measurements    Stenosis   MV stenosis pressure 1/2 time 68 ms         MV valve area p 1/2 method 3 24 cm2               Tricuspid Valve Measurements    RVSP Parameters   TR Peak Sid 2 1 m/s         Triscuspid Valve Regurgitation Peak Gradient 17 mmHg               Aorta Measurements    Aortic Dimensions   Ao root 2 8 cm         Asc Ao 3 4 cm                  Gated CTA of the chest/abdomen/pelvis: 8/5/22  INDICATION:  Preoperative evaluation for TAVR     COMPARISON: CT of the pelvis without contrast April 22, 2022     TECHNIQUE:  CT angiogram examination of the chest, abdomen and pelvis was performed according to standard protocol with cardiac gating  Axial, sagittal, and coronal reformatted images were submitted for interpretation  3D reconstructions were performed   an independent workstation, and are supplied for review          Radiation dose length product (DLP) for this visit:  3390 63 mGy-cm     This examination, like all CT scans performed in the [facility], was performed utilizing techniques to minimize radiation dose exposure, including the use of   iterative reconstruction and automated exposure control      IV Contrast:  120 mL of iodixanol (VISIPAQUE)  Enteric Contrast: Enteric contrast was not administered      FINDINGS:     VASCULAR STRUCTURES:       Annulus: diameter 28 x 21 mm      area: 462 sq mm    Annulus to LCA: 13 mm    Annulus to RCA: 16 mm    Minimal diameter right iliofemoral segment: 7 mm    Minimal diameter left iliofemoral segment: 7 mm     The ascending aorta is nonaneurysmal measuring 33 mm with no significant atherosclerosis  There is a type 1 aortic arch with classic branching anatomy and no stenosis in the visualized great vessels  The aortic arch is nonaneurysmal with mild    atherosclerosis  The descending thoracic aorta is nonaneurysmal with mild  atherosclerosis      The abdominal aorta mildly atherosclerotic  The celiac artery, superior mesenteric artery, and inferior mesenteric artery are patent  Single renal arteries are patent bilaterally  The right and left iliofemoral segments and internal iliac arteries are   patent with minimal atherosclerosis      Cardiac findings: There is mild calcification of the aortic aortic valve and root  The left ventricle is normal   The ventricular septum is normal   No prior valvular surgery  No prior bypass surgery  No pericardial effusion  No cardiac mass or thrombus      OTHER FINDINGS:      CHEST     LUNGS:  Lungs are clear  There is no tracheal or endobronchial lesion      PLEURA:  Unremarkable      MEDIASTINUM AND JOANNA:  Unremarkable      CHEST WALL AND LOWER NECK:   Unremarkable      ABDOMEN     LIVER/BILIARY TREE:  Liver is diffusely decreased in density consistent with fatty change  No CT evidence of suspicious hepatic mass  Normal hepatic contours  No biliary dilatation      GALLBLADDER:  Gallbladder is surgically absent      SPLEEN:  Unremarkable      PANCREAS:  Unremarkable      ADRENAL GLANDS:  7 mm fat-containing benign left adrenal nodule      KIDNEYS/URETERS:  Unremarkable   No hydronephrosis      STOMACH AND BOWEL:  There is colonic diverticulosis without evidence of acute diverticulitis      APPENDIX:  No findings to suggest appendicitis      ABDOMINOPELVIC CAVITY:  No ascites or free intraperitoneal air  No lymphadenopathy  Stable right lower quadrant calcification anterior to the psoas muscle is stable from the prior study consistent with a calcified lymph node      PELVIS     REPRODUCTIVE ORGANS:  Patient is status post hysterectomy      URINARY BLADDER:  Unremarkable      ABDOMINAL WALL/INGUINAL REGIONS:  Unremarkable      OSSEOUS STRUCTURES:  There are age appropriate degenerative changes  No acute fracture or destructive osseous lesion      IMPRESSION:     TVAR measurements as above      Hepatic steatosis      Benign left adrenal nodule      Diverticulosis  Cardiac catheterization: 8/19/22  · No significant obstructive epicardial CAD      Carotid artery ultrasound: 8/5/22  Impression     RIGHT:  There is <50% stenosis noted in the internal carotid artery  Plaque is  homogenous and smooth  Vertebral artery flow is antegrade  There is no significant subclavian artery  disease  LEFT:  There is <50% stenosis noted in the internal carotid artery  Plaque is  heterogenous and smooth  Vertebral artery flow is antegrade  There is no significant subclavian artery  disease  I have personally reviewed pertinent reports        TAVR evaluation Assessment:     5 Meter Walk Test:      Attempt 1: 8   Attempt 2: 8   Attempt 3: 8    STS Risk Score: 1 5%    Aortic Stenosis Stage: D1    NYHC: III    KCCQ-12 completed    Assessment:  Patient Active Problem List    Diagnosis Date Noted    Diverticulitis of large intestine without perforation or abscess without bleeding 03/30/2022    Lightheadedness 09/28/2021    Acute pain of right shoulder 07/22/2019    Adhesive capsulitis of right shoulder 07/22/2019    Aortic stenosis, moderate 06/05/2019    Essential hypertension 06/05/2019    Mixed hyperlipidemia 06/05/2019    Type 2 diabetes mellitus with complication, without long-term current use of insulin ([facility] [address] ) 06/05/2019    Heart palpitations 06/05/2019     Severe aortic stenosis; Ongoing TAVR workup    Plan:    Toñito Gordillo has symptomatic severe aortic stenosis  They have undergone testing for transcatheter aortic valve replacement  The results of these studies have been interpreted by the multidisciplinary TAVR team who have determined the patient to be Low risk for open aortic valve replacement based on other pre-existing comobidities not reflected in the STS risk score  The risks, benefits, and alternatives to TAVR were discussed in detail with the patient today  They understand and wish to proceed with transfemoral transcatheter aortic valve replacement  Informed consent was obtained and a date for the intervention has been set  Toñito Gordillo was comfortable with our recommendations, and their questions were answered to their satisfaction  The following preoperative instructions were provided at the conclusion of their consultation:     1  You will receive a phone call from the hospital between 2:00 PM and 8:00 PM the day prior to surgery to confirm arrival time and location  For surgery on Mondays, you will receive a call on Friday  2  Do not drink or eat anything after midnight the night before surgery  That includes no water, candy, gum, lozenges, Lifesavers, etc  We recommend you not eat any salty or fatty snack food, consume alcohol or smoke the night before surgery  3  Continue taking aspirin but only 81 mg daily  4  If you take Coumadin and/or Plavix, discontinue it 5 days before surgery  5  If you are diabetic, do not take any of your diabetic pills the morning of surgery  If you take Lantus insulin, you may take it at your regularly scheduled time the day before surgery  Do not take any other insulins the morning of surgery    6  The 2 nights before surgery, take a shower each night using the special antiseptic soap or soap sponges you received from the office or hospital  Justice Mas your hair with regular shampoo and rinse completely before using the antiseptic sponges  Use the sponge to wash from your neck down, with special attention to the armpits and groin area  Do not use any other soap or cleanser on your skin  Do not use lotions, powder, deodorant or perfume of any kind on your skin after you shower  Use clean bed linens and wear clean pajamas after your shower  7  You will be prescribed Mupirocin nasal ointment  Apply to both nostrils twice a day for 5 days prior to surgery  8  Do not take a shower the morning of her surgery; you'll be given a special" bath" at the hospital   9  Notify the CT surgery office if you develop a cold, sore throat, cough, fever or other health issues before your surgery  10  Other medication changes included the following: TAKE HALF DOSE OF HS INSULIN THE NIGHT BEFORE SURGERY  HOLD GLIMEPERIDE 3 DAYS BEFORE SURGERY  HOLD MULTIVITAMIN 7 DAYS BEFORE SURGERY        SIGNATURE: Jaylan Kenyon  DATE: September 29, 2022  TIME: 12:52 PM

## 2022-10-03 ENCOUNTER — LAB REQUISITION (OUTPATIENT)
Dept: LAB | Facility: HOSPITAL | Age: 62
End: 2022-10-03
Payer: COMMERCIAL

## 2022-10-03 ENCOUNTER — APPOINTMENT (OUTPATIENT)
Dept: LAB | Facility: HOSPITAL | Age: 62
End: 2022-10-03
Payer: COMMERCIAL

## 2022-10-03 DIAGNOSIS — Z01.810 PRE-OPERATIVE CARDIOVASCULAR EXAMINATION: ICD-10-CM

## 2022-10-03 DIAGNOSIS — I35.0 AORTIC STENOSIS, MODERATE: ICD-10-CM

## 2022-10-03 DIAGNOSIS — Z01.812 ENCOUNTER FOR PREPROCEDURAL LABORATORY EXAMINATION: ICD-10-CM

## 2022-10-03 DIAGNOSIS — Z01.812 ENCOUNTER FOR PRE-OPERATIVE LABORATORY TESTING: ICD-10-CM

## 2022-10-03 LAB
ALBUMIN SERPL BCP-MCNC: 4 G/DL (ref 3.5–5)
ALP SERPL-CCNC: 104 U/L (ref 46–116)
ALT SERPL W P-5'-P-CCNC: 45 U/L (ref 12–78)
ANION GAP SERPL CALCULATED.3IONS-SCNC: 8 MMOL/L (ref 4–13)
AST SERPL W P-5'-P-CCNC: 24 U/L (ref 5–45)
BASOPHILS # BLD AUTO: 0.08 THOUSANDS/ΜL (ref 0–0.1)
BASOPHILS NFR BLD AUTO: 1 % (ref 0–1)
BILIRUB SERPL-MCNC: 0.45 MG/DL (ref 0.2–1)
BUN SERPL-MCNC: 13 MG/DL (ref 5–25)
CALCIUM SERPL-MCNC: 9.1 MG/DL (ref 8.3–10.1)
CHLORIDE SERPL-SCNC: 103 MMOL/L (ref 96–108)
CO2 SERPL-SCNC: 29 MMOL/L (ref 21–32)
CREAT SERPL-MCNC: 0.65 MG/DL (ref 0.6–1.3)
EOSINOPHIL # BLD AUTO: 0.16 THOUSAND/ΜL (ref 0–0.61)
EOSINOPHIL NFR BLD AUTO: 2 % (ref 0–6)
ERYTHROCYTE [DISTWIDTH] IN BLOOD BY AUTOMATED COUNT: 14 % (ref 11.6–15.1)
GFR SERPL CREATININE-BSD FRML MDRD: 94 ML/MIN/1.73SQ M
GLUCOSE P FAST SERPL-MCNC: 110 MG/DL (ref 65–99)
HCT VFR BLD AUTO: 40.7 % (ref 34.8–46.1)
HGB BLD-MCNC: 13.6 G/DL (ref 11.5–15.4)
IMM GRANULOCYTES # BLD AUTO: 0.03 THOUSAND/UL (ref 0–0.2)
IMM GRANULOCYTES NFR BLD AUTO: 0 % (ref 0–2)
INR PPP: 0.9 (ref 0.84–1.19)
LYMPHOCYTES # BLD AUTO: 1.89 THOUSANDS/ΜL (ref 0.6–4.47)
LYMPHOCYTES NFR BLD AUTO: 25 % (ref 14–44)
MCH RBC QN AUTO: 29.7 PG (ref 26.8–34.3)
MCHC RBC AUTO-ENTMCNC: 33.4 G/DL (ref 31.4–37.4)
MCV RBC AUTO: 89 FL (ref 82–98)
MONOCYTES # BLD AUTO: 0.51 THOUSAND/ΜL (ref 0.17–1.22)
MONOCYTES NFR BLD AUTO: 7 % (ref 4–12)
NEUTROPHILS # BLD AUTO: 4.98 THOUSANDS/ΜL (ref 1.85–7.62)
NEUTS SEG NFR BLD AUTO: 65 % (ref 43–75)
NRBC BLD AUTO-RTO: 0 /100 WBCS
PLATELET # BLD AUTO: 229 THOUSANDS/UL (ref 149–390)
PMV BLD AUTO: 10.9 FL (ref 8.9–12.7)
POTASSIUM SERPL-SCNC: 4.1 MMOL/L (ref 3.5–5.3)
PROT SERPL-MCNC: 8.2 G/DL (ref 6.4–8.4)
PROTHROMBIN TIME: 12 SECONDS (ref 11.6–14.5)
RBC # BLD AUTO: 4.58 MILLION/UL (ref 3.81–5.12)
SODIUM SERPL-SCNC: 140 MMOL/L (ref 135–147)
WBC # BLD AUTO: 7.65 THOUSAND/UL (ref 4.31–10.16)

## 2022-10-03 PROCEDURE — 86901 BLOOD TYPING SEROLOGIC RH(D): CPT | Performed by: THORACIC SURGERY (CARDIOTHORACIC VASCULAR SURGERY)

## 2022-10-03 PROCEDURE — 36415 COLL VENOUS BLD VENIPUNCTURE: CPT

## 2022-10-03 PROCEDURE — 86850 RBC ANTIBODY SCREEN: CPT | Performed by: THORACIC SURGERY (CARDIOTHORACIC VASCULAR SURGERY)

## 2022-10-03 PROCEDURE — 85025 COMPLETE CBC W/AUTO DIFF WBC: CPT

## 2022-10-03 PROCEDURE — 87081 CULTURE SCREEN ONLY: CPT

## 2022-10-03 PROCEDURE — 85610 PROTHROMBIN TIME: CPT

## 2022-10-03 PROCEDURE — 80053 COMPREHEN METABOLIC PANEL: CPT

## 2022-10-03 PROCEDURE — 86900 BLOOD TYPING SEROLOGIC ABO: CPT | Performed by: THORACIC SURGERY (CARDIOTHORACIC VASCULAR SURGERY)

## 2022-10-04 LAB
ABO GROUP BLD: NORMAL
BLD GP AB SCN SERPL QL: NEGATIVE
MRSA NOSE QL CULT: NORMAL
RH BLD: POSITIVE
SPECIMEN EXPIRATION DATE: NORMAL

## 2022-10-10 ENCOUNTER — ANESTHESIA EVENT (OUTPATIENT)
Dept: PERIOP | Facility: HOSPITAL | Age: 62
DRG: 267 | End: 2022-10-10
Payer: COMMERCIAL

## 2022-10-10 RX ORDER — HEPARIN SODIUM 1000 [USP'U]/ML
400 INJECTION, SOLUTION INTRAVENOUS; SUBCUTANEOUS ONCE
Status: CANCELLED | OUTPATIENT
Start: 2022-10-11

## 2022-10-10 NOTE — ANESTHESIA PREPROCEDURE EVALUATION
Procedure:  REPLACEMENT AORTIC VALVE TRANSCATHETER (TAVR) TRANSFEMORAL W/ 26MM MILIAN ROSS S3 ULTRA VALVE(ACCESS ON LEFT) TRAY (N/A Chest)  CARDIAC TAVR (N/A Chest)    Relevant Problems   CARDIO   (+) Aortic stenosis, moderate   (+) Essential hypertension   (+) Mixed hyperlipidemia      ENDO   (+) Type 2 diabetes mellitus with complication, without long-term current use of insulin (HCC)      MUSCULOSKELETAL   (+) Adhesive capsulitis of right shoulder      TTE: normal biventricular systolic function, DD1, sev AS, mild MR, mild TR    LHC: no sig CAD    B/L ICA < 50    Cr 0 65, hgb 13 6, plt 229       Anesthesia Plan  ASA Score- 4     Anesthesia Type- general with ASA Monitors  Additional Monitors: arterial line and central venous line  Airway Plan: ETT  Comment: General anesthesia, endotracheal intubation, standard ASA monitors; large bore IV access (possible Cordis); pre-induction arterial line; TLC CVL; TRAY (no TRAY contraindications noted); plan for post-op PACU, possiblve ICU/vent  Risks discussed - nausea, discomfort, sore throat, dental damage; rare anesthetic emergencies; patient understands, agrees to proceed          Plan Factors-    Chart reviewed  Existing labs reviewed  Induction- intravenous  Postoperative Plan- Plan for postoperative opioid use  Planned trial extubation    Informed Consent- Anesthetic plan and risks discussed with patient  I personally reviewed this patient with the CRNA  Discussed and agreed on the Anesthesia Plan with the CRNA  Joseluis Boo

## 2022-10-11 ENCOUNTER — ANESTHESIA (OUTPATIENT)
Dept: PERIOP | Facility: HOSPITAL | Age: 62
DRG: 267 | End: 2022-10-11
Payer: COMMERCIAL

## 2022-10-11 ENCOUNTER — APPOINTMENT (OUTPATIENT)
Dept: NON INVASIVE DIAGNOSTICS | Facility: HOSPITAL | Age: 62
DRG: 267 | End: 2022-10-11
Payer: COMMERCIAL

## 2022-10-11 ENCOUNTER — APPOINTMENT (OUTPATIENT)
Dept: RADIOLOGY | Facility: HOSPITAL | Age: 62
DRG: 267 | End: 2022-10-11
Payer: COMMERCIAL

## 2022-10-11 ENCOUNTER — HOSPITAL ENCOUNTER (INPATIENT)
Facility: HOSPITAL | Age: 62
LOS: 2 days | Discharge: HOME HEALTH | DRG: 267 | End: 2022-10-13
Attending: THORACIC SURGERY (CARDIOTHORACIC VASCULAR SURGERY) | Admitting: THORACIC SURGERY (CARDIOTHORACIC VASCULAR SURGERY)
Payer: COMMERCIAL

## 2022-10-11 DIAGNOSIS — I35.0 AORTIC STENOSIS, MODERATE: Primary | ICD-10-CM

## 2022-10-11 DIAGNOSIS — I35.0 AORTIC STENOSIS, MODERATE: ICD-10-CM

## 2022-10-11 DIAGNOSIS — Z95.2 S/P TAVR (TRANSCATHETER AORTIC VALVE REPLACEMENT): Primary | ICD-10-CM

## 2022-10-11 DIAGNOSIS — I35.9 NONRHEUMATIC AORTIC VALVE DISORDER, UNSPECIFIED: ICD-10-CM

## 2022-10-11 DIAGNOSIS — Z95.2 S/P TAVR (TRANSCATHETER AORTIC VALVE REPLACEMENT): ICD-10-CM

## 2022-10-11 DIAGNOSIS — I44.7 LBBB (LEFT BUNDLE BRANCH BLOCK): ICD-10-CM

## 2022-10-11 PROBLEM — E87.8 HYPERCHLOREMIA: Status: ACTIVE | Noted: 2022-10-11

## 2022-10-11 PROBLEM — I25.10 CAD (CORONARY ARTERY DISEASE): Status: ACTIVE | Noted: 2022-10-11

## 2022-10-11 PROBLEM — E78.5 HYPERLIPIDEMIA: Status: ACTIVE | Noted: 2019-06-05

## 2022-10-11 PROBLEM — E11.59 TYPE 2 DIABETES MELLITUS WITH CIRCULATORY DISORDER, WITH LONG-TERM CURRENT USE OF INSULIN (HCC): Status: ACTIVE | Noted: 2019-06-05

## 2022-10-11 PROBLEM — Z85.3 HISTORY OF BREAST CANCER: Status: ACTIVE | Noted: 2022-10-11

## 2022-10-11 PROBLEM — E11.9 DIABETES MELLITUS (HCC): Status: ACTIVE | Noted: 2019-06-05

## 2022-10-11 PROBLEM — G47.33 OSA ON CPAP: Status: ACTIVE | Noted: 2022-10-11

## 2022-10-11 PROBLEM — I50.9 CHF (CONGESTIVE HEART FAILURE) (HCC): Status: ACTIVE | Noted: 2022-10-11

## 2022-10-11 PROBLEM — Z79.4 TYPE 2 DIABETES MELLITUS WITH CIRCULATORY DISORDER, WITH LONG-TERM CURRENT USE OF INSULIN (HCC): Status: ACTIVE | Noted: 2019-06-05

## 2022-10-11 PROBLEM — Z99.89 OSA ON CPAP: Status: ACTIVE | Noted: 2022-10-11

## 2022-10-11 LAB
ABO GROUP BLD: NORMAL
ANION GAP SERPL CALCULATED.3IONS-SCNC: 6 MMOL/L (ref 4–13)
AORTIC ROOT: 2.4 CM
AORTIC VALVE MEAN VELOCITY: 17.4 M/S
APICAL FOUR CHAMBER EJECTION FRACTION: 63 %
ASCENDING AORTA: 3.4 CM
ATRIAL RATE: 69 BPM
AV AREA BY CONTINUOUS VTI: 2 CM2
AV AREA PEAK VELOCITY: 1.8 CM2
AV LVOT MEAN GRADIENT: 5 MMHG
AV LVOT PEAK GRADIENT: 8 MMHG
AV MEAN GRADIENT: 14 MMHG
AV PEAK GRADIENT: 26 MMHG
AV VALVE AREA: 1.98 CM2
AV VELOCITY RATIO: 0.57
BUN SERPL-MCNC: 11 MG/DL (ref 5–25)
CALCIUM SERPL-MCNC: 8.6 MG/DL (ref 8.3–10.1)
CHLORIDE SERPL-SCNC: 110 MMOL/L (ref 96–108)
CO2 SERPL-SCNC: 23 MMOL/L (ref 21–32)
CREAT SERPL-MCNC: 0.54 MG/DL (ref 0.6–1.3)
DOP CALC AO PEAK VEL: 2.53 M/S
DOP CALC AO VTI: 45.52 CM
DOP CALC LVOT AREA: 3.14 CM2
DOP CALC LVOT DIAMETER: 2 CM
DOP CALC LVOT PEAK VEL VTI: 28.73 CM
DOP CALC LVOT PEAK VEL: 1.44 M/S
DOP CALC LVOT STROKE INDEX: 46.7 ML/M2
DOP CALC LVOT STROKE VOLUME: 90.21 CM3
E WAVE DECELERATION TIME: 211 MS
FRACTIONAL SHORTENING: 49 % (ref 28–44)
GFR SERPL CREATININE-BSD FRML MDRD: 100 ML/MIN/1.73SQ M
GLUCOSE SERPL-MCNC: 134 MG/DL (ref 65–140)
GLUCOSE SERPL-MCNC: 136 MG/DL (ref 65–140)
GLUCOSE SERPL-MCNC: 138 MG/DL (ref 65–140)
GLUCOSE SERPL-MCNC: 141 MG/DL (ref 65–140)
GLUCOSE SERPL-MCNC: 147 MG/DL (ref 65–140)
HCT VFR BLD AUTO: 38.7 % (ref 34.8–46.1)
HGB BLD-MCNC: 12.2 G/DL (ref 11.5–15.4)
INTERVENTRICULAR SEPTUM IN DIASTOLE (PARASTERNAL SHORT AXIS VIEW): 1.6 CM
INTERVENTRICULAR SEPTUM: 1.6 CM (ref 0.6–1.1)
KCT BLD-ACNC: 134 SEC (ref 89–137)
KCT BLD-ACNC: 244 SEC (ref 89–137)
LAAS-AP2: 25.5 CM2
LAAS-AP4: 22.2 CM2
LEFT ATRIUM SIZE: 3.9 CM
LEFT INTERNAL DIMENSION IN SYSTOLE: 2 CM (ref 2.1–4)
LEFT VENTRICULAR INTERNAL DIMENSION IN DIASTOLE: 3.9 CM (ref 3.5–6)
LEFT VENTRICULAR POSTERIOR WALL IN END DIASTOLE: 1.3 CM
LEFT VENTRICULAR STROKE VOLUME: 53 ML
LVSV (TEICH): 53 ML
MV E'TISSUE VEL-SEP: 6 CM/S
MV PEAK A VEL: 1.17 M/S
MV PEAK E VEL: 80 CM/S
MV STENOSIS PRESSURE HALF TIME: 61 MS
MV VALVE AREA P 1/2 METHOD: 3.61 CM2
P AXIS: 76 DEGREES
PLATELET # BLD AUTO: 196 THOUSANDS/UL (ref 149–390)
PMV BLD AUTO: 10.4 FL (ref 8.9–12.7)
POTASSIUM SERPL-SCNC: 3.5 MMOL/L (ref 3.5–5.3)
PR INTERVAL: 200 MS
QRS AXIS: 34 DEGREES
QRSD INTERVAL: 142 MS
QT INTERVAL: 471 MS
QTC INTERVAL: 505 MS
RH BLD: POSITIVE
RIGHT ATRIUM AREA SYSTOLE A4C: 14.4 CM2
RIGHT VENTRICLE ID DIMENSION: 3.7 CM
SL CV LEFT ATRIUM LENGTH A2C: 6.4 CM
SL CV LV EF: 65
SL CV PED ECHO LEFT VENTRICLE DIASTOLIC VOLUME (MOD BIPLANE) 2D: 66 ML
SL CV PED ECHO LEFT VENTRICLE SYSTOLIC VOLUME (MOD BIPLANE) 2D: 14 ML
SODIUM SERPL-SCNC: 139 MMOL/L (ref 135–147)
SPECIMEN SOURCE: ABNORMAL
SPECIMEN SOURCE: NORMAL
T WAVE AXIS: 193 DEGREES
TR MAX PG: 28 MMHG
TR PEAK VELOCITY: 2.6 M/S
TRICUSPID VALVE PEAK REGURGITATION VELOCITY: 2.64 M/S
VENTRICULAR RATE: 69 BPM

## 2022-10-11 PROCEDURE — 85049 AUTOMATED PLATELET COUNT: CPT | Performed by: PHYSICIAN ASSISTANT

## 2022-10-11 PROCEDURE — 33361 REPLACE AORTIC VALVE PERQ: CPT | Performed by: INTERNAL MEDICINE

## 2022-10-11 PROCEDURE — 33361 REPLACE AORTIC VALVE PERQ: CPT | Performed by: THORACIC SURGERY (CARDIOTHORACIC VASCULAR SURGERY)

## 2022-10-11 PROCEDURE — 76377 3D RENDER W/INTRP POSTPROCES: CPT

## 2022-10-11 PROCEDURE — 82803 BLOOD GASES ANY COMBINATION: CPT

## 2022-10-11 PROCEDURE — 71045 X-RAY EXAM CHEST 1 VIEW: CPT

## 2022-10-11 PROCEDURE — 84132 ASSAY OF SERUM POTASSIUM: CPT

## 2022-10-11 PROCEDURE — 93005 ELECTROCARDIOGRAM TRACING: CPT

## 2022-10-11 PROCEDURE — 93010 ELECTROCARDIOGRAM REPORT: CPT | Performed by: INTERNAL MEDICINE

## 2022-10-11 PROCEDURE — 02RF38Z REPLACEMENT OF AORTIC VALVE WITH ZOOPLASTIC TISSUE, PERCUTANEOUS APPROACH: ICD-10-PCS | Performed by: THORACIC SURGERY (CARDIOTHORACIC VASCULAR SURGERY)

## 2022-10-11 PROCEDURE — 82948 REAGENT STRIP/BLOOD GLUCOSE: CPT

## 2022-10-11 PROCEDURE — C1760 CLOSURE DEV, VASC: HCPCS | Performed by: THORACIC SURGERY (CARDIOTHORACIC VASCULAR SURGERY)

## 2022-10-11 PROCEDURE — 85347 COAGULATION TIME ACTIVATED: CPT

## 2022-10-11 PROCEDURE — 80048 BASIC METABOLIC PNL TOTAL CA: CPT | Performed by: PHYSICIAN ASSISTANT

## 2022-10-11 PROCEDURE — 86920 COMPATIBILITY TEST SPIN: CPT

## 2022-10-11 PROCEDURE — C1751 CATH, INF, PER/CENT/MIDLINE: HCPCS | Performed by: THORACIC SURGERY (CARDIOTHORACIC VASCULAR SURGERY)

## 2022-10-11 PROCEDURE — C1769 GUIDE WIRE: HCPCS | Performed by: THORACIC SURGERY (CARDIOTHORACIC VASCULAR SURGERY)

## 2022-10-11 PROCEDURE — C1894 INTRO/SHEATH, NON-LASER: HCPCS | Performed by: THORACIC SURGERY (CARDIOTHORACIC VASCULAR SURGERY)

## 2022-10-11 PROCEDURE — 82947 ASSAY GLUCOSE BLOOD QUANT: CPT

## 2022-10-11 PROCEDURE — 93306 TTE W/DOPPLER COMPLETE: CPT

## 2022-10-11 PROCEDURE — 93355 ECHO TRANSESOPHAGEAL (TEE): CPT

## 2022-10-11 PROCEDURE — 85014 HEMATOCRIT: CPT

## 2022-10-11 PROCEDURE — 84295 ASSAY OF SERUM SODIUM: CPT

## 2022-10-11 PROCEDURE — 85018 HEMOGLOBIN: CPT | Performed by: PHYSICIAN ASSISTANT

## 2022-10-11 PROCEDURE — 82330 ASSAY OF CALCIUM: CPT

## 2022-10-11 PROCEDURE — 02HV33Z INSERTION OF INFUSION DEVICE INTO SUPERIOR VENA CAVA, PERCUTANEOUS APPROACH: ICD-10-PCS | Performed by: ANESTHESIOLOGY

## 2022-10-11 PROCEDURE — NC001 PR NO CHARGE: Performed by: PHYSICIAN ASSISTANT

## 2022-10-11 PROCEDURE — 93306 TTE W/DOPPLER COMPLETE: CPT | Performed by: INTERNAL MEDICINE

## 2022-10-11 PROCEDURE — 85014 HEMATOCRIT: CPT | Performed by: PHYSICIAN ASSISTANT

## 2022-10-11 DEVICE — PERCLOSE™ PROSTYLE™ SUTURE-MEDIATED CLOSURE AND REPAIR SYSTEM
Type: IMPLANTABLE DEVICE | Site: GROIN | Status: FUNCTIONAL
Brand: PERCLOSE™ PROSTYLE™

## 2022-10-11 DEVICE — VALVE TAVR SAPIEN 3 ULTRA W/ CMNDR DLV SYS 23MM: Type: IMPLANTABLE DEVICE | Site: HEART | Status: FUNCTIONAL

## 2022-10-11 RX ORDER — HYDROMORPHONE HCL IN WATER/PF 6 MG/30 ML
0.2 PATIENT CONTROLLED ANALGESIA SYRINGE INTRAVENOUS
Status: DISCONTINUED | OUTPATIENT
Start: 2022-10-11 | End: 2022-10-11 | Stop reason: HOSPADM

## 2022-10-11 RX ORDER — CHLORHEXIDINE GLUCONATE 0.12 MG/ML
15 RINSE ORAL 2 TIMES DAILY
Status: DISCONTINUED | OUTPATIENT
Start: 2022-10-11 | End: 2022-10-13 | Stop reason: HOSPADM

## 2022-10-11 RX ORDER — INSULIN LISPRO 100 [IU]/ML
1-6 INJECTION, SOLUTION INTRAVENOUS; SUBCUTANEOUS
Status: DISCONTINUED | OUTPATIENT
Start: 2022-10-11 | End: 2022-10-13 | Stop reason: HOSPADM

## 2022-10-11 RX ORDER — DOCUSATE SODIUM 100 MG/1
100 CAPSULE, LIQUID FILLED ORAL 2 TIMES DAILY
Status: DISCONTINUED | OUTPATIENT
Start: 2022-10-11 | End: 2022-10-13 | Stop reason: HOSPADM

## 2022-10-11 RX ORDER — FUROSEMIDE 10 MG/ML
40 INJECTION INTRAMUSCULAR; INTRAVENOUS DAILY
Status: DISCONTINUED | OUTPATIENT
Start: 2022-10-11 | End: 2022-10-12

## 2022-10-11 RX ORDER — LIDOCAINE HYDROCHLORIDE 20 MG/ML
INJECTION, SOLUTION EPIDURAL; INFILTRATION; INTRACAUDAL; PERINEURAL AS NEEDED
Status: DISCONTINUED | OUTPATIENT
Start: 2022-10-11 | End: 2022-10-11

## 2022-10-11 RX ORDER — PRAVASTATIN SODIUM 20 MG
20 TABLET ORAL DAILY
Status: DISCONTINUED | OUTPATIENT
Start: 2022-10-11 | End: 2022-10-13 | Stop reason: HOSPADM

## 2022-10-11 RX ORDER — GLIMEPIRIDE 2 MG/1
4 TABLET ORAL 2 TIMES DAILY
Status: DISCONTINUED | OUTPATIENT
Start: 2022-10-11 | End: 2022-10-13 | Stop reason: HOSPADM

## 2022-10-11 RX ORDER — BISACODYL 10 MG
10 SUPPOSITORY, RECTAL RECTAL DAILY PRN
Status: DISCONTINUED | OUTPATIENT
Start: 2022-10-11 | End: 2022-10-13 | Stop reason: HOSPADM

## 2022-10-11 RX ORDER — POTASSIUM CHLORIDE 20 MEQ/1
20 TABLET, EXTENDED RELEASE ORAL DAILY
Status: DISCONTINUED | OUTPATIENT
Start: 2022-10-11 | End: 2022-10-13 | Stop reason: HOSPADM

## 2022-10-11 RX ORDER — LABETALOL HYDROCHLORIDE 5 MG/ML
INJECTION, SOLUTION INTRAVENOUS AS NEEDED
Status: DISCONTINUED | OUTPATIENT
Start: 2022-10-11 | End: 2022-10-11

## 2022-10-11 RX ORDER — SODIUM CHLORIDE, SODIUM GLUCONATE, SODIUM ACETATE, POTASSIUM CHLORIDE, MAGNESIUM CHLORIDE, SODIUM PHOSPHATE, DIBASIC, AND POTASSIUM PHOSPHATE .53; .5; .37; .037; .03; .012; .00082 G/100ML; G/100ML; G/100ML; G/100ML; G/100ML; G/100ML; G/100ML
50 INJECTION, SOLUTION INTRAVENOUS CONTINUOUS
Status: DISPENSED | OUTPATIENT
Start: 2022-10-11 | End: 2022-10-11

## 2022-10-11 RX ORDER — FENTANYL CITRATE 50 UG/ML
50 INJECTION, SOLUTION INTRAMUSCULAR; INTRAVENOUS ONCE
Status: DISCONTINUED | OUTPATIENT
Start: 2022-10-11 | End: 2022-10-11

## 2022-10-11 RX ORDER — ONDANSETRON 2 MG/ML
INJECTION INTRAMUSCULAR; INTRAVENOUS AS NEEDED
Status: DISCONTINUED | OUTPATIENT
Start: 2022-10-11 | End: 2022-10-11

## 2022-10-11 RX ORDER — NICARDIPINE HYDROCHLORIDE 2.5 MG/ML
INJECTION INTRAVENOUS
Status: COMPLETED
Start: 2022-10-11 | End: 2022-10-11

## 2022-10-11 RX ORDER — LIDOCAINE HYDROCHLORIDE 10 MG/ML
INJECTION, SOLUTION EPIDURAL; INFILTRATION; INTRACAUDAL; PERINEURAL
Status: COMPLETED | OUTPATIENT
Start: 2022-10-11 | End: 2022-10-11

## 2022-10-11 RX ORDER — OXYCODONE HYDROCHLORIDE 5 MG/1
5 TABLET ORAL EVERY 4 HOURS PRN
Status: DISCONTINUED | OUTPATIENT
Start: 2022-10-11 | End: 2022-10-11

## 2022-10-11 RX ORDER — GLYCOPYRROLATE 0.2 MG/ML
INJECTION INTRAMUSCULAR; INTRAVENOUS AS NEEDED
Status: DISCONTINUED | OUTPATIENT
Start: 2022-10-11 | End: 2022-10-11

## 2022-10-11 RX ORDER — CEFAZOLIN SODIUM 2 G/50ML
2000 SOLUTION INTRAVENOUS EVERY 8 HOURS
Status: COMPLETED | OUTPATIENT
Start: 2022-10-11 | End: 2022-10-12

## 2022-10-11 RX ORDER — CHLORHEXIDINE GLUCONATE 0.12 MG/ML
15 RINSE ORAL ONCE
Status: COMPLETED | OUTPATIENT
Start: 2022-10-11 | End: 2022-10-11

## 2022-10-11 RX ORDER — HYDROMORPHONE HCL/PF 1 MG/ML
SYRINGE (ML) INJECTION AS NEEDED
Status: DISCONTINUED | OUTPATIENT
Start: 2022-10-11 | End: 2022-10-11

## 2022-10-11 RX ORDER — CALCIUM CHLORIDE 100 MG/ML
1 INJECTION INTRAVENOUS; INTRAVENTRICULAR ONCE
Status: DISCONTINUED | OUTPATIENT
Start: 2022-10-11 | End: 2022-10-11 | Stop reason: CLARIF

## 2022-10-11 RX ORDER — POTASSIUM CHLORIDE 14.9 MG/ML
20 INJECTION INTRAVENOUS
Status: DISCONTINUED | OUTPATIENT
Start: 2022-10-11 | End: 2022-10-11

## 2022-10-11 RX ORDER — ASPIRIN 81 MG/1
81 TABLET, CHEWABLE ORAL DAILY
Status: DISCONTINUED | OUTPATIENT
Start: 2022-10-11 | End: 2022-10-13 | Stop reason: HOSPADM

## 2022-10-11 RX ORDER — OXYCODONE HYDROCHLORIDE 5 MG/1
2.5 TABLET ORAL EVERY 6 HOURS PRN
Status: DISCONTINUED | OUTPATIENT
Start: 2022-10-11 | End: 2022-10-12

## 2022-10-11 RX ORDER — POLYETHYLENE GLYCOL 3350 17 G/17G
17 POWDER, FOR SOLUTION ORAL DAILY
Status: DISCONTINUED | OUTPATIENT
Start: 2022-10-11 | End: 2022-10-13 | Stop reason: HOSPADM

## 2022-10-11 RX ORDER — PROPOFOL 10 MG/ML
INJECTION, EMULSION INTRAVENOUS AS NEEDED
Status: DISCONTINUED | OUTPATIENT
Start: 2022-10-11 | End: 2022-10-11

## 2022-10-11 RX ORDER — LISINOPRIL 5 MG/1
5 TABLET ORAL DAILY
Status: DISCONTINUED | OUTPATIENT
Start: 2022-10-11 | End: 2022-10-13 | Stop reason: HOSPADM

## 2022-10-11 RX ORDER — ONDANSETRON 2 MG/ML
4 INJECTION INTRAMUSCULAR; INTRAVENOUS EVERY 6 HOURS PRN
Status: DISCONTINUED | OUTPATIENT
Start: 2022-10-11 | End: 2022-10-13 | Stop reason: HOSPADM

## 2022-10-11 RX ORDER — OXYCODONE HYDROCHLORIDE 5 MG/1
5 TABLET ORAL EVERY 6 HOURS PRN
Status: DISCONTINUED | OUTPATIENT
Start: 2022-10-11 | End: 2022-10-12

## 2022-10-11 RX ORDER — SODIUM CHLORIDE 9 MG/ML
INJECTION, SOLUTION INTRAVENOUS CONTINUOUS PRN
Status: DISCONTINUED | OUTPATIENT
Start: 2022-10-11 | End: 2022-10-11

## 2022-10-11 RX ORDER — ACETAMINOPHEN 325 MG/1
975 TABLET ORAL EVERY 8 HOURS
Status: DISCONTINUED | OUTPATIENT
Start: 2022-10-11 | End: 2022-10-13 | Stop reason: HOSPADM

## 2022-10-11 RX ORDER — INSULIN GLARGINE 100 [IU]/ML
10 INJECTION, SOLUTION SUBCUTANEOUS
Status: DISCONTINUED | OUTPATIENT
Start: 2022-10-11 | End: 2022-10-13 | Stop reason: HOSPADM

## 2022-10-11 RX ORDER — CEFAZOLIN SODIUM 1 G/3ML
INJECTION, POWDER, FOR SOLUTION INTRAMUSCULAR; INTRAVENOUS AS NEEDED
Status: DISCONTINUED | OUTPATIENT
Start: 2022-10-11 | End: 2022-10-11

## 2022-10-11 RX ORDER — HEPARIN SODIUM 1000 [USP'U]/ML
INJECTION, SOLUTION INTRAVENOUS; SUBCUTANEOUS AS NEEDED
Status: DISCONTINUED | OUTPATIENT
Start: 2022-10-11 | End: 2022-10-11

## 2022-10-11 RX ORDER — PANTOPRAZOLE SODIUM 40 MG/1
40 TABLET, DELAYED RELEASE ORAL DAILY
Status: DISCONTINUED | OUTPATIENT
Start: 2022-10-11 | End: 2022-10-13 | Stop reason: HOSPADM

## 2022-10-11 RX ORDER — MIDAZOLAM HYDROCHLORIDE 2 MG/2ML
INJECTION, SOLUTION INTRAMUSCULAR; INTRAVENOUS AS NEEDED
Status: DISCONTINUED | OUTPATIENT
Start: 2022-10-11 | End: 2022-10-11

## 2022-10-11 RX ORDER — HEPARIN SODIUM 5000 [USP'U]/ML
5000 INJECTION, SOLUTION INTRAVENOUS; SUBCUTANEOUS EVERY 8 HOURS SCHEDULED
Status: DISCONTINUED | OUTPATIENT
Start: 2022-10-11 | End: 2022-10-12

## 2022-10-11 RX ORDER — CARVEDILOL 6.25 MG/1
6.25 TABLET ORAL 2 TIMES DAILY WITH MEALS
Status: DISCONTINUED | OUTPATIENT
Start: 2022-10-11 | End: 2022-10-11

## 2022-10-11 RX ORDER — MAGNESIUM SULFATE HEPTAHYDRATE 40 MG/ML
2 INJECTION, SOLUTION INTRAVENOUS ONCE
Status: DISCONTINUED | OUTPATIENT
Start: 2022-10-11 | End: 2022-10-11 | Stop reason: CLARIF

## 2022-10-11 RX ORDER — NEOSTIGMINE METHYLSULFATE 1 MG/ML
INJECTION INTRAVENOUS AS NEEDED
Status: DISCONTINUED | OUTPATIENT
Start: 2022-10-11 | End: 2022-10-11

## 2022-10-11 RX ORDER — CEFAZOLIN SODIUM 2 G/50ML
2000 SOLUTION INTRAVENOUS ONCE
Status: DISCONTINUED | OUTPATIENT
Start: 2022-10-11 | End: 2022-10-11 | Stop reason: HOSPADM

## 2022-10-11 RX ORDER — ROCURONIUM BROMIDE 10 MG/ML
INJECTION, SOLUTION INTRAVENOUS AS NEEDED
Status: DISCONTINUED | OUTPATIENT
Start: 2022-10-11 | End: 2022-10-11

## 2022-10-11 RX ORDER — OXYCODONE HYDROCHLORIDE 5 MG/1
2.5 TABLET ORAL EVERY 4 HOURS PRN
Status: DISCONTINUED | OUTPATIENT
Start: 2022-10-11 | End: 2022-10-11

## 2022-10-11 RX ORDER — SODIUM CHLORIDE, SODIUM LACTATE, POTASSIUM CHLORIDE, CALCIUM CHLORIDE 600; 310; 30; 20 MG/100ML; MG/100ML; MG/100ML; MG/100ML
INJECTION, SOLUTION INTRAVENOUS CONTINUOUS PRN
Status: DISCONTINUED | OUTPATIENT
Start: 2022-10-11 | End: 2022-10-11

## 2022-10-11 RX ORDER — HYDRALAZINE HYDROCHLORIDE 20 MG/ML
5 INJECTION INTRAMUSCULAR; INTRAVENOUS EVERY 6 HOURS PRN
Status: DISCONTINUED | OUTPATIENT
Start: 2022-10-11 | End: 2022-10-13 | Stop reason: HOSPADM

## 2022-10-11 RX ORDER — POTASSIUM CHLORIDE 14.9 MG/ML
20 INJECTION INTRAVENOUS ONCE AS NEEDED
Status: DISCONTINUED | OUTPATIENT
Start: 2022-10-11 | End: 2022-10-11

## 2022-10-11 RX ORDER — SODIUM CHLORIDE 450 MG/100ML
20 INJECTION, SOLUTION INTRAVENOUS CONTINUOUS
Status: DISCONTINUED | OUTPATIENT
Start: 2022-10-11 | End: 2022-10-11

## 2022-10-11 RX ORDER — POTASSIUM CHLORIDE 29.8 MG/ML
40 INJECTION INTRAVENOUS ONCE
Status: COMPLETED | OUTPATIENT
Start: 2022-10-11 | End: 2022-10-11

## 2022-10-11 RX ORDER — FENTANYL CITRATE 50 UG/ML
50 INJECTION, SOLUTION INTRAMUSCULAR; INTRAVENOUS
Status: DISCONTINUED | OUTPATIENT
Start: 2022-10-11 | End: 2022-10-11

## 2022-10-11 RX ORDER — PROTAMINE SULFATE 10 MG/ML
INJECTION, SOLUTION INTRAVENOUS AS NEEDED
Status: DISCONTINUED | OUTPATIENT
Start: 2022-10-11 | End: 2022-10-11

## 2022-10-11 RX ORDER — HYDROMORPHONE HCL/PF 1 MG/ML
0.5 SYRINGE (ML) INJECTION EVERY 2 HOUR PRN
Status: DISCONTINUED | OUTPATIENT
Start: 2022-10-11 | End: 2022-10-11

## 2022-10-11 RX ADMIN — PROPOFOL 50 MG: 10 INJECTION, EMULSION INTRAVENOUS at 10:24

## 2022-10-11 RX ADMIN — MIDAZOLAM 2 MG: 1 INJECTION INTRAMUSCULAR; INTRAVENOUS at 09:12

## 2022-10-11 RX ADMIN — SUGAMMADEX 200 MG: 100 INJECTION, SOLUTION INTRAVENOUS at 10:19

## 2022-10-11 RX ADMIN — HEPARIN SODIUM 5000 UNITS: 5000 INJECTION INTRAVENOUS; SUBCUTANEOUS at 21:21

## 2022-10-11 RX ADMIN — MUPIROCIN 1 APPLICATION: 20 OINTMENT TOPICAL at 14:32

## 2022-10-11 RX ADMIN — NICARDIPINE HYDROCHLORIDE: 2.5 INJECTION, SOLUTION INTRAVENOUS at 14:54

## 2022-10-11 RX ADMIN — LIDOCAINE HYDROCHLORIDE 100 MG: 20 INJECTION, SOLUTION EPIDURAL; INFILTRATION; INTRACAUDAL; PERINEURAL at 09:20

## 2022-10-11 RX ADMIN — NICARDIPINE HYDROCHLORIDE 5 MG/HR: 2.5 INJECTION, SOLUTION INTRAVENOUS at 10:14

## 2022-10-11 RX ADMIN — SODIUM CHLORIDE, SODIUM GLUCONATE, SODIUM ACETATE, POTASSIUM CHLORIDE, MAGNESIUM CHLORIDE, SODIUM PHOSPHATE, DIBASIC, AND POTASSIUM PHOSPHATE 50 ML/HR: .53; .5; .37; .037; .03; .012; .00082 INJECTION, SOLUTION INTRAVENOUS at 11:20

## 2022-10-11 RX ADMIN — CEFAZOLIN 2000 MG: 1 INJECTION, POWDER, FOR SOLUTION INTRAMUSCULAR; INTRAVENOUS at 09:30

## 2022-10-11 RX ADMIN — DOCUSATE SODIUM 100 MG: 100 CAPSULE, LIQUID FILLED ORAL at 14:32

## 2022-10-11 RX ADMIN — ONDANSETRON 4 MG: 2 INJECTION INTRAMUSCULAR; INTRAVENOUS at 09:50

## 2022-10-11 RX ADMIN — GLIMEPIRIDE 4 MG: 2 TABLET ORAL at 17:28

## 2022-10-11 RX ADMIN — FUROSEMIDE 40 MG: 10 INJECTION, SOLUTION INTRAMUSCULAR; INTRAVENOUS at 14:32

## 2022-10-11 RX ADMIN — OXYCODONE HYDROCHLORIDE 5 MG: 5 TABLET ORAL at 17:26

## 2022-10-11 RX ADMIN — HEPARIN SODIUM 5000 UNITS: 5000 INJECTION INTRAVENOUS; SUBCUTANEOUS at 14:32

## 2022-10-11 RX ADMIN — HYDROMORPHONE HYDROCHLORIDE 0.2 MG: 0.2 INJECTION, SOLUTION INTRAMUSCULAR; INTRAVENOUS; SUBCUTANEOUS at 11:40

## 2022-10-11 RX ADMIN — ROCURONIUM BROMIDE 50 MG: 10 INJECTION INTRAVENOUS at 09:20

## 2022-10-11 RX ADMIN — CEFAZOLIN SODIUM 2000 MG: 2 SOLUTION INTRAVENOUS at 20:16

## 2022-10-11 RX ADMIN — ACETAMINOPHEN 975 MG: 325 TABLET, FILM COATED ORAL at 18:02

## 2022-10-11 RX ADMIN — ACETAMINOPHEN 975 MG: 325 TABLET, FILM COATED ORAL at 14:33

## 2022-10-11 RX ADMIN — LISINOPRIL 5 MG: 5 TABLET ORAL at 14:32

## 2022-10-11 RX ADMIN — PROTAMINE SULFATE 100 MG: 10 INJECTION, SOLUTION INTRAVENOUS at 10:15

## 2022-10-11 RX ADMIN — PANTOPRAZOLE SODIUM 40 MG: 40 TABLET, DELAYED RELEASE ORAL at 14:33

## 2022-10-11 RX ADMIN — HYDROMORPHONE HYDROCHLORIDE 0.2 MG: 0.2 INJECTION, SOLUTION INTRAMUSCULAR; INTRAVENOUS; SUBCUTANEOUS at 11:30

## 2022-10-11 RX ADMIN — CHLORHEXIDINE GLUCONATE 15 ML: 1.2 SOLUTION ORAL at 07:59

## 2022-10-11 RX ADMIN — HYDROMORPHONE HYDROCHLORIDE 0.5 MG: 1 INJECTION, SOLUTION INTRAMUSCULAR; INTRAVENOUS; SUBCUTANEOUS at 09:37

## 2022-10-11 RX ADMIN — GLYCOPYRROLATE 0.8 MCG: 0.2 INJECTION INTRAMUSCULAR; INTRAVENOUS at 10:15

## 2022-10-11 RX ADMIN — MUPIROCIN 1 APPLICATION: 20 OINTMENT TOPICAL at 21:21

## 2022-10-11 RX ADMIN — SODIUM CHLORIDE, SODIUM LACTATE, POTASSIUM CHLORIDE, AND CALCIUM CHLORIDE: .6; .31; .03; .02 INJECTION, SOLUTION INTRAVENOUS at 09:00

## 2022-10-11 RX ADMIN — ROCURONIUM BROMIDE 15 MG: 10 INJECTION INTRAVENOUS at 09:50

## 2022-10-11 RX ADMIN — NICARDIPINE HYDROCHLORIDE 15 MG/HR: 2.5 INJECTION, SOLUTION INTRAVENOUS at 12:12

## 2022-10-11 RX ADMIN — ASPIRIN 81 MG CHEWABLE TABLET 81 MG: 81 TABLET CHEWABLE at 14:54

## 2022-10-11 RX ADMIN — PROPOFOL 150 MG: 10 INJECTION, EMULSION INTRAVENOUS at 09:20

## 2022-10-11 RX ADMIN — INSULIN GLARGINE 10 UNITS: 100 INJECTION, SOLUTION SUBCUTANEOUS at 21:21

## 2022-10-11 RX ADMIN — LABETALOL HYDROCHLORIDE 10 MG: 5 INJECTION, SOLUTION INTRAVENOUS at 10:33

## 2022-10-11 RX ADMIN — POTASSIUM CHLORIDE 40 MEQ: 29.8 INJECTION, SOLUTION INTRAVENOUS at 14:34

## 2022-10-11 RX ADMIN — LIDOCAINE HYDROCHLORIDE 0.5 ML: 10 INJECTION, SOLUTION EPIDURAL; INFILTRATION; INTRACAUDAL; PERINEURAL at 09:18

## 2022-10-11 RX ADMIN — CHLORHEXIDINE GLUCONATE 15 ML: 1.2 SOLUTION ORAL at 17:29

## 2022-10-11 RX ADMIN — PRAVASTATIN SODIUM 20 MG: 20 TABLET ORAL at 14:35

## 2022-10-11 RX ADMIN — NEOSTIGMINE METHYLSULFATE 5 MG: 1 INJECTION INTRAVENOUS at 10:14

## 2022-10-11 RX ADMIN — HYDROMORPHONE HYDROCHLORIDE 0.2 MG: 0.2 INJECTION, SOLUTION INTRAMUSCULAR; INTRAVENOUS; SUBCUTANEOUS at 11:48

## 2022-10-11 RX ADMIN — MUPIROCIN 1 APPLICATION: 20 OINTMENT TOPICAL at 08:00

## 2022-10-11 RX ADMIN — HEPARIN SODIUM 17000 UNITS: 1000 INJECTION INTRAVENOUS; SUBCUTANEOUS at 10:07

## 2022-10-11 RX ADMIN — POTASSIUM CHLORIDE 20 MEQ: 1500 TABLET, EXTENDED RELEASE ORAL at 14:33

## 2022-10-11 RX ADMIN — SODIUM CHLORIDE: 0.9 INJECTION, SOLUTION INTRAVENOUS at 09:27

## 2022-10-11 NOTE — QUICK NOTE
Electrophysiology was consulted concerning new left bundle-branch block after TAVR  EKG 3 hours afterwards still shows continued left bundle-branch block  In this case, will be formally seen tomorrow by electrophysiology  Would keep NPO after midnight and hold beta-blocker for now  Will evaluate tomorrow discussed potential initiation of that in the morning should there be no significant changes overnight  Would repeat EKG in the morning as well

## 2022-10-11 NOTE — ANESTHESIA PROCEDURE NOTES
Arterial Line Insertion  Performed by: Dilshad Agosto MD  Authorized by: Dilshad Agosto MD   Consent: Verbal consent obtained  Written consent obtained  Risks and benefits: risks, benefits and alternatives were discussed  Consent given by: patient  Patient understanding: patient states understanding of the procedure being performed  Patient consent: the patient's understanding of the procedure matches consent given  Procedure consent: procedure consent matches procedure scheduled  Relevant documents: relevant documents present and verified  Required items: required blood products, implants, devices, and special equipment available  Patient identity confirmed: arm band  Preparation: Patient was prepped and draped in the usual sterile fashion  Indications: multiple ABGs and hemodynamic monitoring  Orientation:  Right  Location: radial arterylidocaine (PF) (XYLOCAINE-MPF) 1 % - Infiltration   0 5 mL - 10/11/2022 9:18:00 AM  Sedation:  Patient sedated: yes  Analgesia: see MAR for details  Vitals: Vital signs were monitored during sedation      Procedure Details:  Needle gauge: 20  Seldinger technique: Seldinger technique used  Number of attempts: 1    Post-procedure:  Post-procedure: chlorhexidine patch applied  Waveform: good waveform and waveform confirmed  Post-procedure CNS: unchanged and normal  Patient tolerance: patient tolerated the procedure well with no immediate complications  Comments: Pre-induction 2 minutes

## 2022-10-11 NOTE — ANESTHESIA PROCEDURE NOTES
Procedure Performed: TRAY Anesthesia  Start Time:  10/11/2022 9:35 AM        Preanesthesia Checklist    Patient identified, IV assessed, risks and benefits discussed, monitors and equipment assessed, procedure being performed at surgeon's request and anesthesia consent obtained  Procedure    Diagnostic Indications for TRAY:  assessment of surgical repair, defect repair evaluation, hemodynamic monitoring and suspected pericardial effusion  Type of TRAY: interventional TRAY with real time guidance of intracardiac procedure  Images Saved: ultrasound permanent image saved  Physician Requesting Echo: Juan Don MD   Location performed: OR  Intubated  Bite block not placed  Heart visualized  Insertion of TRAY Probe:  Atraumatic  Probe Type:  Multiplane  Modalities:  3D, color flow mapping, continuous wave Doppler and pulse wave Doppler  Echocardiographic and Doppler Measurements    PREPROCEDURE    LEFT VENTRICLE:  Systolic Function: normal  Ejection Fraction: 60%  RIGHT VENTRICLE:  Systolic Function: normal   Cavity size normal               AORTIC VALVE:  Leaflets: normal and trileaflet  Leaflet motions restricted and heavily calcified  Stenosis: severe  Mean Gradient: 34 mmHg  Peak Gradient: 63 mmHg  Maximum velocity (cm/s): 4 0 m/sec  MITRAL VALVE:  Leaflets: calcified and normal  Leaflet Motions: normal  Regurgitation: mild  Stenosis: none and by CFD  TRICUSPID VALVE:  Leaflets: normal    Regurgitation: trace  ASCENDING AORTA:    Dissection not present  AORTIC ARCH:    dissection not present  Grade 3: atheroma protruding < 0 5 cm into lumen  DESCENDING AORTA:    Dissection not present  Grade 3: atheroma protruding < 0 5 cm into lumen  LEFT ATRIAL APPENDAGE:   No spontaneous echo contrast     OTHER ATRIAL FINDINGS:  No NICK clot  ATRIAL SEPTUM:  Intra-atrial septal morphology: possible small PFO with L-->R flow by CFD  POSTPROCEDURE    LEFT VENTRICLE: Unchanged   RIGHT VENTRICLE: Unchanged   AORTIC VALVE:   Leaflets: ROSS valve in aortic position, well-seated, does not rock, good leaflet excursion and closure, no PVL and bioprosthetic  Stenosis: AV VTI 32 cm, LVOT VTI 21 Cm, THEO 2 36 cm^2  Mean Gradient: 5 mmHg  MITRAL VALVE:    Regurgitation: mild-mod  TRICUSPID VALVE: Unchanged   AORTA: Unchanged   Dissection: Dissection not present  REMOVAL:  Probe Removal: atraumatic  ECHOCARDIOGRAM COMMENTS:  Post-procedure, intra-atrial shunt (L-->R, likely PFO) more apparent, NL 50 cm/sec

## 2022-10-11 NOTE — INTERVAL H&P NOTE
H&P reviewed  After examining the patient I find no changes in the patients condition since the H&P had been written  Vitals:    10/11/22 0754   BP: 148/74   Pulse: 71   Resp: 20   Temp: (!) 97 1 °F (36 2 °C)   SpO2: 97%   Anticipated Length of Stay:  Patient will be admitted on an Inpatient basis with an anticipated length of stay of  greater than 2 midnights  Justification for Hospital Stay:  Post surgical recovery following open heart surgery

## 2022-10-11 NOTE — ANESTHESIA PROCEDURE NOTES
Central Line Insertion  Performed by: Dre Burdick MD  Authorized by: Dre Burdick MD     Date/Time: 10/11/2022 9:30 AM  Catheter Type:  triple lumen  Consent: Verbal consent obtained  Written consent obtained  Risks and benefits: risks, benefits and alternatives were discussed  Consent given by: patient  Patient understanding: patient states understanding of the procedure being performed  Patient consent: the patient's understanding of the procedure matches consent given  Procedure consent: procedure consent matches procedure scheduled  Relevant documents: relevant documents present and verified  Required items: required blood products, implants, devices, and special equipment available  Patient identity confirmed: arm band  Indications: vascular access and central pressure monitoring  Catheter size: 7 Fr  Patient position: Trendelenburg  Anesthesia method: ga    Assessment: blood return through all ports and free fluid flow  Preparation: skin prepped with ChloraPrep  Skin prep agent dried: skin prep agent completely dried prior to procedure  Sterile barriers: all five maximum sterile barriers used - cap, mask, sterile gown, sterile gloves, and large sterile sheet  Hand hygiene: hand hygiene performed prior to central venous catheter insertion  sterile gel and probe cover used in ultrasound-guided central venous catheter insertionultrasound permanent image saved  Vessel of Catheter Tip End: central venous  Number of attempts: 1  Successful placement: yes  Post-procedure: chlorhexidine patch applied, dressing applied and line sutured  Patient tolerance: patient tolerated the procedure well with no immediate complications

## 2022-10-11 NOTE — PLAN OF CARE
Problem: MOBILITY - ADULT  Goal: Maintains/Returns to pre admission functional level  Description: INTERVENTIONS:  - Perform BMAT or MOVE assessment daily    - Set and communicate daily mobility goal to care team and patient/family/caregiver  - Collaborate with rehabilitation services on mobility goals if consulted  - Perform Range of Motion times a day  - Reposition patient every  hours    - Dangle patient  times a day  - Stand patient  times a day  - Ambulate patient  times a day  - Out of bed to chair  times a day   - Out of bed for meal times a day  - Out of bed for toileting  - Record patient progress and toleration of activity level   Outcome: Progressing

## 2022-10-11 NOTE — OP NOTE
OPERATIVE REPORT  PATIENT NAME: Sherrell Severance    :  1960  MRN: [id number]  Pt Location: [facility] OR ROOM 02    SURGERY DATE: 10/11/2022    SURGEON: [facility] BELEM Rushing MD    CO-SURGEON: Helga Hutchison DO    ASSISTANT: Jolanta Fu PA-C    ADDITIONAL ASSISTANT: N/A    PREOPERATIVE DIAGNOSIS: Symptomatic severe aortic stenosis  POSTOPERATIVE DIAGNOSIS: Symptomatic severe aortic stenosis  NYHA Class: 2  CCS Class: 1    PROCEDURE:   Transcatheter aortic valve replacement with a 23 mm Farmer ROSS 3 bioprosthetic valve via left transfemoral approach  CARDIOPULMONARY BYPASS TIME: 0 minutes  ANESTHESIA Dr Remy Martinez, general endotracheal anesthesia with transesophageal echocardiogram guidance  INDICATIONS:  The patient is a 57y o  year-old female with clinical and echocardiographic findings consistent with severe aortic stenosis  The patient was evaluated in our heart valve center, we recommended the procedure described previously  FINDINGS:  1  Intraoperative transesophageal echocardiogram revealed severe aortic stenosis  2  Final transesophageal echocardiogram demonstrated prosthetic valve with normal function no perivalvular leak  OPERATIVE TECHNIQUE:    The patient was taken to the operating room and placed supine on the operating table  Following the satisfactory induction of general anesthesia and placement of monitoring lines, the patient was prepped and draped in the usual sterile fashion  A time-out procedure was performed  The left common femoral vein was accessed percutaneously using Seldinger technique and fluoroscopy, a balloon-tip temporary pacing catheter was inserted and advanced into the right ventricle and its capture was confirmed  The left common femoral artery was accessed percutaneously using Seldinger technique and fluoroscopy  Two (2) Perclose sutures were deployed in the standard fashion  The patient was systemically heparinized   A pigtail catheter was advanced to the right coronary cusp, an aortogram was performed to determine proper angle and orientation for valve deployment  A Lunderquist extra-stiff wire was positioned in the ascending aorta over an exchange catheter  The sheath for the delivery system was inserted  The stiff wire was removed over a catheter, the aortic valve was crossed with a 0 035 straight-tip wire, this was then exchanged for a curved tip extra stiff wire  A 23 mm ROSS 3 valve delivery system was advanced through the delivery sheath into the aorta, the delivery system was configured for deployment  The valve on the delivery system was then advanced and the aortic valve was crossed  At this point, the catheter was desheathed in the standard fashion  The valve was positioned appropriately using a combination of fluoroscopy and transesophageal echocardiogram guidance  During an episode of rapid pacing, balloon deployment of the valve was performed  Following deployment, the position of the valve was confirmed by fluoroscopy and echocardiography and its position appeared appropriate with no perivalvular leak  The valve delivery system was subsequently removed followed by removal of the sheath while the Perclose sutures were secured and direct pressure was held  Protamine was administered with normalization of the ACT  Pressure was released, without evidence of active bleeding  The left common femoral vein sheath was removed and pressure was held  COMPLICATIONS: None    PACKS/TUBES/DRAINS: None  EBL: 25 cc  TRANSFUSION: None  SPECIMENS: None      As the attending surgeon, I was present and scrubbed for all critical portions of this procedure  There was no qualified surgical resident available  Sponge, needle and instrument counts were reported as correct by the nursing staff   A cardiology co-surgeon was required as is a CMS requirement      SIGNATURE: Chaka Cui  DATE: October 11, 2022  TIME: 10:27 AM

## 2022-10-11 NOTE — PROGRESS NOTES
Quick Note - Cardiothoracic Surgery   Sanchez Powers 57 y o  female MRN: [id number]  Unit/Bed#: [facility] [place] Encounter: [id number]      Sanchez Powers underwent transcatheter aortic valve replacement at [place] [address]  today  Following surgery, 12 lead ECG was completed and :BB was identified  Electrophysiology was notified via tiger text to confirm this finding  A repeat ECG was ordered, to be completed in three hours  The EP PA on call will follow up the results and formally complete a consultation of this finding has not resolved      Adriana Phillip  10/11/22  11:10 AM

## 2022-10-11 NOTE — RESPIRATORY THERAPY NOTE
RT Protocol Note  Sim Gordillo 57 y o  female MRN: [id number]  Unit/Bed#: [facility] 405-01 Encounter: [id number]    Assessment    Principal Problem:    Severe aortic stenosis  Active Problems:    Hypertension    Hyperlipidemia    Diabetes mellitus (HCC)    CHF (congestive heart failure) (HCC)    History of breast cancer    SJ on CPAP    CAD (coronary artery disease)    LBBB (left bundle branch block)    Hyperchloremia      Home Pulmonary Medications:  None       Past Medical History:   Diagnosis Date    Adhesive capsulitis of right shoulder     CAD (coronary artery disease)     CHF (congestive heart failure) ([facility])     Diabetes mellitus (HCC)     type 2, insulin dependent    Diverticulosis     History of breast cancer     s/p right lumpectomy, s/p radiation    Hyperlipidemia     Hypertension     Lymphedema of right upper extremity     Obesity (BMI 30-39 9)     SJ on CPAP     Severe aortic stenosis     Vitamin D deficiency      Social History     Socioeconomic History    Marital status: /Civil Union     Spouse name: None    Number of children: None    Years of education: None    Highest education level: None   Occupational History    None   Tobacco Use    Smoking status: Never Smoker    Smokeless tobacco: Never Used   Vaping Use    Vaping Use: Never used   Substance and Sexual Activity    Alcohol use: No    Drug use: No    Sexual activity: Yes     Partners: Male   Other Topics Concern    None   Social History Narrative    None     Social Determinants of Health     Financial Resource Strain: Not on file   Food Insecurity: Not on file   Transportation Needs: Not on file   Physical Activity: Not on file   Stress: Not on file   Social Connections: Not on file   Intimate Partner Violence: Not on file   Housing Stability: Not on file       Subjective         Objective    Physical Exam:   Assessment Type: (P) Assess only  General Appearance: (P) Alert, Awake  Respiratory Pattern: (P) Normal  Bilateral Breath Sounds: (P) Clear    Vitals:  Blood pressure 123/58, pulse 74, temperature (!) 97 4 °F (36 3 °C), resp  rate 14, height 5' 2" (1 575 m), weight 83 kg (183 lb), SpO2 95 %  Imaging and other studies: I have personally reviewed pertinent reports  Plan    Respiratory Plan: (P) Discontinue Protocol        Resp Comments: (P) Pt had a TAVR procedure done and was show how to use the IS  Pt did well and achieved above her goal  Pt has no pulmonary history and takes no home pulm meds  Pt bs clear and pt offers no breathing issues  Will dc resp protocol and continue with acp

## 2022-10-11 NOTE — ANESTHESIA POSTPROCEDURE EVALUATION
Post-Op Assessment Note    CV Status:  Stable    Pain management: adequate     Mental Status:  Alert and awake   Hydration Status:  Euvolemic and stable   PONV Controlled:  Controlled   Airway Patency:  Patent      Post Op Vitals Reviewed: Yes      Staff: CRNA         No complications documented      BP 96/53 (10/11/22 1040)    Temp (!) 97 °F (36 1 °C) (10/11/22 1040)    Pulse 69 (10/11/22 1040)   Resp 20 (10/11/22 1040)    SpO2 93 % (10/11/22 1040)

## 2022-10-12 ENCOUNTER — APPOINTMENT (OUTPATIENT)
Dept: RADIOLOGY | Facility: HOSPITAL | Age: 62
DRG: 267 | End: 2022-10-12
Payer: COMMERCIAL

## 2022-10-12 ENCOUNTER — HOME HEALTH ADMISSION (OUTPATIENT)
Dept: HOME HEALTH SERVICES | Facility: HOME HEALTHCARE | Age: 62
End: 2022-10-12
Payer: COMMERCIAL

## 2022-10-12 DIAGNOSIS — M75.01 ADHESIVE CAPSULITIS OF RIGHT SHOULDER: ICD-10-CM

## 2022-10-12 DIAGNOSIS — I48.91 ATRIAL FIBRILLATION, UNSPECIFIED TYPE (HCC): Primary | ICD-10-CM

## 2022-10-12 DIAGNOSIS — I44.7 LBBB (LEFT BUNDLE BRANCH BLOCK): ICD-10-CM

## 2022-10-12 PROBLEM — Z95.2 S/P TAVR (TRANSCATHETER AORTIC VALVE REPLACEMENT): Status: ACTIVE | Noted: 2022-10-12

## 2022-10-12 LAB
ABO GROUP BLD BPU: NORMAL
ANION GAP SERPL CALCULATED.3IONS-SCNC: 6 MMOL/L (ref 4–13)
ATRIAL RATE: 71 BPM
ATRIAL RATE: 79 BPM
BPU ID: NORMAL
BUN SERPL-MCNC: 11 MG/DL (ref 5–25)
CALCIUM SERPL-MCNC: 8.5 MG/DL (ref 8.3–10.1)
CHLORIDE SERPL-SCNC: 103 MMOL/L (ref 96–108)
CO2 SERPL-SCNC: 27 MMOL/L (ref 21–32)
CREAT SERPL-MCNC: 0.67 MG/DL (ref 0.6–1.3)
CROSSMATCH: NORMAL
ERYTHROCYTE [DISTWIDTH] IN BLOOD BY AUTOMATED COUNT: 14 % (ref 11.6–15.1)
GFR SERPL CREATININE-BSD FRML MDRD: 93 ML/MIN/1.73SQ M
GLUCOSE SERPL-MCNC: 134 MG/DL (ref 65–140)
GLUCOSE SERPL-MCNC: 155 MG/DL (ref 65–140)
GLUCOSE SERPL-MCNC: 156 MG/DL (ref 65–140)
GLUCOSE SERPL-MCNC: 162 MG/DL (ref 65–140)
GLUCOSE SERPL-MCNC: 184 MG/DL (ref 65–140)
HCT VFR BLD AUTO: 36.8 % (ref 34.8–46.1)
HCT VFR BLD AUTO: 36.9 % (ref 34.8–46.1)
HGB BLD-MCNC: 12 G/DL (ref 11.5–15.4)
HGB BLD-MCNC: 12 G/DL (ref 11.5–15.4)
MCH RBC QN AUTO: 28.9 PG (ref 26.8–34.3)
MCHC RBC AUTO-ENTMCNC: 32.5 G/DL (ref 31.4–37.4)
MCV RBC AUTO: 89 FL (ref 82–98)
P AXIS: 34 DEGREES
P AXIS: 64 DEGREES
PLATELET # BLD AUTO: 184 THOUSANDS/UL (ref 149–390)
PMV BLD AUTO: 10.6 FL (ref 8.9–12.7)
POTASSIUM SERPL-SCNC: 3.5 MMOL/L (ref 3.5–5.3)
PR INTERVAL: 162 MS
PR INTERVAL: 178 MS
QRS AXIS: 4 DEGREES
QRS AXIS: 64 DEGREES
QRSD INTERVAL: 138 MS
QRSD INTERVAL: 142 MS
QT INTERVAL: 442 MS
QT INTERVAL: 462 MS
QTC INTERVAL: 502 MS
QTC INTERVAL: 506 MS
RBC # BLD AUTO: 4.15 MILLION/UL (ref 3.81–5.12)
SODIUM SERPL-SCNC: 136 MMOL/L (ref 135–147)
T WAVE AXIS: 145 DEGREES
T WAVE AXIS: 173 DEGREES
UNIT DISPENSE STATUS: NORMAL
UNIT PRODUCT CODE: NORMAL
UNIT PRODUCT VOLUME: 350 ML
UNIT RH: NORMAL
VENTRICULAR RATE: 71 BPM
VENTRICULAR RATE: 79 BPM
WBC # BLD AUTO: 8.52 THOUSAND/UL (ref 4.31–10.16)

## 2022-10-12 PROCEDURE — 85018 HEMOGLOBIN: CPT | Performed by: PHYSICIAN ASSISTANT

## 2022-10-12 PROCEDURE — 97163 PT EVAL HIGH COMPLEX 45 MIN: CPT

## 2022-10-12 PROCEDURE — 71045 X-RAY EXAM CHEST 1 VIEW: CPT

## 2022-10-12 PROCEDURE — 99222 1ST HOSP IP/OBS MODERATE 55: CPT | Performed by: INTERNAL MEDICINE

## 2022-10-12 PROCEDURE — 99233 SBSQ HOSP IP/OBS HIGH 50: CPT | Performed by: THORACIC SURGERY (CARDIOTHORACIC VASCULAR SURGERY)

## 2022-10-12 PROCEDURE — 93010 ELECTROCARDIOGRAM REPORT: CPT | Performed by: INTERNAL MEDICINE

## 2022-10-12 PROCEDURE — 82948 REAGENT STRIP/BLOOD GLUCOSE: CPT

## 2022-10-12 PROCEDURE — 85027 COMPLETE CBC AUTOMATED: CPT | Performed by: PHYSICIAN ASSISTANT

## 2022-10-12 PROCEDURE — 93005 ELECTROCARDIOGRAM TRACING: CPT

## 2022-10-12 PROCEDURE — 97530 THERAPEUTIC ACTIVITIES: CPT

## 2022-10-12 PROCEDURE — 99223 1ST HOSP IP/OBS HIGH 75: CPT | Performed by: PHYSICIAN ASSISTANT

## 2022-10-12 PROCEDURE — 85014 HEMATOCRIT: CPT | Performed by: PHYSICIAN ASSISTANT

## 2022-10-12 PROCEDURE — 80048 BASIC METABOLIC PNL TOTAL CA: CPT | Performed by: PHYSICIAN ASSISTANT

## 2022-10-12 PROCEDURE — 99221 1ST HOSP IP/OBS SF/LOW 40: CPT | Performed by: NURSE PRACTITIONER

## 2022-10-12 PROCEDURE — 97166 OT EVAL MOD COMPLEX 45 MIN: CPT

## 2022-10-12 RX ORDER — ACETAMINOPHEN 325 MG/1
650 TABLET ORAL EVERY 6 HOURS PRN
Status: DISCONTINUED | OUTPATIENT
Start: 2022-10-12 | End: 2022-10-13 | Stop reason: HOSPADM

## 2022-10-12 RX ORDER — MUSCLE RUB CREAM 100; 150 MG/G; MG/G
CREAM TOPICAL 4 TIMES DAILY PRN
Status: DISCONTINUED | OUTPATIENT
Start: 2022-10-12 | End: 2022-10-13 | Stop reason: HOSPADM

## 2022-10-12 RX ORDER — LIDOCAINE 50 MG/G
2 PATCH TOPICAL DAILY
Status: DISCONTINUED | OUTPATIENT
Start: 2022-10-12 | End: 2022-10-13 | Stop reason: HOSPADM

## 2022-10-12 RX ORDER — TORSEMIDE 20 MG/1
20 TABLET ORAL DAILY
Status: DISCONTINUED | OUTPATIENT
Start: 2022-10-12 | End: 2022-10-13 | Stop reason: HOSPADM

## 2022-10-12 RX ORDER — CARVEDILOL 6.25 MG/1
6.25 TABLET ORAL 2 TIMES DAILY WITH MEALS
Status: DISCONTINUED | OUTPATIENT
Start: 2022-10-12 | End: 2022-10-13 | Stop reason: HOSPADM

## 2022-10-12 RX ORDER — CLOPIDOGREL BISULFATE 75 MG/1
75 TABLET ORAL DAILY
Status: DISCONTINUED | OUTPATIENT
Start: 2022-10-12 | End: 2022-10-13 | Stop reason: HOSPADM

## 2022-10-12 RX ORDER — HEPARIN SODIUM 5000 [USP'U]/ML
5000 INJECTION, SOLUTION INTRAVENOUS; SUBCUTANEOUS EVERY 8 HOURS SCHEDULED
Status: DISCONTINUED | OUTPATIENT
Start: 2022-10-12 | End: 2022-10-13 | Stop reason: HOSPADM

## 2022-10-12 RX ORDER — OXYCODONE HYDROCHLORIDE 5 MG/1
5 TABLET ORAL ONCE
Status: COMPLETED | OUTPATIENT
Start: 2022-10-12 | End: 2022-10-12

## 2022-10-12 RX ADMIN — CARVEDILOL 6.25 MG: 6.25 TABLET, FILM COATED ORAL at 18:07

## 2022-10-12 RX ADMIN — DOCUSATE SODIUM 100 MG: 100 CAPSULE, LIQUID FILLED ORAL at 18:07

## 2022-10-12 RX ADMIN — CEFAZOLIN SODIUM 2000 MG: 2 SOLUTION INTRAVENOUS at 03:32

## 2022-10-12 RX ADMIN — MUPIROCIN 1 APPLICATION: 20 OINTMENT TOPICAL at 09:33

## 2022-10-12 RX ADMIN — CEFAZOLIN SODIUM 2000 MG: 2 SOLUTION INTRAVENOUS at 11:24

## 2022-10-12 RX ADMIN — GLIMEPIRIDE 4 MG: 2 TABLET ORAL at 18:06

## 2022-10-12 RX ADMIN — GLIMEPIRIDE 4 MG: 2 TABLET ORAL at 09:30

## 2022-10-12 RX ADMIN — CLOPIDOGREL BISULFATE 75 MG: 75 TABLET ORAL at 11:24

## 2022-10-12 RX ADMIN — OXYCODONE HYDROCHLORIDE 5 MG: 5 TABLET ORAL at 00:23

## 2022-10-12 RX ADMIN — MUPIROCIN 1 APPLICATION: 20 OINTMENT TOPICAL at 22:04

## 2022-10-12 RX ADMIN — HEPARIN SODIUM 5000 UNITS: 5000 INJECTION INTRAVENOUS; SUBCUTANEOUS at 22:04

## 2022-10-12 RX ADMIN — DOCUSATE SODIUM 100 MG: 100 CAPSULE, LIQUID FILLED ORAL at 09:20

## 2022-10-12 RX ADMIN — CHLORHEXIDINE GLUCONATE 15 ML: 1.2 SOLUTION ORAL at 18:13

## 2022-10-12 RX ADMIN — INSULIN LISPRO 1 UNITS: 100 INJECTION, SOLUTION INTRAVENOUS; SUBCUTANEOUS at 11:26

## 2022-10-12 RX ADMIN — ASPIRIN 81 MG CHEWABLE TABLET 81 MG: 81 TABLET CHEWABLE at 09:20

## 2022-10-12 RX ADMIN — PRAVASTATIN SODIUM 20 MG: 20 TABLET ORAL at 09:20

## 2022-10-12 RX ADMIN — HEPARIN SODIUM 5000 UNITS: 5000 INJECTION INTRAVENOUS; SUBCUTANEOUS at 06:09

## 2022-10-12 RX ADMIN — INSULIN LISPRO 1 UNITS: 100 INJECTION, SOLUTION INTRAVENOUS; SUBCUTANEOUS at 22:03

## 2022-10-12 RX ADMIN — LIDOCAINE 5% 2 PATCH: 700 PATCH TOPICAL at 22:07

## 2022-10-12 RX ADMIN — PANTOPRAZOLE SODIUM 40 MG: 40 TABLET, DELAYED RELEASE ORAL at 06:09

## 2022-10-12 RX ADMIN — TORSEMIDE 20 MG: 20 TABLET ORAL at 09:20

## 2022-10-12 RX ADMIN — OXYCODONE HYDROCHLORIDE 5 MG: 5 TABLET ORAL at 06:31

## 2022-10-12 RX ADMIN — POLYETHYLENE GLYCOL 3350 17 G: 17 POWDER, FOR SOLUTION ORAL at 09:22

## 2022-10-12 RX ADMIN — OXYCODONE HYDROCHLORIDE 5 MG: 5 TABLET ORAL at 15:21

## 2022-10-12 RX ADMIN — ACETAMINOPHEN 975 MG: 325 TABLET, FILM COATED ORAL at 18:07

## 2022-10-12 RX ADMIN — CARVEDILOL 6.25 MG: 6.25 TABLET, FILM COATED ORAL at 09:20

## 2022-10-12 RX ADMIN — INSULIN GLARGINE 10 UNITS: 100 INJECTION, SOLUTION SUBCUTANEOUS at 22:04

## 2022-10-12 RX ADMIN — ACETAMINOPHEN 975 MG: 325 TABLET, FILM COATED ORAL at 09:47

## 2022-10-12 RX ADMIN — LISINOPRIL 5 MG: 5 TABLET ORAL at 09:20

## 2022-10-12 RX ADMIN — CHLORHEXIDINE GLUCONATE 15 ML: 1.2 SOLUTION ORAL at 09:33

## 2022-10-12 RX ADMIN — POTASSIUM CHLORIDE 20 MEQ: 1500 TABLET, EXTENDED RELEASE ORAL at 09:20

## 2022-10-12 NOTE — CONSULTS
Consultation - Cardiology Team One  Trever Horner 57 y o  female MRN: [id number]  Unit/Bed#: [facility] 405-01 Encounter: [id number]    Inpatient consult to Cardiology  Consult performed by: AUSTIN Killian  Consult ordered by: Blanche Keith PA-C      Physician Requesting Consult: Bobbi Vizcaino MD  Reason for Consult / Principal Problem: s/p TAVR    Assessment/ Plan    1  Symptomatic severe AS s/p TAVR 10/11/22  POD #1  Post op TTE- LVEF 65% with normally functioning TAVR  BP well controlled and labs stable  New LBBB- EP consulted- see plan below  On ASA, Plavix, statin, and beta blocker  2  New LBBB  EP consulted- recommended resuming carvedilol and monitoring rhythm  Currently NSR with LBBB     3  HTN- controlled, average /56  On carvedilol 6 25 mg BID and lisinopril 5 mg daily  4  HLD- total chol 167, HDL 54, LDL 70 in April  On pravastatin 20 mg daily  5  Type 2 DM- A1c 7 6%, management per primary team    6  SJ- on CPAP at HS    7  Morbid obesity- BMI 34 6     History of Present Illness   HPI: Trever Horner is a 57y o  year old female with symptomatic severe AS, HTN, HLD, type 2 DM, morbid obesity, and SJ on CPAP  She follows with cardiologist Dr Alfreda Moreau and was last seen in the office in June 2022  At the time she c/o exertional SOB and some dizziness  An updated echocardiogram showed preserved LV function with EF 55% and progression of her AS to severe range; she was referred to CT surgery for further evaluation  Pre operative LHC did not show any significant epicardial CAD  She presented to [facility] on 10/11 to undergo planned TAVR with a 23 mm Farmer Vazquez 3 bioprosthetic valve via left transfemoral approach  Final intra op TRAY showed normally functioning TAVR without perivalvular leak  Post operative TTE also shows a well-seated and normally functioning valve with EF 65%  She developed a new LBBB post operatively and electrophysiology consulted   They recommended resuming beta blocker and monitoring her heart rhythm  Cardiology has also been consulted as part of routine post operative management  EKG reviewed personally: NSR with LBBB    Telemetry reviewed personally: NSR with LBBB, HR 80s-90s    Review of Systems   Constitutional: Negative for chills, malaise/fatigue and weight gain  Cardiovascular: Negative for chest pain, dyspnea on exertion, leg swelling, orthopnea, palpitations and syncope  Respiratory: Negative for cough, shortness of breath, sleep disturbances due to breathing and sputum production  Gastrointestinal: Negative for bloating and nausea  Neurological: Negative for dizziness, light-headedness and weakness  Psychiatric/Behavioral: Negative for altered mental status  All other systems reviewed and are negative  Historical Information   Past Medical History:   Diagnosis Date   • Adhesive capsulitis of right shoulder    • CAD (coronary artery disease)    • CHF (congestive heart failure) (HCC)    • Diabetes mellitus (HCC)     type 2, insulin dependent   • Diverticulosis    • History of breast cancer     s/p right lumpectomy, s/p radiation   • Hyperlipidemia    • Hypertension    • Lymphedema of right upper extremity    • Obesity (BMI 30-39  9)    • SJ on CPAP    • Severe aortic stenosis    • Vitamin D deficiency      Past Surgical History:   Procedure Laterality Date   • APPENDECTOMY     • BREAST LUMPECTOMY Right    • CARDIAC CATHETERIZATION N/A 08/19/2022    Procedure: Cardiac RHC/LHC; Surgeon: Chayo Shoemaker DO;  Location: [facility];   Service: Cardiology   • CHOLECYSTECTOMY     • KNEE ARTHROSCOPY Right     knee   • TONSILLECTOMY AND ADENOIDECTOMY     • TOTAL ABDOMINAL HYSTERECTOMY       Social History     Substance and Sexual Activity   Alcohol Use No     Social History     Substance and Sexual Activity   Drug Use No     Social History     Tobacco Use   Smoking Status Never Smoker   Smokeless Tobacco Never Used     Family History: Family History   Problem Relation Age of Onset   • COPD Mother    • Heart disease Father    • Heart disease Paternal Grandmother    • Cancer Family    • Colon cancer Neg Hx        Meds/Allergies   all current active meds have been reviewed and current meds:   Current Facility-Administered Medications   Medication Dose Route Frequency   • acetaminophen (TYLENOL) tablet 650 mg  650 mg Oral Q6H PRN   • acetaminophen (TYLENOL) tablet 975 mg  975 mg Oral Q8H   • aspirin chewable tablet 81 mg  81 mg Oral Daily   • bisacodyl (DULCOLAX) rectal suppository 10 mg  10 mg Rectal Daily PRN   • carvedilol (COREG) tablet 6 25 mg  6 25 mg Oral BID With Meals   • ceFAZolin (ANCEF) IVPB (premix in dextrose) 2,000 mg 50 mL  2,000 mg Intravenous Q8H   • chlorhexidine (PERIDEX) 0 12 % oral rinse 15 mL  15 mL Mouth/Throat BID   • clopidogrel (PLAVIX) tablet 75 mg  75 mg Oral Daily   • docusate sodium (COLACE) capsule 100 mg  100 mg Oral BID   • glimepiride (AMARYL) tablet 4 mg  4 mg Oral BID   • heparin (porcine) subcutaneous injection 5,000 Units  5,000 Units Subcutaneous Q8H [facility]   • hydrALAZINE (APRESOLINE) injection 5 mg  5 mg Intravenous Q6H PRN   • insulin glargine (LANTUS) subcutaneous injection 10 Units 0 1 mL  10 Units Subcutaneous HS   • insulin lispro (HumaLOG) 100 units/mL subcutaneous injection 1-6 Units  1-6 Units Subcutaneous TID AC   • insulin lispro (HumaLOG) 100 units/mL subcutaneous injection 1-6 Units  1-6 Units Subcutaneous HS   • lidocaine (cardiac) injection 100 mg  100 mg Intravenous Q30 Min PRN   • lisinopril (ZESTRIL) tablet 5 mg  5 mg Oral Daily   • mupirocin (BACTROBAN) 2 % nasal ointment 1 application  1 application Nasal C90M [facility]   • ondansetron (ZOFRAN) injection 4 mg  4 mg Intravenous Q6H PRN   • pantoprazole (PROTONIX) EC tablet 40 mg  40 mg Oral Daily   • polyethylene glycol (MIRALAX) packet 17 g  17 g Oral Daily   • potassium chloride (K-DUR,KLOR-CON) CR tablet 20 mEq  20 mEq Oral Daily   • pravastatin (PRAVACHOL) tablet 20 mg  20 mg Oral Daily   • torsemide (DEMADEX) tablet 20 mg  20 mg Oral Daily          Allergies   Allergen Reactions   • Fentanyl Rash   • Niaspan [Niacin Er] Rash   • Prednisone Rash       Objective   Vitals: Blood pressure 129/50, pulse 81, temperature 98 2 °F (36 8 °C), temperature source Oral, resp  rate 18, height 5' 2" (1 575 m), weight 85 8 kg (189 lb 2 5 oz), SpO2 95 %  , Body mass index is 34 6 kg/m²  ,     Systolic (57RBL), CYJ:805 , Min:104 , TBF:279     Diastolic (82PKL), ZHQ:79, Min:50, Max:80        Intake/Output Summary (Last 24 hours) at 10/12/2022 1117  Last data filed at 10/12/2022 0718  Gross per 24 hour   Intake 1078 ml   Output 4300 ml   Net -3222 ml     Wt Readings from Last 3 Encounters:   10/12/22 85 8 kg (189 lb 2 5 oz)   09/29/22 85 7 kg (189 lb)   08/19/22 85 7 kg (189 lb)     Invasive Devices  Report    Central Venous Catheter Line  Duration           CVC Central Lines 10/11/22 Triple 1 day          Peripheral Intravenous Line  Duration           Peripheral IV 10/11/22 Left Arm 1 day    Peripheral IV 10/11/22 Left Hand 1 day          Drain  Duration           Urethral Catheter Non-latex; Temperature probe 16 Fr  1 day              Physical Exam  Vitals reviewed  Constitutional:       General: She is not in acute distress  Appearance: She is obese  Neck:      Vascular: No hepatojugular reflux or JVD  Cardiovascular:      Rate and Rhythm: Normal rate and regular rhythm  Pulses: Normal pulses  Heart sounds: Murmur heard  No friction rub  No gallop  Pulmonary:      Effort: Pulmonary effort is normal  No respiratory distress  Breath sounds: No rales  Comments: Room air  Abdominal:      General: Bowel sounds are normal  There is no distension  Palpations: Abdomen is soft  Tenderness: There is no abdominal tenderness  Musculoskeletal:         General: No tenderness  Normal range of motion  Cervical back: Neck supple  Right lower leg: No edema  Left lower leg: No edema  Skin:     General: Skin is warm and dry  Findings: No erythema  Comments: Left groin access site- tender to touch, mildly ecchymotic  No hematoma  Neurological:      Mental Status: She is alert and oriented to person, place, and time     Psychiatric:         Mood and Affect: Mood normal          LABORATORY RESULTS:      CBC with diff:   Results from last 7 days   Lab Units 10/12/22  0442 10/11/22  1305 10/11/22  1049   WBC Thousand/uL 8 52  --   --    HEMOGLOBIN g/dL 12 0  12 0  --  12 2   HEMATOCRIT % 36 9  36 8  --  38 7   MCV fL 89  --   --    PLATELETS Thousands/uL 184 196  --    MCH pg 28 9  --   --    MCHC g/dL 32 5  --   --    RDW % 14 0  --   --    MPV fL 10 6 10 4  --      CMP:  Results from last 7 days   Lab Units 10/12/22  0442 10/11/22  1049   POTASSIUM mmol/L 3 5 3 5   CHLORIDE mmol/L 103 110*   CO2 mmol/L 27 23   BUN mg/dL 11 11   CREATININE mg/dL 0 67 0 54*   CALCIUM mg/dL 8 5 8 6   EGFR ml/min/1 73sq m 93 100     BMP:  Results from last 7 days   Lab Units 10/12/22  0442 10/11/22  1049   POTASSIUM mmol/L 3 5 3 5   CHLORIDE mmol/L 103 110*   CO2 mmol/L 27 23   BUN mg/dL 11 11   CREATININE mg/dL 0 67 0 54*   CALCIUM mg/dL 8 5 8 6       Lab Results   Component Value Date    CREATININE 0 67 10/12/2022    CREATININE 0 54 (L) 10/11/2022    CREATININE 0 65 10/03/2022     Cardiac testing:   Results for orders placed during the hospital encounter of 20    Echo complete with contrast if indicated    Narrative  [address]  [phone]    Transthoracic Echocardiogram  2D, M-mode, Doppler, and Color Doppler    Study date:  08-Dec-2020    Patient: Alexandru Longoria [facility]  MR number: [id number]  Account number: [de-identified]  : 1960  Age: 56 years  Gender: Female  Status: Outpatient  Location: [address] [place] [address] [facility]  Height: 63 in  Weight: 183 lb  BP: 144/ 76 mmHg    Indications: Aortic stenosis    Diagnoses: I35 0 - Nonrheumatic aortic (valve) stenosis    Sonographer:  SARAH Ruiz  Primary Physician:  Mirta Hui DO  Referring Physician:  Malvin Gandhi DO  Group:  [facility]  Interpreting Physician:  Devon Francisco MD    SUMMARY    LEFT VENTRICLE:  Systolic function was normal  Ejection fraction was estimated to be 65 %  There were no regional wall motion abnormalities  Wall thickness was mildly increased  There was mild concentric hypertrophy  LEFT ATRIUM:  The atrium was mildly dilated  MITRAL VALVE:  There was mild annular calcification  There was trace regurgitation  AORTIC VALVE:  There was moderate to severe stenosis  There was mild regurgitation  Valve mean gradient was 29 mmHg  Estimated aortic valve area (by VTI) was 0 88 cmï¾²  TRICUSPID VALVE:  There was trace regurgitation  HISTORY: PRIOR HISTORY: Hypertension, hyperlipidemia, aortic stenosis, type 2 diabetes, breast cancer    PROCEDURE: The study was performed in the [address] [facility]  This was a routine study  The transthoracic approach was used  The study included complete 2D imaging, M-mode, complete spectral Doppler, and color Doppler  Image  quality was adequate  LEFT VENTRICLE: Size was normal  Systolic function was normal  Ejection fraction was estimated to be 65 %  There were no regional wall motion abnormalities  Wall thickness was mildly increased  There was mild concentric hypertrophy  DOPPLER: Left ventricular diastolic function parameters were normal     RIGHT VENTRICLE: The size was normal  Systolic function was normal  Wall thickness was normal     LEFT ATRIUM: The atrium was mildly dilated  RIGHT ATRIUM: Size was normal     MITRAL VALVE: There was mild annular calcification  Valve structure was normal  There was normal leaflet separation   DOPPLER: The transmitral velocity was within the normal range  There was no evidence for stenosis  There was trace  regurgitation  AORTIC VALVE: The valve was probably trileaflet  Leaflets exhibited mildly increased thickness, mild calcification, and mildly reduced cuspal separation  DOPPLER: Transaortic velocity was increased due to valvular stenosis  There was  moderate to severe stenosis  There was mild regurgitation  TRICUSPID VALVE: The valve structure was normal  There was normal leaflet separation  DOPPLER: The transtricuspid velocity was within the normal range  There was no evidence for stenosis  There was trace regurgitation  Pulmonary artery  systolic pressure was within the normal range  Estimated peak PA pressure was 25 mmHg  PULMONIC VALVE: Leaflets exhibited normal thickness, no calcification, and normal cuspal separation  DOPPLER: The transpulmonic velocity was within the normal range  There was no significant regurgitation  PERICARDIUM: There was no pericardial effusion  The pericardium was normal in appearance  AORTA: The root exhibited normal size  SYSTEMIC VEINS: IVC: The inferior vena cava was normal in size      MEASUREMENT TABLES    2D MEASUREMENTS  LVOT   (Reference normals)  Diam   20 mm   (--)    DOPPLER MEASUREMENTS  LVOT   (Reference normals)  Peak loni   95 cm/s   (--)  Mean loni   52 cm/s   (--)  VTI   22 cm   (--)  Peak gradient   4 mmHg   (--)  Mean gradient   2 mmHg   (--)  Stroke vol   69 12 ml   (--)  Stroke index   0 38 ml/mï¾²   (--)  Aortic valve   (Reference normals)  Peak loni   343 cm/s   (--)  Mean loni   254 cm/s   (--)  VTI   80 cm   (--)  Peak gradient   49 mmHg   (--)  Mean gradient   29 mmHg   (--)  Obstr index, VTI   0 28    (--)  Valve area, VTI   0 88 cmï¾²   (--)  Area index, VTI   0 47 cmï¾²/mï¾²   (--)  Obstr index, Vmax   0 28    (--)  Valve area, Vmax   0 88 cmï¾²   (--)  Area index, Vmax   0 47 cmï¾²/mï¾²   (--)  Obstr index, Vmean   0 2    (--)  Valve area, Vmean   0 63 cmï¾²   (--)  Area index, Vmean 0 34 cmï¾²/mï¾²   (--)    SYSTEM MEASUREMENT TABLES    2D  %FS: 35 41 %  Ao Diam: 3 21 cm  EDV(Teich): 66 01 ml  EF(Teich): 65 48 %  ESV(Teich): 22 79 ml  IVSd: 1 31 cm  LA Area: 22 2 cm2  LA Diam: 3 8 cm  LVEDV MOD A4C: 116 69 ml  LVEF MOD A4C: 64 23 %  LVESV MOD A4C: 41 74 ml  LVIDd: 3 9 cm  LVIDs: 2 52 cm  LVLd A4C: 7 69 cm  LVLs A4C: 6 38 cm  LVOT Diam: 1 99 cm  LVPWd: 1 18 cm  RA Area: 13 5 cm2  RVIDd: 3 32 cm  SV MOD A4C: 74 95 ml  SV(Teich): 43 23 ml    CW  AR Dec [place]: 3 03 m/s2  AR Dec Time: 1242 02 ms  AR PHT: 360 19 ms  AR Vmax: 3 76 m/s  AR maxP 58 mmHg  AV Env  Ti: 324 77 ms  AV VTI: 81 99 cm  AV Vmax: 3 46 m/s  AV Vmean: 2 53 m/s  AV maxP 9 mmHg  AV meanP 66 mmHg  TR Vmax: 2 6 m/s  TR maxP 94 mmHg    PW  THEO (VTI): 0 77 cm2  THEO Vmax: 0 84 cm2  E' Sept: 0 07 m/s  E/E' Sept: 10 89  LVOT Env  Ti: 372 98 ms  LVOT VTI: 20 46 cm  LVOT Vmax: 0 93 m/s  LVOT Vmean: 0 55 m/s  LVOT maxPG: 3 5 mmHg  LVOT meanP 53 mmHg  LVSV Dopp: 63 46 ml  MV A Sid: 0 93 m/s  MV Dec [place]: 4 2 m/s2  MV DecT: 189 19 ms  MV E Sid: 0 79 m/s  MV E/A Ratio: 0 85  MV PHT: 54 87 ms  MVA By PHT: 4 01 cm2    [employer] Accredited Echocardiography Laboratory    Prepared and electronically signed by    Kuldip Hill MD  Signed 08-Dec-2020 15:27:20    Imaging: I have personally reviewed pertinent reports  and I have personally reviewed pertinent films in PACS  Cardiac catheterization    Result Date: 10/11/2022  Narrative: OPERATIVE REPORT PATIENT NAME: Tigre Currie  :  1960 MRN: [id number] Pt Location: [facility] OR ROOM 02 SURGERY DATE: 10/11/2022 Surgeon(s) and Role: Panel 1:    * Zulema Robins MD - Primary    * Flora Thomas PA-C - Assisting Panel 2:    * Darlyn Schofield DO - Primary Co-Surgeons: Kristy Mooney DO; Zulema Robins MD PREOPERATIVE DIAGNOSIS Symptomatic severe aortic stenosis  POSTOPERATIVE DIAGNOSIS Symptomatic severe aortic stenosis   PROCEDURE Transcatheter aortic valve replacement with a 23 mm Farmer ROSS 3 ULTRA bioprosthetic valve via a percutaneous left transfemoral approach  ANESTHESIA Dr Migdalia Forte, general endotracheal anesthesia with transesophageal echocardiogram guidance  After informed consent was obtained, patient brought to hybrid operating room in fasting state  Time out was performed  Using modified seldinger technique sheaths were placed in the left  common  femoral artery and vein respectively  The left  common  femoral artery was also used for placement of delivery sheath  The vessel was accessed using modified seldinger technique  Using fluoroscopy a temporary wire was advanced into RV apex through transfemoral sheath  The coplanar view was obtained using pigtail catheter placed in ascending aorta with subsequent ascending aortography  The TAVR delivery sheath was ulltimately advanced over a stiff wire  Next the 23 mm Farmer ROSS 3 ULTRA valve was implanted using rapid pacing with fluoro and TRAY guidance  There was no significant paravalvular leak appreciated post implant  The sheaths were removed and hemostasis obtained by manual compression of the venous sheath  The TAVR delivery sheath was removed and percutaneous closure was obtained using Preclose technique with two Proglide devices deployed pre sheath insertion  Patient left the hybrid operating room in stable condition  Impression  Successful 23mm Farmer ROSS 3 ULTRA transcatheter aortic valve replacement  SIGNATURE: Tamia Ayers DATE: October 11, 2022 TIME: 11:58 AM NOTE: A cardiac surgeon as co-surgeon was required as is a CMS requirement as well as needed for conventional open (non catheter based) surgical skills should become necessary       TRAY Anesthesia    Result Date: 10/11/2022  Narrative: Migdalia Forte MD     10/11/2022 10:27 AM Procedure Performed: TRAY Anesthesia Start Time:  10/11/2022 9:35 AM Preanesthesia Checklist Patient identified, IV assessed, risks and benefits discussed, monitors and equipment assessed, procedure being performed at surgeon's request and anesthesia consent obtained  Procedure Diagnostic Indications for TRAY:  assessment of surgical repair, defect repair evaluation, hemodynamic monitoring and suspected pericardial effusion  Type of TRAY: interventional TRAY with real time guidance of intracardiac procedure  Images Saved: ultrasound permanent image saved  Physician Requesting Echo: Ruperto Pena MD   Location performed: OR  Intubated  Bite block not placed  Heart visualized  Insertion of TRAY Probe:  Atraumatic  Probe Type:  Multiplane  Modalities:  3D, color flow mapping, continuous wave Doppler and pulse wave Doppler  Echocardiographic and Doppler Measurements PREPROCEDURE LEFT VENTRICLE: Systolic Function: normal  Ejection Fraction: 60%  RIGHT VENTRICLE: Systolic Function: normal   Cavity size normal   AORTIC VALVE: Leaflets: normal and trileaflet  Leaflet motions restricted and heavily calcified  Stenosis: severe  Mean Gradient: 34 mmHg  Peak Gradient: 63 mmHg  Maximum velocity (cm/s): 4 0 m/sec  MITRAL VALVE: Leaflets: calcified and normal  Leaflet Motions: normal  Regurgitation: mild  Stenosis: none and by CFD  TRICUSPID VALVE: Leaflets: normal    Regurgitation: trace  ASCENDING AORTA:   Dissection not present  AORTIC ARCH:   dissection not present  Grade 3: atheroma protruding < 0 5 cm into lumen  DESCENDING AORTA:   Dissection not present  Grade 3: atheroma protruding < 0 5 cm into lumen  LEFT ATRIAL APPENDAGE:  No spontaneous echo contrast OTHER ATRIAL FINDINGS: No NICK clot  ATRIAL SEPTUM: Intra-atrial septal morphology: possible small PFO with L-->R flow by CFD  POSTPROCEDURE LEFT VENTRICLE: Unchanged   RIGHT VENTRICLE: Unchanged   AORTIC VALVE: Leaflets: ROSS valve in aortic position, well-seated, does not rock, good leaflet excursion and closure, no PVL and bioprosthetic   Stenosis: AV VTI 32 cm, LVOT VTI 21 Cm, THEO 2 36 cm^2  Mean Gradient: 5 mmHg  MITRAL VALVE:  Regurgitation: mild-mod  TRICUSPID VALVE: Unchanged   AORTA: Unchanged   Dissection: Dissection not present  REMOVAL: Probe Removal: atraumatic  ECHOCARDIOGRAM COMMENTS: Post-procedure, intra-atrial shunt (L-->R, likely PFO) more apparent, NL 50 cm/sec  XR chest portable ICU    Result Date: 10/11/2022  Narrative: CHEST INDICATION:   S/P open heart  COMPARISON:  8/5/2022 CT EXAM PERFORMED/VIEWS:  XR CHEST PORTABLE ICU FINDINGS:  Lung volumes are within normal limits  Lungs are clear  No effusion or pneumothorax  New aortic valve replacement  New right IJ central venous catheter at the cavoatrial junction  Otherwise stable appearance of the cardiomediastinal silhouette  No acute osseous or soft tissue pathology  Impression: No acute cardiopulmonary findings  Workstation performed: [id number]     Echo complete w/ contrast if indicated    Result Date: 10/11/2022  Narrative: •  Left Ventricle: Left ventricular cavity size is normal  Wall thickness is moderately increased  The left ventricular ejection fraction is 65%  Systolic function is normal  Wall motion cannot be accurately assessed  Diastolic function is mildly abnormal, consistent with grade I (abnormal) relaxation  •  Left Atrium: The atrium is mildly dilated  •  Aortic Valve: There is an Farmer ROSS 3 23 mm TAVR bioprosthetic valve  The prosthetic valve appears well-seated and appears to be functioning normally  There is no evidence of paravalvular regurgitation  There is no evidence of transvalvular regurgitation  The aortic valve peak velocity is 2 53 m/s  The aortic valve peak gradient is 26 mmHg  The aortic valve mean gradient is 14 mmHg  The aortic valve area is 1 98 cm2  •  Mitral Valve: There is mild annular calcification  Thank you for allowing us to participate in this patient's care  This pt will follow up with Dr Lulú Pang once discharged       Counseling / Coordination of Care  Total floor / unit time spent today 45 minutes  Greater than 50% of total time was spent with the patient and / or family counseling and / or coordination of care  A description of the counseling / coordination of care: Review of history, current assessment, development of a plan  Code Status: Level 1 - Full Code    ** Please Note: Dragon 360 Dictation voice to text software may have been used in the creation of this document   **

## 2022-10-12 NOTE — PLAN OF CARE
Problem: PHYSICAL THERAPY ADULT  Goal: Performs mobility at highest level of function for planned discharge setting  See evaluation for individualized goals  Description: Treatment/Interventions: LE strengthening/ROM, Elevations, Therapeutic exercise, Endurance training, Bed mobility, Gait training, Spoke to nursing, Spoke to case management, OT          See flowsheet documentation for full assessment, interventions and recommendations  Note: Prognosis: Good  Problem List: Decreased strength, Decreased endurance, Impaired balance, Decreased mobility, Obesity  Assessment: Pt is 57 y o  female admitted with Dx of Severe aortic stenosis and underwent Transcatheter aortic valve replacement with a 23 mm Farmer ROSS 3 bioprosthetic valve via left transfemoral approach on 10/11/2022; postop course included: LBBB  Pt 's comorbidities affecting POC include: Adhesive capsulitis of right shoulder, CAD (coronary artery disease), CHF (congestive heart failure) ([facility] [address] ), Diabetes mellitus ([facility] [address] ), History of breast cancer, Hypertension, Lymphedema of right upper extremity, Obesity (BMI 30-39 9), SJ on CPAP and personal factors of: YAYA  Pt's clinical presentation is currently unstable/unpredictable which is evident in ongoing telem monitoring w/ EP following due to new LBBB  Pt presents w/ generalized weakness, incl decreased LE strength, decreased functional endurance, and inconsistent amb balance and gait patterns w/ overall observed mobility status on level surfaces and elevations appearing to be at or near premorbid level  Will cont to follow pt in PT for progressive mobilization to max endurance  Otherwise, anticipate pt will return home w/ available family support upon D/C when medically cleared; home PT follow up may need to be considered  PT Discharge Recommendation: Home with home health rehabilitation (home PT)    See flowsheet documentation for full assessment

## 2022-10-12 NOTE — CASE MANAGEMENT
Case Management Assessment & Discharge Planning Note    Patient name Sergey Powers  Location [facility] 368-66 MRN [id number]  : 1960 Date 10/12/2022       Current Admission Date: 10/11/2022  Current Admission Diagnosis:Severe aortic stenosis   Patient Active Problem List    Diagnosis Date Noted   • S/P TAVR (transcatheter aortic valve replacement) 10/12/2022   • CHF (congestive heart failure) ([facility] [address] ) 10/11/2022   • History of breast cancer 10/11/2022   • SJ on CPAP 10/11/2022   • CAD (coronary artery disease) 10/11/2022   • LBBB (left bundle branch block) 10/11/2022   • Hyperchloremia 10/11/2022   • Diverticulitis of large intestine without perforation or abscess without bleeding 2022   • Lightheadedness 2021   • Acute pain of right shoulder 2019   • Adhesive capsulitis of right shoulder 2019   • Severe aortic stenosis 2019   • Hypertension 2019   • Hyperlipidemia 2019   • Diabetes mellitus ([facility] ) 2019   • Heart palpitations 2019      LOS (days): 1  Geometric Mean LOS (GMLOS) (days): 2 20  Days to GMLOS:1 2     OBJECTIVE:    Risk of Unplanned Readmission Score: 10 75         Current admission status: Inpatient       Preferred Pharmacy:   [facility] [address], [address] [facility]  12   [facility]  12   [place] [address]  Phone: [phone] Fax: [place] [address] [id number]  [address]  [facility] [address]  Phone: [phone] Fax: [phone]    Primary Care Provider: Blake Ross DO    Primary Insurance: [address] [place] [address]  Secondary Insurance: BLUE CROSS    ASSESSMENT:  Babak Garcia Proxies    There are no active Health Care Proxies on file         Advance Directives  Primary Contact:  Augustina Leavitt         Readmission Root Cause  30 Day Readmission: No    Patient Information  Admitted from[de-identified] Home  Mental Status: Alert  During Assessment patient was accompanied by: Not accompanied during assessment  Assessment information provided by[de-identified] Patient  Support Systems: Spouse/significant other, Daughter  [place] of Residence: [place] [address] do you live in?: [address]: Lives w/ Spouse/significant other    Activities of Daily Living Prior to Admission  Functional Status: Independent  Completes ADLs independently?: Yes  Ambulates independently?: Yes  Does patient use assisted devices?: No  Does patient currently own DME?: No  Does patient have a history of Outpatient Therapy (PT/OT)?: No  Does the patient have a history of Short-Term Rehab?: No  Does patient have a history of HHC?: No  Does patient currently have [address]?: No         Patient Information Continued  Income Source: Employed  Does patient have prescription coverage?: Yes  Within the past 12 months, you worried that your food would run out before you got the money to buy more : Never true  Within the past 12 months, the food you bought just didn't last and you didn't have money to get more : Never true  Food insecurity resource given?: N/A  Does patient receive dialysis treatments?: No  Does patient have a history of substance abuse?: No  Does patient have a history of Mental Health Diagnosis?: No         Means of Transportation  Means of Transport to Appts[de-identified] Drives Self        DISCHARGE DETAILS:    Discharge planning discussed with[de-identified] pt--recommended for home PT, discussed with pt, would prefer [facility], referral sent  [place] of Choice: Yes     CM contacted family/caregiver?: No- see comments (alert and oriented)  Were Treatment Team discharge recommendations reviewed with patient/caregiver?: Yes  Did patient/caregiver verbalize understanding of patient care needs?: Yes  Were patient/caregiver advised of the risks associated with not following Treatment Team discharge recommendations?: Yes    Contacts  Patient Contacts:  Mariela Born Serface  Relationship to Patient[de-identified] Family  Contact Method: Phone  Phone Number: [phone]  Reason/Outcome: Continuity of Care, Emergency Contact, Discharge [address]         Is the patient interested in [facility] at discharge?: Yes  [address] requested[de-identified] Physical [address] Name[de-identified] [facility] Provider[de-identified] PCP  Home Health Services Needed[de-identified] Evaluate Functional Status and Safety, Gait/ADL Training, Strengthening/Theraputic Exercises to Improve Function  Homebound Criteria Met[de-identified] Requires the Assistance of Another Person for Safe Ambulation or to Leave the Home  Supporting Clincal Findings[de-identified] Fatigues Easliy in [employer], Limited Endurance    DME Referral Provided  Referral made for DME?: No (does not want RW)    Other Referral/Resources/Interventions Provided:  Interventions: [facility]  [facility]         Treatment Team Recommendation: Home with [facility]  Discharge Destination Plan[de-identified] Home with [place] at Discharge : Family                                      Additional Comments: 54yo female POD#1 TAVR  Has new left BBB and and will remain another night  Home PT accepted by [facility]  Refuses RW, family to transport

## 2022-10-12 NOTE — PROGRESS NOTES
Progress Note - Cardiothoracic Surgery   Josi Salas 57 y o  female MRN: [id number]  Unit/Bed#: [facility] 405-01 Encounter: [id number]    Aortic stenosis, Non-Rheumatic  S/P transfemoral transcatheter aortic valve replacement; POD # 1    24 Hour Events: No events  Weaned to room air  Cardene weaned off  NPO, pending EP evaluation       Medications:   Scheduled Meds:  Current Facility-Administered Medications   Medication Dose Route Frequency Provider Last Rate   • acetaminophen  650 mg Rectal Q4H PRN Adriana Phillip PA-C     • acetaminophen  975 mg Oral Q8H Adriana Phillip PA-C     • aspirin  81 mg Oral Daily Adriana Phillip PA-C     • bisacodyl  10 mg Rectal Daily PRN Adriana Phillip PA-C     • cefazolin  2,000 mg Intravenous Q8H Adriana Phillip PA-C 2,000 mg (10/12/22 0332)   • chlorhexidine  15 mL Mouth/Throat BID Adriana Phillip PA-C     • docusate sodium  100 mg Oral BID Adriana Phillip PA-C     • furosemide  40 mg Intravenous Daily Adriana Phillip PA-C     • glimepiride  4 mg Oral BID Adriana Phillip PA-C     • heparin (porcine)  5,000 Units Subcutaneous Q8H [address] Adriana Phillip PA-C     • hydrALAZINE  5 mg Intravenous Q6H PRN Adriana Phillip PA-C     • insulin glargine  10 Units Subcutaneous HS Adriana Phillip PA-C     • insulin lispro  1-6 Units Subcutaneous TID AC Adriana Phillip PA-C     • insulin lispro  1-6 Units Subcutaneous HS Adriana Phillip PA-C     • lidocaine (cardiac)  100 mg Intravenous Q30 Min PRN Adriana Phillip PA-C     • lisinopril  5 mg Oral Daily Adriana Phillip PA-C     • mupirocin  1 application Nasal D59E [address] Adriana Phillip PA-C     • niCARdipine  2 5-15 mg/hr Intravenous Titrated Adriana Phillip PA-C Stopped (10/11/22 1800)   • ondansetron  4 mg Intravenous Q6H PRN Adriana Phillip PA-C     • oxyCODONE  2 5 mg Oral Q6H PRN Silver Cornejo PA-C     • oxyCODONE  5 mg Oral Q6H PRN Silver Cornejo PA-C     • pantoprazole  40 mg Oral Daily Adriana Phillip PA-C     • polyethylene glycol 17 g Oral Daily Adriana Phillip PA-C     • potassium chloride  20 mEq Oral Daily Adriana Phillip PA-C     • pravastatin  20 mg Oral Daily Adriana Phillip PA-C       Continuous Infusions:niCARdipine, 2 5-15 mg/hr, Last Rate: Stopped (10/11/22 1800)      PRN Meds: •  acetaminophen  •  bisacodyl  •  hydrALAZINE  •  lidocaine (cardiac)  •  ondansetron  •  oxyCODONE  •  oxyCODONE    Vitals:   Vitals:    10/12/22 0306 10/12/22 0400 10/12/22 0500 10/12/22 0600   BP: 116/50 107/53 126/52 127/56   BP Location: Left arm      Pulse: 70  82 81   Resp: 20      Temp: 98 4 °F (36 9 °C)      TempSrc: Oral      SpO2: 95%  94% 91%   Weight:    85 8 kg (189 lb 2 5 oz)   Height:           Telemetry: NSR; Heart Rate: 71    Hemodynamics:       Respiratory:   SpO2: SpO2: 91 %, SpO2 Activity: SpO2 Activity: At Rest; Room Air    Intake/Output:     Intake/Output Summary (Last 24 hours) at 10/12/2022 0720  Last data filed at 10/12/2022 0718  Gross per 24 hour   Intake 2078 ml   Output 4450 ml   Net -2372 ml        Weights:   Weight (last 2 days)     Date/Time Weight    10/12/22 0600 85 8 (189 15)    10/11/22 1415 83 (183)    10/11/22 0754 83 (183)        Results:   Results from last 7 days   Lab Units 10/12/22  0442 10/11/22  1305 10/11/22  1049   WBC Thousand/uL 8 52  --   --    HEMOGLOBIN g/dL 12 0  12 0  --  12 2   HEMATOCRIT % 36 9  36 8  --  38 7   PLATELETS Thousands/uL 184 196  --      Results from last 7 days   Lab Units 10/12/22  0442 10/11/22  1049   POTASSIUM mmol/L 3 5 3 5   CHLORIDE mmol/L 103 110*   CO2 mmol/L 27 23   BUN mg/dL 11 11   CREATININE mg/dL 0 67 0 54*   CALCIUM mg/dL 8 5 8 6         Point of care glucose: 156    Studies:    CXR: No effusion  No PTX  EKG: NSR with persistent (new post op) LBBB    Echo:  The prosthetic valve appears well-seated and appears to be functioning normally  There is no evidence of paravalvular regurgitation  I have personally reviewed pertinent reports     and I have personally reviewed pertinent films in PACS    Invasive Lines/Tubes:  Invasive Devices  Report    Central Venous Catheter Line  Duration           CVC Central Lines 10/11/22 Triple <1 day          Peripheral Intravenous Line  Duration           Peripheral IV 10/11/22 Left Arm <1 day    Peripheral IV 10/11/22 Left Hand <1 day          Drain  Duration           Urethral Catheter Non-latex; Temperature probe 16 Fr  <1 day                Physical Exam:    HEENT/NECK:  Normocephalic  Atraumatic  No jugular venous distention  Cardiac: Regular rate and rhythm and No murmurs/rubs/gallops  Pulmonary:  Breath sounds clear bilaterally  Abdomen:  Non-tender, Non-distended and Normal bowel sounds  Incisions: Groin puncture sites clean, dry, and intact without hematoma  Extremities: Extremities warm/dry and Trace edema B/L  Neuro: Alert and oriented X 3  Skin: Warm/Dry, without rashes or lesions  Assessment:  Principal Problem:    Severe aortic stenosis  Active Problems:    Hypertension    Hyperlipidemia    Diabetes mellitus (HCC)    CHF (congestive heart failure) (HCC)    History of breast cancer    SJ on CPAP    CAD (coronary artery disease)    LBBB (left bundle branch block)    Hyperchloremia    S/P TAVR (transcatheter aortic valve replacement)       Aortic stenosis, Non-Rheumatic  S/P transfemoral transcatheter aortic valve replacement; POD # 1    Plan:    1  Cardiac:    NSR with new post op LBBB  NPO, awaiting EP consultation  Beta blocker held    HR/BP well-controlled   Continue Lisinopril, 5 mg PO QD   Continue PRN IV Hydralazine    Maintain central IV access today    ASA/Plavix    Consult cardiology for postoperative medical management  Post op transthoracic echocardiogram completed  Well seated AV, without PVL    Continue Statin therapy  Continue DVT prophylaxis    2     Pulmonary:   Extubated  CXR findings: Clear  Good Room air oxygen saturation; Continue incentive spirometry/Coughing/Deep breathing exercises    3  Renal:   Intake/Output net: -1800  mL/24 hours  D/C IV Lasix  Start Torsemide, 20 mg PO QD    Post op Creatinine stable; Follow up labs prn    4  Neuro:  Neurologically intact; No active issues  Incisional pain well-controlled; Continue prn Tylenol    5  GI:  Tolerating TLC 2 3 gm sodium diet  Maintain 1800 mL daily fluid restriction   Continue daily stool softeners and prn suppository  Continue GI prophylaxis    6  Endo:   History of diabetes; Continue pre-op regimen with additional sliding scale coverage    7  Hematology:    Post-operative blood count acceptable; Trend prn    8     Disposition:   Gerontology consultation ordered for routine assessment of TAVR patient over 72years old  Transfer from step down to telemetry level of care today    VTE Pharmacologic Prophylaxis: Sequential compression device (Venodyne)  and Heparin  VTE Mechanical Prophylaxis: sequential compression device    Bedside rounds completed with supervising physician  Plan of care discussed with bedside nurse    SIGNATURE: Ju Lopez  DATE: October 12, 2022  TIME: 7:20 AM

## 2022-10-12 NOTE — PHYSICAL THERAPY NOTE
Physical Therapy Evaluation     Patient's Name: Emi Hanna    Admitting Diagnosis  Aortic stenosis, moderate [I35 0]    Problem List  Patient Active Problem List   Diagnosis    Severe aortic stenosis    Hypertension    Hyperlipidemia    Diabetes mellitus ([facility] )    Heart palpitations    Acute pain of right shoulder    Adhesive capsulitis of right shoulder    Lightheadedness    Diverticulitis of large intestine without perforation or abscess without bleeding    CHF (congestive [facility] failure) ([facility] )    History of breast cancer    SJ on CPAP    CAD (coronary artery disease)    LBBB (left bundle branch block)    Hyperchloremia    S/P TAVR (transcatheter aortic valve replacement)       Past Medical History  Past Medical History:   Diagnosis Date    Adhesive capsulitis of right shoulder     CAD (coronary artery disease)     CHF (congestive heart failure) ([facility])     Diabetes mellitus ([facility] )     type 2, insulin dependent    Diverticulosis     History of breast cancer     s/p right lumpectomy, s/p radiation    Hyperlipidemia     Hypertension     Lymphedema of right upper extremity     Obesity (BMI 30-39 9)     SJ on CPAP     Severe aortic stenosis     Vitamin D deficiency        Past Surgical History  Past Surgical History:   Procedure Laterality Date    APPENDECTOMY      BREAST LUMPECTOMY Right     CARDIAC CATHETERIZATION N/A 08/19/2022    Procedure: Cardiac RHC/LHC; Surgeon: Dre Lee DO;  Location: [facility] CARDIAC CATH LAB; Service: Cardiology    CHOLECYSTECTOMY      KNEE ARTHROSCOPY Right     knee    TONSILLECTOMY AND ADENOIDECTOMY      TOTAL ABDOMINAL HYSTERECTOMY            10/12/22 0852   PT Last Visit   PT Visit Date 10/12/22   Note Type   Note type Evaluation   Pain Assessment   Pain Assessment Tool FLACC   Pain Location/Orientation Orientation: Left; Location: Incision; Location: Groin   Pain Onset/Description Onset: Ongoing;Frequency: Intermittent; Descriptor: Aching;Descriptor: Discomfort   Effect of Pain on Daily Activities guarding   Patient's Stated Pain Goal No pain   Hospital Pain Intervention(s) Repositioned; Ambulation/increased activity; Emotional support   Pain Rating: FLACC (Rest) - Face 0   Pain Rating: FLACC (Rest) - Legs 0   Pain Rating: FLACC (Rest) - Activity 0   Pain Rating: FLACC (Rest) - Cry 0   Pain Rating: FLACC (Rest) - Consolability 0   Score: FLACC (Rest) 0   Pain Rating: FLACC (Activity) - Face 1   Pain Rating: FLACC (Activity) - Legs 0   Pain Rating: FLACC (Activity) - Activity 0   Pain Rating: FLACC (Activity) - Cry 1   Pain Rating: FLACC (Activity) - Consolability 0   Score: FLACC (Activity) 2   Restrictions/Precautions   Braces or Orthoses   (denies)   Other Precautions Cardiac/sternal;Telemetry   Home Living   Type of Home House   Home Layout One level  (1 small YAYA)   Prior Function   Level of [place] Independent with ADLs  (amb w/o AD)   Lives With Spouse   General   Additional Pertinent History cleared for assessment (spoke to nsg)   Cognition   Overall Cognitive Status WFL   Arousal/Participation Alert   Attention Within functional limits   Orientation Level Oriented to person;Oriented to place;Oriented to situation   Memory Within functional limits   Following Commands Follows one step commands without difficulty   Subjective   Subjective Alert; in the chair; agreeable to mobilize   RUE Assessment   RUE Assessment WFL  (AROM)   LUE Assessment   LUE Assessment WFL  (AROM)   RLE Assessment   RLE Assessment WFL  (AROM)   Strength RLE   RLE Overall Strength   (good - (grossly))   LLE Assessment   LLE Assessment WFL  (AROM)   Strength LLE   LLE Overall Strength   (fair +/ good - (grossly))   Transfers   Sit to Stand 6  Modified independent   Stand to Sit 6  Modified independent   Ambulation/Elevation   Gait pattern Excessively slow; Short stride; Inconsistent brandon  (no overt LOB or knee buckling)   Gait Assistance 5  Supervision  (mainly for lines)   Assistive Device None Distance 2 x 90 ft w/ standing stop in between and steps negotiation after initial 10 ft   Stair Management Assistance 5  Supervision   Additional items Assist x 1;Verbal cues  (reviewed sequencing)   Stair Management Technique One rail R;Step to pattern; Foreward;Backward;Nonreciprocal   Number of Stairs 2  (1 step x 2 trials)   Balance   Static Sitting Good   Dynamic Sitting Fair +   Static Standing Fair +   Dynamic Standing Fair   Ambulatory Fair   Activity Tolerance   Activity Tolerance Patient tolerated treatment well   Medical Staff Made Aware Co-eval performed w/ OTR due to complexity of medical status and multiple comorbidities   Nurse Made Aware spoke to Shannan Barragan, [address] [place] [address]   Assessment   Prognosis Good   Problem List Decreased strength;Decreased endurance; Impaired balance;Decreased mobility;Obesity   Assessment Pt is 57 y o  female admitted with Dx of Severe aortic stenosis and underwent Transcatheter aortic valve replacement with a 23 mm Farmer RSOS 3 bioprosthetic valve via left transfemoral approach on 10/11/2022; postop course included: LBBB  Pt 's comorbidities affecting POC include: Adhesive capsulitis of right shoulder, CAD (coronary artery disease), CHF (congestive heart failure) ([facility] Diabetes mellitus ([facility] [address] ), History of breast cancer, Hypertension, Lymphedema of right upper extremity, Obesity (BMI 30-39 9), SJ on CPAP and personal factors of: YAYA  Pt's clinical presentation is currently unstable/unpredictable which is evident in ongoing telem monitoring w/ EP following due to new LBBB  Pt presents w/ generalized weakness, incl decreased LE strength, decreased functional endurance, and inconsistent amb balance and gait patterns w/ overall observed mobility status on level surfaces and elevations appearing to be at or near premorbid level  Will cont to follow pt in PT for progressive mobilization to max endurance   Otherwise, anticipate pt will return home w/ available family support upon D/C when medically cleared; home PT follow up may need to be considered  Goals   Patient Goals to return home   STG Expiration Date 10/19/22   Short Term Goal #1 5-7 days  Pt will amb 400 ft w/o assistive device, (I) in order to facilitate safe return to premorbid environment and community amb status  Pt will negotiate 1 step w/o hand rail and w/ SPC PRN, mod (I) in order to assure safe navigation in and out of the premorbid living environment  Pt will achieve (I) level w/ bed mob in order to facilitate safety with OOB and back to bed transitions in own living environment  Pt will participate in LE therex and balance activities to max progression w/ mobility skills  PT Treatment Day 0   Plan   Treatment/Interventions LE strengthening/ROM; Elevations; Therapeutic exercise; Endurance training;Bed mobility;Gait training;Spoke to nursing;Spoke to case management;OT   PT Frequency 4-6x/wk   Recommendation   PT Discharge Recommendation Home with home health rehabilitation  (home PT)   AM-PAC Basic Mobility Inpatient   Turning in Bed Without Bedrails 4   Lying on Back to Sitting on Edge of Flat Bed 4   Moving Bed to Chair 4   Standing Up From Chair 4   Walk in Room 3   Climb 3-5 Stairs 3   Basic Mobility Inpatient Raw Score 22   Basic Mobility Standardized Score 47 4   Highest Level Of Mobility   JH-HLM Goal 7: Walk 25 feet or more   JH-HLM Achieved 7: Walk 25 feet or more   Modified Worth Scale   Modified Worth Scale 2   End of Consult   Patient Position at End of Consult Bedside chair; All needs within reach         [facility]

## 2022-10-12 NOTE — UTILIZATION REVIEW
Initial Clinical Review    Elective Inpatient surgical procedure  Age/Sex: 57 y o  female  Surgery Date: 10/11/2022  Procedure: Transcatheter aortic valve replacement with a 23 mm Farmer ROSS 3 bioprosthetic valve via left transfemoral approach  Anesthesia: general endotracheal anesthesia with transesophageal echocardiogram guidance  Operative Findings: 1  Intraoperative transesophageal echocardiogram revealed severe aortic stenosis  2  Final transesophageal echocardiogram demonstrated prosthetic valve with normal function no perivalvular leak  POD#1 Progress Note (10/12/2022): Aortic stenosis, Non-Rheumatic  S/P transfemoral transcatheter aortic valve replacement  Wean to room air  Wean off IV cardene  NPO  Consult EP:  New LBBB after TAVR  Hold BB  Repeat ECG in AM   OT  Admission Orders: Date/Time/Statement:   Admission Orders (From admission, onward)     Ordered        10/11/22 1050  Inpatient Admission  Once                      Orders Placed This Encounter   Procedures   • Inpatient Admission     Standing Status:   Standing     Number of Occurrences:   1     Order Specific Question:   Level of Care     Answer:   Level 1 Stepdown [13]     Order Specific Question:   Estimated length of stay     Answer:   More than 2 Midnights     Order Specific Question:   Certification     Answer:   I certify that inpatient services are medically necessary for this patient for a duration of greater than two midnights  See H&P and MD Progress Notes for additional information about the patient's course of treatment  Vital Signs: /50   Pulse 81   Temp 98 2 °F (36 8 °C) (Oral)   Resp 18   Ht 5' 2" (1 575 m)   Wt 85 8 kg (189 lb 2 5 oz)   LMP  (LMP Unknown)   SpO2 95%   BMI 34 60 kg/m²     Pertinent Labs/Diagnostic Test Results:   XR chest portable ICU   Final Result by Risa Romero MD (10/11 1615)   No acute cardiopulmonary findings        XR chest portable    (Results Pending)     Results from last 7 days   Lab Units 10/12/22  0442 10/11/22  1305 10/11/22  1049   WBC Thousand/uL 8 52  --   --    HEMOGLOBIN g/dL 12 0  12 0  --  12 2   HEMATOCRIT % 36 9  36 8  --  38 7   PLATELETS Thousands/uL 184 196  --      Results from last 7 days   Lab Units 10/12/22  0442 10/11/22  1049   SODIUM mmol/L 136 139   POTASSIUM mmol/L 3 5 3 5   CHLORIDE mmol/L 103 110*   CO2 mmol/L 27 23   ANION GAP mmol/L 6 6   BUN mg/dL 11 11   CREATININE mg/dL 0 67 0 54*   EGFR ml/min/1 73sq m 93 100   CALCIUM mg/dL 8 5 8 6     Results from last 7 days   Lab Units 10/12/22  0603 10/11/22  2112 10/11/22  1639 10/11/22  1045 10/11/22  0812   POC GLUCOSE mg/dl 134 138 147* 136 141*     Results from last 7 days   Lab Units 10/12/22  0442 10/11/22  1049   GLUCOSE RANDOM mg/dL 156* 134     Diet: CV diet with fld restriction 1800ml  Mobility: Up with assistance / Up as tolerated / POD#1 OOB to chair and for all meals    DVT Prophylaxis: Heparin    Medications/Pain Control:   Scheduled Medications:  acetaminophen, 975 mg, Oral, Q8H  aspirin, 81 mg, Oral, Daily  carvedilol, 6 25 mg, Oral, BID With Meals  cefazolin, 2,000 mg, Intravenous, Q8H  chlorhexidine, 15 mL, Mouth/Throat, BID  clopidogrel, 75 mg, Oral, Daily  docusate sodium, 100 mg, Oral, BID  glimepiride, 4 mg, Oral, BID  heparin (porcine), 5,000 Units, Subcutaneous, Q8H JEFF  insulin glargine, 10 Units, Subcutaneous, HS  insulin lispro, 1-6 Units, Subcutaneous, TID AC  insulin lispro, 1-6 Units, Subcutaneous, HS  lisinopril, 5 mg, Oral, Daily  mupirocin, 1 application, Nasal, W50K JEFF  pantoprazole, 40 mg, Oral, Daily  polyethylene glycol, 17 g, Oral, Daily  potassium chloride, 20 mEq, Oral, Daily  pravastatin, 20 mg, Oral, Daily  torsemide, 20 mg, Oral, Daily      Continuous IV Infusions:  niCARdipine (CARDENE) 25 mg (STANDARD CONCENTRATION) in sodium chloride 0 9% 250 mL  Rate:  mL/hr Dose: 2 5-15 mg/hr  Freq: Titrated Route: IV  Last Dose: Stopped (10/11/22 1800)  Start: 10/11/22 1045 End: 10/12/22 0736  multi-electrolyte (PLASMALYTE-A/ISOLYTE-S PH 7 4) IV solution  Rate: 50 mL/hr Dose: 50 mL/hr  Freq: Continuous Route: IV  Indications of Use: IV Hydration  Last Dose: Stopped (10/11/22 2330)  Start: 10/11/22 1100 End: 10/11/22 2319    PRN Meds:  acetaminophen, 650 mg, Oral, Q6H PRN  bisacodyl, 10 mg, Rectal, Daily PRN  hydrALAZINE, 5 mg, Intravenous, Q6H PRN  lidocaine (cardiac), 100 mg, Intravenous, Q30 Min PRN  ondansetron, 4 mg, Intravenous, Q6H PRN        Network Utilization Review Department  ATTENTION: Please call with any questions or concerns to [phone] and carefully listen to the prompts so that you are directed to the right person  All voicemails are confidential   Bill Kauffman all requests for admission clinical reviews, approved or denied determinations and any other requests to dedicated fax number below belonging to the campus where the patient is receiving treatment   List of dedicated fax numbers for the Facilities:  [address] DENIALS (Administrative/Medical Necessity) [phone]   [address] (Maternity/NICU/Pediatrics) [phone]   [address] [phone]   Andrew Lambert 77 [phone]   1306 [facility] [address] [facility] [place] [address] [address] Scar[address] [place] 28 [phone]   [address] [facility] 134 815 [facility] [phone]

## 2022-10-12 NOTE — CONSULTS
Consultation - Stephon Julien 57 y o  female MRN: [id number]  Unit/Bed#: [facility] 405-01 Encounter: [id number]      Assessment/Plan  Aortic stenosis  S/p TAVR  CT surgery managing    Frailty   Clinical Frail Scale: 4- Vulnerable  Not dependent for daily help, symptoms limit activity  Feeling slowed up, tired during the day  PT/OT  Albumin 4 0, maintain protein in diet  Keep physically, mentally and socially active    Cognitive screening  No reported memory loss  Recommend check TSH and Vitamin B12  maintain neuro protective lifestyle :   Keep physically, mentally and socially active   Lower BMI   Increase physical exercise   Recommend Mediterranean diet   Monitor good control of blood pressure, blood sugar and cholestrerol    Fall prevention  At risk for falls secondary to polypharmacy, hx of prior fall  Fall precautions  Recommend fall prevention/ balance training such as Matter of Balance or Yeyo Chi or yoga  Fall prevention @ cdc gov/steadi    Insomnia  Related to hospitalization  First line is behavioral therapy  Avoid sedative hypnotics such as benzodiazepines and benadryl  Encourage staying awake during the day  Encourage daytime activity, morning exercise  Decrease or eliminate day time naps  Avoid caffiene, alcohol especially during late afternoon and evening hours  Establish a night time routine  Recommend melatonin 3 mg po QHS    Delirium precautions  Alert and oriented  X 3  Avoid deliriogenic medications such as tramadol, benzodiazepines, anticholinergics,  Benadryl  Treat pain, See geriatric pain protocol  Monitor for constipation and urinary retention  Encourage early and frequent moblization, OOB  Encourage Hydration/ Nutrition  Implement sleep hygiene, limit night time interuptions, group activities    Advanced care planning  Full code       History of Present Illness   Physician Requesting Consult: Raine Bradshaw MD  Reason for Consult / Principal Problem: aortic stenosis  Hx and PE limited by: NA  HPI: Horacio Farooq is a 57y o  year old female who presents with aortic stenosis  She is admitted for transcatheter aortic valve replacement  She has CAD, HF, HTN, hyperlipidemia, SJ    Prior to arrival pt lives at home with her   She drives  She is independent with IADLs and ADLs  She ambulates independently  She has prior fall  She wears glasses  She has full upper dentures and partial lowers  She has sleep apnea  She denies urinary incontinence or constipation  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, she scrap books and likes to travel with her   Upon exam pt is lying in bed  She is alert and oriented x 3  Inpatient consult to Gerontology  Consult performed by: AUSTIN Sutton  Consult ordered by: Silverio Georges PA-C          Review of Systems   Constitutional: Negative for unexpected weight change  HENT: Negative for hearing loss  Eyes: Negative for visual disturbance  Respiratory: Negative for cough  Cardiovascular: Negative for chest pain  Gastrointestinal: Negative for constipation  Genitourinary: Negative for difficulty urinating  Musculoskeletal: Negative for gait problem  Neurological: Negative for weakness  Psychiatric/Behavioral: Negative for suicidal ideas  Historical Information   Past Medical History:   Diagnosis Date   • Adhesive capsulitis of right shoulder    • CAD (coronary artery disease)    • CHF (congestive heart failure) ([facility])    • Diabetes mellitus ([facility])     type 2, insulin dependent   • Diverticulosis    • History of breast cancer     s/p right lumpectomy, s/p radiation   • Hyperlipidemia    • Hypertension    • Lymphedema of right upper extremity    • Obesity (BMI 30-39  9)    • SJ on CPAP    • Severe aortic stenosis    • Vitamin D deficiency      Past Surgical History:   Procedure Laterality Date   • APPENDECTOMY     • BREAST LUMPECTOMY Right    • CARDIAC CATHETERIZATION N/A 08/19/2022    Procedure: Cardiac RHC/LHC;   Surgeon: Carole Miller DO;  [facility];   Service: Cardiology   • CHOLECYSTECTOMY     • KNEE ARTHROSCOPY Right     knee   • TONSILLECTOMY AND ADENOIDECTOMY     • TOTAL ABDOMINAL HYSTERECTOMY       Social History   Social History     Substance and Sexual Activity   Alcohol Use No     Social History     Substance and Sexual Activity   Drug Use No     Social History     Tobacco Use   Smoking Status Never Smoker   Smokeless Tobacco Never Used         Family History:   Family History   Problem Relation Age of Onset   • COPD Mother    • Heart disease Father    • Heart disease Paternal Grandmother    • Cancer Family    • Colon cancer Neg Hx        Meds/Allergies   Current meds:   Current Facility-Administered Medications   Medication Dose Route Frequency   • acetaminophen (TYLENOL) tablet 650 mg  650 mg Oral Q6H PRN   • acetaminophen (TYLENOL) tablet 975 mg  975 mg Oral Q8H   • aspirin chewable tablet 81 mg  81 mg Oral Daily   • bisacodyl (DULCOLAX) rectal suppository 10 mg  10 mg Rectal Daily PRN   • carvedilol (COREG) tablet 6 25 mg  6 25 mg Oral BID With Meals   • ceFAZolin (ANCEF) IVPB (premix in dextrose) 2,000 mg 50 mL  2,000 mg Intravenous Q8H   • chlorhexidine (PERIDEX) 0 12 % oral rinse 15 mL  15 mL Mouth/Throat BID   • clopidogrel (PLAVIX) tablet 75 mg  75 mg Oral Daily   • docusate sodium (COLACE) capsule 100 mg  100 mg Oral BID   • glimepiride (AMARYL) tablet 4 mg  4 mg Oral BID   • heparin (porcine) subcutaneous injection 5,000 Units  5,000 Units Subcutaneous Q8H [address]   • hydrALAZINE (APRESOLINE) injection 5 mg  5 mg Intravenous Q6H PRN   • insulin glargine (LANTUS) subcutaneous injection 10 Units 0 1 mL  10 Units Subcutaneous HS   • insulin lispro (HumaLOG) 100 units/mL subcutaneous injection 1-6 Units  1-6 Units Subcutaneous TID AC   • insulin lispro (HumaLOG) 100 units/mL subcutaneous injection 1-6 Units  1-6 Units Subcutaneous HS   • lidocaine (cardiac) injection 100 mg  100 mg Intravenous Q30 Min PRN   • lisinopril (ZESTRIL) tablet 5 mg  5 mg Oral Daily   • mupirocin (BACTROBAN) 2 % nasal ointment 1 application  1 application Nasal Q03X [address]   • ondansetron (ZOFRAN) injection 4 mg  4 mg Intravenous Q6H PRN   • pantoprazole (PROTONIX) EC tablet 40 mg  40 mg Oral Daily   • polyethylene glycol (MIRALAX) packet 17 g  17 g Oral Daily   • potassium chloride (K-DUR,KLOR-CON) CR tablet 20 mEq  20 mEq Oral Daily   • pravastatin (PRAVACHOL) tablet 20 mg  20 mg Oral Daily   • torsemide (DEMADEX) tablet 20 mg  20 mg Oral Daily      Current PTA meds:  Medications Prior to Admission   Medication   • aspirin 81 mg chewable tablet   • carvedilol (COREG) 6 25 mg tablet   • Icosapent Ethyl (VASCEPA PO)   • Insulin Glargine (TOUJEO) 300 units/mL CONCETRATED U-300 injection pen   • mupirocin (BACTROBAN) 2 % nasal ointment   • pravastatin (PRAVACHOL) 20 mg tablet   • TART CHERRY PO   • Cholecalciferol (VITAMIN D3) 1000 UNITS CAPS   • Cyanocobalamin (VITAMIN B12 PO)   • glimepiride (AMARYL) 2 mg tablet   • multivitamin (THERAGRAN) TABS   • pyridoxine (VITAMIN B6) 100 mg tablet        Allergies   Allergen Reactions   • Fentanyl Rash   • Niaspan [Niacin Er] Rash   • Prednisone Rash       Objective   Vitals: Blood pressure 129/50, pulse 81, temperature 98 2 °F (36 8 °C), temperature source Oral, resp  rate 18, height 5' 2" (1 575 m), weight 85 8 kg (189 lb 2 5 oz), SpO2 95 %  ,Body mass index is 34 6 kg/m²  Physical Exam  Vitals and nursing note reviewed  HENT:      Head: Normocephalic  Nose: No congestion  Eyes:      General:         Right eye: No discharge  Cardiovascular:      Rate and Rhythm: Normal rate and regular rhythm  Pulses: Normal pulses  Pulmonary:      Effort: Pulmonary effort is normal       Breath sounds: Normal breath sounds  Abdominal:      General: Bowel sounds are normal       Palpations: Abdomen is soft  Musculoskeletal:         General: Normal range of motion        Cervical back: Normal range of motion  Skin:     General: Skin is warm and dry  Neurological:      Mental Status: She is alert and oriented to person, place, and time  Mental status is at baseline  Psychiatric:         Mood and Affect: Mood normal          Lab Results:   Results from last 7 days   Lab Units 10/12/22  0442   WBC Thousand/uL 8 52   HEMOGLOBIN g/dL 12 0  12 0   HEMATOCRIT % 36 9  36 8   PLATELETS Thousands/uL 184        Results from last 7 days   Lab Units 10/12/22  0442   POTASSIUM mmol/L 3 5   CHLORIDE mmol/L 103   CO2 mmol/L 27   BUN mg/dL 11   CREATININE mg/dL 0 67   CALCIUM mg/dL 8 5       Imaging Studies: I have personally reviewed pertinent reports  EKG, Pathology, and Other Studies: I have personally reviewed pertinent reports      VTE Prophylaxis: Sequential compression device (Venodyne)     Code Status: Level 1 - Full Code

## 2022-10-12 NOTE — RESTORATIVE TECHNICIAN NOTE
Restorative Technician Note      Patient Name: Gurwinder Rainey     Restorative Tech Visit Date: 10/12/2022  Note Type: Mobility  Patient Position Upon Consult: Bedside chair  Activity Performed: Ambulated  Assistive Device: Other (Comment) (none)  Patient Position at End of Consult:  All needs within reach

## 2022-10-12 NOTE — OCCUPATIONAL THERAPY NOTE
Occupational Therapy Evaluation     Patient Name: Gurwinder Rainey  FMFIO'L Date: 10/12/2022  Problem List  Principal Problem:    Severe aortic stenosis  Active Problems:    Hypertension    Hyperlipidemia    Diabetes mellitus (HCC)    CHF (congestive heart failure) (HCC)    History of breast cancer    SJ on CPAP    CAD (coronary artery disease)    LBBB (left bundle branch block)    Hyperchloremia    S/P TAVR (transcatheter aortic valve replacement)    Past Medical History  Past Medical History:   Diagnosis Date    Adhesive capsulitis of right shoulder     CAD (coronary artery disease)     CHF (congestive heart failure) (HCC)     Diabetes mellitus (HCC)     type 2, insulin dependent    Diverticulosis     History of breast cancer     s/p right lumpectomy, s/p radiation    Hyperlipidemia     Hypertension     Lymphedema of right upper extremity     Obesity (BMI 30-39 9)     SJ on CPAP     Severe aortic stenosis     Vitamin D deficiency      Past Surgical History  Past Surgical History:   Procedure Laterality Date    APPENDECTOMY      BREAST LUMPECTOMY Right     CARDIAC CATHETERIZATION N/A 08/19/2022    Procedure: Cardiac RHC/LHC; Surgeon: Bela Garrido DO;  [facility]; Service: Cardiology    CHOLECYSTECTOMY      KNEE ARTHROSCOPY Right     knee    TONSILLECTOMY AND ADENOIDECTOMY      TOTAL ABDOMINAL HYSTERECTOMY             10/12/22 0853   OT Last Visit   OT Visit Date 10/12/22   Note Type   Note type Evaluation   Pain Assessment   Pain Assessment Tool 0-10   Pain Score No Pain   Hospital Pain Intervention(s) Repositioned; Ambulation/increased activity; Emotional support   Restrictions/Precautions   Weight Bearing Precautions Per Order No   Other Precautions Cardiac/sternal;Multiple lines;Telemetry; Fall Risk   Home Living   Type of Home House  ([facility] with ramped entrance)   Home Layout One level;Performs ADLs on one level; Able to live on main level with bedroom/bathroom; Access; Ramped entrance   Bathroom Shower/Tub Tub/shower unit   [employer] Raised   Bathroom Equipment Grab bars in shower   [address] Other (Comment)  (No DME)   Additional Comments Pt reports living in 1  with ramped entrance with her ; no DME PTA   Prior Function   Level of [place] Independent with ADLs; Independent with functional mobility   Lives With Spouse   Receives Help From Family   IADLs Independent with driving; Independent with meal prep; Independent with medication management   Falls in the last 6 months 0   Vocational Retired   Comments (+) driving   Lifestyle   Autonomy PTA, pt was independent in ADLs/IADLs and functional mobility w/ no DME; (+) driving   Reciprocal Relationships Lives with her    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   Intrinsic Gratification Enjoys scrap booking   [address] [place] "I am feeling good!"   ADL   Where Assessed Chair   Eating Assistance 7  Independent   Grooming Assistance 7  [address] 6  Modified Independent   [address] 6  [address] [facility] 6  Modified independent   [address] [facility] 6  Modified independent   [address]  6  Modified independent   [address] [facility] 5  Supervision/Setup   Bed Mobility   Supine to Sit Unable to assess   Sit to Supine Unable to assess   Transfers   Sit to Stand 6  Modified independent   Stand to Sit 6  Modified independent   Additional Comments No DME   Functional Mobility   Functional Mobility 5  Supervision   Additional Comments Pt completed household functional mobility distances w/ supervision and no DME; no LOB noted   Balance   Static Sitting Good   Dynamic Sitting Fair +   Static Standing Fair +   Dynamic Standing Fair   Ambulatory Fair   Activity Tolerance   Activity Tolerance Patient tolerated treatment well   Medical Staff Made Aware DPT Shamar   Nurse Made Aware RN cleared for OT eval   RUE Assessment   RUE Assessment WFL   LUE Assessment   LUE Assessment WFL   Hand Function   Gross Motor Coordination Functional   Fine Motor Coordination Functional   Sensation   Light Touch No apparent deficits   Proprioception   Proprioception No apparent deficits   Vision - Complex Assessment   Ocular Range of Motion Intact   Tracking Intact   Psychosocial   Psychosocial (WDL) WDL   Perception   Inattention/Neglect Appears intact   Cognition   Overall Cognitive Status WFL   Arousal/Participation Alert; Responsive;Arousable; Cooperative   Attention Within functional limits   Orientation Level Oriented X4   Memory Within functional limits   Following Commands Follows all commands and directions without difficulty   Comments Pt very pleasant and cooperative; demonstrates good safety awareness/understanding of all cardiac education provided   Assessment   Prognosis Good   Assessment Pt is a pleasant and cooperative 58 yo female who presented to [facility] for elective TAVR surgery on 10/11 2* severe aortic stenosis  Pt  has a past medical history of Adhesive capsulitis of right shoulder, CAD (coronary artery disease), CHF (congestive heart failure) ([facility] [address] ), Diabetes mellitus ([facility] [address] ), Diverticulosis, History of breast cancer, Hyperlipidemia, Hypertension, Lymphedema of right upper extremity, Obesity (BMI 30-39 9), SJ on CPAP, Severe aortic stenosis, and Vitamin D deficiency  Pt with active OT orders and OT consulted to assess pt's functional status and occupational performance to determine safe d/c needs  Pt seen for co-eval with PT to increase safety, decrease fall risk, and maximize functional/occupational performance 2* medical complexity which is a regression from pt's functional baseline  Pt lives with her  in a 1 SH w/ ramped entrance  PTA, pt was independent in ADLs/IADLs and reports using no DME for functional mobility (+) driving  Discussed role and scope of OT in which pt was receptive   At this time, pt is not demonstrating any significant occupational deficits and is functioning at a level of functional transfers w/ Mod I and no DME, functional mobility w/ supervision, and UB/LB ADLs @ a Mod I level  From an OT standpoint, recommend discharge to home with home health with increased social support once medically stable  Pt was receptive regarding education on returning home safely with energy conservation techniques and demonstrated good carryover during occupational/functional performance  At this time, pt demonstrates good insight/safety awareness and does not express any concerns regarding performing ADL/IADL/functional mobility tasks  No further skilled acute care OT services are needed at this time  The patient's raw score on the AM-PAC Daily Activity inpatient short form is 24, standardized score is 57 54, greater than 39 4  Patients at this level are likely to benefit from discharge to home  Please refer to the recommendation of the Occupational Therapist for safe discharge planning  Recommend continued engagement in ADL/IADL/functional mobility tasks with nursing and restorative therapy staff as appropriate to promote the highest level of independence prior to discharge  OT is discharging pt from caseload at this time, please reconsult if needed     Goals   Patient Goals To go home   Plan   OT Frequency Eval only   Recommendation   OT Discharge Recommendation Home with home health rehabilitation  (Increased social support and assistance)   Equipment Recommended Shower/Tub chair with back ($)   AM-PAC Daily Activity Inpatient   Lower Body Dressing 4   Bathing 4   Toileting 4   Upper Body Dressing 4   Grooming 4   Eating 4   Daily Activity Raw Score 24   Daily Activity Standardized Score (Calc for Raw Score >=11) 57 54   AM-[facility] Applied Cognition Inpatient   Following a Speech/Presentation 4   Understanding Ordinary Conversation 4   Taking Medications 4   Remembering Where Things Are Placed or Put Away 4 Remembering List of 4-5 Errands 4   Taking Care of Complicated Tasks 4   Applied Cognition Raw Score 24   Applied Cognition Standardized Score 62 21   Additional Treatment Session   Start Time 7628   End Time 1020   Treatment Assessment Pt seen for additional OT treatment on this date focusing on cardiac education and enhancing occupational performance in valued occupations  Pt provided with verbal education along with cardiac packet handout to enhance functional carryover  Pt demonstrated good understanding of all education provided and was receptive  At this time, pt does not express any questions/concerns regarding education provided     Additional Treatment Day 1      Blue Roy MS, OTR/L

## 2022-10-12 NOTE — CONSULTS
Consultation - Electrophysiology - Cardiology  Michelle Cronin Files 57 y o  female MRN: [id number]  Unit/Bed#: [facility] 405-01 Encounter: [id number]      Inpatient consult to Electrophysiology  Consult performed by: Analy Thayer PA-C  Consult ordered by: Vianney Packer PA-C          History of Present Illness   Physician Requesting Consult: [facility], MD  Reason for Consult / Principal Problem: New LBBB s/p TAVR    Assessment/Plan   1  New LBBB  2  Aortic stenosis  3  Hx SVT  4  HTN  5  HLD  6  DM type 2  7  CHF  8  Hx breast cancer      New LBBB s/p TAVR still present on EKG this morning  Plan to monitor on telemetry for another 24 hrs to monitor for progression  Restart home Coreg 6 25 mg BID  If LBBB stays stable, patient should be ok for discharge with a live cardiac event monitor x 4 weeks  If there is evidence of worsening heart block, patient may require pacemaker prior to discharge  HPI: Maye Bean is a 57y o  year old female with PMH as stated above, who presents to [facility] for elective TAVR procedure, completed on 10/11/2022  Immediately following the procedure, patient had new LBBB seen on EKG in the PACU  LBBB persisted into this morning, and EP was asked to evaluate patient for need for pacemaker  Upon evaluation, patient is resting comfortably in bed  She offers no complaints  Patient denies chest pain/heaviness/tightness/pressure, palpitations, shortness of breath, orthopnea, lightheadedness, presyncope, syncope or N/V  Pre-TAVR work up included echo and cardiac cath  Echo showed EF 55% with severe aortic stenosis  Cardiac cath shows no coronary artery disease  Patient had a Holter monitor in 2021 for complaints of palpitations, showing one brief run of atrial tachycardia, lasting 14 beats  Review of Systems   All other systems reviewed and are negative            Historical Information   Past Medical History:   Diagnosis Date   • Adhesive capsulitis of right shoulder    • CAD (coronary artery disease)    • CHF (congestive heart failure) ([facility])    • Diabetes mellitus (HCC)     type 2, insulin dependent   • Diverticulosis    • History of breast cancer     s/p right lumpectomy, s/p radiation   • Hyperlipidemia    • Hypertension    • Lymphedema of right upper extremity    • Obesity (BMI 30-39  9)    • SJ on CPAP    • Severe aortic stenosis    • Vitamin D deficiency        Past Surgical History:   Procedure Laterality Date   • APPENDECTOMY     • BREAST LUMPECTOMY Right    • CARDIAC CATHETERIZATION N/A 08/19/2022    Procedure: Cardiac RHC/LHC; Surgeon: Tamia Ayers DO;  Location: [facility];   Service: Cardiology   • CHOLECYSTECTOMY     • KNEE ARTHROSCOPY Right     knee   • TONSILLECTOMY AND ADENOIDECTOMY     • TOTAL ABDOMINAL HYSTERECTOMY         Social History     Substance and Sexual Activity   Alcohol Use No     Social History     Substance and Sexual Activity   Drug Use No     Social History     Tobacco Use   Smoking Status Never Smoker   Smokeless Tobacco Never Used       Family History   Problem Relation Age of Onset   • COPD Mother    • Heart disease Father    • Heart disease Paternal Grandmother    • Cancer Family    • Colon cancer Neg Hx          Meds/Allergies   Hospital Medications:   Current Facility-Administered Medications   Medication Dose Route Frequency   • acetaminophen (TYLENOL) tablet 650 mg  650 mg Oral Q6H PRN   • acetaminophen (TYLENOL) tablet 975 mg  975 mg Oral Q8H   • aspirin chewable tablet 81 mg  81 mg Oral Daily   • bisacodyl (DULCOLAX) rectal suppository 10 mg  10 mg Rectal Daily PRN   • carvedilol (COREG) tablet 6 25 mg  6 25 mg Oral BID With Meals   • ceFAZolin (ANCEF) IVPB (premix in dextrose) 2,000 mg 50 mL  2,000 mg Intravenous Q8H   • chlorhexidine (PERIDEX) 0 12 % oral rinse 15 mL  15 mL Mouth/Throat BID   • docusate sodium (COLACE) capsule 100 mg  100 mg Oral BID   • glimepiride (AMARYL) tablet 4 mg  4 mg Oral BID   • hydrALAZINE (APRESOLINE) injection 5 mg  5 mg Intravenous Q6H PRN   • insulin glargine (LANTUS) subcutaneous injection 10 Units 0 1 mL  10 Units Subcutaneous HS   • insulin lispro (HumaLOG) 100 units/mL subcutaneous injection 1-6 Units  1-6 Units Subcutaneous TID AC   • insulin lispro (HumaLOG) 100 units/mL subcutaneous injection 1-6 Units  1-6 Units Subcutaneous HS   • lidocaine (cardiac) injection 100 mg  100 mg Intravenous Q30 Min PRN   • lisinopril (ZESTRIL) tablet 5 mg  5 mg Oral Daily   • mupirocin (BACTROBAN) 2 % nasal ointment 1 application  1 application Nasal [address]   • ondansetron (ZOFRAN) injection 4 mg  4 mg Intravenous Q6H PRN   • pantoprazole (PROTONIX) EC tablet 40 mg  40 mg Oral Daily   • polyethylene glycol (MIRALAX) packet 17 g  17 g Oral Daily   • potassium chloride (K-DUR,KLOR-CON) CR tablet 20 mEq  20 mEq Oral Daily   • pravastatin (PRAVACHOL) tablet 20 mg  20 mg Oral Daily   • torsemide (DEMADEX) tablet 20 mg  20 mg Oral Daily       Home Medications:   Medications Prior to Admission   Medication   • aspirin 81 mg chewable tablet   • carvedilol (COREG) 6 25 mg tablet   • Icosapent Ethyl (VASCEPA PO)   • Insulin Glargine (TOUJEO) 300 units/mL CONCETRATED U-300 injection pen   • mupirocin (BACTROBAN) 2 % nasal ointment   • pravastatin (PRAVACHOL) 20 mg tablet   • TART CHERRY PO   • Cholecalciferol (VITAMIN D3) 1000 UNITS CAPS   • Cyanocobalamin (VITAMIN B12 PO)   • glimepiride (AMARYL) 2 mg tablet   • multivitamin (THERAGRAN) TABS   • pyridoxine (VITAMIN B6) 100 mg tablet         Allergies   Allergen Reactions   • Fentanyl Rash   • Niaspan [Niacin Er] Rash   • Prednisone Rash         Objective   Vitals: Blood pressure 129/50, pulse 81, temperature 98 2 °F (36 8 °C), temperature source Oral, resp  rate 18, height 5' 2" (1 575 m), weight 85 8 kg (189 lb 2 5 oz), SpO2 95 %    Orthostatic Blood Pressures    Flowsheet Row Most Recent Value   Blood Pressure 129/50 filed at 10/12/2022 3026   Patient Position - Orthostatic VS Sitting filed at 10/12/2022 0749            Intake/Output Summary (Last 24 hours) at 10/12/2022 0929  Last data filed at 10/12/2022 5401  Gross per 24 hour   Intake 2078 ml   Output 4450 ml   Net -2372 ml       Invasive Devices  Report    Central Venous Catheter Line  Duration           CVC Central Lines 10/11/22 Triple <1 day          Peripheral Intravenous Line  Duration           Peripheral IV 10/11/22 Left Arm 1 day    Peripheral IV 10/11/22 Left Hand 1 day          Drain  Duration           Urethral Catheter Non-latex; Temperature probe 16 Fr  1 day                  Physical Exam  Vitals reviewed  Constitutional:       General: She is not in acute distress  Appearance: She is not ill-appearing or diaphoretic  HENT:      Head: Normocephalic and atraumatic  Right Ear: External ear normal       Left Ear: External ear normal       Nose: Nose normal    Eyes:      General:         Right eye: No discharge  Left eye: No discharge  Cardiovascular:      Rate and Rhythm: Normal rate and regular rhythm  Heart sounds: No murmur heard  No friction rub  Pulmonary:      Effort: Pulmonary effort is normal       Breath sounds: Normal breath sounds  No wheezing, rhonchi or rales  Abdominal:      General: There is no distension  Palpations: Abdomen is soft  Tenderness: There is no abdominal tenderness  Musculoskeletal:         General: No deformity or signs of injury  Cervical back: No rigidity  No muscular tenderness  Right lower leg: No edema  Left lower leg: No edema  Skin:     General: Skin is warm and dry  Capillary Refill: Capillary refill takes less than 2 seconds  Coloration: Skin is not jaundiced or pale  Neurological:      General: No focal deficit present  Mental Status: She is alert and oriented to person, place, and time  Mental status is at baseline     Psychiatric:         Mood and Affect: Mood normal          Behavior: Behavior normal          Thought Content: Thought content normal               Lab Results: I have personally reviewed pertinent lab results  Results from last 7 days   Lab Units 10/12/22  0442 10/11/22  1305 10/11/22  1049   WBC Thousand/uL 8 52  --   --    HEMOGLOBIN g/dL 12 0  12 0  --  12 2   HEMATOCRIT % 36 9  36 8  --  38 7   PLATELETS Thousands/uL 184 196  --      Results from last 7 days   Lab Units 10/12/22  0442 10/11/22  1049   POTASSIUM mmol/L 3 5 3 5   CHLORIDE mmol/L 103 110*   CO2 mmol/L 27 23   BUN mg/dL 11 11   CREATININE mg/dL 0 67 0 54*   CALCIUM mg/dL 8 5 8 6                 Imaging: I have personally reviewed pertinent films in PACS    Cardiac testing:  ECHO:   •  Left Ventricle: Left ventricular cavity size is normal  Wall thickness is moderately increased  The left ventricular ejection fraction is 65%  Systolic function is normal  Wall motion cannot be accurately assessed  Diastolic function is mildly abnormal, consistent with grade I (abnormal) relaxation  •  Left Atrium: The atrium is mildly dilated  •  Aortic Valve: There is an Farmer ROSS 3 23 mm TAVR bioprosthetic valve  The prosthetic valve appears well-seated and appears to be functioning normally  There is no evidence of paravalvular regurgitation  There is no evidence of transvalvular regurgitation  The aortic valve peak velocity is 2 53 m/s  The aortic valve peak gradient is 26 mmHg  The aortic valve mean gradient is 14 mmHg  The aortic valve area is 1 98 cm2  •  Mitral Valve: There is mild annular calcification        CATH: 8/2022  · No significant obstructive epicardial CAD          HOLTER: 11/2021  DESCRIPTION OF FINDINGS:   The patient was monitored for a total  48 hours   The patient was   predominantly in  sinus rhythm throughout the study   The average heart   rate was  78 beats per minute   The heart rate ranged from a low of  50   beats per minute in  sinus bradycardia at  3:47 a m  to a maximum of  145   beats per minute in  sinus tachycardia with PAC at  2:09 p m  Laverne Kendall Ventricular ectopic activity consisted of  0 beats ( 0 0% of total beats),   of which,  0 were single PVCs, 0 were ventricular couplets, 0 were in   bigeminy and 0 were in trigeminy  There was no sustained or nonsustained   ventricular tachycardia  Supraventricular ectopic activity consisted of  28 beats ( 0 0% of total   beats), of which,  9 were single PACs,  1 was late beat, 2 were atrial   couplets, 0 were in bigeminy and 0 were in trigeminy  There were 1 atrial   runs, longest of which was 14 beats   There was no evidence of atrial   fibrillation or atrial flutter  There were no significant pauses  The longest R-R interval was 1 3   seconds   There was no evidence of advanced degree heart block  The accompanying patient diary notes symptoms of  lightheaded on Wednesday   and short of breath on Thursday however there were no time frames   documented however there were diary correlations without symptoms listed   on strips printed and correlations with these diary results showed sinus   rhythm with heart rates 71-99 beats per minute  Impression  Predominantly sinus rhythm, with an average heart rate of  78 beats per   minute    Rare supraventricular ectopy with short atrial run with no symptoms   documented    No significant premature ventricular contractions   No significant pauses or advanced degree heart block    While the return diary did list symptoms of lightheadedness on Wednesday   and shortness of breath on Thursday there were no specific times   documented with this however there were diary listed events on strips   printed which correlated with sinus rhythm heart rates 71-99 beats per   minute                 VTE Prophylaxis: Heparin

## 2022-10-13 ENCOUNTER — TELEPHONE (OUTPATIENT)
Dept: CARDIOLOGY CLINIC | Facility: CLINIC | Age: 62
End: 2022-10-13

## 2022-10-13 ENCOUNTER — CLINICAL SUPPORT (OUTPATIENT)
Dept: CARDIOLOGY CLINIC | Facility: CLINIC | Age: 62
End: 2022-10-13
Payer: COMMERCIAL

## 2022-10-13 VITALS
OXYGEN SATURATION: 96 % | RESPIRATION RATE: 18 BRPM | DIASTOLIC BLOOD PRESSURE: 60 MMHG | BODY MASS INDEX: 34.81 KG/M2 | WEIGHT: 189.15 LBS | HEIGHT: 62 IN | TEMPERATURE: 97.7 F | SYSTOLIC BLOOD PRESSURE: 130 MMHG | HEART RATE: 80 BPM

## 2022-10-13 DIAGNOSIS — I44.7 LEFT BUNDLE BRANCH BLOCK: Primary | ICD-10-CM

## 2022-10-13 LAB
ANION GAP SERPL CALCULATED.3IONS-SCNC: 5 MMOL/L (ref 4–13)
ATRIAL RATE: 78 BPM
BASE EXCESS BLDA CALC-SCNC: -1 MMOL/L (ref -2–3)
BASE EXCESS BLDA CALC-SCNC: -2 MMOL/L (ref -2–3)
BUN SERPL-MCNC: 11 MG/DL (ref 5–25)
CA-I BLD-SCNC: 1.15 MMOL/L (ref 1.12–1.32)
CA-I BLD-SCNC: 1.18 MMOL/L (ref 1.12–1.32)
CALCIUM SERPL-MCNC: 8.9 MG/DL (ref 8.3–10.1)
CHLORIDE SERPL-SCNC: 105 MMOL/L (ref 96–108)
CO2 SERPL-SCNC: 28 MMOL/L (ref 21–32)
CREAT SERPL-MCNC: 0.59 MG/DL (ref 0.6–1.3)
ERYTHROCYTE [DISTWIDTH] IN BLOOD BY AUTOMATED COUNT: 13.8 % (ref 11.6–15.1)
GFR SERPL CREATININE-BSD FRML MDRD: 97 ML/MIN/1.73SQ M
GLUCOSE SERPL-MCNC: 119 MG/DL (ref 65–140)
GLUCOSE SERPL-MCNC: 119 MG/DL (ref 65–140)
GLUCOSE SERPL-MCNC: 138 MG/DL (ref 65–140)
GLUCOSE SERPL-MCNC: 147 MG/DL (ref 65–140)
GLUCOSE SERPL-MCNC: 171 MG/DL (ref 65–140)
HCO3 BLDA-SCNC: 24.6 MMOL/L (ref 22–28)
HCO3 BLDA-SCNC: 24.8 MMOL/L (ref 22–28)
HCT VFR BLD AUTO: 37.1 % (ref 34.8–46.1)
HCT VFR BLD CALC: 31 % (ref 34.8–46.1)
HCT VFR BLD CALC: 33 % (ref 34.8–46.1)
HGB BLD-MCNC: 11.9 G/DL (ref 11.5–15.4)
HGB BLDA-MCNC: 10.5 G/DL (ref 11.5–15.4)
HGB BLDA-MCNC: 11.2 G/DL (ref 11.5–15.4)
MCH RBC QN AUTO: 28.5 PG (ref 26.8–34.3)
MCHC RBC AUTO-ENTMCNC: 32.1 G/DL (ref 31.4–37.4)
MCV RBC AUTO: 89 FL (ref 82–98)
P AXIS: 41 DEGREES
PCO2 BLD: 26 MMOL/L (ref 21–32)
PCO2 BLD: 26 MMOL/L (ref 21–32)
PCO2 BLD: 46 MM HG (ref 36–44)
PCO2 BLD: 48.7 MM HG (ref 36–44)
PH BLD: 7.31 [PH] (ref 7.35–7.45)
PH BLD: 7.34 [PH] (ref 7.35–7.45)
PLATELET # BLD AUTO: 162 THOUSANDS/UL (ref 149–390)
PMV BLD AUTO: 10.6 FL (ref 8.9–12.7)
PO2 BLD: 134 MM HG (ref 75–129)
PO2 BLD: 173 MM HG (ref 75–129)
POTASSIUM BLD-SCNC: 3.5 MMOL/L (ref 3.5–5.3)
POTASSIUM BLD-SCNC: 3.6 MMOL/L (ref 3.5–5.3)
POTASSIUM SERPL-SCNC: 3.9 MMOL/L (ref 3.5–5.3)
PR INTERVAL: 188 MS
QRS AXIS: -11 DEGREES
QRSD INTERVAL: 136 MS
QT INTERVAL: 428 MS
QTC INTERVAL: 487 MS
RBC # BLD AUTO: 4.17 MILLION/UL (ref 3.81–5.12)
SAO2 % BLD FROM PO2: 99 % (ref 60–85)
SAO2 % BLD FROM PO2: 99 % (ref 60–85)
SODIUM BLD-SCNC: 141 MMOL/L (ref 136–145)
SODIUM BLD-SCNC: 141 MMOL/L (ref 136–145)
SODIUM SERPL-SCNC: 138 MMOL/L (ref 135–147)
SPECIMEN SOURCE: ABNORMAL
SPECIMEN SOURCE: ABNORMAL
T WAVE AXIS: 142 DEGREES
VENTRICULAR RATE: 78 BPM
WBC # BLD AUTO: 7.35 THOUSAND/UL (ref 4.31–10.16)

## 2022-10-13 PROCEDURE — 80048 BASIC METABOLIC PNL TOTAL CA: CPT | Performed by: PHYSICIAN ASSISTANT

## 2022-10-13 PROCEDURE — 99238 HOSP IP/OBS DSCHRG MGMT 30/<: CPT | Performed by: THORACIC SURGERY (CARDIOTHORACIC VASCULAR SURGERY)

## 2022-10-13 PROCEDURE — NC001 PR NO CHARGE: Performed by: PHYSICIAN ASSISTANT

## 2022-10-13 PROCEDURE — 85027 COMPLETE CBC AUTOMATED: CPT | Performed by: PHYSICIAN ASSISTANT

## 2022-10-13 PROCEDURE — 99211 OFF/OP EST MAY X REQ PHY/QHP: CPT

## 2022-10-13 PROCEDURE — 99233 SBSQ HOSP IP/OBS HIGH 50: CPT | Performed by: PHYSICIAN ASSISTANT

## 2022-10-13 PROCEDURE — 93005 ELECTROCARDIOGRAM TRACING: CPT

## 2022-10-13 PROCEDURE — 93010 ELECTROCARDIOGRAM REPORT: CPT | Performed by: INTERNAL MEDICINE

## 2022-10-13 PROCEDURE — 97530 THERAPEUTIC ACTIVITIES: CPT

## 2022-10-13 PROCEDURE — 82948 REAGENT STRIP/BLOOD GLUCOSE: CPT

## 2022-10-13 RX ORDER — POTASSIUM CHLORIDE 20 MEQ/1
20 TABLET, EXTENDED RELEASE ORAL DAILY
Qty: 5 TABLET | Refills: 0 | Status: SHIPPED | OUTPATIENT
Start: 2022-10-14 | End: 2022-10-24

## 2022-10-13 RX ORDER — LISINOPRIL 5 MG/1
5 TABLET ORAL DAILY
Qty: 30 TABLET | Refills: 2 | Status: SHIPPED | OUTPATIENT
Start: 2022-10-14

## 2022-10-13 RX ORDER — CLOPIDOGREL BISULFATE 75 MG/1
75 TABLET ORAL DAILY
Qty: 90 TABLET | Refills: 0 | Status: SHIPPED | OUTPATIENT
Start: 2022-10-14 | End: 2023-01-12

## 2022-10-13 RX ORDER — TORSEMIDE 20 MG/1
20 TABLET ORAL DAILY
Qty: 5 TABLET | Refills: 0 | Status: SHIPPED | OUTPATIENT
Start: 2022-10-14 | End: 2022-10-24

## 2022-10-13 RX ADMIN — INSULIN LISPRO 1 UNITS: 100 INJECTION, SOLUTION INTRAVENOUS; SUBCUTANEOUS at 11:52

## 2022-10-13 RX ADMIN — CARVEDILOL 6.25 MG: 6.25 TABLET, FILM COATED ORAL at 08:42

## 2022-10-13 RX ADMIN — PANTOPRAZOLE SODIUM 40 MG: 40 TABLET, DELAYED RELEASE ORAL at 05:26

## 2022-10-13 RX ADMIN — CHLORHEXIDINE GLUCONATE 15 ML: 1.2 SOLUTION ORAL at 08:41

## 2022-10-13 RX ADMIN — POTASSIUM CHLORIDE 20 MEQ: 1500 TABLET, EXTENDED RELEASE ORAL at 08:42

## 2022-10-13 RX ADMIN — GLIMEPIRIDE 4 MG: 2 TABLET ORAL at 08:52

## 2022-10-13 RX ADMIN — CLOPIDOGREL BISULFATE 75 MG: 75 TABLET ORAL at 08:42

## 2022-10-13 RX ADMIN — TORSEMIDE 20 MG: 20 TABLET ORAL at 08:42

## 2022-10-13 RX ADMIN — ACETAMINOPHEN 975 MG: 325 TABLET, FILM COATED ORAL at 11:52

## 2022-10-13 RX ADMIN — LISINOPRIL 5 MG: 5 TABLET ORAL at 08:42

## 2022-10-13 RX ADMIN — LIDOCAINE 5% 2 PATCH: 700 PATCH TOPICAL at 08:45

## 2022-10-13 RX ADMIN — ASPIRIN 81 MG CHEWABLE TABLET 81 MG: 81 TABLET CHEWABLE at 08:42

## 2022-10-13 RX ADMIN — MUPIROCIN 1 APPLICATION: 20 OINTMENT TOPICAL at 08:41

## 2022-10-13 RX ADMIN — DOCUSATE SODIUM 100 MG: 100 CAPSULE, LIQUID FILLED ORAL at 08:42

## 2022-10-13 RX ADMIN — POLYETHYLENE GLYCOL 3350 17 G: 17 POWDER, FOR SOLUTION ORAL at 08:42

## 2022-10-13 RX ADMIN — PRAVASTATIN SODIUM 20 MG: 20 TABLET ORAL at 08:42

## 2022-10-13 RX ADMIN — HEPARIN SODIUM 5000 UNITS: 5000 INJECTION INTRAVENOUS; SUBCUTANEOUS at 05:26

## 2022-10-13 NOTE — PHYSICAL THERAPY NOTE
PHYSICAL THERAPY NOTE          Patient Name: Erick Liz  QBLSW'V Date: 10/13/2022         10/13/22 0946   PT Last Visit   PT Visit Date 10/13/22   Note Type   Note Type Treatment   Pain Assessment   Pain Assessment Tool FLACC   Pain Location/Orientation Orientation: Left; Location: Groin; Location: Incision   Pain Onset/Description Onset: Ongoing;Frequency: Intermittent; Descriptor: Aching;Descriptor: Discomfort   Effect of Pain on Daily Activities some guarding   Patient's Stated Pain Goal No pain   Hospital Pain Intervention(s) Repositioned; Ambulation/increased activity; Emotional support   Pain Rating: FLACC (Rest) - Face 0   Pain Rating: FLACC (Rest) - Legs 0   Pain Rating: FLACC (Rest) - Activity 0   Pain Rating: FLACC (Rest) - Cry 0   Pain Rating: FLACC (Rest) - Consolability 0   Score: FLACC (Rest) 0   Pain Rating: FLACC (Activity) - Face 1   Pain Rating: FLACC (Activity) - Legs 0   Pain Rating: FLACC (Activity) - Activity 0   Pain Rating: FLACC (Activity) - Cry 1   Pain Rating: FLACC (Activity) - Consolability 1   Score: FLACC (Activity) 3   Restrictions/Precautions   Other Precautions Cardiac/sternal;Telemetry   General   Chart Reviewed Yes   Additional Pertinent History cleared for Tx session (spoke to nsg)   Response to Previous Treatment Patient with no complaints from previous session  Cognition   Overall Cognitive Status WFL   Arousal/Participation Alert; Cooperative   Attention Within functional limits   Orientation Level Oriented to person;Oriented to place;Oriented to situation   Memory Within functional limits   Following Commands Follows all commands and directions without difficulty   Subjective   Subjective Alert; in the chair; agreeable to mobilize and go over HEP   Transfers   Sit to Stand 6  Modified independent   Stand to Sit 6  Modified independent   Ambulation/Elevation   Gait pattern Excessively slow; Short stride  (no overt LOB, knee buckling or swaying)   Gait Assistance 6  Modified independent   Assistive Device None   Distance 2 x 170 ft w/ standing rest stop in between   Balance   Static Sitting Good   Static Standing Fair +   Ambulatory Fair   Activity Tolerance   Activity Tolerance Patient tolerated treatment well   Nurse Made Aware spoke to [place]   Exercises   Knee AROM Long Arc Quad Sitting;15 reps;AROM; Bilateral  (HEP)   Ankle Pumps Sitting;15 reps;AROM; Bilateral  (HEP)   Marching Sitting;Bilateral;AROM;5 reps  (HEP)   Assessment   Assessment Pt demonstrated overall improvement in balance, endurance and all aspects of observed mobility progressing to mod (I) level on level surfaces (incl amb w/o AD) w/ no overt uncorrected LOB, gross knee buckling, or swaying observed during the session; no excessive discomfort or fatigue expressed; LE therex/HEP reviewed and performed; pt appeared to be comfortable at the end of session; overall, cont to anticipate pt will return home w/ available family support upon D/C from PT/mobility stand point and when medically cleared; home PT follow up is recommended; no other immediate PT needs otherwise identified while in the hospital; will therefore sign off   Goals   Patient Goals to return home   PT Treatment Day 1   Plan   Treatment/Interventions Spoke to nursing;Spoke to case management   Progress Discontinue PT   PT Frequency Other (Comment)  (D/C PT)   Recommendation   PT Discharge Recommendation Home with home health rehabilitation   Jacquie 8 in Bed Without Bedrails 4   Lying on Back to Sitting on Edge of Flat Bed 4   Moving Bed to Chair 4   Standing Up From Chair 4   Walk in Room 4   Climb 3-5 Stairs 3   Basic Mobility Inpatient Raw Score 23   Basic Mobility Standardized Score 50 88   Highest Level Of Mobility   JH-HLM Goal 7: Walk 25 feet or more   JH-HLM Achieved 8: Walk 250 feet ot more   Education   Education Provided Mobility training;Home exercise program   Patient Demonstrates verbal understanding   End of Consult   Patient Position at End of Consult Bedside chair; All needs within [facility]

## 2022-10-13 NOTE — PLAN OF CARE
Problem: MOBILITY - ADULT  Goal: Maintain or return to baseline ADL function  Description: INTERVENTIONS:  -  Assess patient's ability to carry out ADLs; assess patient's baseline for ADL function and identify physical deficits which impact ability to perform ADLs (bathing, care of mouth/teeth, toileting, grooming, dressing, etc )  - Assess/evaluate cause of self-care deficits   - Assess range of motion  - Assess patient's mobility; develop plan if impaired  - Assess patient's need for assistive devices and provide as appropriate  - Encourage maximum independence but intervene and supervise when necessary  - Involve family in performance of ADLs  - Assess for home care needs following discharge   - Consider OT consult to assist with ADL evaluation and planning for discharge  - Provide patient education as appropriate  Outcome: Completed  Goal: Maintains/Returns to pre admission functional level  Description: INTERVENTIONS:  - Perform BMAT or MOVE assessment daily    - Set and communicate daily mobility goal to care team and patient/family/caregiver  - Collaborate with rehabilitation services on mobility goals if consulted  - Perform Range of Motion 3 times a day  - Reposition patient every 2 hours    - Dangle patient 3 times a day  - Stand patient 3 times a day  - Ambulate patient 4 times a day  - Out of bed to chair 1 times a day   - Out of bed for meals 3 times a day  - Out of bed for toileting  - Record patient progress and toleration of activity level   Outcome: Completed     Problem: Prexisting or High Potential for Compromised Skin Integrity  Goal: Skin integrity is maintained or improved  Description: INTERVENTIONS:  - Identify patients at risk for skin breakdown  - Assess and monitor skin integrity  - Assess and monitor nutrition and hydration status  - Monitor labs   - Assess for incontinence   - Turn and reposition patient  - Assist with mobility/ambulation  - Relieve pressure over bony prominences  - Avoid friction and shearing  - Provide appropriate hygiene as needed including keeping skin clean and dry  - Evaluate need for skin moisturizer/barrier cream  - Collaborate with interdisciplinary team   - Patient/family teaching  - Consider wound care consult   Outcome: Completed     Problem: Potential for Falls  Goal: Patient will remain free of falls  Description: INTERVENTIONS:  - Educate patient/family on patient safety including physical limitations  - Instruct patient to call for assistance with activity   - Consult OT/PT to assist with strengthening/mobility   - Keep Call bell within reach  - Keep bed low and locked with side rails adjusted as appropriate  - Keep care items and personal belongings within reach  - Initiate and maintain comfort rounds  - Make Fall Risk Sign visible to staff  - Offer Toileting every 2 Hours, in advance of need  - Apply yellow socks and bracelet for high fall risk patients  - Consider moving patient to room near nurses station  Outcome: Completed

## 2022-10-13 NOTE — UTILIZATION REVIEW
Notification of Discharge   This is a Notification of Discharge from our facility [address]  Please be advised that this patient has been discharge from our facility  Below you will find the admission and discharge date and time including the patient’s disposition  UTILIZATION REVIEW CONTACT:  Junior Hobbs  Utilization   Network Utilization Review Department  Phone: [phone] x carefully listen to the prompts  All voicemails are confidential   Email: [email or web address]     PHYSICIAN ADVISORY SERVICES:  FOR LDBN-PT-RQHC REVIEW - MEDICAL NECESSITY DENIAL  Phone: [phone]  Fax: [phone]  Email: [email or web address]     PRESENTATION DATE: 10/11/2022  6:58 AM  OBERVATION ADMISSION DATE:   INPATIENT ADMISSION DATE: 10/11/22 10:33 AM   DISCHARGE DATE: 10/13/2022  1:22 PM  DISPOSITION: Home with [place] with [address] INFORMATION:  Send all requests for admission clinical reviews, approved or denied determinations and any other requests to dedicated fax number below belonging to the campus where the patient is receiving treatment   List of dedicated fax numbers:  [address] [place] [address] DENIALS (Administrative/Medical Necessity) [phone]   [address] [place] [address] (Maternity/NICU/Pediatrics) [phone]   [facility] [phone]   [facility] 87 [phone]   [address] [phone]   [address] [facility] [phone]   [address] [address] [facility] [address] [phone]   [address] [place] [address] 119 [phone]   [facility]  [phone]   [address] [facility] [phone]   [address] [facility] [address] [facility] [phone]

## 2022-10-13 NOTE — CASE MANAGEMENT
Case Management Discharge Note    Patient name Bernardino Aguila  Location [facility] 405/[facility] 017-64 MRN [id number]  : 1960 Date 10/13/2022       Current Admission Date: 10/11/2022  Current Admission Diagnosis:Severe aortic stenosis      LOS (days): 2  Geometric Mean LOS (GMLOS) (days): 1 40  Days to GMLOS:-0 6     OBJECTIVE:  Risk of Unplanned Readmission Score: 12 24   Current admission status: Inpatient   Primary Insurance: [address] [place] [address]  Secondary Insurance: BLUE CROSS    DISCHARGE DETAILS:    Discharge planning discussed with[de-identified] Patient  Freedom of Choice: Yes  CM contacted family/caregiver?: Yes  Were Treatment Team discharge recommendations reviewed with patient/caregiver?: Yes  Did patient/caregiver verbalize understanding of patient care needs?: Yes  Were patient/caregiver advised of the risks associated with not following Treatment Team discharge recommendations?: Yes    Contacts  Patient Contacts: Pam Radha (pt's )  Relationship to Patient[de-identified] Family  Contact Method: Phone  Phone Number: [phone]  Reason/Outcome: Emergency [address]         Is the patient interested in [facility] at discharge?: Yes  [address] requested[de-identified] Nursing, Physical [address] Name[de-identified] [address] [facility] Provider[de-identified] PCP  Home Health Services Needed[de-identified] Post-Op Care and Assessment, Evaluate Functional Status and Safety, Gait/ADL Training, Strengthening/Theraputic Exercises to Improve Function  Homebound Criteria Met[de-identified] Requires the Assistance of Another Person for Safe Ambulation or to Leave the Home  Supporting Clincal Findings[de-identified] Limited Endurance    Treatment Team Recommendation: Home with  [address]  Discharge Destination Plan[de-identified] Home with Gabrirebeccatad at Discharge : Family    Additional Comments: Pt is cleared for d/c by Cardiac Surgery BRANDEE Meyer was notified of pt's d/c order   Pt is accepted for services by [facility] for her aftercare plan  The pt and her  Zuri Baez were both informed of d/c   will transport pt home later this day, pickup time is TBD  No IMM required  No chart copy required  CM to follow

## 2022-10-13 NOTE — PROGRESS NOTES
Progress Note - Cardiothoracic Surgery   Valere Bleacher 57 y o  female MRN: [id number]  Unit/Bed#: [facility] 405-01 Encounter: [id number]    Aortic stenosis, Non-Rheumatic  S/P transfemoral transcatheter aortic valve replacement; POD # 2    24 Hour Events: Tolerated Coreg yesterday       Medications:   Scheduled Meds:  Current Facility-Administered Medications   Medication Dose Route Frequency Provider Last Rate   • acetaminophen  650 mg Oral Q6H PRN Toshia Ramirez PA-C     • acetaminophen  975 mg Oral Q8H Adriana Phillip PA-C     • aspirin  81 mg Oral Daily Adriana Phillip PA-C     • bisacodyl  10 mg Rectal Daily PRN Adriana Phillip PA-C     • carvedilol  6 25 mg Oral BID With Meals Kacie Welsh PA-C     • chlorhexidine  15 mL Mouth/Throat BID Adriana Phillip PA-C     • clopidogrel  75 mg Oral Daily Toshia Ramirez PA-C     • docusate sodium  100 mg Oral BID Adriana Phillip PA-C     • glimepiride  4 mg Oral BID Adriana Phillip PA-C     • heparin (porcine)  5,000 Units Subcutaneous [facility] Toshia Ramirez PA-C     • hydrALAZINE  5 mg Intravenous Q6H PRN Adriana Phillip PA-C     • insulin glargine  10 Units Subcutaneous HS Adriana Phillip PA-C     • insulin lispro  1-6 Units Subcutaneous TID AC Adriana Phillip PA-C     • insulin lispro  1-6 Units Subcutaneous HS Adriana Phillip PA-C     • lidocaine (cardiac)  100 mg Intravenous Q30 Min PRN Adriana Phillip PA-C     • lidocaine  2 patch Topical Daily Adriana Phillip PA-C     • lisinopril  5 mg Oral Daily Adriana Phillip PA-C     • menthol-methyl salicylate   Apply externally 4x Daily PRN Adriana Phillip PA-C     • mupirocin  1 application Nasal [address] Adriana Phillip PA-C     • ondansetron  4 mg Intravenous Q6H PRN Adriana Phillip PA-C     • pantoprazole  40 mg Oral Daily Adriana Phillip PA-C     • polyethylene glycol  17 g Oral Daily Adriana Phillip PA-C     • potassium chloride  20 mEq Oral Daily Adriana Phillip PA-C     • pravastatin  20 mg Oral Daily Adriana BRANDEE Phillip     • torsemide  20 mg Oral Daily Danae Canas PA-C       Continuous Infusions:   PRN Meds: •  acetaminophen  •  bisacodyl  •  hydrALAZINE  •  lidocaine (cardiac)  •  menthol-methyl salicylate  •  ondansetron    Vitals:   Vitals:    10/13/22 0400 10/13/22 0700 10/13/22 0842 10/13/22 0845   BP: 135/62 133/62 132/61 132/61   BP Location: Left arm Left arm     Pulse: 74 77 87    Resp: 16 20     Temp: 98 2 °F (36 8 °C) 97 8 °F (36 6 °C)     TempSrc: Oral Oral     SpO2: 97% 94%     Weight:       Height:           Telemetry: NSR; Heart Rate: 77; No bradycardia overnight  Hemodynamics:       Respiratory:   SpO2: SpO2: 94 %, SpO2 Activity: SpO2 Activity: At Rest; Room Air    Intake/Output:     Intake/Output Summary (Last 24 hours) at 10/13/2022 0856  Last data filed at 10/13/2022 0730  Gross per 24 hour   Intake 190 ml   Output 800 ml   Net -610 ml        Weights:   Weight (last 2 days)     Date/Time Weight    10/12/22 0600 85 8 (189 15)    10/11/22 1415 83 (183)    10/11/22 0754 83 (183)        Results:   Results from last 7 days   Lab Units 10/13/22  0515 10/12/22  0442 10/11/22  1305 10/11/22  1049   WBC Thousand/uL 7 35 8 52  --   --    HEMOGLOBIN g/dL 11 9 12 0  12 0  --  12 2   HEMATOCRIT % 37 1 36 9  36 8  --  38 7   PLATELETS Thousands/uL 162 184 196  --      Results from last 7 days   Lab Units 10/13/22  0515 10/12/22  0442 10/11/22  1049 10/11/22  1025   POTASSIUM mmol/L 3 9 3 5 3 5  --    CHLORIDE mmol/L 105 103 110*  --    CO2 mmol/L 28 27 23  --    CO2, I-STAT mmol/L  --   --   --  26   BUN mg/dL 11 11 11  --    CREATININE mg/dL 0 59* 0 67 0 54*  --    GLUCOSE, ISTAT mg/dl  --   --   --  119   CALCIUM mg/dL 8 9 8 5 8 6  --          Point of care glucose: 156    Studies:        EKG: NSR with persistent (new post op) LBBB  , from 142  Echo:  The prosthetic valve appears well-seated and appears to be functioning normally  There is no evidence of paravalvular regurgitation  I have personally reviewed pertinent reports  and I have personally reviewed pertinent films in PACS    Invasive Lines/Tubes:  Invasive Devices  Report    Central Venous Catheter Line  Duration           CVC Central Lines 10/11/22 Triple 1 day          Peripheral Intravenous Line  Duration           Peripheral IV 10/11/22 Left Arm 1 day    Peripheral IV 10/11/22 Left Hand 1 day              Physical Exam:    HEENT/NECK:  Normocephalic  Atraumatic  No jugular venous distention  Cardiac: Regular rate and rhythm and No murmurs/rubs/gallops  Pulmonary:  Breath sounds clear bilaterally and No rales/rhonchi/wheezes  Abdomen:  Non-tender, Non-distended and Normal bowel sounds  Incisions: Groin puncture sites clean, dry, and intact without hematoma  Extremities: Extremities warm/dry and 1+ edema B/L  Neuro: Alert and oriented X 3  Skin: Warm/Dry, without rashes or lesions  Assessment:  Principal Problem:    Severe aortic stenosis  Active Problems:    Hypertension    Hyperlipidemia    Diabetes mellitus ([facility])    CHF (congestive heart failure) ([facility])    History of breast cancer    SJ on CPAP    CAD (coronary artery disease)    LBBB (left bundle branch block)    Hyperchloremia    S/P TAVR (transcatheter aortic valve replacement)       Aortic stenosis, Non-Rheumatic  S/P transfemoral transcatheter aortic valve replacement; POD # 2    Plan:    1  Cardiac:    NSR with new post op LBBB    Tolerated Coreg, 6 25 mg PO BID    No bradycardia/heart block over the last 24 hours    Repeat ECG shows Perisistent LBBB;  qrs 136, from 142    Live monitor at time of discharge    Will follow up final EP recommendations    Continue Lisinopril, 5 mg PO QD     D/C central IV access today    ASA/Plavix    Post op transthoracic echocardiogram completed  Well seated AV, without PVL    Continue Statin therapy  Continue DVT prophylaxis    2     Pulmonary:   Extubated  CXR findings: Clear  Good Room air oxygen saturation; Continue incentive spirometry/Coughing/Deep breathing exercises    3  Renal:   Intake/Output net: -1100 mL/24 hours    Torsemide, 20 mg PO QD    Post op Creatinine stable; Follow up labs prn    4  Neuro:  Neurologically intact; No active issues  Incisional pain well-controlled; Continue prn Tylenol    5  GI:  Tolerating TLC 2 3 gm sodium diet  Maintain 1800 mL daily fluid restriction   Continue daily stool softeners and prn suppository  Continue GI prophylaxis    6  Endo:   History of diabetes; Continue pre-op regimen with additional sliding scale coverage    7  Hematology:    Post-operative blood count acceptable; Trend prn    8     Disposition:   Home today, pending EP final recommendations      VTE Pharmacologic Prophylaxis: Sequential compression device (Venodyne)  and Heparin  VTE Mechanical Prophylaxis: sequential compression device    Bedside rounds completed with supervising physician  Plan of care discussed with bedside nurse    SIGNATURE: Delmi Kirk  DATE: October 13, 2022  TIME: 8:56 AM

## 2022-10-13 NOTE — DISCHARGE SUMMARY
Discharge Summary - Cardiothoracic Surgery   Michelle Prajapati Amboy 57 y o  female MRN: [id number]  Unit/Bed#: [facility] 405-01 Encounter: [id number]    Admission Date: 10/11/2022     Discharge Date: 10/13/22    Admitting Diagnosis: Aortic stenosis, moderate [I35 0]    Primary Discharge Diagnosis:   Aortic stenosis, Non-Rheumatic  S/P transfemoral transcatheter aortic valve replacement;    Secondary Discharge Diagnosis:   CAD, CHF, HTN, HL, IDDM2, diverticulosis, SJ (CPAP), hiatal hernia, h/o breast ca (s/p right lumpectomy, s/p radiation), adhesive capsulitis right shoulder, RUE lymphedema, BPPV, OA, baseline Creatinine (0 5-0 7)        Attending: DAVIE Greenberg  Consulting Physician(s):   Cardiology  Medical/Critical Care  Electrophysiology    Procedures Performed:   Procedure(s):  REPLACEMENT AORTIC VALVE TRANSCATHETER (TAVR) TRANSFEMORAL W/ 23MM MILIAN ROSS S3 ULTRA VALVE(ACCESS ON LEFT) TRAY  CARDIAC TAVR     Hospital Course: The patient was seen in consultation prior to this admission for evaluation of Aortic stenosis, Non-Rheumatic  Risks and benefits of transfemoral transcatheter aortic valve replacement were discussed in detail, and patient was agreeable  Routine preoperative evaluation was completed and informed consent was obtained prior to admission  10/11: Elective admission for TF TAVR #23mm via L approach  Trace PVL on intra-op TRAY s/p valve deployment  Extubated and transferred to PACU supported with no gtts  DP pulses 2+ b/l  ECG shows new LBBB ( from 76 pre-op), beta blocker held, EP consulted, repeat EKG for 1400  Gerontology and cardiology consulted  CXR w/ mild central PVC, start Lasix 40mg IV QDay while in house  PM: HTN, on cardene 15, started on Lisinopril 5mg & PRN Hydralazine    10/12: Weaned to room air  Cardene weaned off  NSR with new post op LBBB  NPO, awaiting EP consultation  PoPost op transthoracic echocardiogram completed   Well seated AV, without PVL   Intake/Output net: -1800  mL/24 hours  D/C IV Lasix and start Torsemide, 20 mg PO QD  Update level of care to telemetry  EP evaluation completed; Coreg resumed  Observe today as inpatient   PM: Heart rate/rhythm stable s/p beta blocker dosing    10/13: Tolerated Coreg, 6 25 mg PO BID  No bradycardia/heart block over the last 24 hours  Repeat ECG shows Persistent LBBB; qrs 136, from 142  Live monitor at time of discharge  D/C central IV access today  Fit for discharge to home, with live heart rate event monitor as directed by EP         Condition at Discharge:   good     Discharge Physical Exam:    Please see the documented physical exam from this morning's progress note for details  Discharge Data:  Results from last 7 days   Lab Units 10/13/22  0515 10/12/22  0442 10/11/22  1305 10/11/22  1049   WBC Thousand/uL 7 35 8 52  --   --    HEMOGLOBIN g/dL 11 9 12 0  12 0  --  12 2   HEMATOCRIT % 37 1 36 9  36 8  --  38 7   PLATELETS Thousands/uL 162 184 196  --      Results from last 7 days   Lab Units 10/13/22  0515 10/12/22  0442 10/11/22  1049 10/11/22  1025   POTASSIUM mmol/L 3 9 3 5 3 5  --    CHLORIDE mmol/L 105 103 110*  --    CO2 mmol/L 28 27 23  --    CO2, I-STAT mmol/L  --   --   --  26   BUN mg/dL 11 11 11  --    CREATININE mg/dL 0 59* 0 67 0 54*  --    GLUCOSE, ISTAT mg/dl  --   --   --  119   CALCIUM mg/dL 8 9 8 5 8 6  --            Discharge instructions/Information to patient and family:   See after visit summary for information provided to patient and family  Tigre Currie was educated on restrictions regarding driving and lifting, and techniques of proper incisional care  They were specifically counselled on signs and symptoms of an incisional infection, and advised to contact our service immediately should they develop fevers, sweats, chill, redness or drainage at the site of any incisions      Provisions for Follow-Up Care:  See after visit summary for information related to follow-up care and any pertinent home health orders  Disposition:  Home    Planned Readmission:   No    Discharge Medications:  See after visit summary for reconciled discharge medications provided to patient and family  Fidelia Pete was provided contact information and scheduled a follow up appointment with DAVIE Gil  Additionally, follow up appointments have been scheduled for their primary care physician and primary cardiologist   Contact information was provided  Fidelia Pete was counseled on the importance of avoiding tobacco products  As with all patients whom have undergone open heart surgery, tobacco cessation medication was contraindicated at the time of discharge  ACE/ARB was Prescribed at discharge    Beta Blocker was Prescribed at discharge    Aspirin was Prescribed at discharge    Statin was Prescribed at discharge      The patient was discharged on ongoing diuretic therapy with Torsemide 20 mg, PO QD and Potassium Chloride 20 mEq, PO QD  They were advised to continue these medications for 5 days, unless otherwise directed  The patient was informed that following their postoperative surgical evaluation, they will be referred to outpatient cardiac rehabilitation  They were counseled that this program is run by specialists who will help them safely strengthen their heart and prevent more heart disease  Cardiac rehabilitation will include exercise, relaxation, stress management, and heart-healthy nutrition  Caregivers will also check to make sure their medication regimen is working  During this admission, the patient was questioned on their use of tobacco, alcohol, and illicit/non-prescription drug use in the  previous 24 months  Fidelia Pete states that they have not used any of these substances in this time frame  I spent 20 minutes discharging the patient  This time was spent on the day of discharge  I had direct contact with the patient on the day of discharge   Additional documentation is required if more than 30 minutes were spent on discharge       SIGNATURE: Autumn Keith  DATE: October 13, 2022  TIME: 11:46 AM

## 2022-10-13 NOTE — PLAN OF CARE
Problem: PHYSICAL THERAPY ADULT  Goal: Performs mobility at highest level of function for planned discharge setting  See evaluation for individualized goals  Description: Treatment/Interventions: LE strengthening/ROM, Elevations, Therapeutic exercise, Endurance training, Bed mobility, Gait training, Spoke to nursing, Spoke to case management, OT          See flowsheet documentation for full assessment, interventions and recommendations  Outcome: Adequate for Discharge  Note: Prognosis: Good  Problem List: Decreased strength, Decreased endurance, Impaired balance, Decreased mobility, Obesity  Assessment: Pt demonstrated overall improvement in balance, endurance and all aspects of observed mobility progressing to mod (I) level on level surfaces (incl amb w/o AD) w/ no overt uncorrected LOB, gross knee buckling, or swaying observed during the session; no excessive discomfort or fatigue expressed; LE therex/HEP reviewed and performed; pt appeared to be comfortable at the end of session; overall, cont to anticipate pt will return home w/ available family support upon D/C from PT/mobility stand point and when medically cleared; home PT follow up is recommended; no other immediate PT needs otherwise identified while in the hospital; will therefore sign off        PT Discharge Recommendation: Home with home health rehabilitation    See flowsheet documentation for full assessment

## 2022-10-13 NOTE — PROGRESS NOTES
Progress Note - Electrophysiology-Cardiology (EP)   Michelle Vera  57 y o  female MRN: [id number]  Unit/Bed#: [facility] 405-01 Encounter: [id number]      Assessment/Plan:  1  New LBBB  2  Aortic stenosis  3  Hx SVT  4  HTN  5  HLD  6  DM type 2  7  CHF  8  Hx breast cancer      Patient tolerating home beta blocker and maintaining NSR with stable LBBB  Patient will be discharged with [employer] for 4 weeks of live cardiac event monitoring  Subjective/Objective     Subjective: Upon evaluation, patient is resting comfortably in her chair  She offers no complaints  Patient denies chest pain/heaviness/tightness/pressure, palpitations, shortness of breath, orthopnea, lightheadedness, presyncope, syncope or N/V  Telemetry review shows sinus rhythm with no significant bradycardia or pauses  EKG shows stable LBBB          Objective:     Vitals: /60   Pulse 80   Temp 97 7 °F (36 5 °C)   Resp 18   Ht 5' 2" (1 575 m)   Wt 85 8 kg (189 lb 2 5 oz)   LMP  (LMP Unknown)   SpO2 96%   BMI 34 60 kg/m²   Vitals:    10/11/22 1415 10/12/22 0600   Weight: 83 kg (183 lb) 85 8 kg (189 lb 2 5 oz)     Orthostatic Blood Pressures    Flowsheet Row Most Recent Value   Blood Pressure 130/60 filed at 10/13/2022 1100   Patient Position - Orthostatic VS Lying filed at 10/13/2022 0700            Intake/Output Summary (Last 24 hours) at 10/13/2022 1415  Last data filed at 10/13/2022 0730  Gross per 24 hour   Intake 100 ml   Output 800 ml   Net -700 ml       Invasive Devices  Report    None                             Scheduled Meds:  Current Facility-Administered Medications   Medication Dose Route Frequency Provider Last Rate   • acetaminophen  650 mg Oral Q6H PRN Elio Pizarro PA-C     • acetaminophen  975 mg Oral Q8H Adriana Phillip PA-C     • aspirin  81 mg Oral Daily Adriana Phillip PA-C     • bisacodyl  10 mg Rectal Daily PRN Adriana Phillip PA-C     • carvedilol  6 25 mg Oral BID With Meals Bharathi Serrano PA-C     • chlorhexidine  15 mL Mouth/Throat BID Adriana Phillip PA-C     • clopidogrel  75 mg Oral Daily Brent Mueller PA-C     • docusate sodium  100 mg Oral BID Adriana Phillip PA-C     • glimepiride  4 mg Oral BID Adriana Phillip PA-C     • heparin (porcine)  5,000 Units Subcutaneous [facility] Brent Mueller PA-C     • hydrALAZINE  5 mg Intravenous Q6H PRN Adriana Phillip PA-C     • insulin glargine  10 Units Subcutaneous HS Adriana Phillip PA-C     • insulin lispro  1-6 Units Subcutaneous TID AC Adriana Phillip PA-C     • insulin lispro  1-6 Units Subcutaneous HS Adriana Phillip PA-C     • lidocaine (cardiac)  100 mg Intravenous Q30 Min PRN Adriana Phillip PA-C     • lidocaine  2 patch Topical Daily Adriana Phillip PA-C     • lisinopril  5 mg Oral Daily Adriana Phillip PA-C     • menthol-methyl salicylate   Apply externally 4x Daily PRN Adriana Phillip PA-C     • mupirocin  1 application Nasal [address] Adriana Phillip PA-C     • ondansetron  4 mg Intravenous Q6H PRN Adriana Phillip PA-C     • pantoprazole  40 mg Oral Daily Adriana Phillip PA-C     • polyethylene glycol  17 g Oral Daily Adriana Phillip PA-C     • potassium chloride  20 mEq Oral Daily Adriana Phillip PA-C     • pravastatin  20 mg Oral Daily Adriana Phillip PA-C     • torsemide  20 mg Oral Daily Diego Jones PA-C       Continuous Infusions:   PRN Meds: •  acetaminophen  •  bisacodyl  •  hydrALAZINE  •  lidocaine (cardiac)  •  menthol-methyl salicylate  •  ondansetron    Review of Systems:  ROS as noted above, otherwise 12 point review of systems was performed and is negative  Physical Exam:   Physical Exam  Vitals reviewed  Constitutional:       General: She is not in acute distress  Appearance: She is not ill-appearing or diaphoretic  HENT:      Head: Normocephalic and atraumatic  Right Ear: External ear normal       Left Ear: External ear normal       Nose: Nose normal    Eyes:      General:         Right eye: No discharge  Left eye: No discharge  Cardiovascular:      Rate and Rhythm: Normal rate and regular rhythm  Heart sounds: No murmur heard  No friction rub  Pulmonary:      Effort: Pulmonary effort is normal       Breath sounds: Normal breath sounds  No wheezing, rhonchi or rales  Abdominal:      General: There is no distension  Palpations: Abdomen is soft  Tenderness: There is no abdominal tenderness  Musculoskeletal:         General: No deformity or signs of injury  Cervical back: No rigidity  No muscular tenderness  Right lower leg: No edema  Left lower leg: No edema  Skin:     General: Skin is warm and dry  Capillary Refill: Capillary refill takes less than 2 seconds  Coloration: Skin is not jaundiced or pale  Neurological:      General: No focal deficit present  Mental Status: She is alert and oriented to person, place, and time  Mental status is at baseline  Psychiatric:         Mood and Affect: Mood normal          Behavior: Behavior normal          Thought Content: Thought content normal                      Lab Results: I have personally reviewed pertinent lab results      Results from last 7 days   Lab Units 10/13/22  0515 10/12/22  0442 10/11/22  1305 10/11/22  1049   WBC Thousand/uL 7 35 8 52  --   --    HEMOGLOBIN g/dL 11 9 12 0  12 0  --  12 2   HEMATOCRIT % 37 1 36 9  36 8  --  38 7   PLATELETS Thousands/uL 162 184 196  --      Results from last 7 days   Lab Units 10/13/22  0515 10/12/22  0442 10/11/22  1049 10/11/22  1025   POTASSIUM mmol/L 3 9 3 5 3 5  --    CHLORIDE mmol/L 105 103 110*  --    CO2 mmol/L 28 27 23  --    CO2, I-STAT mmol/L  --   --   --  26   BUN mg/dL 11 11 11  --    CREATININE mg/dL 0 59* 0 67 0 54*  --    GLUCOSE, ISTAT mg/dl  --   --   --  119   CALCIUM mg/dL 8 9 8 5 8 6  --                  Imaging: I have personally reviewed pertinent films in PACS  ECHO:   •  Left Ventricle: Left ventricular cavity size is normal  Wall thickness is moderately increased  The left ventricular ejection fraction is 65%  Systolic function is normal  Wall motion cannot be accurately assessed  Diastolic function is mildly abnormal, consistent with grade I (abnormal) relaxation  •  Left Atrium: The atrium is mildly dilated  •  Aortic Valve: There is an Farmer ROSS 3 23 mm TAVR bioprosthetic valve  The prosthetic valve appears well-seated and appears to be functioning normally  There is no evidence of paravalvular regurgitation  There is no evidence of transvalvular regurgitation  The aortic valve peak velocity is 2 53 m/s  The aortic valve peak gradient is 26 mmHg  The aortic valve mean gradient is 14 mmHg  The aortic valve area is 1 98 cm2  •  Mitral Valve: There is mild annular calcification         CATH: 8/2022  · No significant obstructive epicardial CAD              HOLTER: 11/2021  DESCRIPTION OF FINDINGS:   The patient was monitored for a total  48 hours   The patient was   predominantly in  sinus rhythm throughout the study   The average heart   rate was  78 beats per minute   The heart rate ranged from a low of  50   beats per minute in  sinus bradycardia at  3:47 a m  to a maximum of  145   beats per minute in  sinus tachycardia with PAC at  2:09 p m  [facility] Ventricular ectopic activity consisted of  0 beats ( 0 0% of total beats),   of which,  0 were single PVCs, 0 were ventricular couplets, 0 were in   bigeminy and 0 were in trigeminy  There was no sustained or nonsustained   ventricular tachycardia  Supraventricular ectopic activity consisted of  28 beats ( 0 0% of total   beats), of which,  9 were single PACs,  1 was late beat, 2 were atrial   couplets, 0 were in bigeminy and 0 were in trigeminy  There were 1 atrial   runs, longest of which was 14 beats   There was no evidence of atrial   fibrillation or atrial flutter  There were no significant pauses   The longest R-R interval was 1 3   seconds   There was no evidence of advanced degree heart block       The accompanying patient diary notes symptoms of  lightheaded on Wednesday   and short of breath on Thursday however there were no time frames   documented however there were diary correlations without symptoms listed   on strips printed and correlations with these diary results showed sinus   rhythm with heart rates 71-99 beats per minute     Impression  Predominantly sinus rhythm, with an average heart rate of  78 beats per   minute    Rare supraventricular ectopy with short atrial run with no symptoms   documented    No significant premature ventricular contractions   No significant pauses or advanced degree heart block    While the return diary did list symptoms of lightheadedness on Wednesday   and shortness of breath on Thursday there were no specific times   documented with this however there were diary listed events on strips   printed which correlated with sinus rhythm heart rates 71-99 beats per   minute                VTE Pharmacologic Prophylaxis: Heparin  VTE Mechanical Prophylaxis: sequential compression device

## 2022-10-14 ENCOUNTER — HOME CARE VISIT (OUTPATIENT)
Dept: HOME HEALTH SERVICES | Facility: HOME HEALTHCARE | Age: 62
End: 2022-10-14

## 2022-10-14 ENCOUNTER — HOME CARE VISIT (OUTPATIENT)
Dept: HOME HEALTH SERVICES | Facility: HOME HEALTHCARE | Age: 62
End: 2022-10-14
Payer: COMMERCIAL

## 2022-10-14 VITALS
SYSTOLIC BLOOD PRESSURE: 126 MMHG | BODY MASS INDEX: 33.86 KG/M2 | HEART RATE: 90 BPM | RESPIRATION RATE: 20 BRPM | DIASTOLIC BLOOD PRESSURE: 70 MMHG | WEIGHT: 184 LBS | TEMPERATURE: 97.8 F | OXYGEN SATURATION: 95 % | HEIGHT: 62 IN

## 2022-10-14 DIAGNOSIS — I35.0 AORTIC STENOSIS, MODERATE: ICD-10-CM

## 2022-10-14 PROCEDURE — G0299 HHS/HOSPICE OF RN EA 15 MIN: HCPCS

## 2022-10-14 PROCEDURE — 400013 VN SOC

## 2022-10-14 RX ORDER — PRAVASTATIN SODIUM 20 MG
20 TABLET ORAL DAILY
Qty: 90 TABLET | Refills: 3 | Status: SHIPPED | OUTPATIENT
Start: 2022-10-14

## 2022-10-14 NOTE — CASE COMMUNICATION
Spoke with pt who confirmed the following information       Agreeable to [facility] services and no other SN agencies in home: xx    Communication to the physician regarding delay: na    Insurance, copays, HB status reviewed: xx    Verified address and phone number: xx    Requested that patient secure pets: xx    Medication bottles and DC instructions in home: xx    Wound care/IV supplies in home: na    CG present to learn: na    Other concerns  patient/family would like to address at visit:

## 2022-10-14 NOTE — CASE COMMUNICATION
[facility] has admitted your patient to [address] [place] [address] service with the following disciplines:      SN and PT  This report is informational only, no responses is needed  Primary focus of home health care Surgical aftercare sp TAVR   Patient stated goals of care to gain more energy and get back to living life   Anticipated visit pattern and next visit date  2W6  next vs 10/18/22    See medication list  all medication reviewed at [facility] wit h patient  SN notified Dr Gee Valle office patient needs refill of pravachol to [facility]   Patient previously taking Synjardy XR  mg daily  Med not listed on AVS  Per Dr Kumar Christopher office staff patient needs to follow up with endocrinologist to clarify if she should continue taking  SN called endocrine Dr Georgina Vasquez and left message to please clarify medication    Significant clinical findings none  Potential barr iers to goal achievement none  Other pertinent information  Patient wearing Zio AT patch heart monitor       Thank you for allowing us to participate in the care of your patient

## 2022-10-16 ENCOUNTER — HOME CARE VISIT (OUTPATIENT)
Dept: HOME HEALTH SERVICES | Facility: HOME HEALTHCARE | Age: 62
End: 2022-10-16
Payer: COMMERCIAL

## 2022-10-16 NOTE — CASE COMMUNICATION
Patient declines need for [facility] PT at this time  Feels she is getting around her [place] home fine and is "mobile enough" until she is to start cardiac rehab  Please reconsult PT if situation changes

## 2022-10-18 ENCOUNTER — HOME CARE VISIT (OUTPATIENT)
Dept: HOME HEALTH SERVICES | Facility: HOME HEALTHCARE | Age: 62
End: 2022-10-18
Payer: COMMERCIAL

## 2022-10-18 VITALS
OXYGEN SATURATION: 95 % | BODY MASS INDEX: 32.6 KG/M2 | SYSTOLIC BLOOD PRESSURE: 118 MMHG | RESPIRATION RATE: 20 BRPM | TEMPERATURE: 97.7 F | WEIGHT: 178.25 LBS | HEART RATE: 96 BPM | DIASTOLIC BLOOD PRESSURE: 70 MMHG

## 2022-10-18 PROCEDURE — G0299 HHS/HOSPICE OF RN EA 15 MIN: HCPCS

## 2022-10-18 PROCEDURE — G0180 MD CERTIFICATION HHA PATIENT: HCPCS | Performed by: PHYSICIAN ASSISTANT

## 2022-10-19 ENCOUNTER — TELEPHONE (OUTPATIENT)
Dept: CARDIAC SURGERY | Facility: CLINIC | Age: 62
End: 2022-10-19

## 2022-10-19 NOTE — TELEPHONE ENCOUNTER
POST OP CALL    10/11/22: Elective TAVR  10/13/22: Discharge home w/ ZIO patch monitoring  10/19/22:PCP appt  10/19/22:Spoke with patient today  She states she is doing good  She was seen by her PCP today  She is tolerating activity without SOB, lightheadedness, palpitations or chest pain, She feels tired yet  No edema  Weight is stable at 179 lb (pre op weight 183 lb)  Groin site bruised ut healing well, no pain, swelling or bleeding  She was seen by [facility] yesterday and they are planning to visit again on Fri 10/21/22    Questions answered  Appts reviewed

## 2022-10-20 DIAGNOSIS — I35.0 AORTIC STENOSIS, MODERATE: ICD-10-CM

## 2022-10-20 RX ORDER — AMLODIPINE BESYLATE 5 MG/1
TABLET ORAL
Qty: 90 TABLET | Refills: 3 | Status: SHIPPED | OUTPATIENT
Start: 2022-10-20

## 2022-10-20 RX ORDER — CARVEDILOL 6.25 MG/1
TABLET ORAL
Qty: 180 TABLET | Refills: 3 | Status: SHIPPED | OUTPATIENT
Start: 2022-10-20

## 2022-10-21 ENCOUNTER — TELEPHONE (OUTPATIENT)
Dept: HOME HEALTH SERVICES | Facility: HOME HEALTHCARE | Age: 62
End: 2022-10-21

## 2022-10-21 ENCOUNTER — HOME CARE VISIT (OUTPATIENT)
Dept: HOME HEALTH SERVICES | Facility: HOME HEALTHCARE | Age: 62
End: 2022-10-21
Payer: COMMERCIAL

## 2022-10-21 NOTE — CASE COMMUNICATION
Notice of change in projected skilled nurse visit pattern for this week  Pt scheduled to be seen today, but visit not made as anticipated  CMS requires MD be notified of changes in ordered visit pattern

## 2022-10-24 ENCOUNTER — OFFICE VISIT (OUTPATIENT)
Dept: CARDIOLOGY CLINIC | Facility: CLINIC | Age: 62
End: 2022-10-24
Payer: COMMERCIAL

## 2022-10-24 VITALS
WEIGHT: 185.3 LBS | HEART RATE: 78 BPM | OXYGEN SATURATION: 96 % | HEIGHT: 62 IN | SYSTOLIC BLOOD PRESSURE: 138 MMHG | DIASTOLIC BLOOD PRESSURE: 78 MMHG | BODY MASS INDEX: 34.1 KG/M2

## 2022-10-24 DIAGNOSIS — Z95.2 S/P TAVR (TRANSCATHETER AORTIC VALVE REPLACEMENT): Primary | ICD-10-CM

## 2022-10-24 DIAGNOSIS — G47.33 OSA ON CPAP: ICD-10-CM

## 2022-10-24 DIAGNOSIS — Z99.89 OSA ON CPAP: ICD-10-CM

## 2022-10-24 DIAGNOSIS — E78.5 HYPERLIPIDEMIA, UNSPECIFIED HYPERLIPIDEMIA TYPE: ICD-10-CM

## 2022-10-24 DIAGNOSIS — I44.7 LBBB (LEFT BUNDLE BRANCH BLOCK): ICD-10-CM

## 2022-10-24 DIAGNOSIS — E11.9 TYPE 2 DIABETES MELLITUS WITHOUT COMPLICATION, UNSPECIFIED WHETHER LONG TERM INSULIN USE (HCC): ICD-10-CM

## 2022-10-24 DIAGNOSIS — I10 HYPERTENSION, UNSPECIFIED TYPE: ICD-10-CM

## 2022-10-24 PROCEDURE — 99214 OFFICE O/P EST MOD 30 MIN: CPT | Performed by: INTERNAL MEDICINE

## 2022-10-24 NOTE — PROGRESS NOTES
Cardiology Follow Up    Allan Brito  1960  [id number]  [address]  [phone]    1  S/P TAVR (transcatheter aortic valve replacement)     2  LBBB (left bundle branch block)     3  Hypertension, unspecified type     4  Hyperlipidemia, unspecified hyperlipidemia type     5  Type 2 diabetes mellitus without complication, unspecified whether long term insulin use ([facility] )     6  SJ on CPAP         Interval History:   Ms Yovani Best was admitted to [facility] on 10/11 - 10/13/22 with severe aortic stenosis sp TAVR  On 10/11 Ms Brittni Johnson was electively admitted and underwent TF TAVR #23 mm Jamesward Vazquez S3 Ultra valve by Dr Rogerio Hunter  She was extubated transferred to the PACU  Postoperative EKG showed new left bundle branch block  EP consulted  Postop TTE showed well-seated aortic valve without PVL  Coreg resume no bradycardia or heart block over the past 24 hours  EKG persistent left bundle branch block  Event recorder ordered by EP at discharge  Ms Yovani Best presents to our office for a recent hospitalization follow up visit  She is accompanied by her   Elvin Bath admits improved fatigue and balance since TAVR  She is not active at this time to evaluate shortness of breath  She   Denies chest pain palpitations lightheadedness or dizziness        Medical History   Hypertension  Hyperlipidemia 4/09/22 , , HDL 53, LDL 70   Chronic HFpEF LVEF 55%, grade 1 abnormal relaxation   DM2 HgbA1C 7 5 on 4/09/22   SJ on CPAP  Breast CA sp Lumpectomy sp radiation  RUE Lymphedema  BPPV    8/19/22 [facility] NO CAD  Patient Active Problem List   Diagnosis   • Severe aortic stenosis   • Hypertension   • Hyperlipidemia   • Diabetes mellitus ([facility] )   • Heart palpitations   • Acute pain of right shoulder   • Adhesive capsulitis of right shoulder   • Lightheadedness   • Diverticulitis of large intestine without perforation or abscess without bleeding   • CHF (congestive heart failure) ([facility])   • History of breast cancer   • SJ on CPAP   • CAD (coronary artery disease)   • LBBB (left bundle branch block)   • Hyperchloremia   • S/P TAVR (transcatheter aortic valve replacement)     Past Medical History:   Diagnosis Date   • Adhesive capsulitis of right shoulder    • CAD (coronary artery disease)    • CHF (congestive heart failure) ([facility])    • Diabetes mellitus ([facility])     type 2, insulin dependent   • Diverticulosis    • History of breast cancer     s/p right lumpectomy, s/p radiation   • Hyperlipidemia    • Hypertension    • Lymphedema of right upper extremity    • Obesity (BMI 30-39  9)    • SJ on CPAP    • Severe aortic stenosis    • Vitamin D deficiency      Social History     Socioeconomic History   • Marital status: /Civil Union     Spouse name: Not on file   • Number of children: Not on file   • Years of education: Not on file   • Highest education level: Not on file   Occupational History   • Not on file   Tobacco Use   • Smoking status: Never Smoker   • Smokeless tobacco: Never Used   Vaping Use   • Vaping Use: Never used   Substance and Sexual Activity   • Alcohol use: No   • Drug use: No   • Sexual activity: Yes     Partners: Male   Other Topics Concern   • Not on file   Social History Narrative   • Not on file     Social Determinants of Health     Financial Resource Strain: Not on file   Food Insecurity: No Food Insecurity   • Worried About Running Out of Food in the Last Year: Never true   • Ran Out of Food in the Last Year: Never true   Transportation Needs: Not on file   Physical Activity: Not on file   Stress: Not on file   Social Connections: Not on file   Intimate Partner Violence: Not on file   Housing Stability: Not on file      Family History   Problem Relation Age of Onset   • COPD Mother    • Heart disease Father    • Heart disease Paternal Grandmother    • Cancer Family    • Colon cancer Neg Hx      Past Surgical History:   Procedure Laterality Date   • APPENDECTOMY     • BREAST LUMPECTOMY Right    • CARDIAC CATHETERIZATION N/A 08/19/2022    Procedure: Cardiac RHC/LHC; Surgeon: Gilbert Saint, DO;  [facility]: [facility]; Service: Cardiology   • CARDIAC CATHETERIZATION N/A 10/11/2022    Procedure: CARDIAC TAVR;  Surgeon: Gilbert Saint, DO;  Location: [facility];  Service: Cardiology   • CHOLECYSTECTOMY     • KNEE ARTHROSCOPY Right     knee   • ID REPLACE AORTIC VALVE OPENFEMORAL ARTERY APPROACH N/A 10/11/2022    Procedure: REPLACEMENT AORTIC VALVE TRANSCATHETER (TAVR) TRANSFEMORAL W/ 23MM MILIAN ROSS S3 ULTRA VALVE(ACCESS ON LEFT) TRAY;  Surgeon: Dustin Bearden MD;  Location: [facility] MA[facility];  Service: Cardiac Surgery   • TONSILLECTOMY AND ADENOIDECTOMY     • TOTAL ABDOMINAL HYSTERECTOMY         Current Outpatient Medications:   •  amLODIPine (NORVASC) 5 mg tablet, TAKE ONE TABLET BY MOUTH EVERY DAY, Disp: 90 tablet, Rfl: 3  •  aspirin 81 mg chewable tablet, Chew 81 mg daily, Disp: , Rfl:   •  carvedilol (COREG) 6 25 mg tablet, TAKE ONE TABLET BY MOUTH TWICE A DAY WITH MEALS, Disp: 180 tablet, Rfl: 3  •  Cholecalciferol (VITAMIN D3) 1000 UNITS CAPS, Take 2,000 Units by mouth daily, Disp: , Rfl:   •  clopidogrel (PLAVIX) 75 mg tablet, Take 1 tablet (75 mg total) by mouth daily Do not start before October 14, 2022 , Disp: 90 tablet, Rfl: 0  •  Cyanocobalamin (VITAMIN B12 PO), Take 2,500 mcg by mouth daily, Disp: , Rfl:   •  Empagliflozin-metFORMIN HCl ER (Synjardy XR)  MG TB24, Take 1 capsule by mouth daily   Indications: Type 2 Diabetes, Disp: , Rfl:   •  glimepiride (AMARYL) 2 mg tablet, Take 4 mg by mouth 2 (two) times a day , Disp: , Rfl:   •  Icosapent Ethyl (VASCEPA PO), Take 2 capsules by mouth daily, Disp: , Rfl:   •  Insulin Glargine (TOUJEO) 300 units/mL CONCETRATED U-300 injection pen, Inject 20 Units under the skin daily at bedtime, Disp: , Rfl:   •  lisinopril (ZESTRIL) 5 mg tablet, Take 1 tablet (5 mg total) by mouth daily Do not start before October 14, 2022 , Disp: 30 tablet, Rfl: 2  •  multivitamin (THERAGRAN) TABS, Take 1 tablet by mouth daily, Disp: , Rfl:   •  potassium chloride (K-DUR,KLOR-CON) 20 mEq tablet, Take 1 tablet (20 mEq total) by mouth daily for 5 days Do not start before October 14, 2022 , Disp: 5 tablet, Rfl: 0  •  pravastatin (PRAVACHOL) 20 mg tablet, Take 1 tablet (20 mg total) by mouth daily, Disp: 90 tablet, Rfl: 3  •  pyridoxine (VITAMIN B6) 100 mg tablet, Take 100 mg by mouth daily, Disp: , Rfl:   •  torsemide (DEMADEX) 20 mg tablet, Take 1 tablet (20 mg total) by mouth daily for 5 days Do not start before October 14, 2022 , Disp: 5 tablet, Rfl: 0  Allergies   Allergen Reactions   • Fentanyl Rash   • Niaspan [Niacin Er] Rash   • Prednisone Rash       Labs:  No results displayed because visit has over 200 results        Lab Requisition on 10/03/2022   Component Date Value   • ABO Grouping 10/03/2022 A    • Rh Factor 10/03/2022 Positive    • Antibody Screen 10/03/2022 Negative    • Specimen Expiration Date 10/03/2022 [id number]    Appointment on 10/03/2022   Component Date Value   • Protime 10/03/2022 12 0    • INR 10/03/2022 0 90    • WBC 10/03/2022 7 65    • RBC 10/03/2022 4 58    • Hemoglobin 10/03/2022 13 6    • Hematocrit 10/03/2022 40 7    • MCV 10/03/2022 89    • MCH 10/03/2022 29 7    • MCHC 10/03/2022 33 4    • RDW 10/03/2022 14 0    • MPV 10/03/2022 10 9    • Platelets 55/27/4694 229    • nRBC 10/03/2022 0    • Neutrophils Relative 10/03/2022 65    • Immat GRANS % 10/03/2022 0    • Lymphocytes Relative 10/03/2022 25    • Monocytes Relative 10/03/2022 7    • Eosinophils Relative 10/03/2022 2    • Basophils Relative 10/03/2022 1    • Neutrophils Absolute 10/03/2022 4 98    • Immature Grans Absolute 10/03/2022 0 03    • Lymphocytes Absolute 10/03/2022 1 89    • Monocytes Absolute 10/03/2022 0 51    • Eosinophils Absolute 10/03/2022 0 16    • Basophils Absolute 10/03/2022 0 08    • MRSA Culture Only 10/03/2022 No Methicillin Resistant Staphlyococcus aureus (MRSA) isolated      Imaging: XR chest portable    Result Date: 10/12/2022  Narrative: CHEST INDICATION:   Post Open Heart Surgery  COMPARISON:  Previous day EXAM PERFORMED/VIEWS:  XR CHEST PORTABLE FINDINGS: There is no interval change in right internal jugular catheter and TAVR  Cardiomediastinal silhouette appears unremarkable  The lungs are clear  No pneumothorax or pleural effusion  Osseous structures appear within normal limits for patient age  Impression: No acute cardiopulmonary disease  Workstation performed: [id number]     Cardiac catheterization    Result Date: 10/11/2022  Narrative: OPERATIVE REPORT PATIENT NAME: Christiano Thurman  :  1960 MRN: [id number] Pt Location: [facility] HYBRID OR ROOM 02 SURGERY DATE: 10/11/2022 Surgeon(s) and Role: Panel 1:    * Rochelle Marquez MD - Primary    * Clarence Root PA-C - Assisting Panel 2:    * Lavinia Villatoro DO - Primary Co-Surgeons: Suman Mooney DO; Rochelle Marquez MD PREOPERATIVE DIAGNOSIS Symptomatic severe aortic stenosis  POSTOPERATIVE DIAGNOSIS Symptomatic severe aortic stenosis  PROCEDURE Transcatheter aortic valve replacement with a 23 mm Farmer ROSS 3 ULTRA bioprosthetic valve via a percutaneous left transfemoral approach  ANESTHESIA Dr Ingris Jorge, general endotracheal anesthesia with transesophageal echocardiogram guidance  After informed consent was obtained, patient brought to hybrid operating room in fasting state  Time out was performed  Using modified seldinger technique sheaths were placed in the left  common  femoral artery and vein respectively  The left  common  femoral artery was also used for placement of delivery sheath  The vessel was accessed using modified seldinger technique  Using fluoroscopy a temporary wire was advanced into RV apex through transfemoral sheath    The coplanar view was obtained using pigtail catheter placed in ascending aorta with subsequent ascending aortography  The TAVR delivery sheath was ulltimately advanced over a stiff wire  Next the 23 mm Farmer ROSS 3 ULTRA valve was implanted using rapid pacing with fluoro and TRAY guidance  There was no significant paravalvular leak appreciated post implant  The sheaths were removed and hemostasis obtained by manual compression of the venous sheath  The TAVR delivery sheath was removed and percutaneous closure was obtained using Preclose technique with two Proglide devices deployed pre sheath insertion  Patient left the hybrid operating room in stable condition  Impression  Successful 23mm Farmer ROSS 3 ULTRA transcatheter aortic valve replacement  SIGNATURE: Gilbert Saint DATE: October 11, 2022 TIME: 11:58 AM NOTE: A cardiac surgeon as co-surgeon was required as is a CMS requirement as well as needed for conventional open (non catheter based) surgical skills should become necessary  TRAY Anesthesia    Result Date: 10/11/2022  Narrative: Derek Schuster MD     10/11/2022 10:27 AM Procedure Performed: TRAY Anesthesia Start Time:  10/11/2022 9:35 AM Preanesthesia Checklist Patient identified, IV assessed, risks and benefits discussed, monitors and equipment assessed, procedure being performed at surgeon's request and anesthesia consent obtained  Procedure Diagnostic Indications for TRAY:  assessment of surgical repair, defect repair evaluation, hemodynamic monitoring and suspected pericardial effusion  Type of TRAY: interventional TRAY with real time guidance of intracardiac procedure  Images Saved: ultrasound permanent image saved  Physician Requesting Echo: Dustin Bearden MD   Location performed: OR  Intubated  Bite block not placed  Heart visualized  Insertion of TRAY Probe:  Atraumatic  Probe Type:  Multiplane  Modalities:  3D, color flow mapping, continuous wave Doppler and pulse wave Doppler  Echocardiographic and Doppler Measurements PREPROCEDURE LEFT VENTRICLE: Systolic Function: normal  Ejection Fraction: 60%  RIGHT VENTRICLE: Systolic Function: normal   Cavity size normal   AORTIC VALVE: Leaflets: normal and trileaflet  Leaflet motions restricted and heavily calcified  Stenosis: severe  Mean Gradient: 34 mmHg  Peak Gradient: 63 mmHg  Maximum velocity (cm/s): 4 0 m/sec  MITRAL VALVE: Leaflets: calcified and normal  Leaflet Motions: normal  Regurgitation: mild  Stenosis: none and by CFD  TRICUSPID VALVE: Leaflets: normal    Regurgitation: trace  ASCENDING AORTA:   Dissection not present  AORTIC ARCH:   dissection not present  Grade 3: atheroma protruding < 0 5 cm into lumen  DESCENDING AORTA:   Dissection not present  Grade 3: atheroma protruding < 0 5 cm into lumen  LEFT ATRIAL APPENDAGE:  No spontaneous echo contrast OTHER ATRIAL FINDINGS: No NICK clot  ATRIAL SEPTUM: Intra-atrial septal morphology: possible small PFO with L-->R flow by CFD  POSTPROCEDURE LEFT VENTRICLE: Unchanged   RIGHT VENTRICLE: Unchanged   AORTIC VALVE: Leaflets: ROSS valve in aortic position, well-seated, does not rock, good leaflet excursion and closure, no PVL and bioprosthetic  Stenosis: AV VTI 32 cm, LVOT VTI 21 Cm, THEO 2 36 cm^2  Mean Gradient: 5 mmHg  MITRAL VALVE:  Regurgitation: mild-mod  TRICUSPID VALVE: Unchanged   AORTA: Unchanged   Dissection: Dissection not present  REMOVAL: Probe Removal: atraumatic  ECHOCARDIOGRAM COMMENTS: Post-procedure, intra-atrial shunt (L-->R, likely PFO) more apparent, NL 50 cm/sec  XR chest portable ICU    Result Date: 10/11/2022  Narrative: CHEST INDICATION:   S/P open heart  COMPARISON:  8/5/2022 CT EXAM PERFORMED/VIEWS:  XR CHEST PORTABLE ICU FINDINGS:  Lung volumes are within normal limits  Lungs are clear  No effusion or pneumothorax  New aortic valve replacement  New right IJ central venous catheter at the cavoatrial junction    Otherwise stable appearance of the cardiomediastinal silhouette  No acute osseous or soft tissue pathology  Impression: No acute cardiopulmonary findings  Workstation performed: [id number]     TRAY intraop interventional w/realtime guidance of cardiac procedures    Result Date: 10/12/2022  Narrative: This order contains the linked images for the TRAY that was performed by the Anesthesiologist   Please see the  CARDIAC TRAY ANESTHESIA procedure for results  Echo complete w/ contrast if indicated    Result Date: 10/11/2022  Narrative: •  Left Ventricle: Left ventricular cavity size is normal  Wall thickness is moderately increased  The left ventricular ejection fraction is 65%  Systolic function is normal  Wall motion cannot be accurately assessed  Diastolic function is mildly abnormal, consistent with grade I (abnormal) relaxation  •  Left Atrium: The atrium is mildly dilated  •  Aortic Valve: There is an Farmer ROSS 3 23 mm TAVR bioprosthetic valve  The prosthetic valve appears well-seated and appears to be functioning normally  There is no evidence of paravalvular regurgitation  There is no evidence of transvalvular regurgitation  The aortic valve peak velocity is 2 53 m/s  The aortic valve peak gradient is 26 mmHg  The aortic valve mean gradient is 14 mmHg  The aortic valve area is 1 98 cm2  •  Mitral Valve: There is mild annular calcification  Review of Systems:  Review of Systems   Constitutional: Positive for fatigue  Musculoskeletal: Positive for arthralgias and myalgias  All other systems reviewed and are negative  Physical Exam:  Physical Exam  Vitals reviewed  Constitutional:       Appearance: She is obese  Cardiovascular:      Rate and Rhythm: Normal rate and regular rhythm  Pulses: Normal pulses  Heart sounds: Normal heart sounds  Pulmonary:      Effort: Pulmonary effort is normal       Breath sounds: Normal breath sounds     Abdominal:      General: Bowel sounds are normal       Palpations: Abdomen is soft  Musculoskeletal:         General: Normal range of motion  Cervical back: Normal range of motion and neck supple  Right lower leg: No edema  Left lower leg: No edema  Skin:     General: Skin is warm and dry  Capillary Refill: Capillary refill takes less than 2 seconds  Neurological:      General: No focal deficit present  Mental Status: She is alert and oriented to person, place, and time  Psychiatric:         Mood and Affect: Mood normal          Behavior: Behavior normal          Discussion/Summary:  1  10/11/22 sp TF TAVR #23 mm Edward Vazquez S3 Ultra valve by Dr Sudarshan Branch  Continue on  Coreg 6 25 mg b i d , amlodipine 5 mg daily,   Lisinopril 5 mg daily, Aspirin 81 mg daily,Plavix 75 mg daily,  Pravastatin  20 mg daily ordered, Malika Kelly has not started this medication  According to Malika Kelly her PCP would like her to take Nexlizet with Vascepa and not Pravastatin 20mg daily due to her hx of Arthritis  I will reach out to Dr Asaf Zhou ,her Primary Cardiologist, for his input  Malika Kelly is aware she will need life long pre medication with an antibiotic one hour prior to dental procedures  She will undergo testing prior to her follow-up office visit with Dr Sudarshan Branch  2  New LBBB sp TAVR 2 week live zio in place, awaiting second one in the mail  3  Hypertension LUE sitting 126/62, controlled on  Coreg 6 25 mg b i d , lisinopril 5 mg daily, amlodipine 5 mg daily,  2 g sodium diet  4  Hyperlipidemia 4/09/22 , , HDL 53, LDL 70 According to Malika Kelly her PCP would like her to take Nexlizet with Vascepa and not Pravastatin 20mg daily due to her hx of Arthritis  I will reach out to Dr Asaf Zhou her Primary Cardiologist for his input  5  DM2 HgbA1C 7 5 on 4/09/22 per PCP   6   SJ complaint with CPAP nightly

## 2022-10-25 ENCOUNTER — HOME CARE VISIT (OUTPATIENT)
Dept: HOME HEALTH SERVICES | Facility: HOME HEALTHCARE | Age: 62
End: 2022-10-25
Payer: COMMERCIAL

## 2022-10-25 PROCEDURE — G0299 HHS/HOSPICE OF RN EA 15 MIN: HCPCS

## 2022-10-26 VITALS
DIASTOLIC BLOOD PRESSURE: 82 MMHG | RESPIRATION RATE: 20 BRPM | OXYGEN SATURATION: 95 % | SYSTOLIC BLOOD PRESSURE: 130 MMHG | TEMPERATURE: 96.9 F | HEART RATE: 81 BPM

## 2022-10-28 ENCOUNTER — HOME CARE VISIT (OUTPATIENT)
Dept: HOME HEALTH SERVICES | Facility: HOME HEALTHCARE | Age: 62
End: 2022-10-28
Payer: COMMERCIAL

## 2022-10-28 VITALS
WEIGHT: 189 LBS | HEART RATE: 74 BPM | TEMPERATURE: 95.9 F | SYSTOLIC BLOOD PRESSURE: 148 MMHG | RESPIRATION RATE: 20 BRPM | DIASTOLIC BLOOD PRESSURE: 74 MMHG | OXYGEN SATURATION: 96 % | BODY MASS INDEX: 34.57 KG/M2

## 2022-10-28 PROCEDURE — G0299 HHS/HOSPICE OF RN EA 15 MIN: HCPCS

## 2022-11-04 ENCOUNTER — CLINICAL SUPPORT (OUTPATIENT)
Dept: CARDIOLOGY CLINIC | Facility: CLINIC | Age: 62
End: 2022-11-04

## 2022-11-04 DIAGNOSIS — I44.7 LBBB (LEFT BUNDLE BRANCH BLOCK): ICD-10-CM

## 2022-11-09 ENCOUNTER — APPOINTMENT (OUTPATIENT)
Dept: LAB | Facility: CLINIC | Age: 62
End: 2022-11-09

## 2022-11-09 DIAGNOSIS — Z95.2 S/P TAVR (TRANSCATHETER AORTIC VALVE REPLACEMENT): ICD-10-CM

## 2022-11-09 DIAGNOSIS — I35.0 AORTIC STENOSIS, MODERATE: ICD-10-CM

## 2022-11-09 LAB
ANION GAP SERPL CALCULATED.3IONS-SCNC: 3 MMOL/L (ref 4–13)
BASOPHILS # BLD AUTO: 0.07 THOUSANDS/ÂΜL (ref 0–0.1)
BASOPHILS NFR BLD AUTO: 1 % (ref 0–1)
BUN SERPL-MCNC: 14 MG/DL (ref 5–25)
CALCIUM SERPL-MCNC: 9.8 MG/DL (ref 8.3–10.1)
CHLORIDE SERPL-SCNC: 108 MMOL/L (ref 96–108)
CO2 SERPL-SCNC: 28 MMOL/L (ref 21–32)
CREAT SERPL-MCNC: 0.71 MG/DL (ref 0.6–1.3)
EOSINOPHIL # BLD AUTO: 0.21 THOUSAND/ÂΜL (ref 0–0.61)
EOSINOPHIL NFR BLD AUTO: 3 % (ref 0–6)
ERYTHROCYTE [DISTWIDTH] IN BLOOD BY AUTOMATED COUNT: 13.9 % (ref 11.6–15.1)
GFR SERPL CREATININE-BSD FRML MDRD: 90 ML/MIN/1.73SQ M
GLUCOSE P FAST SERPL-MCNC: 148 MG/DL (ref 65–99)
HCT VFR BLD AUTO: 40.3 % (ref 34.8–46.1)
HGB BLD-MCNC: 13.1 G/DL (ref 11.5–15.4)
IMM GRANULOCYTES # BLD AUTO: 0.02 THOUSAND/UL (ref 0–0.2)
IMM GRANULOCYTES NFR BLD AUTO: 0 % (ref 0–2)
LYMPHOCYTES # BLD AUTO: 1.54 THOUSANDS/ÂΜL (ref 0.6–4.47)
LYMPHOCYTES NFR BLD AUTO: 25 % (ref 14–44)
MCH RBC QN AUTO: 28.7 PG (ref 26.8–34.3)
MCHC RBC AUTO-ENTMCNC: 32.5 G/DL (ref 31.4–37.4)
MCV RBC AUTO: 88 FL (ref 82–98)
MONOCYTES # BLD AUTO: 0.56 THOUSAND/ÂΜL (ref 0.17–1.22)
MONOCYTES NFR BLD AUTO: 9 % (ref 4–12)
NEUTROPHILS # BLD AUTO: 3.84 THOUSANDS/ÂΜL (ref 1.85–7.62)
NEUTS SEG NFR BLD AUTO: 62 % (ref 43–75)
NRBC BLD AUTO-RTO: 0 /100 WBCS
PLATELET # BLD AUTO: 193 THOUSANDS/UL (ref 149–390)
PMV BLD AUTO: 10.7 FL (ref 8.9–12.7)
POTASSIUM SERPL-SCNC: 4.3 MMOL/L (ref 3.5–5.3)
RBC # BLD AUTO: 4.57 MILLION/UL (ref 3.81–5.12)
SODIUM SERPL-SCNC: 139 MMOL/L (ref 135–147)
WBC # BLD AUTO: 6.24 THOUSAND/UL (ref 4.31–10.16)

## 2022-11-10 ENCOUNTER — HOSPITAL ENCOUNTER (OUTPATIENT)
Dept: NON INVASIVE DIAGNOSTICS | Facility: HOSPITAL | Age: 62
Discharge: HOME | End: 2022-11-10

## 2022-11-10 ENCOUNTER — OFFICE VISIT (OUTPATIENT)
Dept: CARDIAC SURGERY | Facility: CLINIC | Age: 62
End: 2022-11-10

## 2022-11-10 VITALS
OXYGEN SATURATION: 97 % | TEMPERATURE: 97.9 F | WEIGHT: 186.7 LBS | DIASTOLIC BLOOD PRESSURE: 63 MMHG | HEART RATE: 74 BPM | BODY MASS INDEX: 34.15 KG/M2 | SYSTOLIC BLOOD PRESSURE: 143 MMHG

## 2022-11-10 VITALS
SYSTOLIC BLOOD PRESSURE: 148 MMHG | HEIGHT: 62 IN | HEART RATE: 63 BPM | DIASTOLIC BLOOD PRESSURE: 74 MMHG | BODY MASS INDEX: 34.78 KG/M2 | WEIGHT: 189 LBS

## 2022-11-10 DIAGNOSIS — Z95.2 S/P TAVR (TRANSCATHETER AORTIC VALVE REPLACEMENT): Primary | ICD-10-CM

## 2022-11-10 DIAGNOSIS — I35.0 AORTIC STENOSIS, MODERATE: ICD-10-CM

## 2022-11-10 DIAGNOSIS — Z48.89 ENCOUNTER FOR POSTOPERATIVE CARE: ICD-10-CM

## 2022-11-10 DIAGNOSIS — Z95.2 S/P TAVR (TRANSCATHETER AORTIC VALVE REPLACEMENT): ICD-10-CM

## 2022-11-10 LAB
AORTIC ROOT: 3.1 CM
AORTIC VALVE MEAN VELOCITY: 14.6 M/S
APICAL FOUR CHAMBER EJECTION FRACTION: 70 %
ASCENDING AORTA: 3.5 CM
ATRIAL RATE: 64 BPM
AV AREA BY CONTINUOUS VTI: 1.6 CM2
AV AREA PEAK VELOCITY: 1.4 CM2
AV LVOT MEAN GRADIENT: 2 MMHG
AV LVOT PEAK GRADIENT: 4 MMHG
AV MEAN GRADIENT: 9 MMHG
AV PEAK GRADIENT: 18 MMHG
AV VALVE AREA: 1.64 CM2
AV VELOCITY RATIO: 0.46
DOP CALC AO PEAK VEL: 2.09 M/S
DOP CALC AO VTI: 41.99 CM
DOP CALC LVOT AREA: 3.14 CM2
DOP CALC LVOT DIAMETER: 2 CM
DOP CALC LVOT PEAK VEL VTI: 21.96 CM
DOP CALC LVOT PEAK VEL: 0.96 M/S
DOP CALC LVOT STROKE INDEX: 35.3 ML/M2
DOP CALC LVOT STROKE VOLUME: 68.95 CM3
E WAVE DECELERATION TIME: 217 MS
FRACTIONAL SHORTENING: 38 % (ref 28–44)
INTERVENTRICULAR SEPTUM IN DIASTOLE (PARASTERNAL SHORT AXIS VIEW): 1.4 CM
INTERVENTRICULAR SEPTUM: 1.4 CM (ref 0.6–1.1)
LAAS-AP2: 21 CM2
LAAS-AP4: 21.1 CM2
LEFT ATRIUM SIZE: 3.9 CM
LEFT INTERNAL DIMENSION IN SYSTOLE: 2.5 CM (ref 2.1–4)
LEFT VENTRICULAR INTERNAL DIMENSION IN DIASTOLE: 4 CM (ref 3.5–6)
LEFT VENTRICULAR POSTERIOR WALL IN END DIASTOLE: 1.3 CM
LEFT VENTRICULAR STROKE VOLUME: 47 ML
LVSV (TEICH): 47 ML
MV E'TISSUE VEL-SEP: 6 CM/S
MV PEAK A VEL: 1 M/S
MV PEAK E VEL: 93 CM/S
MV STENOSIS PRESSURE HALF TIME: 63 MS
MV VALVE AREA P 1/2 METHOD: 3.49 CM2
P AXIS: 22 DEGREES
PR INTERVAL: 158 MS
QRS AXIS: 23 DEGREES
QRSD INTERVAL: 144 MS
QT INTERVAL: 468 MS
QTC INTERVAL: 482 MS
RIGHT ATRIUM AREA SYSTOLE A4C: 16.9 CM2
RIGHT VENTRICLE ID DIMENSION: 3.8 CM
SL CV LEFT ATRIUM LENGTH A2C: 5.7 CM
SL CV LV EF: 65
SL CV PED ECHO LEFT VENTRICLE DIASTOLIC VOLUME (MOD BIPLANE) 2D: 70 ML
SL CV PED ECHO LEFT VENTRICLE SYSTOLIC VOLUME (MOD BIPLANE) 2D: 22 ML
T WAVE AXIS: 119 DEGREES
TRICUSPID ANNULAR PLANE SYSTOLIC EXCURSION: 1.9 CM
VENTRICULAR RATE: 64 BPM

## 2022-11-10 RX ORDER — IRBESARTAN 150 MG/1
TABLET ORAL
COMMUNITY
Start: 2022-11-08

## 2022-11-10 NOTE — PROGRESS NOTES
POST OP FOLLOW UP VISIT S/P TAVR    Procedure: S/P transfemoral transcatheter aortic valve replacement, performed on 10/11/22  History: Phu Lieberman is a 57y o  year old female who presents to our office today for routine follow up care from transcatheter aortic valve replacement  Postop, she had a persistent LBBB, was evaluated by EP and was set up with an ambulatory event monitor  She sent the first one back, and I reviewed Dr Criss Hutchins notes  She will be sending the second one back tomorrow  Bria Matos reports that she is feeling fatigued, more than she did before the TAVR  She states that she has not had palpitations, LE edema, shortness of breath or chest pain  She has followed up with her PCP and Cardiology, and has an appointment with Dr Lea Barton next week  She plans to do cardiac rehab  They have been in touch with her, but she was waiting to be cleared by our office  Review of System:     History obtained from the patient  General ROS: positive for  - fatigue  Psychological ROS: negative  Ophthalmic ROS: negative  ENT ROS: negative  Allergy and Immunology ROS: negative  Hematological and Lymphatic ROS: negative  Endocrine ROS: negative  Respiratory ROS: no cough, shortness of breath, or wheezing  Cardiovascular ROS: no chest pain or dyspnea on exertion  Gastrointestinal ROS: no abdominal pain, change in bowel habits, or black or bloody stools  Genito-Urinary ROS: no dysuria, trouble voiding, or hematuria  Musculoskeletal ROS: negative  Neurological ROS: no TIA or stroke symptoms  Dermatological ROS: negative    Vital Signs:     Vitals:    11/10/22 1126   BP: 143/63   BP Location: Left arm   Patient Position: Sitting   Cuff Size: Extra-Large   Pulse: 74   Temp: 97 9 °F (36 6 °C)   SpO2: 97%   Weight: 84 7 kg (186 lb 11 2 oz)       Home Medications:     Prior to Admission medications    Medication Sig Start Date End Date Taking?  Authorizing Provider   amLODIPine (NORVASC) 5 mg tablet TAKE ONE TABLET BY MOUTH EVERY DAY 10/20/22  Yes Arnold Rodriguez DO   aspirin 81 mg chewable tablet Chew 81 mg daily   Yes Historical Provider, MD   carvedilol (COREG) 6 25 mg tablet TAKE ONE TABLET BY MOUTH TWICE A DAY WITH MEALS 10/20/22  Yes Arnold Rodriguez DO   Cholecalciferol (VITAMIN D3) 1000 UNITS CAPS Take 2,000 Units by mouth daily   Yes Historical Provider, MD   clopidogrel (PLAVIX) 75 mg tablet Take 1 tablet (75 mg total) by mouth daily Do not start before October 14, 2022  10/14/22 1/12/23 Yes Jorge Luis Jerome PA-C   Cyanocobalamin (VITAMIN B12 PO) Take 2,500 mcg by mouth daily   Yes Historical Provider, MD   Empagliflozin-metFORMIN HCl ER (Synjardy XR)  MG TB24 Take 1 capsule by mouth daily  Indications: Type 2 Diabetes   Yes Leidy Nj DO   glimepiride (AMARYL) 2 mg tablet Take 4 mg by mouth 2 (two) times a day    Yes Historical Provider, MD   Icosapent Ethyl (VASCEPA PO) Take 2 capsules by mouth daily   Yes Historical Provider, MD   Insulin Glargine (TOUJEO) 300 units/mL CONCETRATED U-300 injection pen Inject 20 Units under the skin daily at bedtime   Yes Historical Provider, MD   lisinopril (ZESTRIL) 5 mg tablet Take 1 tablet (5 mg total) by mouth daily Do not start before October 14, 2022  10/14/22  Yes Jorge Luis Jerome PA-C   multivitamin [facility]) TABS Take 1 tablet by mouth daily   Yes Historical Provider, MD   pyridoxine (VITAMIN B6) 100 mg tablet Take 100 mg by mouth daily   Yes Historical Provider, MD   irbesartan (AVAPRO) 150 mg tablet  11/8/22   Historical Provider, MD   pravastatin (PRAVACHOL) 20 mg tablet Take 1 tablet (20 mg total) by mouth daily  Patient not taking: No sig reported 10/14/22   Karlene Nolasco DO       Physical Exam:    General: alert, oriented, NAD   HEENT/NECK:  PERRL  No jugular venous distention  Cardiac:Regular rate and rhythm  Pulmonary:Breath sounds clear bilaterally  Abdomen:  Non-tender, Non-distended  Positive bowel sounds    Upper extremities: 2+ radial pulses; brisk capillary refill  Lower extremities: Extremities warm/dry  Bilateral femoral pulses 2+, no bruit; PT/DP pulses 2+ bilaterally  No edema B/L  Incisions: Inguinal incision is clean, dry, and intact  Musculoskeletal: LYNN  Neuro: Alert and oriented X 3  Sensation is grossly intact  No focal deficits  Skin: Warm/Dry, without rashes or lesions      Lab Results:     Results from last 7 days   Lab Units 11/09/22  0836   WBC Thousand/uL 6 24   HEMOGLOBIN g/dL 13 1   HEMATOCRIT % 40 3   PLATELETS Thousands/uL 193     Results from last 7 days   Lab Units 11/09/22  0836   POTASSIUM mmol/L 4 3   CHLORIDE mmol/L 108   CO2 mmol/L 28   BUN mg/dL 14   CREATININE mg/dL 0 71   CALCIUM mg/dL 9 8       Imaging Studies:     Transthoracic Echocardiogram: 11/10/22  Left Ventricle Measurements    Function/Volumes   A4C EF 70 %         LVOT stroke volume 68 95 cm3         LVOT stroke volume index 35 3 ml/m2         Dimensions   LVIDd 4 cm         LVIDS 2 5 cm         IVSd 1 4 cm         LVPWd 1 3 cm         LVOT area 3 14 cm2         FS 38 %         Diastolic Filling   MV E' Tissue Velocity Septal 6 cm/s         E wave deceleration time 217 ms         MV Peak E Sid 93 cm/s         MV Peak A Sid 1 m/s          Report Measurements   AV LVOT peak gradient 4 mmHg              Interventricular Septum Measurements    Shunt Ratio   LVOT peak VTI 21 96 cm         LVOT peak sid 0 96 m/s              Right Ventricle Measurements    Dimensions   RVID d 3 8 cm               Left Atrium Measurements    Dimensions   LA size 3 9 cm         LA length (A2C) 5 7 cm               Right Atrium Measurements    Dimensions   RAA A4C 16 9 cm2               Atrial Septum Measurements    Shunt Ratio   LVOT peak VTI 21 96 cm         LVOT peak sid 0 96 m/s               Aortic Valve Measurements    Stenosis   Aortic valve peak velocity 2 09 m/s         LVOT peak sid 0 96 m/s         Ao VTI 41 99 cm         LVOT peak VTI 21 96 cm AV mean gradient 9 mmHg         LVOT mn grad 2 mmHg         AV peak gradient 18 mmHg         AV LVOT peak gradient 4 mmHg         Dimensionless velociy index 0 46          Area/Dimensions   AV valve area 1 64 cm2         AV area by cont VTI 1 6 cm2         AV area peak loni 1 4 cm2         LVOT diameter 2 cm         LVOT area 3 14 cm2               Mitral Valve Measurements    Stenosis   MV stenosis pressure 1/2 time 63 ms         MV valve area p 1/2 method 3 49 cm2               Aorta Measurements    Aortic Dimensions   Ao root 3 1 cm         Asc Ao 3 5 cm               EKG: pending     I have personally reviewed pertinent reports  TAVR evaluation Assessment:     Ankit De Jesus [address]: I    Assessment:   Aortic stenosis, Non-Rheumatic  S/P transcatheter aortic valve replacement    Ora Fang is making good progress in their post-op recovery  They are at NYHA functional class I  Left Groin incision is well healed  Weight and VS are stable  Recent echocardiogram demonstrates well seated valve, no paravalvular leak  ECG result pending, BMP & CBC reviewed, WNL  Plan:   Medications reviewed with patient  Recommended Plavix therapy after TAVR is for 90 days only but Aspirin 81 mg daily is lifelong  Benefits of participating in cardiac rehabilitation discussed with patient and they are cleared to proceed with the program  May resume driving and all normal activities  Ora Fang will return for one year follow-up in our office with repeat echocardiogram, ECG, CBC & BMP  Our office will contact patient to schedule appointment  They have been advised to maintain routine follow up with their cardiologist and PCP for ongoing medical care  Patient was comfortable with our recommendations and their questions were answered to their satisfaction  The patient recently had a screening colonoscopy in April 2022  Therefore GI referral is not indicated at this time       Marine Navarro BRANDEE  [unfilled]  11:32 AM

## 2022-11-10 NOTE — PROGRESS NOTES
POST OP FOLLOW UP VISIT S/P TAVR    Procedure: S/P Transcatheter aortic valve replacement with a 23 mm Farmer ROSS 3 bioprosthetic valve via left transfemoral approach  , performed on 10/11/22  History: Laura Wei is a 57y o  year old female who presents to our office today for routine follow up care from transfemoral transcatheter aortic valve replacement  ***    Review of System:     History obtained from {:278864}  General ROS: {:437633}  Psychological ROS: {:273375}  Ophthalmic ROS: {:312942}  ENT ROS: {:207835}  Allergy and Immunology ROS: {:034523}  Hematological and Lymphatic ROS: {:896542}  Endocrine ROS: {:229185}  Breast ROS: {:918927}  Respiratory ROS: {:286743}  Cardiovascular ROS: {:589837}  Gastrointestinal ROS: {:474123}  Genito-Urinary ROS: {:502213}  Musculoskeletal ROS: {:322374}  Neurological ROS: {:343576}  Dermatological ROS: {:618366}    Vital Signs: There were no vitals filed for this visit  Home Medications:     Prior to Admission medications    Medication Sig Start Date End Date Taking? Authorizing Provider   amLODIPine (NORVASC) 5 mg tablet TAKE ONE TABLET BY MOUTH EVERY DAY 10/20/22   Sandrita Gloria, DO   aspirin 81 mg chewable tablet Chew 81 mg daily    Historical Provider, MD   carvedilol (COREG) 6 25 mg tablet TAKE ONE TABLET BY MOUTH TWICE A DAY WITH MEALS 10/20/22   Arnold Rodriguez, DO   Cholecalciferol (VITAMIN D3) 1000 UNITS CAPS Take 2,000 Units by mouth daily    Historical Provider, MD   clopidogrel (PLAVIX) 75 mg tablet Take 1 tablet (75 mg total) by mouth daily Do not start before October 14, 2022  10/14/22 1/12/23  Drexel Rinne, PA-C   Cyanocobalamin (VITAMIN B12 PO) Take 2,500 mcg by mouth daily    Historical Provider, MD   Empagliflozin-metFORMIN HCl ER (Synjardy XR)  MG TB24 Take 1 capsule by mouth daily   Indications: Type 2 Diabetes    Leidy Ondina, DO   glimepiride (AMARYL) 2 mg tablet Take 4 mg by mouth 2 (two) times a day Historical Provider, MD   Icosapent Ethyl (VASCEPA PO) Take 2 capsules by mouth daily    Historical Provider, MD   Insulin Glargine (TOUJEO) 300 units/mL CONCETRATED U-300 injection pen Inject 20 Units under the skin daily at bedtime    Historical Provider, MD   lisinopril (ZESTRIL) 5 mg tablet Take 1 tablet (5 mg total) by mouth daily Do not start before October 14, 2022  10/14/22   Keli Perez PA-C   multivitamin [facility]) TABS Take 1 tablet by mouth daily    Historical Provider, MD   pravastatin (PRAVACHOL) 20 mg tablet Take 1 tablet (20 mg total) by mouth daily  Patient not taking: No sig reported 10/14/22   Karen Rodriguez DO   pyridoxine (VITAMIN B6) 100 mg tablet Take 100 mg by mouth daily    Historical Provider, MD       Physical Exam:    General: ***  HEENT/NECK:  PERRLA  No jugular venous distention  Cardiac:{ctsheartexam:01566}  Pulmonary:{ctspulmonaryexam:39849}  Abdomen:  Non-tender, Non-distended  Positive bowel sounds  Upper extremities: *** radial pulses; brisk capillary refill  Lower extremities: Extremities warm/dry  Bilateral femoral pulses ***, *** bruit; PT/DP pulses {CTSTAVRPULSES:65911::"1+"} bilaterally  {ctsedema:16748} edema B/L  Incisions: {CTSTAVRINCISION:44218}  Musculoskeletal: ***  Neuro: Alert and oriented X 3  Sensation is grossly intact  No focal deficits  Skin: Warm/Dry, without rashes or lesions  Lab Results:     Results from last 7 days   Lab Units 11/09/22  0836   WBC Thousand/uL 6 24   HEMOGLOBIN g/dL 13 1   HEMATOCRIT % 40 3   PLATELETS Thousands/uL 193     Results from last 7 days   Lab Units 11/09/22  0836   POTASSIUM mmol/L 4 3   CHLORIDE mmol/L 108   CO2 mmol/L 28   BUN mg/dL 14   CREATININE mg/dL 0 71   CALCIUM mg/dL 9 8       Imaging Studies:     Transthoracic Echocardiogram: ***    EKG: ***    {Results Review Statement:52129}    TAVR evaluation Assessment:     [facility]: {#:08031}    Assessment:   Aortic stenosis, Non-Rheumatic     S/P transfemoral transcatheter aortic valve replacement    Cheyanne Chowdary is making good progress in their post-op recovery  They are at NYHA functional class ***  Left groin incision is well healed  Weight and VS are stable  Recent echocardiogram demonstrates ***   ECG, BMP & CBC ***   Plan:   Medications reviewed with patient  Recommended Plavix therapy after TAVR is for 90 days only but Aspirin 81 mg daily is lifelong  Benefits of participating in cardiac rehabilitation discussed with patient and they are cleared to proceed with the program  May resume driving and all normal activities  Cheyanne Chowdary will return for one year follow-up in our office with repeat echocardiogram, ECG, CBC & BMP  Our office will contact patient to schedule appointment  They have been advised to maintain routine follow up with their cardiologist and PCP for ongoing medical care  Patient was comfortable with our recommendations and their questions were answered to their satisfaction  The patient recently had a screening colonoscopy in 4/15/22  Therefore GI referral is not indicated at this time       AUSTIN Lewis  11/10/22  10:28 AM

## 2022-11-16 ENCOUNTER — OFFICE VISIT (OUTPATIENT)
Dept: CARDIOLOGY CLINIC | Facility: CLINIC | Age: 62
End: 2022-11-16

## 2022-11-16 VITALS
DIASTOLIC BLOOD PRESSURE: 70 MMHG | WEIGHT: 185.5 LBS | HEIGHT: 63 IN | SYSTOLIC BLOOD PRESSURE: 140 MMHG | OXYGEN SATURATION: 96 % | HEART RATE: 89 BPM | BODY MASS INDEX: 32.87 KG/M2

## 2022-11-16 DIAGNOSIS — I35.0 AORTIC STENOSIS, MODERATE: ICD-10-CM

## 2022-11-16 DIAGNOSIS — I44.7 LBBB (LEFT BUNDLE BRANCH BLOCK): ICD-10-CM

## 2022-11-16 DIAGNOSIS — E78.5 HYPERLIPIDEMIA, UNSPECIFIED HYPERLIPIDEMIA TYPE: ICD-10-CM

## 2022-11-16 DIAGNOSIS — Z99.89 OSA ON CPAP: ICD-10-CM

## 2022-11-16 DIAGNOSIS — R00.2 HEART PALPITATIONS: ICD-10-CM

## 2022-11-16 DIAGNOSIS — I35.0 SEVERE AORTIC STENOSIS: Primary | ICD-10-CM

## 2022-11-16 DIAGNOSIS — I25.10 CORONARY ARTERY DISEASE INVOLVING NATIVE HEART WITHOUT ANGINA PECTORIS, UNSPECIFIED VESSEL OR LESION TYPE: ICD-10-CM

## 2022-11-16 DIAGNOSIS — E11.9 TYPE 2 DIABETES MELLITUS WITHOUT COMPLICATION, UNSPECIFIED WHETHER LONG TERM INSULIN USE (HCC): ICD-10-CM

## 2022-11-16 DIAGNOSIS — Z95.2 S/P TAVR (TRANSCATHETER AORTIC VALVE REPLACEMENT): ICD-10-CM

## 2022-11-16 DIAGNOSIS — I10 HYPERTENSION, UNSPECIFIED TYPE: ICD-10-CM

## 2022-11-16 DIAGNOSIS — G47.33 OSA ON CPAP: ICD-10-CM

## 2022-11-16 RX ORDER — CARVEDILOL 6.25 MG/1
TABLET ORAL
Qty: 180 TABLET | Refills: 3 | Status: SHIPPED | OUTPATIENT
Start: 2022-11-16

## 2022-11-16 RX ORDER — PRAVASTATIN SODIUM 20 MG
20 TABLET ORAL DAILY
Qty: 90 TABLET | Refills: 3 | Status: SHIPPED | OUTPATIENT
Start: 2022-11-16

## 2022-11-16 NOTE — PROGRESS NOTES
Rosa Julien  1960  [id number]  [facility] [address]  [phone]    1  Severe aortic stenosis        2  Hypertension, unspecified type        3  SJ on CPAP        4  Type 2 diabetes mellitus without complication, unspecified whether long term insulin use ([address] )        5  Coronary artery disease involving native heart without angina pectoris, unspecified vessel or lesion type        6  LBBB (left bundle branch block)        7  Hyperlipidemia, unspecified hyperlipidemia type        8  Heart palpitations        9  S/P TAVR (transcatheter aortic valve replacement)        10  Aortic stenosis, moderate  pravastatin (PRAVACHOL) 20 mg tablet          Discussion/Summary:  Overall she is doing very well following TAVR  She has a left bundle-branch block but no signs of higher AV block  She is wearing Zio patch  Blood pressure is well controlled  Lipids have been stable she has had intolerance to statins in the past recommend trying pravastatin 20 mg 2 days a week with Co Q10 daily can gradually up titrate over the course of 2-3 months  She will communicate with me over e-mail and will adjust dosing  Recent echocardiogram was personally reviewed  Watch for any signs of lightheadedness or dizziness  Follow-up in 4 months continue with rehabilitation hopefully start at cardiac rehab soon    Interval History:  75-year-old female with a history of moderate aortic stenosis, hypertension, hyperlipidemia, diabetes, statin intolerance presents to establish care with my practice  She is transitioning from a prior cardiologist who is retiring  Overall she has been doing well from a cardiac standpoint  She has good functional capacity and works long days  She denies any exertional chest pressure heaviness              Overall she has been doing well after her TAVR procedure went well without complications left bundle was noted no evidence of high-degree AV block no lightheadedness dizziness or syncope  Denies any chest pain functional capacity is good no signs decompensated heart failure  Problem List     Aortic stenosis, moderate    Essential hypertension    Mixed hyperlipidemia    Type 2 diabetes mellitus with complication, without long-term current use of insulin ([facility])    Lab Results   Component Value Date    HGBA1C 7 5 (H) 04/09/2022       No results for input(s): POCGLU in the last 72 hours  Blood Sugar Average: Last 72 hrs:          Heart palpitations        Past Medical History:   Diagnosis Date   • Adhesive capsulitis of right shoulder    • CAD (coronary artery disease)    • CHF (congestive heart failure) ([facility])    • Diabetes mellitus ([facility])     type 2, insulin dependent   • Diverticulosis    • History of breast cancer     s/p right lumpectomy, s/p radiation   • Hyperlipidemia    • Hypertension    • Lymphedema of right upper extremity    • Obesity (BMI 30-39  9)    • SJ on CPAP    • Severe aortic stenosis    • Vitamin D deficiency      Social History     Socioeconomic History   • Marital status: /Civil Union     Spouse name: Not on file   • Number of children: Not on file   • Years of education: Not on file   • Highest education level: Not on file   Occupational History   • Not on file   Tobacco Use   • Smoking status: Never   • Smokeless tobacco: Never   Vaping Use   • Vaping Use: Never used   Substance and Sexual Activity   • Alcohol use: No   • Drug use: No   • Sexual activity: Yes     Partners: Male   Other Topics Concern   • Not on file   Social History Narrative   • Not on file     Social Determinants of Health     Financial Resource Strain: Not on file   Food Insecurity: No Food Insecurity   • Worried About Running Out of Food in the Last Year: Never true   • Ran Out of Food in the Last Year: Never true   Transportation Needs: Not on file   Physical Activity: Not on file   Stress: Not on file   Social Connections: Not on file   Intimate Partner Violence: Not on file   Housing Stability: Not on file      Family History   Problem Relation Age of Onset   • COPD Mother    • Heart disease Father    • Heart disease Paternal Grandmother    • Cancer Family    • Colon cancer Neg Hx      Past Surgical History:   Procedure Laterality Date   • APPENDECTOMY     • BREAST LUMPECTOMY Right    • CARDIAC CATHETERIZATION N/A 08/19/2022    Procedure: Cardiac RHC/LHC; Surgeon: Mathew Thayer DO;  Location: [facility]; Service: Cardiology   • CARDIAC CATHETERIZATION N/A 10/11/2022    Procedure: CARDIAC TAVR;  Surgeon: Mathew Thayer DO;  Location: [facility];  Service: Cardiology   • CHOLECYSTECTOMY     • KNEE ARTHROSCOPY Right     knee   • NC REPLACE AORTIC VALVE OPENFEMORAL ARTERY APPROACH N/A 10/11/2022    Procedure: REPLACEMENT AORTIC VALVE TRANSCATHETER (TAVR) TRANSFEMORAL W/ 23MM MILIAN ROSS S3 ULTRA VALVE(ACCESS ON LEFT) TRAY;  Surgeon: Michael Love MD;  Location: [facility];  Service: Cardiac Surgery   • TONSILLECTOMY AND ADENOIDECTOMY     • TOTAL ABDOMINAL HYSTERECTOMY         Current Outpatient Medications:   •  aspirin 81 mg chewable tablet, Chew 81 mg daily, Disp: , Rfl:   •  carvedilol (COREG) 6 25 mg tablet, TAKE ONE TABLET BY MOUTH TWICE A DAY WITH MEALS, Disp: 180 tablet, Rfl: 3  •  Cholecalciferol (VITAMIN D3) 1000 UNITS CAPS, Take 2,000 Units by mouth daily, Disp: , Rfl:   •  clopidogrel (PLAVIX) 75 mg tablet, Take 1 tablet (75 mg total) by mouth daily Do not start before October 14, 2022 , Disp: 90 tablet, Rfl: 0  •  Cyanocobalamin (VITAMIN B12 PO), Take 2,500 mcg by mouth daily, Disp: , Rfl:   •  Empagliflozin-metFORMIN HCl ER (Synjardy XR)  MG TB24, Take 1 capsule by mouth daily   Indications: Type 2 Diabetes, Disp: , Rfl:   •  glimepiride (AMARYL) 2 mg tablet, Take 4 mg by mouth 2 (two) times a day , Disp: , Rfl:   •  Icosapent Ethyl (VASCEPA PO), Take 2 capsules by mouth daily, Disp: , Rfl:   •  Insulin Glargine (TOUJEO) 300 units/mL CONCETRATED U-300 injection pen, Inject 20 Units under the skin daily at bedtime, Disp: , Rfl:   •  lisinopril (ZESTRIL) 5 mg tablet, Take 1 tablet (5 mg total) by mouth daily Do not start before October 14, 2022 , Disp: 30 tablet, Rfl: 2  •  multivitamin (THERAGRAN) TABS, Take 1 tablet by mouth daily, Disp: , Rfl:   •  pravastatin (PRAVACHOL) 20 mg tablet, Take 1 tablet (20 mg total) by mouth daily, Disp: 90 tablet, Rfl: 3  •  pyridoxine (VITAMIN B6) 100 mg tablet, Take 100 mg by mouth daily, Disp: , Rfl:   •  amLODIPine (NORVASC) 5 mg tablet, TAKE ONE TABLET BY MOUTH EVERY DAY (Patient not taking: Reported on 11/16/2022), Disp: 90 tablet, Rfl: 3  •  irbesartan (AVAPRO) 150 mg tablet, , Disp: , Rfl:   Allergies   Allergen Reactions   • Fentanyl Rash   • Niaspan [Niacin Er] Rash   • Prednisone Rash       Labs:     Chemistry        Component Value Date/Time    K 4 3 11/09/2022 0836     11/09/2022 0836    CO2 28 11/09/2022 0836    CO2 26 10/11/2022 1025    BUN 14 11/09/2022 0836    CREATININE 0 71 11/09/2022 0836        Component Value Date/Time    CALCIUM 9 8 11/09/2022 0836    ALKPHOS 104 10/03/2022 1129    AST 24 10/03/2022 1129    ALT 45 10/03/2022 1129            No results found for: CHOL  No results found for: HDL  No results found for: LDLCALC  No results found for: TRIG  No results found for: CHOLHDL    Imaging: No results found  ECG:  Sinus rhythm nonspecific T-wave changes      Review of Systems   Constitutional: Negative  HENT: Negative  Eyes: Negative  Cardiovascular: Negative for chest pain and palpitations  Respiratory: Negative  Endocrine: Negative  Hematologic/Lymphatic: Negative  Skin: Negative  Musculoskeletal: Negative  Gastrointestinal: Negative  Genitourinary: Negative  Neurological: Negative  Psychiatric/Behavioral: Negative          Vitals:    11/16/22 1419   BP: 140/70   Pulse: 89   SpO2: 96%     Vitals: 11/16/22 1419   Weight: 84 1 kg (185 lb 8 oz)     Height: 5' 2 5" (158 8 cm)   Body mass index is 33 39 kg/m²  Physical Exam:  Vital signs reviewed  General:  Alert and cooperative, appears stated age, no acute distress  HEENT:  PERRLA, EOMI, no scleral icterus, no conjunctival pallor  Neck:  No lymphadenopathy, no thyromegaly, no carotid bruits, no elevated JVP  Heart:  Regular rate and rhythm, normal S1/S2, no S3/S4, no murmur, rubs or gallops  PMI nondisplaced  Lungs:  Clear to auscultation bilaterally, no wheezes rales or rhonchi  Abdomen:  Soft, non-tender, positive bowel sounds, no rebound or guarding,   no organomegaly   Extremities:  Normal range of motion    No clubbing, cyanosis or edema   Vascular:  2+ pedal pulses  Skin:  No rashes or lesions on exposed skin  Neurologic:  Cranial nerves II-XII grossly intact without focal deficits  Psych:  Normal mood and affect

## 2022-11-21 ENCOUNTER — HOSPITAL ENCOUNTER (EMERGENCY)
Facility: HOSPITAL | Age: 62
Discharge: HOME | End: 2022-11-21
Attending: EMERGENCY MEDICINE

## 2022-11-21 ENCOUNTER — APPOINTMENT (EMERGENCY)
Dept: RADIOLOGY | Facility: HOSPITAL | Age: 62
End: 2022-11-21

## 2022-11-21 VITALS
HEART RATE: 87 BPM | OXYGEN SATURATION: 96 % | TEMPERATURE: 97.6 F | SYSTOLIC BLOOD PRESSURE: 166 MMHG | BODY MASS INDEX: 34.02 KG/M2 | WEIGHT: 189 LBS | RESPIRATION RATE: 16 BRPM | DIASTOLIC BLOOD PRESSURE: 69 MMHG

## 2022-11-21 DIAGNOSIS — S90.30XA CONTUSION OF FOOT: Primary | ICD-10-CM

## 2022-11-21 RX ORDER — ACETAMINOPHEN 325 MG/1
975 TABLET ORAL ONCE
Status: COMPLETED | OUTPATIENT
Start: 2022-11-21 | End: 2022-11-21

## 2022-11-21 RX ADMIN — ACETAMINOPHEN 975 MG: 325 TABLET ORAL at 14:04

## 2022-11-22 ENCOUNTER — CLINICAL SUPPORT (OUTPATIENT)
Dept: CARDIAC REHAB | Facility: HOSPITAL | Age: 62
End: 2022-11-22

## 2022-11-22 ENCOUNTER — CLINICAL SUPPORT (OUTPATIENT)
Dept: CARDIOLOGY CLINIC | Facility: CLINIC | Age: 62
End: 2022-11-22

## 2022-11-22 DIAGNOSIS — I44.7 LBBB (LEFT BUNDLE BRANCH BLOCK): ICD-10-CM

## 2022-11-22 DIAGNOSIS — Z95.2 S/P TAVR (TRANSCATHETER AORTIC VALVE REPLACEMENT): Primary | ICD-10-CM

## 2022-11-22 NOTE — PROGRESS NOTES
Cardiac Rehabilitation Plan of Care   Initial Care Plan          Today's date: 2022   # of Exercise Sessions Completed: Initial Visit  Patient name: Norm Roman      : 1960  Age: 57 y o  MRN: [id number]  Referring Physician: Cole Maldonado MD  Cardiologist: Dr Dre Suarez DO  Provider: Liza bowling  Clinician: Campbell Jurado Alt, MPT, CCRP    Dx:   Encounter Diagnosis   Name Primary? • S/P TAVR (transcatheter aortic valve replacement)    New Onset LBBB  Hx Breast Cancer w/Radiaiton Therapy    Date of onset: 10/11/2022    SUMMARY OF PROGRESS:  Today is the patient's initial visit at Cardiac Rehab  Patient admits to 100% medication compliance  Patient reports the following physical limitations: ZAMARRIPA, fatigue, difficulty ambulating long distances, limited ADLs and overall functional decline  The patient did not complete an initial Sub Max ETT due to limited weight bearing status of RLE  Patient had an ER visit yesterday due to right foot contusion  She is currently donns an ace wrap and cast shoe  Patient does require supplemental O2 at this time, and does require a CPAP  to sleep  Patient's PHQ9 Score was two  At this time, patient denies having depression  Patient's GAD7 Score was zero  At this time, patient denies having anxiety  Currently, the patient does follow a formal exercise program at home  Patient was counseled on exercise guidelines including 1-2 days of moderate exercise on opposite days of Cardiac/Pulmonary Rehab, as tolerated  Patient was issued the [facility] Cardiopulmonary Guide this session  Patient will perform an initial Sub Max Test at her next scheduled visit on 2022  If patient still has a limited weight bearing status at that time, she will call and reschedule        Patient has goals of initiate HEP, improve strength, decrease ZAMARRIPA, decrease fatigue,weight loss, reduce risk profile, reduce reliance on medication, ability to tolerate long distances on elevations and attain her maximal level of function  Patient is agreeable to coming to Cardiac Rehab 3X times/week for 12 weeks or until she reaches 36 sessions  She is tentatively scheduled to begin 2022  Medication compliance: Yes   Comments: Pt reports to be compliant with medications  Fall Risk: Low   Comments: Ambulates with a steady gait with no assist device    EKG Interpretation: Recent onset of LBBB      EXERCISE ASSESSMENT and PLAN    Exercise Prescription:      Frequency: 3 days/week   Supplement with home exercise 2+ days/wk as tolerated       Minutes: 30-40         METS: 3 to 4            HR: 20-30 > RHR   RPE: 4-6         Modalities: Treadmill, UBE, NuStep and Recumbent bike      30 Day Goals for Exercise Progression:    Frequency: 3 days/week of cardiac rehab       Supplement with home exercise 3+ days/wk as tolerated    Minutes: 40-45                            >150 mins/wk of moderate intensity exercise   METS: 4 to 5   HR: 20-30 > RHR   RPE: 4-6   Modalities: Treadmill, UBE, NuStep and Recumbent bike    Strength trainin-3 days / week  12-15 repetitions  1-2 sets per modality    Modalities: Leg Press and UE PREs    Home Exercise: none  Patient has a TM at home and plans to initiate HEP as advised by CR staff       Goals: 10% improvement in functional capacity - based on max METs achieved in fitness assessment, Reduced dyspnea with physical activity  0-1/10, improved DASI score by 10%, Increase in exercise capacity by 40% - based on peak METs tolerated in cardiac rehab exercise session, Exercise 5 days/wk, >150mins/wk of moderate intensity exercise, Return to work unrestricted, Attend Rehab regularly and start a home exercise program    Progression Toward Goals:  Reviewed Pt goals and determined plan of care    Education: benefit of exercise for CAD risk factors, home exercise guidelines, AHA guidelines to achieve >150 mins/wk of moderate exercise, RPE scale and class: Risk Factors for Heart Disease   Plan:education on home exercise guidelines, home exercise 30+ mins 2 days opposite CR and Education class: Risk Factors for Heart Disease  Readiness to change: Preparation:  (Getting ready to change)       NUTRITION ASSESSMENT AND PLAN    Weight control:    Starting weight: 189   Current weight:   189  Waist circumference:    Starting: NA   Current:  NA    Diabetes: T2D  A1c: 7 5    last measured: 4/9/2022    Lipid management: Discussed diet and lipid management and Last lipid profile 4/9/2022  Chol 167    HDL 53  LDL 70    Goals:TRG <150, improved A1c  < 7 0%, Improved Rate Your Plate score  >84, choose lean meat (93-95%), increase intake of fish, shellfish, cook without added fat or use vegetable oil/spray, increase intake of meatless meals, reduce cheese intake or use reduced-fat, eat 3 or more servings of whole grains a day, Eat 4-5 cups of fruits and vegetables daily, use olive or canola oil in baking, choose low sodium processed foods, eliminate butter, use fat-free dressings/chatterjee or seldom use and daily saturated fat intake <7%/13g    Progression Toward Goals: Reviewed Pt goals and determined plan of care    Education: heart healthy eating  low sodium diet  hydration  nutrition for  lipid management  target goal for A1c <7 0  exercise and diabetes management   wt  loss   education class: Heart Healthy Eating  education class:  Label Reading  Plan: Education class: Reading Food Labels, Education Class: Heart Healthy Eating, replace butter with soft spreads made with olive oil, canola or yogurt, replace refined grain bread with whole grain bread, replace unhealthy snacks with fruits & vegs, use salt substitute like Mrs   Dash, increase utilization of fresh or dried herbs, eat more home cooked meals and eat out less often, will try new grains like brown rice, quinoa, farro, learn how to read food labels and keep added daily sugar <25g/day  Readiness to change: Preparation:  (Getting ready to change)       PSYCHOSOCIAL ASSESSMENT AND PLAN    Emotional:  Depression assessment:  PHQ-9 = 4  1-4 = Minimal Depression            Anxiety measure:  DEB-7 = 0  5-9 = Mild anxiety  Self-reported stress level:  3/10  Social support: Excellent    Goals:  Reduce perceived stress to 1-3/10, Physical Fitness in [facility] Score < 3, Social Support in [facility] Score < 3, Daily Activity in [facility] Score < 3, Overall Health in [facility] Score < 3, Quality of Life in [facility] Score < 3 , Increased interest in doing things and improved sleep    Progression Toward Goals: Reviewed Pt goals and determined plan of care    Education: signs/sxs of depression, benefits of a positive support system, stress management techniques, class:  Stress and Your Health  and class:  Relaxation  Plan: Class: Stress and Your Health, Class: Relaxation, Practice relaxation techniques, Exercise, Spend time outdoors and Enjoy a hobby  Readiness to change: Preparation:  (Getting ready to change)       OTHER CORE COMPONENTS     Tobacco:   Social History     Tobacco Use   Smoking Status Never   Smokeless Tobacco Never       Tobacco Use Intervention:   N/A:  Patient is a non-smoker     Anginal Symptoms:  ZAMARRIPA and excessive fatigue prior to surgery  Patient states she still feels easily fatigued     NTG use: No prescription    Blood pressure: Readings will be evaluated at her next visit during Sub Max Testing   Resting:    Exercise:    Recovery:     Goals: consistent BP < 130/80, reduced dietary sodium <2300mg, moderate intensity exercise >150 mins/wk, medication compliance and reduce number of medications  needed for BP control    Progression Toward Goals: Reviewed Pt goals and determined plan of care    Education:  understanding high blood pressure and it's relationship to CAD, Education class:  Common Heart Medications and Education class: Understanding Heart Disease  Plan: Class: Understanding Heart Disease, Class: Common Heart Medications, avoid places with second hand smoke, Avoid Processed foods, engage in regular exercise, use salt substitutes, check labels for sodium content and monitor home BP  Readiness to change: Preparation:  (Getting ready to change)           CARDIAC REHAB ASSESSMENT    Today's date: 2022  Patient name: Yoandy Bergman     : 1960       MRN: [id number]  PCP: Deepa Hopson DO  Referring Physician: Laurel Lozada MD  Cardiologist: Dr Karlene Nolasco DO  Surgeon: Dr Daja Tse MD  Dx:   Encounter Diagnosis   Name Primary? • S/P TAVR (transcatheter aortic valve replacement)    New Onset LBBB  Hx Breast Cancer w/Radiation Therapy    Date of onset: 10/11/2022  Cultural needs: None    Weight    Wt Readings from Last 1 Encounters:   22 85 7 kg (189 lb)      Height:   Ht Readings from Last 1 Encounters:   22 5' 2 5" (1 588 m)     Medical History:   Past Medical History:   Diagnosis Date   • Adhesive capsulitis of right shoulder    • CAD (coronary artery disease)    • CHF (congestive heart failure) (HCC)    • Diabetes mellitus (HCC)     type 2, insulin dependent   • Diverticulosis    • History of breast cancer     s/p right lumpectomy, s/p radiation   • Hyperlipidemia    • Hypertension    • Lymphedema of right upper extremity    • Obesity (BMI 30-39  9)    • SJ on CPAP    • Severe aortic stenosis    • Vitamin D deficiency          Physical Limitations: None    Fall Risk: Low   Comments: Patient ambulates w/out AD  Recent right foot contusion  Donns ace wrap and cast shoe  Anginal Equivalent: Dyspnea and Excessive fatigue prior to the TAVR  Patient notes she is still easily fatigued      NTG use: No prescription    Risk Factors   Cholesterol: Yes  Smoking: Never used  HTN: Yes  DM: Type 2   Obesity: Yes   Inactivity: No  Stress:  perceived  stress: 3/10   Stressors: Current health issues and employment   Goals for Stress Management:Practice Relaxation Techniques, Exercise, Improve Time Management, Maintain a stress diary, Keep a positive mindset, Spend time outside and Enjoy a hobby    Family History:  Family History   Problem Relation Age of Onset   • COPD Mother    • Heart disease Father    • Heart disease Paternal Grandmother    • Cancer Family    • Colon cancer Neg Hx        Allergies: Fentanyl, Niaspan [niacin er], and Prednisone  ETOH:   Social History     Substance and Sexual Activity   Alcohol Use No         Current Medications:   Current Outpatient Medications   Medication Sig Dispense Refill   • amLODIPine (NORVASC) 5 mg tablet TAKE ONE TABLET BY MOUTH EVERY DAY (Patient not taking: Reported on 11/16/2022) 90 tablet 3   • aspirin 81 mg chewable tablet Chew 81 mg daily     • carvedilol (COREG) 6 25 mg tablet TAKE ONE TABLET BY MOUTH TWICE A DAY WITH MEALS 180 tablet 3   • Cholecalciferol (VITAMIN D3) 1000 UNITS CAPS Take 2,000 Units by mouth daily     • clopidogrel (PLAVIX) 75 mg tablet Take 1 tablet (75 mg total) by mouth daily Do not start before October 14, 2022  90 tablet 0   • Cyanocobalamin (VITAMIN B12 PO) Take 2,500 mcg by mouth daily     • Empagliflozin-metFORMIN HCl ER (Synjardy XR)  MG TB24 Take 1 capsule by mouth daily   Indications: Type 2 Diabetes     • glimepiride (AMARYL) 2 mg tablet Take 4 mg by mouth 2 (two) times a day      • Icosapent Ethyl (VASCEPA PO) Take 2 capsules by mouth daily     • Insulin Glargine (TOUJEO) 300 units/mL CONCETRATED U-300 injection pen Inject 20 Units under the skin daily at bedtime     • irbesartan (AVAPRO) 150 mg tablet  (Patient not taking: Reported on 11/10/2022)     • lisinopril (ZESTRIL) 5 mg tablet Take 1 tablet (5 mg total) by mouth daily Do not start before October 14, 2022  30 tablet 2   • multivitamin (THERAGRAN) TABS Take 1 tablet by mouth daily     • pravastatin (PRAVACHOL) 20 mg tablet Take 1 tablet (20 mg total) by mouth daily 90 tablet 3   • pyridoxine (VITAMIN B6) 100 mg tablet Take 100 mg by mouth daily       No current facility-administered medications for this visit  Functional Status Prior to Diagnosis for Treatment   Occupation: plans to return to work when medically stable   for [employer]  Recreation: Outdoor activities  ADL’s: No limitations  Floyd: No limitations  Exercise: No formal HEP  Other:     Current Functional Status  Occupation: plans to return to work   Recreation: As above  ADL’s:able to perform self-care resumed driving  Floyd: Capable of performing light to moderate ADLs  Exercise: Agreeable to attend CR  Other:     Patient Specific Goals:  Patient has goals of initiate HEP, improve strength, decrease ZAMARRIPA, decrease fatigue,weight loss, reduce risk profile, reduce reliance on medication, ability to tolerate long distances on elevations and attain her maximal level of function  Short Term Program Goals: dietary modifications increased strength improved energy/stamina with ADLs exercise 120-150 mins/wk return to work    Long Term Goals: increased maximal walking duration  increased intial training workload  Improved [facility] Status score  Improved functional capacity  Improved Quality of Life - [facility] score reduced  Improved lipid profile  Reduced stress  improved Rate Your Plate Score    Ability to reach goals/rehabilitation potential:  Excellent    Projected return to function: 12 weeks  Objective tests: sub-max TM ETT to be performed at her next scheduled session  Nutritional   Reviewed details of Rate your Plate  Discussed key elements of heart healthy eating  Reviewed patient goals for dietary modifications and their clinical implications  Reviewed most recent lipid profile       Goals for dietary modification: choose lean cuts of meat  poultry without the skin  low fat ground meat and poultry  eliminate processed meats  reduce portions of meat to 3 oz  increase fish intake  more meatless meals  increase whole grains  increase fruits and vegetables  eliminate butter  low sodium  improved snack choices  more nuts/seeds      Emotional/Social  Patient reports he/she is coping well with good social support and denies depression or anxiety    Marital status:     Domestic Violence Screening: No    Comments: Patient requires skilled CR interventions in order to attain her goals and maximal level of function  CR is medically necessary for secondary prevention

## 2022-11-24 NOTE — ED PROVIDER NOTES
History  Chief Complaint   Patient presents with   • Foot Injury     Patient c/o right foot injury  Patient was getting a rack out between cabinet and fridge when the meat saw fell on her foot  57 y o   female  Patient presents with: Foot Injury: Patient c/o right foot injury  Patient was getting a rack out between cabinet and fridge when the meat saw fell on her foot  Past Medical History:  No date: Adhesive capsulitis of right shoulder  No date: CAD (coronary artery disease)  No date: CHF (congestive heart failure) ([facility])  No date: Diabetes mellitus ([facility])      Comment:  type 2, insulin dependent  No date: Diverticulosis  No date: History of breast cancer      Comment:  s/p right lumpectomy, s/p radiation  No date: Hyperlipidemia  No date: Hypertension  No date: Lymphedema of right upper extremity  No date: Obesity (BMI 30-39  9)  No date: SJ on CPAP  No date: Severe aortic stenosis  No date: Vitamin D deficiency Active Ambulatory Problems    Severe aortic stenosis         Date Noted: 06/05/2019      Hypertension         Date Noted: 06/05/2019      Hyperlipidemia         Date Noted: 06/05/2019      Diabetes mellitus ([facility] )         Date Noted: 06/05/2019      Heart palpitations         Date Noted: 06/05/2019      Acute pain of right shoulder         Date Noted: 07/22/2019      Adhesive capsulitis of right shoulder         Date Noted: 07/22/2019      Lightheadedness         Date Noted: 09/28/2021      Diverticulitis of large intestine without perforation or abscess without bleeding         Date Noted: 03/30/2022      CHF (congestive heart failure) ([facility] )         Date Noted: 10/11/2022      History of breast cancer         Date Noted: 10/11/2022      SJ on CPAP         Date Noted: 10/11/2022      CAD (coronary artery disease)         Date Noted: 10/11/2022      LBBB (left bundle branch block)         Date Noted: 10/11/2022      Hyperchloremia         Date Noted: 10/11/2022      S/P TAVR (transcatheter aortic valve replacement)         Date Noted: 10/12/2022      Encounter for postoperative care         Date Noted: 11/10/2022    Resolved Ambulatory Problems  No Resolved Ambulatory Problems  Past Medical History:  No date: Diverticulosis  No date: Lymphedema of right upper extremity  No date: Obesity (BMI 30-39  9)  No date: Vitamin D deficiency     The patient came the emergency department for evaluation of foot injury sustained when she dropped a meets on her foot  There is no cotton skin, there is ecchymosis over the third fourth and fifth toes  The patient has some difficulty with bearing weight, x-ray was reviewed interpreted by me and I see no acute fracture  The patient was offered crutches for walking assistance and refused  She was placed in orthopedic shoe and was ambulatory with that  History provided by:  Patient   used: No    Foot Injury - Major  Location:  Foot  Foot location:  R foot  Pain details:     Quality:  Aching    Radiates to:  Does not radiate    Severity:  Mild    Onset quality:  Gradual    Timing:  Constant  Chronicity:  New  Ineffective treatments:  None tried  Risk factors: no concern for non-accidental trauma        Prior to Admission Medications   Prescriptions Last Dose Informant Patient Reported? Taking? Cholecalciferol (VITAMIN D3) 1000 UNITS CAPS   Yes No   Sig: Take 2,000 Units by mouth daily   Cyanocobalamin (VITAMIN B12 PO)   Yes No   Sig: Take 2,500 mcg by mouth daily   Empagliflozin-metFORMIN HCl ER (Synjardy XR)  MG TB24   Yes No   Sig: Take 1 capsule by mouth daily   Indications: Type 2 Diabetes   Icosapent Ethyl (VASCEPA PO)   Yes No   Sig: Take 2 capsules by mouth daily   Insulin Glargine (TOUJEO) 300 units/mL CONCETRATED U-300 injection pen   Yes No   Sig: Inject 20 Units under the skin daily at bedtime   amLODIPine (NORVASC) 5 mg tablet   No No   Sig: TAKE ONE TABLET BY MOUTH EVERY DAY   Patient not taking: Reported on 11/16/2022   aspirin 81 mg chewable tablet   Yes No   Sig: Chew 81 mg daily   carvedilol (COREG) 6 25 mg tablet   No No   Sig: TAKE ONE TABLET BY MOUTH TWICE A DAY WITH MEALS   clopidogrel (PLAVIX) 75 mg tablet   No No   Sig: Take 1 tablet (75 mg total) by mouth daily Do not start before October 14, 2022  glimepiride (AMARYL) 2 mg tablet   Yes No   Sig: Take 4 mg by mouth 2 (two) times a day    irbesartan (AVAPRO) 150 mg tablet   Yes No   Patient not taking: Reported on 11/10/2022   lisinopril (ZESTRIL) 5 mg tablet   No No   Sig: Take 1 tablet (5 mg total) by mouth daily Do not start before October 14, 2022  multivitamin (THERAGRAN) TABS   Yes No   Sig: Take 1 tablet by mouth daily   pravastatin (PRAVACHOL) 20 mg tablet   No No   Sig: Take 1 tablet (20 mg total) by mouth daily   pyridoxine (VITAMIN B6) 100 mg tablet   Yes No   Sig: Take 100 mg by mouth daily      Facility-Administered Medications: None       Past Medical History:   Diagnosis Date   • Adhesive capsulitis of right shoulder    • CAD (coronary artery disease)    • CHF (congestive heart failure) (HCC)    • Diabetes mellitus (HCC)     type 2, insulin dependent   • Diverticulosis    • History of breast cancer     s/p right lumpectomy, s/p radiation   • Hyperlipidemia    • Hypertension    • Lymphedema of right upper extremity    • Obesity (BMI 30-39  9)    • SJ on CPAP    • Severe aortic stenosis    • Vitamin D deficiency        Past Surgical History:   Procedure Laterality Date   • APPENDECTOMY     • BREAST LUMPECTOMY Right    • CARDIAC CATHETERIZATION N/A 08/19/2022    Procedure: Cardiac RHC/LHC; Surgeon: Coretta Baez DO;  Location: [facility];   Service: Cardiology   • CARDIAC CATHETERIZATION N/A 10/11/2022    Procedure: CARDIAC TAVR;  Surgeon: Coretta Baez DO;  Location: [facility] MAIN [facility];  Service: Cardiology   • CHOLECYSTECTOMY     • KNEE ARTHROSCOPY Right     knee   • DE REPLACE AORTIC VALVE OPENFEMORAL ARTERY APPROACH N/A 10/11/2022    Procedure: REPLACEMENT AORTIC VALVE TRANSCATHETER (TAVR) TRANSFEMORAL W/ 23MM MILIAN ROSS S3 ULTRA VALVE(ACCESS ON LEFT) TRAY;  Surgeon: Gayle Dodd MD;  Location: [facility];  Service: Cardiac Surgery   • TONSILLECTOMY AND ADENOIDECTOMY     • TOTAL ABDOMINAL HYSTERECTOMY         Family History   Problem Relation Age of Onset   • COPD Mother    • Heart disease Father    • Heart disease Paternal Grandmother    • Cancer Family    • Colon cancer Neg Hx      I have reviewed and agree with the history as documented  E-Cigarette/Vaping   • E-Cigarette Use Never User      E-Cigarette/Vaping Substances   • Nicotine No    • THC No    • CBD No    • Flavoring No      Social History     Tobacco Use   • Smoking status: Never   • Smokeless tobacco: Never   Vaping Use   • Vaping Use: Never used   Substance Use Topics   • Alcohol use: No   • Drug use: No       Review of Systems   All other systems reviewed and are negative  Physical Exam  Physical Exam  Vitals and nursing note reviewed  Constitutional:       Appearance: She is well-developed and well-nourished  HENT:      Head: Normocephalic and atraumatic  Right Ear: External ear normal       Left Ear: External ear normal    Eyes:      Extraocular Movements: EOM normal       Conjunctiva/sclera: Conjunctivae normal    Neck:      Thyroid: No thyromegaly  Vascular: No JVD  Trachea: No tracheal deviation  Cardiovascular:      Rate and Rhythm: Normal rate  Pulmonary:      Effort: Pulmonary effort is normal       Breath sounds: Normal breath sounds  No stridor  Abdominal:      General: There is no distension  Palpations: Abdomen is soft  There is no mass  Tenderness: There is no abdominal tenderness  There is no guarding  Hernia: No hernia is present  Musculoskeletal:         General: No tenderness, deformity or edema  Normal range of motion  Lymphadenopathy:      Cervical: No cervical adenopathy  Skin:     General: Skin is warm  Coloration: Skin is not pale  Findings: No erythema or rash  Neurological:      Mental Status: She is alert and oriented to person, place, and time  Psychiatric:         Mood and Affect: Mood and affect normal          Behavior: Behavior normal          Vital Signs  ED Triage Vitals   Temperature Pulse Respirations Blood Pressure SpO2   11/21/22 1317 11/21/22 1317 11/21/22 1317 11/21/22 1317 11/21/22 1317   97 6 °F (36 4 °C) 87 16 166/69 96 %      Temp Source Heart Rate Source Patient Position - Orthostatic VS BP Location FiO2 (%)   11/21/22 1317 11/21/22 1317 11/21/22 1317 11/21/22 1317 --   Oral Monitor Sitting Left arm       Pain Score       11/21/22 1404       6           Vitals:    11/21/22 1317   BP: 166/69   Pulse: 87   Patient Position - Orthostatic VS: Sitting         Visual Acuity      ED Medications  Medications   acetaminophen (TYLENOL) tablet 975 mg (975 mg Oral Given 11/21/22 1404)       Diagnostic Studies  Results Reviewed     None                 XR foot 3+ views RIGHT   Final Result by Qian Macias MD (11/21 1435)   No acute osseous abnormality  Workstation performed: [id number]                    Procedures  Procedures         ED Course                               SBIRT 22yo+    Flowsheet Row Most Recent Value   SBIRT (25 yo +)    In order to provide better care to our patients, we are screening all of our patients for alcohol and drug use  Would it be okay to ask you these screening questions?  No Filed at: 11/21/2022 1402                    [facility]  Number of Diagnoses or Management Options  Contusion of foot: new and requires workup  Patient Progress  Patient progress: stable      Disposition  Final diagnoses:   Contusion of foot     Time reflects when diagnosis was documented in both MDM as applicable and the Disposition within this note     Time User Action Codes Description Comment    11/21/2022  2:39 PM Alfredo Marin Add [S90 30XA] Contusion of foot       ED Disposition ED Disposition   Discharge    Condition   Stable    Date/Time   Mon Nov 21, 2022  2:39 PM    Comment   Yoandy Bergman discharge to home/self care  Follow-up Information     Follow up With Specialties Details Why [address], [address], DO General Practice   [address]  [address]  [address] [address] [phone]            Discharge Medication List as of 11/21/2022  2:40 PM      CONTINUE these medications which have NOT CHANGED    Details   amLODIPine (NORVASC) 5 mg tablet TAKE ONE TABLET BY MOUTH EVERY DAY, Normal      aspirin 81 mg chewable tablet Chew 81 mg daily, Historical Med      carvedilol (COREG) 6 25 mg tablet TAKE ONE TABLET BY MOUTH TWICE A DAY WITH MEALS, Normal      Cholecalciferol (VITAMIN D3) 1000 UNITS CAPS Take 2,000 Units by mouth daily, Historical Med      clopidogrel (PLAVIX) 75 mg tablet Take 1 tablet (75 mg total) by mouth daily Do not start before October 14, 2022 , Starting Fri 10/14/2022, Until u 1/12/2023, Normal      Cyanocobalamin (VITAMIN B12 PO) Take 2,500 mcg by mouth daily, Historical Med      Empagliflozin-metFORMIN HCl ER (Synjardy XR)  MG TB24 Take 1 capsule by mouth daily   Indications: Type 2 Diabetes, Historical Med      glimepiride (AMARYL) 2 mg tablet Take 4 mg by mouth 2 (two) times a day , Historical Med      Icosapent Ethyl (VASCEPA PO) Take 2 capsules by mouth daily, Historical Med      Insulin Glargine (TOUJEO) 300 units/mL CONCETRATED U-300 injection pen Inject 20 Units under the skin daily at bedtime, Historical Med      irbesartan (AVAPRO) 150 mg tablet Starting Tue 11/8/2022, Historical Med      lisinopril (ZESTRIL) 5 mg tablet Take 1 tablet (5 mg total) by mouth daily Do not start before October 14, 2022 , Starting Fri 10/14/2022, Normal      multivitamin (THERAGRAN) TABS Take 1 tablet by mouth daily, Historical Med      pravastatin (PRAVACHOL) 20 mg tablet Take 1 tablet (20 mg total) by mouth daily, Starting Wed 11/16/2022, Normal      pyridoxine (VITAMIN B6) 100 mg tablet Take 100 mg by mouth daily, Historical Med             No discharge procedures on file      PDMP Review       Value Time User    PDMP Reviewed  Yes 7/21/2021  1:37 AM Aakash Junior MD          ED Provider  Electronically Signed by           Gabriel Mendoza DO  11/24/22 0948

## 2022-11-30 ENCOUNTER — CLINICAL SUPPORT (OUTPATIENT)
Dept: CARDIAC REHAB | Facility: HOSPITAL | Age: 62
End: 2022-11-30

## 2022-11-30 DIAGNOSIS — Z95.2 S/P TAVR (TRANSCATHETER AORTIC VALVE REPLACEMENT): ICD-10-CM

## 2022-11-30 NOTE — PROGRESS NOTES
The patient completed her initial ETT during her first exercise session due to a foot injury right before her initial evaluation  Attached is her initial ETT, corresponding exercise session details, and updated initial outcomes

## 2022-11-30 NOTE — PROGRESS NOTES
Patient completed initial ETT during today's exercise session due to a foot injury at the time of initial evaluation

## 2022-12-02 ENCOUNTER — CLINICAL SUPPORT (OUTPATIENT)
Dept: CARDIAC REHAB | Facility: HOSPITAL | Age: 62
End: 2022-12-02

## 2022-12-02 DIAGNOSIS — Z95.2 S/P TAVR (TRANSCATHETER AORTIC VALVE REPLACEMENT): ICD-10-CM

## 2022-12-05 ENCOUNTER — CLINICAL SUPPORT (OUTPATIENT)
Dept: CARDIAC REHAB | Facility: HOSPITAL | Age: 62
End: 2022-12-05

## 2022-12-05 DIAGNOSIS — Z95.2 S/P TAVR (TRANSCATHETER AORTIC VALVE REPLACEMENT): ICD-10-CM

## 2022-12-07 ENCOUNTER — CLINICAL SUPPORT (OUTPATIENT)
Dept: CARDIAC REHAB | Facility: HOSPITAL | Age: 62
End: 2022-12-07

## 2022-12-07 DIAGNOSIS — Z95.2 S/P TAVR (TRANSCATHETER AORTIC VALVE REPLACEMENT): ICD-10-CM

## 2022-12-09 ENCOUNTER — APPOINTMENT (OUTPATIENT)
Dept: CARDIAC REHAB | Facility: HOSPITAL | Age: 62
End: 2022-12-09

## 2022-12-12 ENCOUNTER — CLINICAL SUPPORT (OUTPATIENT)
Dept: CARDIAC REHAB | Facility: HOSPITAL | Age: 62
End: 2022-12-12

## 2022-12-12 DIAGNOSIS — Z95.2 S/P TAVR (TRANSCATHETER AORTIC VALVE REPLACEMENT): ICD-10-CM

## 2022-12-14 ENCOUNTER — CLINICAL SUPPORT (OUTPATIENT)
Dept: CARDIAC REHAB | Facility: HOSPITAL | Age: 62
End: 2022-12-14

## 2022-12-14 DIAGNOSIS — Z95.2 S/P TAVR (TRANSCATHETER AORTIC VALVE REPLACEMENT): ICD-10-CM

## 2022-12-19 ENCOUNTER — CLINICAL SUPPORT (OUTPATIENT)
Dept: CARDIAC REHAB | Facility: HOSPITAL | Age: 62
End: 2022-12-19

## 2022-12-19 DIAGNOSIS — Z95.2 S/P TAVR (TRANSCATHETER AORTIC VALVE REPLACEMENT): Primary | ICD-10-CM

## 2022-12-20 NOTE — PROGRESS NOTES
Cardiac Rehabilitation Plan of Care   30 Day Reassessment          Today's date: 2022   # of Exercise Sessions Completed:   Patient name: Topher Lau      : 1960  Age: 57 y o  MRN: [id number]  Referring Physician: Suzette Alonso PA-C  Cardiologist: Dr Grady Montero DO  Provider: [facility]  Clinician: Nayeli Saldivar MS    Dx:   Encounter Diagnosis   Name Primary? • S/P TAVR (transcatheter aortic valve replacement)    New Onset LBBB  Hx Breast Cancer w/Radiaiton Therapy    Date of onset: 10/11/2022    SUMMARY OF PROGRESS:  Jorge Correa has completed 7 sessions at Cardiac Rehab  Patient admits to 100% medication compliance  Patient reports the following physical limitations: ZAMARRIPA, fatigue, difficulty ambulating long distances, limited ADLs and overall functional decline  Patient does require supplemental O2 at this time, and does require a CPAP  to sleep  Patient's PHQ9 Score was two  At this time, patient denies having depression  Patient's GAD7 Score was zero  At this time, patient denies having anxiety  Currently, the patient does follow a formal exercise program at home  Patient was counseled on exercise guidelines including 1-2 days of moderate exercise on opposite days of Cardiac/Pulmonary Rehab, as tolerated  Patient has been receiving weekly educational handouts; please see attached LSI document for complete list     Jorge Correa has been working at a 3 82 MET level  At rest, HR 75 and /56 with appropriate increase to 115 and 144/68 during exercise  She rates her program a 4-5/10 on a 1-10 RPE scale  Patient denied any symptoms during exercise  During recovery, HR 84 and /60  Telemetry reveals NSR w/ LBBB  Patient has goals of initiate HEP, improve strength, decrease ZAMARRIPA, decrease fatigue,weight loss, reduce risk profile, reduce reliance on medication, ability to tolerate long distances on elevations and attain her maximal level of function       Jorge Correa has been very compliant in attending her scheduled cardiac rehab sessions and gives great effort each session  She is doing well despite her orthopedic issues and recent contusion of her foot  Her program will continue to be progressed as tolerated  Medication compliance: Yes   Comments: Pt reports to be compliant with medications  Fall Risk: Low   Comments: Ambulates with a steady gait with no assist device    EKG Interpretation: Recent onset of LBBB      EXERCISE ASSESSMENT and PLAN    Exercise Prescription:      Frequency: 3 days/week   Supplement with home exercise 2+ days/wk as tolerated       Minutes: 40-45         METS: 3 82            HR: 20-30 > RHR   RPE: 4-6         Modalities: Treadmill, UBE, NuStep and Recumbent bike      30 Day Goals for Exercise Progression:    Frequency: 3 days/week of cardiac rehab       Supplement with home exercise 3+ days/wk as tolerated    Minutes: 45-50                           >150 mins/wk of moderate intensity exercise   METS: 4 to 5   HR: 20-30 > RHR   RPE: 4-6   Modalities: Treadmill, UBE, NuStep and Recumbent bike    Strength trainin-3 days / week  12-15 repetitions  1-2 sets per modality    Modalities: Leg Press and UE PREs    Home Exercise: none  Patient has a TM at home and plans to initiate HEP as advised by CR staff  Goals: 10% improvement in functional capacity - based on max METs achieved in fitness assessment, Reduced dyspnea with physical activity  0-1/10, improved DASI score by 10%, Increase in exercise capacity by 40% - based on peak METs tolerated in cardiac rehab exercise session, Exercise 5 days/wk, >150mins/wk of moderate intensity exercise, Return to work unrestricted, Attend Rehab regularly and start a home exercise program    Progression Toward Goals:  Pt is progressing and showing improvement  toward the following goals: Increasing MET level   , Will continue to educate and progress as tolerated , Goals met: Attending rehab regularly      Education: benefit of exercise for CAD risk factors, home exercise guidelines, AHA guidelines to achieve >150 mins/wk of moderate exercise, RPE scale and class: Risk Factors for Heart Disease   Plan:education on home exercise guidelines, home exercise 30+ mins 2 days opposite CR and Education class: Risk Factors for Heart Disease  Readiness to change: Preparation:  (Getting ready to change)       NUTRITION ASSESSMENT AND PLAN    Weight control:    Starting weight: 189   Current weight:   189  Waist circumference:    Starting: NA   Current:  NA    Diabetes: T2D  A1c: 7 5    last measured: 4/9/2022    Lipid management: Discussed diet and lipid management and Last lipid profile 4/9/2022  Chol 167    HDL 53  LDL 70    Goals:TRG <150, improved A1c  < 7 0%, Improved Rate Your Plate score  >72, choose lean meat (93-95%), increase intake of fish, shellfish, cook without added fat or use vegetable oil/spray, increase intake of meatless meals, reduce cheese intake or use reduced-fat, eat 3 or more servings of whole grains a day, Eat 4-5 cups of fruits and vegetables daily, use olive or canola oil in baking, choose low sodium processed foods, eliminate butter, use fat-free dressings/chatterjee or seldom use and daily saturated fat intake <7%/13g    Progression Toward Goals: Pt is progressing and showing improvement  toward the following goals: Working towards eating a heart healthy diet   , Will continue to educate and progress as tolerated      Education: heart healthy eating  low sodium diet  hydration  nutrition for  lipid management  target goal for A1c <7 0  exercise and diabetes management   wt  loss   education class: Heart Healthy Eating  education class:  Label Reading  Plan: Education class: Reading Food Labels, Education Class: Heart Healthy Eating, replace butter with soft spreads made with olive oil, canola or yogurt, replace refined grain bread with whole grain bread, replace unhealthy snacks with fruits & vegs, use salt substitute like Mrs Schaefer, increase utilization of fresh or dried herbs, eat more home cooked meals and eat out less often, will try new grains like brown rice, quinoa, farro, learn how to read food labels and keep added daily sugar <25g/day  Readiness to change: Preparation:  (Getting ready to change)       PSYCHOSOCIAL ASSESSMENT AND PLAN    Emotional:  Depression assessment:  PHQ-9 = 4  1-4 = Minimal Depression            Anxiety measure:  DEB-7 = 0  5-9 = Mild anxiety  Self-reported stress level:  3/10  Social support: Excellent    Goals:  Reduce perceived stress to 1-3/10, Physical Fitness in [facility] Score < 3, Social Support in [facility] Score < 3, Daily Activity in [facility] Score < 3, Overall Health in [facility] Score < 3, Quality of Life in [facility] Score < 3 , Increased interest in doing things and improved sleep    Progression Toward Goals: Will continue to educate and progress as tolerated , Goals met: Stress level remains low  Education: signs/sxs of depression, benefits of a positive support system, stress management techniques, class:  Stress and Your Health  and class:  Relaxation  Plan: Class: Stress and Your Health, Class: Relaxation, Practice relaxation techniques, Exercise, Spend time outdoors and Enjoy a hobby  Readiness to change: Preparation:  (Getting ready to change)       OTHER CORE COMPONENTS     Tobacco:   Social History     Tobacco Use   Smoking Status Never   Smokeless Tobacco Never       Tobacco Use Intervention:   N/A:  Patient is a non-smoker     Anginal Symptoms:  ZAMARRIPA and excessive fatigue prior to surgery  Patient states she still feels easily fatigued  NTG use: No prescription    Blood pressure:    Restin/56   Exercise: 144/68   Recovery: 118/60    Goals: consistent BP < 130/80, reduced dietary sodium <2300mg, moderate intensity exercise >150 mins/wk, medication compliance and reduce number of medications  needed for BP control    Progression Toward Goals:  Will continue to educate and progress as tolerated , Goals met: Resting BP < 130/80, 100% medication compliance      Education:  understanding high blood pressure and it's relationship to CAD, Education class:  Common Heart Medications and Education class: Understanding Heart Disease  Plan: Class: Understanding Heart Disease, Class: Common Heart Medications, avoid places with second hand smoke, Avoid Processed foods, engage in regular exercise, use salt substitutes, check labels for sodium content and monitor home BP  Readiness to change: Preparation:  (Getting ready to change)

## 2022-12-21 ENCOUNTER — CLINICAL SUPPORT (OUTPATIENT)
Dept: CARDIAC REHAB | Facility: HOSPITAL | Age: 62
End: 2022-12-21

## 2022-12-21 DIAGNOSIS — Z95.2 S/P TAVR (TRANSCATHETER AORTIC VALVE REPLACEMENT): ICD-10-CM

## 2022-12-27 ENCOUNTER — CLINICAL SUPPORT (OUTPATIENT)
Dept: CARDIAC REHAB | Facility: HOSPITAL | Age: 62
End: 2022-12-27

## 2022-12-27 DIAGNOSIS — Z95.2 S/P TAVR (TRANSCATHETER AORTIC VALVE REPLACEMENT): ICD-10-CM

## 2022-12-28 ENCOUNTER — CLINICAL SUPPORT (OUTPATIENT)
Dept: CARDIAC REHAB | Facility: HOSPITAL | Age: 62
End: 2022-12-28

## 2022-12-28 DIAGNOSIS — Z95.2 S/P TAVR (TRANSCATHETER AORTIC VALVE REPLACEMENT): ICD-10-CM

## 2022-12-30 ENCOUNTER — CLINICAL SUPPORT (OUTPATIENT)
Dept: CARDIAC REHAB | Facility: HOSPITAL | Age: 62
End: 2022-12-30

## 2022-12-30 DIAGNOSIS — Z95.2 S/P TAVR (TRANSCATHETER AORTIC VALVE REPLACEMENT): ICD-10-CM

## 2023-01-02 ENCOUNTER — APPOINTMENT (OUTPATIENT)
Dept: CARDIAC REHAB | Facility: HOSPITAL | Age: 63
End: 2023-01-02

## 2023-01-03 ENCOUNTER — CLINICAL SUPPORT (OUTPATIENT)
Dept: CARDIAC REHAB | Facility: HOSPITAL | Age: 63
End: 2023-01-03

## 2023-01-03 DIAGNOSIS — Z95.2 S/P TAVR (TRANSCATHETER AORTIC VALVE REPLACEMENT): ICD-10-CM

## 2023-01-04 ENCOUNTER — CLINICAL SUPPORT (OUTPATIENT)
Dept: CARDIAC REHAB | Facility: HOSPITAL | Age: 63
End: 2023-01-04

## 2023-01-04 DIAGNOSIS — Z95.2 S/P TAVR (TRANSCATHETER AORTIC VALVE REPLACEMENT): ICD-10-CM

## 2023-01-06 ENCOUNTER — CLINICAL SUPPORT (OUTPATIENT)
Dept: CARDIAC REHAB | Facility: HOSPITAL | Age: 63
End: 2023-01-06

## 2023-01-06 DIAGNOSIS — Z95.2 S/P TAVR (TRANSCATHETER AORTIC VALVE REPLACEMENT): ICD-10-CM

## 2023-01-09 ENCOUNTER — CLINICAL SUPPORT (OUTPATIENT)
Dept: CARDIAC REHAB | Facility: HOSPITAL | Age: 63
End: 2023-01-09

## 2023-01-09 DIAGNOSIS — Z95.2 S/P TAVR (TRANSCATHETER AORTIC VALVE REPLACEMENT): ICD-10-CM

## 2023-01-10 ENCOUNTER — CLINICAL SUPPORT (OUTPATIENT)
Dept: CARDIAC REHAB | Facility: HOSPITAL | Age: 63
End: 2023-01-10

## 2023-01-10 DIAGNOSIS — Z95.2 S/P TAVR (TRANSCATHETER AORTIC VALVE REPLACEMENT): ICD-10-CM

## 2023-01-11 ENCOUNTER — CLINICAL SUPPORT (OUTPATIENT)
Dept: CARDIAC REHAB | Facility: HOSPITAL | Age: 63
End: 2023-01-11

## 2023-01-11 DIAGNOSIS — Z95.2 S/P TAVR (TRANSCATHETER AORTIC VALVE REPLACEMENT): ICD-10-CM

## 2023-01-13 ENCOUNTER — APPOINTMENT (OUTPATIENT)
Dept: CARDIAC REHAB | Facility: HOSPITAL | Age: 63
End: 2023-01-13

## 2023-01-16 ENCOUNTER — CLINICAL SUPPORT (OUTPATIENT)
Dept: CARDIAC REHAB | Facility: HOSPITAL | Age: 63
End: 2023-01-16

## 2023-01-16 DIAGNOSIS — Z95.2 S/P TAVR (TRANSCATHETER AORTIC VALVE REPLACEMENT): Primary | ICD-10-CM

## 2023-01-16 NOTE — PROGRESS NOTES
Cardiac Rehabilitation Plan of Care   60 Day Reassessment          Today's date: 2023   # of Exercise Sessions Completed:   Patient name: Lamar Ash      : 1960  Age: 57 y o  MRN: [id number]  Referring Physician: Tara Valdes PA-C  Cardiologist: Dr Argenis Chester, DO  Provider: Liza bowling  Clinician: Robin Salazar MS    Dx:   Encounter Diagnosis   Name Primary? • S/P TAVR (transcatheter aortic valve replacement)    New Onset LBBB  Hx Breast Cancer w/Radiaiton Therapy    Date of onset: 10/11/2022    SUMMARY OF PROGRESS:  Nancy Shoemaker has completed 18 sessions at Cardiac Rehab  Patient admits to 100% medication compliance  Patient reports improvements in the following physical limitations: ZAMARRIPA, fatigue, difficulty ambulating long distances, limited ADLs and overall functional decline  Patient does not require supplemental O2 at this time, and does require a CPAP  to sleep  Patient's PHQ9 Score was two  At this time, patient denies having depression  Patient's GAD7 Score was zero  At this time, patient denies having anxiety  Currently, the patient does follow a formal exercise program at home  Patient was counseled on exercise guidelines including 1-2 days of moderate exercise on opposite days of Cardiac/Pulmonary Rehab, as tolerated  Patient has been receiving weekly educational handouts; please see attached LSI document for complete list     Nancy Shoemaker has been working at a 4 10 MET level  At rest, HR 83 and /66 with appropriate increase to 110 and 158/68 during exercise  She rates her program a 4-5/10 on a 1-10 RPE scale  Patient denied any symptoms during exercise  During recovery, HR 84 and /60  Telemetry reveals NSR w/ LBBB  Patient has goals of initiate HEP, improve strength, decrease ZAMARRIPA, decrease fatigue,weight loss, reduce risk profile, reduce reliance on medication, ability to tolerate long distances on elevations and attain her maximal level of function       Nancy Shoemaker has been very compliant in attending her scheduled cardiac rehab sessions and gives great effort each session  She states she does a lot of walking, is very active around her house, and no longer has any limitations  She states she "feels good" and that cardiac rehab is really helping her despite some orthopedic issues  Her program will be progressed as tolerated  Medication compliance: Yes   Comments: Pt reports to be compliant with medications  Fall Risk: Low   Comments: Ambulates with a steady gait with no assist device    EKG Interpretation: NSR w/ LBBB      EXERCISE ASSESSMENT and PLAN    Exercise Prescription:      Frequency: 3 days/week   Supplement with home exercise 2+ days/wk as tolerated       Minutes: 40-45         METS: 4 10            HR: 20-30 > RHR   RPE: 4-6         Modalities: Treadmill, UBE, NuStep and Recumbent bike      30 Day Goals for Exercise Progression:    Frequency: 3 days/week of cardiac rehab       Supplement with home exercise 3+ days/wk as tolerated    Minutes: 45-50                           >150 mins/wk of moderate intensity exercise   METS: 4 5-5 5   HR: 20-30 > RHR   RPE: 4-6   Modalities: Treadmill, UBE, NuStep and Recumbent bike    Strength trainin-3 days / week  12-15 repetitions  1-2 sets per modality    Modalities: Leg Press and UE PREs    Home Exercise: none  Patient has a TM at home and plans to initiate HEP as advised by CR staff       Goals: 10% improvement in functional capacity - based on max METs achieved in fitness assessment, Reduced dyspnea with physical activity  0-1/10, improved DASI score by 10%, Increase in exercise capacity by 40% - based on peak METs tolerated in cardiac rehab exercise session, Exercise 5 days/wk, >150mins/wk of moderate intensity exercise, Return to work unrestricted, Attend Rehab regularly and start a home exercise program    Progression Toward Goals:  Pt is progressing and showing improvement  toward the following goals: Increasing MET level   , Will continue to educate and progress as tolerated , Goals met: Attending rehab regularly  Education: benefit of exercise for CAD risk factors, home exercise guidelines, AHA guidelines to achieve >150 mins/wk of moderate exercise, RPE scale and class: Risk Factors for Heart Disease   Plan:education on home exercise guidelines, home exercise 30+ mins 2 days opposite CR and Education class: Risk Factors for Heart Disease  Readiness to change: Preparation:  (Getting ready to change)       NUTRITION ASSESSMENT AND PLAN    Weight control:    Starting weight: 189   Current weight:   192  Waist circumference:    Starting: NA   Current:  NA    Diabetes: T2D  A1c: 7 5    last measured: 4/9/2022    Lipid management: Discussed diet and lipid management and Last lipid profile 4/9/2022  Chol 167    HDL 53  LDL 70    Goals:TRG <150, improved A1c  < 7 0%, Improved Rate Your Plate score  >78, choose lean meat (93-95%), increase intake of fish, shellfish, cook without added fat or use vegetable oil/spray, increase intake of meatless meals, reduce cheese intake or use reduced-fat, eat 3 or more servings of whole grains a day, Eat 4-5 cups of fruits and vegetables daily, use olive or canola oil in baking, choose low sodium processed foods, eliminate butter, use fat-free dressings/chatterjee or seldom use and daily saturated fat intake <7%/13g    Progression Toward Goals: Pt is progressing and showing improvement  toward the following goals: Working towards eating a heart healthy diet   , Will continue to educate and progress as tolerated      Education: heart healthy eating  low sodium diet  hydration  nutrition for  lipid management  target goal for A1c <7 0  exercise and diabetes management   wt  loss   education class: Heart Healthy Eating  education class:  Label Reading  Plan: Education class: Reading Food Labels, Education Class: Heart Healthy Eating, replace butter with soft spreads made with olive oil, canola or yogurt, replace refined grain bread with whole grain bread, replace unhealthy snacks with fruits & vegs, use salt substitute like Mrs  Dash, increase utilization of fresh or dried herbs, eat more home cooked meals and eat out less often, will try new grains like brown rice, quinoa, farro, learn how to read food labels and keep added daily sugar <25g/day  Readiness to change: Preparation:  (Getting ready to change)       PSYCHOSOCIAL ASSESSMENT AND PLAN    Emotional:  Depression assessment:  PHQ-9 = 4  1-4 = Minimal Depression            Anxiety measure:  DEB-7 = 0  5-9 = Mild anxiety  Self-reported stress level:  5/10  Social support: Excellent    Goals:  Reduce perceived stress to 1-3/10, Physical Fitness in [facility] Score < 3, Social Support in [facility] Score < 3, Daily Activity in [facility] Score < 3, Overall Health in [facility] Score < 3, Quality of Life in [facility] Score < 3 , Increased interest in doing things and improved sleep    Progression Toward Goals: Will continue to educate and progress as tolerated , Goals not met: Stress level increased from 3 to 5/10  Education: signs/sxs of depression, benefits of a positive support system, stress management techniques, class:  Stress and Your Health  and class:  Relaxation  Plan: Class: Stress and Your Health, Class: Relaxation, Practice relaxation techniques, Exercise, Spend time outdoors and Enjoy a hobby  Readiness to change: Preparation:  (Getting ready to change)       OTHER CORE COMPONENTS     Tobacco:   Social History     Tobacco Use   Smoking Status Never   Smokeless Tobacco Never       Tobacco Use Intervention:   N/A:  Patient is a non-smoker     Anginal Symptoms:  ZAMARRIPA and excessive fatigue prior to surgery  Patient states she still feels easily fatigued     NTG use: No prescription    Blood pressure:    Restin/66   Exercise: 158/68   Recovery: 118/60    Goals: consistent BP < 130/80, reduced dietary sodium <2300mg, moderate intensity exercise >150 mins/wk, medication compliance and reduce number of medications  needed for BP control    Progression Toward Goals: Will continue to educate and progress as tolerated , Goals met: Resting BP < 130/80, 100% medication compliance      Education:  understanding high blood pressure and it's relationship to CAD, Education class:  Common Heart Medications and Education class: Understanding Heart Disease  Plan: Class: Understanding Heart Disease, Class: Common Heart Medications, avoid places with second hand smoke, Avoid Processed foods, engage in regular exercise, use salt substitutes, check labels for sodium content and monitor home BP  Readiness to change: Preparation:  (Getting ready to change)

## 2023-01-17 ENCOUNTER — CLINICAL SUPPORT (OUTPATIENT)
Dept: CARDIAC REHAB | Facility: HOSPITAL | Age: 63
End: 2023-01-17

## 2023-01-17 DIAGNOSIS — Z95.2 S/P TAVR (TRANSCATHETER AORTIC VALVE REPLACEMENT): ICD-10-CM

## 2023-01-18 ENCOUNTER — APPOINTMENT (OUTPATIENT)
Dept: CARDIAC REHAB | Facility: HOSPITAL | Age: 63
End: 2023-01-18

## 2023-01-20 ENCOUNTER — APPOINTMENT (OUTPATIENT)
Dept: CARDIAC REHAB | Facility: HOSPITAL | Age: 63
End: 2023-01-20

## 2023-01-23 ENCOUNTER — CLINICAL SUPPORT (OUTPATIENT)
Dept: CARDIAC REHAB | Facility: HOSPITAL | Age: 63
End: 2023-01-23

## 2023-01-23 DIAGNOSIS — Z95.2 S/P TAVR (TRANSCATHETER AORTIC VALVE REPLACEMENT): ICD-10-CM

## 2023-01-24 ENCOUNTER — CLINICAL SUPPORT (OUTPATIENT)
Dept: CARDIAC REHAB | Facility: HOSPITAL | Age: 63
End: 2023-01-24

## 2023-01-24 DIAGNOSIS — Z95.2 S/P TAVR (TRANSCATHETER AORTIC VALVE REPLACEMENT): ICD-10-CM

## 2023-01-25 ENCOUNTER — APPOINTMENT (OUTPATIENT)
Dept: CARDIAC REHAB | Facility: HOSPITAL | Age: 63
End: 2023-01-25

## 2023-01-26 DIAGNOSIS — Z95.2 S/P TAVR (TRANSCATHETER AORTIC VALVE REPLACEMENT): ICD-10-CM

## 2023-01-27 ENCOUNTER — CLINICAL SUPPORT (OUTPATIENT)
Dept: CARDIAC REHAB | Facility: HOSPITAL | Age: 63
End: 2023-01-27

## 2023-01-27 ENCOUNTER — APPOINTMENT (OUTPATIENT)
Dept: LAB | Facility: HOSPITAL | Age: 63
End: 2023-01-27

## 2023-01-27 DIAGNOSIS — Z95.2 S/P TAVR (TRANSCATHETER AORTIC VALVE REPLACEMENT): ICD-10-CM

## 2023-01-27 DIAGNOSIS — I10 HYPERTENSION, UNSPECIFIED TYPE: ICD-10-CM

## 2023-01-27 LAB
25(OH)D3 SERPL-MCNC: 35.8 NG/ML (ref 30–100)
ALBUMIN SERPL BCP-MCNC: 4.2 G/DL (ref 3.5–5)
ALP SERPL-CCNC: 80 U/L (ref 34–104)
ALT SERPL W P-5'-P-CCNC: 22 U/L (ref 7–52)
ANION GAP SERPL CALCULATED.3IONS-SCNC: 10 MMOL/L (ref 4–13)
AST SERPL W P-5'-P-CCNC: 19 U/L (ref 13–39)
BASOPHILS # BLD AUTO: 0.06 THOUSANDS/ÂΜL (ref 0–0.1)
BASOPHILS NFR BLD AUTO: 1 % (ref 0–1)
BILIRUB SERPL-MCNC: 0.43 MG/DL (ref 0.2–1)
BUN SERPL-MCNC: 15 MG/DL (ref 5–25)
CALCIUM SERPL-MCNC: 9.2 MG/DL (ref 8.4–10.2)
CHLORIDE SERPL-SCNC: 105 MMOL/L (ref 96–108)
CO2 SERPL-SCNC: 26 MMOL/L (ref 21–32)
CREAT SERPL-MCNC: 0.52 MG/DL (ref 0.6–1.3)
EOSINOPHIL # BLD AUTO: 0.16 THOUSAND/ÂΜL (ref 0–0.61)
EOSINOPHIL NFR BLD AUTO: 3 % (ref 0–6)
ERYTHROCYTE [DISTWIDTH] IN BLOOD BY AUTOMATED COUNT: 14.1 % (ref 11.6–15.1)
GFR SERPL CREATININE-BSD FRML MDRD: 101 ML/MIN/1.73SQ M
GLUCOSE P FAST SERPL-MCNC: 133 MG/DL (ref 65–99)
HCT VFR BLD AUTO: 40.2 % (ref 34.8–46.1)
HGB BLD-MCNC: 12.9 G/DL (ref 11.5–15.4)
IMM GRANULOCYTES # BLD AUTO: 0.02 THOUSAND/UL (ref 0–0.2)
IMM GRANULOCYTES NFR BLD AUTO: 0 % (ref 0–2)
LYMPHOCYTES # BLD AUTO: 1.93 THOUSANDS/ÂΜL (ref 0.6–4.47)
LYMPHOCYTES NFR BLD AUTO: 30 % (ref 14–44)
MAGNESIUM SERPL-MCNC: 1.9 MG/DL (ref 1.9–2.7)
MCH RBC QN AUTO: 28.5 PG (ref 26.8–34.3)
MCHC RBC AUTO-ENTMCNC: 32.1 G/DL (ref 31.4–37.4)
MCV RBC AUTO: 89 FL (ref 82–98)
MONOCYTES # BLD AUTO: 0.4 THOUSAND/ÂΜL (ref 0.17–1.22)
MONOCYTES NFR BLD AUTO: 6 % (ref 4–12)
NEUTROPHILS # BLD AUTO: 3.81 THOUSANDS/ÂΜL (ref 1.85–7.62)
NEUTS SEG NFR BLD AUTO: 60 % (ref 43–75)
NRBC BLD AUTO-RTO: 0 /100 WBCS
PLATELET # BLD AUTO: 184 THOUSANDS/UL (ref 149–390)
PMV BLD AUTO: 10.8 FL (ref 8.9–12.7)
POTASSIUM SERPL-SCNC: 4.1 MMOL/L (ref 3.5–5.3)
PROT SERPL-MCNC: 7.5 G/DL (ref 6.4–8.4)
RBC # BLD AUTO: 4.53 MILLION/UL (ref 3.81–5.12)
SODIUM SERPL-SCNC: 141 MMOL/L (ref 135–147)
T4 FREE SERPL-MCNC: 0.97 NG/DL (ref 0.76–1.46)
TSH SERPL DL<=0.05 MIU/L-ACNC: 0.38 UIU/ML (ref 0.45–4.5)
WBC # BLD AUTO: 6.38 THOUSAND/UL (ref 4.31–10.16)

## 2023-01-27 RX ORDER — LISINOPRIL 5 MG/1
5 TABLET ORAL DAILY
Qty: 90 TABLET | Refills: 2 | Status: SHIPPED | OUTPATIENT
Start: 2023-01-27

## 2023-01-28 LAB
EST. AVERAGE GLUCOSE BLD GHB EST-MCNC: 151 MG/DL
HBA1C MFR BLD: 6.9 %

## 2023-01-30 ENCOUNTER — APPOINTMENT (OUTPATIENT)
Dept: CARDIAC REHAB | Facility: HOSPITAL | Age: 63
End: 2023-01-30

## 2023-02-01 ENCOUNTER — APPOINTMENT (OUTPATIENT)
Dept: CARDIAC REHAB | Facility: HOSPITAL | Age: 63
End: 2023-02-01

## 2023-02-03 ENCOUNTER — CLINICAL SUPPORT (OUTPATIENT)
Dept: CARDIAC REHAB | Facility: HOSPITAL | Age: 63
End: 2023-02-03

## 2023-02-03 DIAGNOSIS — Z95.2 S/P TAVR (TRANSCATHETER AORTIC VALVE REPLACEMENT): ICD-10-CM

## 2023-02-06 ENCOUNTER — TELEPHONE (OUTPATIENT)
Dept: CARDIOLOGY CLINIC | Facility: CLINIC | Age: 63
End: 2023-02-06

## 2023-02-06 ENCOUNTER — CLINICAL SUPPORT (OUTPATIENT)
Dept: CARDIAC REHAB | Facility: HOSPITAL | Age: 63
End: 2023-02-06

## 2023-02-06 DIAGNOSIS — Z95.2 S/P TAVR (TRANSCATHETER AORTIC VALVE REPLACEMENT): Primary | ICD-10-CM

## 2023-02-06 DIAGNOSIS — Z95.2 S/P TAVR (TRANSCATHETER AORTIC VALVE REPLACEMENT): ICD-10-CM

## 2023-02-06 RX ORDER — AMOXICILLIN 500 MG/1
CAPSULE ORAL
Qty: 4 CAPSULE | Refills: 3 | Status: SHIPPED | OUTPATIENT
Start: 2023-02-06 | End: 2023-02-07

## 2023-02-06 NOTE — TELEPHONE ENCOUNTER
Pt is S/P TAVR  Pt has dental appt tomorrow  Please advise on Antibiotic script  No antibiotic allergies

## 2023-02-08 ENCOUNTER — CLINICAL SUPPORT (OUTPATIENT)
Dept: CARDIAC REHAB | Facility: HOSPITAL | Age: 63
End: 2023-02-08

## 2023-02-08 DIAGNOSIS — Z95.2 S/P TAVR (TRANSCATHETER AORTIC VALVE REPLACEMENT): ICD-10-CM

## 2023-02-10 ENCOUNTER — APPOINTMENT (OUTPATIENT)
Dept: CARDIAC REHAB | Facility: HOSPITAL | Age: 63
End: 2023-02-10

## 2023-02-13 ENCOUNTER — CLINICAL SUPPORT (OUTPATIENT)
Dept: CARDIAC REHAB | Facility: HOSPITAL | Age: 63
End: 2023-02-13

## 2023-02-13 DIAGNOSIS — Z95.2 S/P TAVR (TRANSCATHETER AORTIC VALVE REPLACEMENT): Primary | ICD-10-CM

## 2023-02-13 NOTE — PROGRESS NOTES
Cardiac Rehabilitation Plan of Care   90 Day Reassessment          Today's date: 2023   # of Exercise Sessions Completed:   Patient name: Romeo Truong      : 1960  Age: 57 y o  MRN: [id number]  Referring Physician: Rickey Ramos PA-C  Cardiologist: Dr Camila Garcia, DO  Provider: Liza bowling  Clinician: Jerry Niño Im, MPT, CCRP    Dx:   Encounter Diagnosis   Name Primary? • S/P TAVR (transcatheter aortic valve replacement)    New Onset LBBB  Hx Breast Cancer w/Radiaiton Therapy    Date of onset: 10/11/2022    SUMMARY OF PROGRESS:  Blanka Zamorano has completed 26 sessions at Cardiac Rehab  Patient admits to 100% medication compliance  Patient no longer reports any functional limitations  Patient does not require supplemental O2 at this time, and does require a CPAP  to sleep  Patient's PHQ9 Score was two  At this time, patient denies having depression  Patient's GAD7 Score was zero  At this time, patient denies having anxiety  The patient is going to start exercising at home, she has a TM available    Patient was counseled on exercise guidelines including 1-2 days of moderate exercise on opposite days of Cardiac Rehab, as tolerated  Patient has been receiving weekly educational handouts; please see attached LSI document for complete list     Balnka Zamorano has been working at a 4 10 MET level  At rest, HR 80 and /72 with appropriate increase to 121 and 154/68 during exercise  She rates her program a 4-5/10 on a 1-10 RPE scale  Patient denied any symptoms during exercise  During recovery, HR 89 and /62  Telemetry reveals NSR w/ LBBB  Patient has goals of initiate HEP,  decrease fatigue,weight loss, reduce risk profile, reduce reliance on medication, ability to tolerate long distances on elevations and attain her maximal level of function  Blanka Zamorano has been very compliant in attending her scheduled cardiac rehab sessions and gives great effort each session   She states she does a lot of walking, is very active around her house, and no longer has any limitations  She has correct hemodynamic responses to activity  She states she "feels good" and that cardiac rehab is really helping her despite some orthopedic issues  BLE has improved  She no longer any ZAMARRIPA  Her program will be progressed as tolerated  Medication compliance: Yes   Comments: Pt reports to be compliant with medications  Fall Risk: Low   Comments: Ambulates with a steady gait with no assist device    EKG Interpretation: NSR w/ LBBB      EXERCISE ASSESSMENT and PLAN    Exercise Prescription:      Frequency: 3 days/week   Supplement with home exercise 3+ days/wk as tolerated       Minutes: 40-45         METS: 4 10            HR: 105-115   RPE: 4-5/10         Modalities: Treadmill, UBE, NuStep and Recumbent bike      30 Day Goals for Exercise Progression:    Frequency: 3 days/week of cardiac rehab       Supplement with home exercise 5+ days/wk as tolerated    Minutes: 45-50                           >150 mins/wk of moderate intensity exercise   METS: 4 5-5 5   HR: 105-115   RPE: 4-5/10   Modalities: Treadmill, UBE, NuStep and Recumbent bike    Strength trainin-3 days / week  12-15 repetitions  1-2 sets per modality    Modalities: Leg Press and UE PREs    Home Exercise: Patient has started  a TM at home and plans to initiate HEP as advised by CR staff  Goals: 10% improvement in functional capacity - based on max METs achieved in fitness assessment, Reduced dyspnea with physical activity  0-1/10, improved DASI score by 10%, Increase in exercise capacity by 40% - based on peak METs tolerated in cardiac rehab exercise session, Exercise 5 days/wk, >150mins/wk of moderate intensity exercise, Return to work unrestricted, Attend Rehab regularly and start a home exercise program    Progression Toward Goals:  Pt is progressing and showing improvement  toward the following goals:   Increasing MET level, no longer notes ZAMARRIPA and performs mod exercise at 150 min/week   , Will continue to educate and progress as tolerated , Goals met: Attending rehab regularly  Education: benefit of exercise for CAD risk factors, home exercise guidelines, AHA guidelines to achieve >150 mins/wk of moderate exercise, RPE scale and class: Risk Factors for Heart Disease   Plan:education on home exercise guidelines, home exercise 30+ mins 2 days opposite CR and Education class: Risk Factors for Heart Disease  Readiness to change: Action:  (Changing behavior)      NUTRITION ASSESSMENT AND PLAN    Weight control:    Starting weight: 189   Current weight:   189  Waist circumference:    Starting: NA   Current:  NA    Diabetes: T2D  A1c: 7 5    last measured: 4/9/2022    Repeat 1/27/2023: A1C 6 9 and Eag 151     Lipid management: Discussed diet and lipid management and Last lipid profile 4/9/2022  Chol 167    HDL 53  LDL 70    No repeat Lipid Panel since April  Goals:TRG <150, improved A1c  < 7 0%, Improved Rate Your Plate score  >20, choose lean meat (93-95%), increase intake of fish, shellfish, cook without added fat or use vegetable oil/spray, increase intake of meatless meals, reduce cheese intake or use reduced-fat, eat 3 or more servings of whole grains a day, Eat 4-5 cups of fruits and vegetables daily, use olive or canola oil in baking, choose low sodium processed foods, eliminate butter, use fat-free dressings/chatterjee or seldom use and daily saturated fat intake <7%/13g    Progression Toward Goals: Will continue to educate and progress as tolerated , Goals not met: no further weight loss during this 30 day reporting perod     , Goals met: imporved A1C and Blood Glucose Level, imporved diet and increased water intake       Education: heart healthy eating  low sodium diet  hydration  nutrition for  lipid management  target goal for A1c <7 0  exercise and diabetes management   wt  loss   education class: Heart Healthy Eating  education class:  Label Reading  Plan: Education class: Reading Food Labels, Education Class: Heart Healthy Eating, replace butter with soft spreads made with olive oil, canola or yogurt, replace refined grain bread with whole grain bread, replace unhealthy snacks with fruits & vegs, use salt substitute like Mrs  Dash, increase utilization of fresh or dried herbs, eat more home cooked meals and eat out less often, will try new grains like brown rice, quinoa, farro, learn how to read food labels and keep added daily sugar <25g/day  Readiness to change: Action:  (Changing behavior)      PSYCHOSOCIAL ASSESSMENT AND PLAN    Emotional:  Depression assessment:  PHQ-9 = 4  1-4 = Minimal Depression            Anxiety measure:  DEB-7 = 0  5-9 = Mild anxiety  Self-reported stress level:  3-5/10  Social support: Excellent    Goals:  Reduce perceived stress to 1-3/10, Physical Fitness in [facility] Score < 3, Social Support in [facility] Score < 3, Daily Activity in [facility] Score < 3, Overall Health in [facility] Score < 3, Quality of Life in [facility] Score < 3 , Increased interest in doing things and improved sleep    Progression Toward Goals: Will continue to educate and progress as tolerated , Goals met: imporved sleep, improved quality of life, improved physical fitness and increased social activities  , Stress level continues to remain between 3-5/10  Education: signs/sxs of depression, benefits of a positive support system, stress management techniques, class:  Stress and Your Health  and class:  Relaxation  Plan: Class: Stress and Your Health, Class: Relaxation, Practice relaxation techniques, Exercise, Spend time outdoors and Enjoy a hobby  Readiness to change: Action:  (Changing behavior)      OTHER CORE COMPONENTS     Tobacco:   Social History     Tobacco Use   Smoking Status Never   Smokeless Tobacco Never       Tobacco Use Intervention:   N/A:  Patient is a non-smoker     Anginal Symptoms:  ZAMARRIPA and excessive fatigue prior to surgery  Patient states she still feels easily fatigued  NTG use: No prescription    Blood pressure:    Restin/71   Exercise: 154/68   Recovery: 130/62    Goals: consistent BP < 130/80, reduced dietary sodium <2300mg, moderate intensity exercise >150 mins/wk, medication compliance and reduce number of medications  needed for BP control    Progression Toward Goals: Will continue to educate and progress as tolerated , Goals met: Resting BP < 130/80, 100% medication compliance, reduced daily sodium intake and mod exercise at 150 min/week       Education:  understanding high blood pressure and it's relationship to CAD, Education class:  Common Heart Medications and Education class: Understanding Heart Disease  Plan: Class: Understanding Heart Disease, Class: Common Heart Medications, avoid places with second hand smoke, Avoid Processed foods, engage in regular exercise, use salt substitutes, check labels for sodium content and monitor home BP  Readiness to change: Action:  (Changing behavior)

## 2023-02-15 ENCOUNTER — CLINICAL SUPPORT (OUTPATIENT)
Dept: CARDIAC REHAB | Facility: HOSPITAL | Age: 63
End: 2023-02-15

## 2023-02-15 DIAGNOSIS — Z95.2 S/P TAVR (TRANSCATHETER AORTIC VALVE REPLACEMENT): ICD-10-CM

## 2023-02-17 ENCOUNTER — CLINICAL SUPPORT (OUTPATIENT)
Dept: CARDIAC REHAB | Facility: HOSPITAL | Age: 63
End: 2023-02-17

## 2023-02-17 DIAGNOSIS — Z95.2 S/P TAVR (TRANSCATHETER AORTIC VALVE REPLACEMENT): ICD-10-CM

## 2023-02-20 ENCOUNTER — APPOINTMENT (OUTPATIENT)
Dept: CARDIAC REHAB | Facility: HOSPITAL | Age: 63
End: 2023-02-20

## 2023-02-21 ENCOUNTER — CLINICAL SUPPORT (OUTPATIENT)
Dept: CARDIAC REHAB | Facility: HOSPITAL | Age: 63
End: 2023-02-21

## 2023-02-21 DIAGNOSIS — Z95.2 S/P TAVR (TRANSCATHETER AORTIC VALVE REPLACEMENT): ICD-10-CM

## 2023-02-22 ENCOUNTER — CLINICAL SUPPORT (OUTPATIENT)
Dept: CARDIAC REHAB | Facility: HOSPITAL | Age: 63
End: 2023-02-22

## 2023-02-22 DIAGNOSIS — Z95.2 S/P TAVR (TRANSCATHETER AORTIC VALVE REPLACEMENT): ICD-10-CM

## 2023-02-24 ENCOUNTER — CLINICAL SUPPORT (OUTPATIENT)
Dept: CARDIAC REHAB | Facility: HOSPITAL | Age: 63
End: 2023-02-24

## 2023-02-24 DIAGNOSIS — Z95.2 S/P TAVR (TRANSCATHETER AORTIC VALVE REPLACEMENT): ICD-10-CM

## 2023-02-27 ENCOUNTER — APPOINTMENT (OUTPATIENT)
Dept: CARDIAC REHAB | Facility: HOSPITAL | Age: 63
End: 2023-02-27

## 2023-03-01 ENCOUNTER — CLINICAL SUPPORT (OUTPATIENT)
Dept: CARDIAC REHAB | Facility: HOSPITAL | Age: 63
End: 2023-03-01

## 2023-03-01 DIAGNOSIS — Z95.2 S/P TAVR (TRANSCATHETER AORTIC VALVE REPLACEMENT): ICD-10-CM

## 2023-03-03 ENCOUNTER — CLINICAL SUPPORT (OUTPATIENT)
Dept: CARDIAC REHAB | Facility: HOSPITAL | Age: 63
End: 2023-03-03

## 2023-03-03 DIAGNOSIS — Z95.2 S/P TAVR (TRANSCATHETER AORTIC VALVE REPLACEMENT): ICD-10-CM

## 2023-03-06 ENCOUNTER — CLINICAL SUPPORT (OUTPATIENT)
Dept: CARDIAC REHAB | Facility: HOSPITAL | Age: 63
End: 2023-03-06

## 2023-03-06 DIAGNOSIS — Z95.2 S/P TAVR (TRANSCATHETER AORTIC VALVE REPLACEMENT): ICD-10-CM

## 2023-03-08 ENCOUNTER — APPOINTMENT (OUTPATIENT)
Dept: CARDIAC REHAB | Facility: HOSPITAL | Age: 63
End: 2023-03-08

## 2023-03-08 ENCOUNTER — CLINICAL SUPPORT (OUTPATIENT)
Dept: CARDIAC REHAB | Facility: HOSPITAL | Age: 63
End: 2023-03-08

## 2023-03-08 DIAGNOSIS — Z95.2 S/P TAVR (TRANSCATHETER AORTIC VALVE REPLACEMENT): ICD-10-CM

## 2023-03-10 ENCOUNTER — CLINICAL SUPPORT (OUTPATIENT)
Dept: CARDIAC REHAB | Facility: HOSPITAL | Age: 63
End: 2023-03-10

## 2023-03-10 ENCOUNTER — APPOINTMENT (OUTPATIENT)
Dept: CARDIAC REHAB | Facility: HOSPITAL | Age: 63
End: 2023-03-10

## 2023-03-10 DIAGNOSIS — Z95.2 S/P TAVR (TRANSCATHETER AORTIC VALVE REPLACEMENT): Primary | ICD-10-CM

## 2023-03-10 NOTE — PROGRESS NOTES
Cardiac Rehabilitation Plan of Care   Discharge          Today's date: 3/10/2023   # of Exercise Sessions Completed:   Patient name: Mercedez Link      : 1960  Age: 70 y o  MRN: [id number]  Referring Physician: Melvina Sacks, PA-C  Cardiologist: Dr Alvin Garcia DO  Provider: Liza bowling  Clinician: Kalia Caputo MS    Dx:   Encounter Diagnosis   Name Primary? • S/P TAVR (transcatheter aortic valve replacement)    New Onset LBBB  Hx Breast Cancer w/Radiaiton Therapy  Date of onset: 10/11/2022      SUMMARY OF PROGRESS:  Sadia Live has completed 36 sessions at Cardiac Rehab  Patient admits to 100% medication compliance  Patient no longer reports any functional limitations  Patient does not require supplemental O2 at this time, and does require a CPAP  to sleep  Patient's PHQ9 Score was two  At this time, patient denies having depression  Patient's GAD7 Score was zero  At this time, patient denies having anxiety  The patient is going to start exercising at home, she has a TM available    Patient was counseled on exercise guidelines following her discharge  Patient has been receiving weekly educational handouts; please see attached LSI document for complete list     Sadia Live has been working at a 4 10 MET level  At rest, HR 88 and /66 with appropriate increase to 121 and 146/68 during exercise  She rates her program a 4-5/10 on a 1-10 RPE scale  Patient denied any symptoms during exercise  During recovery, HR 89 and /62  Telemetry reveals NSR w/ LBBB  Sadia Live was very compliant in attending her scheduled cardiac rehab sessions and gave great effort each session  She states she does a lot of walking, is very active around her house, and no longer has any limitations  She states she "feels much better" and that cardiac rehab really helped her despite some orthopedic issues  BLE has improved  She no longer any ZAMARRIPA         Medication compliance: Yes   Comments: Pt reports to be compliant with medications  Fall Risk: Low   Comments: Ambulates with a steady gait with no assist device    EKG Interpretation: NSR w/ LBBB      EXERCISE ASSESSMENT and PLAN    Exercise Prescription:      Frequency: 3 days/week   Supplement with home exercise 3+ days/wk as tolerated       Minutes: 42         METS: 4 10            HR: 20-30 bpm above rest   RPE: 4-5/10         Modalities: Treadmill, UBE, NuStep and Recumbent bike        Strength trainin-3 days / week  12-15 repetitions  1-2 sets per modality    Modalities: Leg Press and UE PREs    Home Exercise: Patient has started a TM at home and plans to initiate HEP    Goals: 10% improvement in functional capacity - based on max METs achieved in fitness assessment, Reduced dyspnea with physical activity  0-1/10, improved DASI score by 10%, Increase in exercise capacity by 40% - based on peak METs tolerated in cardiac rehab exercise session, Exercise 5 days/wk, >150mins/wk of moderate intensity exercise, Return to work unrestricted, Attend Rehab regularly and start a home exercise program    Progression Toward Goals:  Goals met: Improved ETT METs, reduced ZAMARRIPA, improved DASI score, attended rehab regularly, and will initiate HEP upon discharge , Patient will be encouraged to focus on lifestyle modifications following discharge      Education: benefit of exercise for CAD risk factors, home exercise guidelines, AHA guidelines to achieve >150 mins/wk of moderate exercise, RPE scale and class: Risk Factors for Heart Disease   Plan:education on home exercise guidelines, home exercise 30+ mins 2 days opposite CR and Education class: Risk Factors for Heart Disease  Readiness to change: Maintenance: (Maintaining the behavior change)      NUTRITION ASSESSMENT AND PLAN    Weight control:    Starting weight: 189   Current weight:   189  Waist circumference:    Starting: NA   Current:  NA    Diabetes: T2D  A1c: 7 5    last measured: 2022  Repeat 2023: A1C 6 9 and Eag 151 Lipid management: Discussed diet and lipid management and Last lipid profile 4/9/2022  Chol 167    HDL 53  LDL 70  No repeat Lipid Panel since April  Goals:TRG <150, improved A1c  < 7 0%, Improved Rate Your Plate score  >93, choose lean meat (93-95%), increase intake of fish, shellfish, cook without added fat or use vegetable oil/spray, increase intake of meatless meals, reduce cheese intake or use reduced-fat, eat 3 or more servings of whole grains a day, Eat 4-5 cups of fruits and vegetables daily, use olive or canola oil in baking, choose low sodium processed foods, eliminate butter, use fat-free dressings/chatterjee or seldom use and daily saturated fat intake <7%/13g    Progression Toward Goals: Goals not met: No weight loss or updated lipid panel, RYP score decreased  , Goals met: Improved HbA1C, improved fasting glucose, increased water intake , Patient will be encouraged to focus on lifestyle modifications following discharge  Education: heart healthy eating  low sodium diet  hydration  nutrition for  lipid management  target goal for A1c <7 0  exercise and diabetes management   wt  loss   education class: Heart Healthy Eating  education class:  Label Reading  Plan: Education class: Reading Food Labels, Education Class: Heart Healthy Eating, replace butter with soft spreads made with olive oil, canola or yogurt, replace refined grain bread with whole grain bread, replace unhealthy snacks with fruits & vegs, use salt substitute like Mrs   Dash, increase utilization of fresh or dried herbs, eat more home cooked meals and eat out less often, will try new grains like brown rice, quinoa, farro, learn how to read food labels and keep added daily sugar <25g/day  Readiness to change: Action:  (Changing behavior)      PSYCHOSOCIAL ASSESSMENT AND PLAN    Emotional:  Depression assessment:  PHQ-9 = 0  0 =No Depression            Anxiety measure:  DEB-7 = 0  0-4  = Not anxious  Self-reported stress level: 3/10  Social support: Excellent    Goals:  Reduce perceived stress to 1-310, Physical Fitness in [facility] Score < 3, Social Support in [facility] Score < 3, Daily Activity in [facility] Score < 3, Overall Health in [facility] Score < 3, Quality of Life in [facility] Score < 3 , Increased interest in doing things and improved sleep    Progression Toward Goals: Goals met: Improved sleep, improved QOL, improved PHQ9 score, reduced stress  , Patient will be encouraged to focus on lifestyle modifications following discharge  Education: signs/sxs of depression, benefits of a positive support system, stress management techniques, class:  Stress and Your Health  and class:  Relaxation  Plan: Class: Stress and Your Health, Class: Relaxation, Practice relaxation techniques, Exercise, Spend time outdoors and Enjoy a hobby  Readiness to change: Maintenance: (Maintaining the behavior change)      OTHER CORE COMPONENTS     Tobacco:   Social History     Tobacco Use   Smoking Status Never   Smokeless Tobacco Never       Tobacco Use Intervention:   N/A:  Patient is a non-smoker     Anginal Symptoms:  ZAMARRIPA and excessive fatigue prior to surgery  Patient states she still feels easily fatigued  NTG use: No prescription    Blood pressure:    Restin/66   Exercise: 146/68   Recovery: 130/62    Goals: consistent BP < 130/80, reduced dietary sodium <2300mg, moderate intensity exercise >150 mins/wk, medication compliance and reduce number of medications  needed for BP control    Progression Toward Goals: Goals met: Resting BP < 130/80, 100% medication compliance, reduced daily sodium intake and mod exercise at 150 min/week   , Patient will be encouraged to focus on lifestyle modifications following discharge      Education:  understanding high blood pressure and it's relationship to CAD, Education class:  Common Heart Medications and Education class: Understanding Heart Disease  Plan: Class: Understanding Heart Disease, Class: Common Heart Medications, avoid places with second hand smoke, Avoid Processed foods, engage in regular exercise, use salt substitutes, check labels for sodium content and monitor home BP  Readiness to change: Maintenance: (Maintaining the behavior change)

## 2023-03-13 ENCOUNTER — APPOINTMENT (OUTPATIENT)
Dept: CARDIAC REHAB | Facility: HOSPITAL | Age: 63
End: 2023-03-13

## 2023-03-15 ENCOUNTER — APPOINTMENT (OUTPATIENT)
Dept: CARDIAC REHAB | Facility: HOSPITAL | Age: 63
End: 2023-03-15

## 2023-03-17 ENCOUNTER — APPOINTMENT (OUTPATIENT)
Dept: CARDIAC REHAB | Facility: HOSPITAL | Age: 63
End: 2023-03-17

## 2023-03-20 ENCOUNTER — APPOINTMENT (OUTPATIENT)
Dept: CARDIAC REHAB | Facility: HOSPITAL | Age: 63
End: 2023-03-20

## 2023-03-22 ENCOUNTER — APPOINTMENT (OUTPATIENT)
Dept: CARDIAC REHAB | Facility: HOSPITAL | Age: 63
End: 2023-03-22

## 2023-03-24 ENCOUNTER — APPOINTMENT (OUTPATIENT)
Dept: CARDIAC REHAB | Facility: HOSPITAL | Age: 63
End: 2023-03-24

## 2023-04-04 ENCOUNTER — HOSPITAL ENCOUNTER (INPATIENT)
Facility: HOSPITAL | Age: 63
LOS: 1 days | Discharge: HOME | End: 2023-04-05
Attending: EMERGENCY MEDICINE | Admitting: STUDENT IN AN ORGANIZED HEALTH CARE EDUCATION/TRAINING PROGRAM

## 2023-04-04 ENCOUNTER — APPOINTMENT (EMERGENCY)
Dept: RADIOLOGY | Facility: HOSPITAL | Age: 63
End: 2023-04-04

## 2023-04-04 ENCOUNTER — APPOINTMENT (OUTPATIENT)
Dept: CT IMAGING | Facility: HOSPITAL | Age: 63
End: 2023-04-04

## 2023-04-04 ENCOUNTER — APPOINTMENT (OUTPATIENT)
Dept: NON INVASIVE DIAGNOSTICS | Facility: HOSPITAL | Age: 63
End: 2023-04-04

## 2023-04-04 DIAGNOSIS — R07.9 CHEST PAIN: Primary | ICD-10-CM

## 2023-04-04 DIAGNOSIS — I35.0 AORTIC STENOSIS, MODERATE: ICD-10-CM

## 2023-04-04 DIAGNOSIS — Z86.79 HISTORY OF CAD (CORONARY ARTERY DISEASE): ICD-10-CM

## 2023-04-04 LAB
2HR DELTA HS TROPONIN: -2 NG/L
4HR DELTA HS TROPONIN: -3 NG/L
ALBUMIN SERPL BCP-MCNC: 4.6 G/DL (ref 3.5–5)
ALP SERPL-CCNC: 92 U/L (ref 34–104)
ALT SERPL W P-5'-P-CCNC: 26 U/L (ref 7–52)
ANION GAP SERPL CALCULATED.3IONS-SCNC: 10 MMOL/L (ref 4–13)
AORTIC ROOT: 2.1 CM
AORTIC VALVE MEAN VELOCITY: 14.4 M/S
APICAL FOUR CHAMBER EJECTION FRACTION: 71 %
ASCENDING AORTA: 3.2 CM
AST SERPL W P-5'-P-CCNC: 24 U/L (ref 13–39)
ATRIAL RATE: 68 BPM
ATRIAL RATE: 73 BPM
ATRIAL RATE: 73 BPM
AV AREA BY CONTINUOUS VTI: 2.1 CM2
AV AREA PEAK VELOCITY: 2.1 CM2
AV LVOT MEAN GRADIENT: 4 MMHG
AV LVOT PEAK GRADIENT: 7 MMHG
AV MEAN GRADIENT: 9 MMHG
AV PEAK GRADIENT: 14 MMHG
AV VALVE AREA: 2.1 CM2
AV VELOCITY RATIO: 0.68
BASOPHILS # BLD AUTO: 0.07 THOUSANDS/ÂΜL (ref 0–0.1)
BASOPHILS NFR BLD AUTO: 1 % (ref 0–1)
BILIRUB SERPL-MCNC: 0.46 MG/DL (ref 0.2–1)
BUN SERPL-MCNC: 17 MG/DL (ref 5–25)
CALCIUM SERPL-MCNC: 10 MG/DL (ref 8.4–10.2)
CARDIAC TROPONIN I PNL SERPL HS: 12 NG/L
CARDIAC TROPONIN I PNL SERPL HS: 13 NG/L
CARDIAC TROPONIN I PNL SERPL HS: 15 NG/L
CHLORIDE SERPL-SCNC: 101 MMOL/L (ref 96–108)
CO2 SERPL-SCNC: 25 MMOL/L (ref 21–32)
CREAT SERPL-MCNC: 0.6 MG/DL (ref 0.6–1.3)
DOP CALC AO PEAK VEL: 1.88 M/S
DOP CALC AO VTI: 41 CM
DOP CALC LVOT AREA: 3.14 CM2
DOP CALC LVOT DIAMETER: 2 CM
DOP CALC LVOT PEAK VEL VTI: 27.46 CM
DOP CALC LVOT PEAK VEL: 1.28 M/S
DOP CALC LVOT STROKE INDEX: 44.1 ML/M2
DOP CALC LVOT STROKE VOLUME: 86.22 CM3
E WAVE DECELERATION TIME: 263 MS
EOSINOPHIL # BLD AUTO: 0.22 THOUSAND/ÂΜL (ref 0–0.61)
EOSINOPHIL NFR BLD AUTO: 2 % (ref 0–6)
ERYTHROCYTE [DISTWIDTH] IN BLOOD BY AUTOMATED COUNT: 13.8 % (ref 11.6–15.1)
FRACTIONAL SHORTENING: 45 % (ref 28–44)
GFR SERPL CREATININE-BSD FRML MDRD: 95 ML/MIN/1.73SQ M
GLUCOSE SERPL-MCNC: 126 MG/DL (ref 65–140)
GLUCOSE SERPL-MCNC: 137 MG/DL (ref 65–140)
GLUCOSE SERPL-MCNC: 143 MG/DL (ref 65–140)
GLUCOSE SERPL-MCNC: 201 MG/DL (ref 65–140)
HCT VFR BLD AUTO: 42.3 % (ref 34.8–46.1)
HGB BLD-MCNC: 14.1 G/DL (ref 11.5–15.4)
IMM GRANULOCYTES # BLD AUTO: 0.03 THOUSAND/UL (ref 0–0.2)
IMM GRANULOCYTES NFR BLD AUTO: 0 % (ref 0–2)
INTERVENTRICULAR SEPTUM IN DIASTOLE (PARASTERNAL SHORT AXIS VIEW): 1.4 CM
INTERVENTRICULAR SEPTUM: 1.4 CM (ref 0.6–1.1)
LAAS-AP2: 19.5 CM2
LAAS-AP4: 20 CM2
LEFT ATRIUM AREA SYSTOLE SINGLE PLANE A4C: 20 CM2
LEFT ATRIUM SIZE: 3.9 CM
LEFT INTERNAL DIMENSION IN SYSTOLE: 2.2 CM (ref 2.1–4)
LEFT VENTRICULAR INTERNAL DIMENSION IN DIASTOLE: 4 CM (ref 3.5–6)
LEFT VENTRICULAR POSTERIOR WALL IN END DIASTOLE: 0.9 CM
LEFT VENTRICULAR STROKE VOLUME: 55 ML
LVSV (TEICH): 55 ML
LYMPHOCYTES # BLD AUTO: 3.57 THOUSANDS/ÂΜL (ref 0.6–4.47)
LYMPHOCYTES NFR BLD AUTO: 38 % (ref 14–44)
MCH RBC QN AUTO: 29.2 PG (ref 26.8–34.3)
MCHC RBC AUTO-ENTMCNC: 33.3 G/DL (ref 31.4–37.4)
MCV RBC AUTO: 88 FL (ref 82–98)
MONOCYTES # BLD AUTO: 0.67 THOUSAND/ÂΜL (ref 0.17–1.22)
MONOCYTES NFR BLD AUTO: 7 % (ref 4–12)
MV E'TISSUE VEL-SEP: 5 CM/S
MV PEAK A VEL: 0.89 M/S
MV PEAK E VEL: 75 CM/S
MV STENOSIS PRESSURE HALF TIME: 76 MS
MV VALVE AREA P 1/2 METHOD: 2.89 CM2
NEUTROPHILS # BLD AUTO: 4.94 THOUSANDS/ÂΜL (ref 1.85–7.62)
NEUTS SEG NFR BLD AUTO: 52 % (ref 43–75)
NRBC BLD AUTO-RTO: 0 /100 WBCS
P AXIS: 20 DEGREES
P AXIS: 57 DEGREES
P AXIS: 61 DEGREES
PLATELET # BLD AUTO: 242 THOUSANDS/UL (ref 149–390)
PMV BLD AUTO: 10.5 FL (ref 8.9–12.7)
POTASSIUM SERPL-SCNC: 3.8 MMOL/L (ref 3.5–5.3)
PR INTERVAL: 164 MS
PR INTERVAL: 170 MS
PR INTERVAL: 172 MS
PROT SERPL-MCNC: 8.3 G/DL (ref 6.4–8.4)
QRS AXIS: 58 DEGREES
QRS AXIS: 62 DEGREES
QRS AXIS: 70 DEGREES
QRSD INTERVAL: 92 MS
QT INTERVAL: 408 MS
QT INTERVAL: 414 MS
QT INTERVAL: 434 MS
QTC INTERVAL: 449 MS
QTC INTERVAL: 456 MS
QTC INTERVAL: 461 MS
RBC # BLD AUTO: 4.83 MILLION/UL (ref 3.81–5.12)
RIGHT ATRIUM AREA SYSTOLE A4C: 12 CM2
RIGHT VENTRICLE ID DIMENSION: 2.9 CM
SL CV LEFT ATRIUM LENGTH A2C: 5 CM
SL CV LV EF: 70
SL CV PED ECHO LEFT VENTRICLE DIASTOLIC VOLUME (MOD BIPLANE) 2D: 71 ML
SL CV PED ECHO LEFT VENTRICLE SYSTOLIC VOLUME (MOD BIPLANE) 2D: 17 ML
SODIUM SERPL-SCNC: 136 MMOL/L (ref 135–147)
T WAVE AXIS: -21 DEGREES
T WAVE AXIS: 68 DEGREES
T WAVE AXIS: 8 DEGREES
TR MAX PG: 22 MMHG
TR PEAK VELOCITY: 2.4 M/S
TRICUSPID ANNULAR PLANE SYSTOLIC EXCURSION: 1.6 CM
TRICUSPID VALVE PEAK REGURGITATION VELOCITY: 2.35 M/S
VENTRICULAR RATE: 68 BPM
VENTRICULAR RATE: 73 BPM
VENTRICULAR RATE: 73 BPM
WBC # BLD AUTO: 9.5 THOUSAND/UL (ref 4.31–10.16)

## 2023-04-04 RX ORDER — MORPHINE SULFATE 4 MG/ML
4 INJECTION, SOLUTION INTRAMUSCULAR; INTRAVENOUS EVERY 4 HOURS PRN
Status: DISCONTINUED | OUTPATIENT
Start: 2023-04-04 | End: 2023-04-05 | Stop reason: HOSPADM

## 2023-04-04 RX ORDER — SODIUM CHLORIDE 9 MG/ML
3 INJECTION INTRAVENOUS
Status: DISCONTINUED | OUTPATIENT
Start: 2023-04-04 | End: 2023-04-05 | Stop reason: HOSPADM

## 2023-04-04 RX ORDER — LISINOPRIL 5 MG/1
5 TABLET ORAL DAILY
Status: DISCONTINUED | OUTPATIENT
Start: 2023-04-04 | End: 2023-04-05 | Stop reason: HOSPADM

## 2023-04-04 RX ORDER — INSULIN GLARGINE 100 [IU]/ML
20 INJECTION, SOLUTION SUBCUTANEOUS
Status: DISCONTINUED | OUTPATIENT
Start: 2023-04-04 | End: 2023-04-05 | Stop reason: HOSPADM

## 2023-04-04 RX ORDER — PRAVASTATIN SODIUM 20 MG
20 TABLET ORAL
Status: DISCONTINUED | OUTPATIENT
Start: 2023-04-05 | End: 2023-04-05 | Stop reason: HOSPADM

## 2023-04-04 RX ORDER — CARVEDILOL 6.25 MG/1
6.25 TABLET ORAL 2 TIMES DAILY WITH MEALS
Status: DISCONTINUED | OUTPATIENT
Start: 2023-04-04 | End: 2023-04-05 | Stop reason: HOSPADM

## 2023-04-04 RX ORDER — INSULIN LISPRO 100 [IU]/ML
1-6 INJECTION, SOLUTION INTRAVENOUS; SUBCUTANEOUS
Status: DISCONTINUED | OUTPATIENT
Start: 2023-04-04 | End: 2023-04-05 | Stop reason: HOSPADM

## 2023-04-04 RX ORDER — ACETAMINOPHEN 325 MG/1
650 TABLET ORAL EVERY 6 HOURS PRN
Status: DISCONTINUED | OUTPATIENT
Start: 2023-04-04 | End: 2023-04-05 | Stop reason: HOSPADM

## 2023-04-04 RX ORDER — ASPIRIN 81 MG/1
81 TABLET, CHEWABLE ORAL
Status: DISCONTINUED | OUTPATIENT
Start: 2023-04-04 | End: 2023-04-05 | Stop reason: HOSPADM

## 2023-04-04 RX ORDER — PYRIDOXINE HCL (VITAMIN B6) 50 MG
100 TABLET ORAL DAILY
Status: DISCONTINUED | OUTPATIENT
Start: 2023-04-04 | End: 2023-04-05 | Stop reason: HOSPADM

## 2023-04-04 RX ORDER — HEPARIN SODIUM 5000 [USP'U]/ML
5000 INJECTION, SOLUTION INTRAVENOUS; SUBCUTANEOUS EVERY 8 HOURS SCHEDULED
Status: DISCONTINUED | OUTPATIENT
Start: 2023-04-04 | End: 2023-04-05 | Stop reason: HOSPADM

## 2023-04-04 RX ORDER — KETOROLAC TROMETHAMINE 30 MG/ML
15 INJECTION, SOLUTION INTRAMUSCULAR; INTRAVENOUS ONCE
Status: COMPLETED | OUTPATIENT
Start: 2023-04-04 | End: 2023-04-04

## 2023-04-04 RX ADMIN — HEPARIN SODIUM 5000 UNITS: 5000 INJECTION INTRAVENOUS; SUBCUTANEOUS at 21:39

## 2023-04-04 RX ADMIN — LISINOPRIL 5 MG: 5 TABLET ORAL at 08:42

## 2023-04-04 RX ADMIN — HEPARIN SODIUM 5000 UNITS: 5000 INJECTION INTRAVENOUS; SUBCUTANEOUS at 06:01

## 2023-04-04 RX ADMIN — MORPHINE SULFATE 2 MG: 2 INJECTION, SOLUTION INTRAMUSCULAR; INTRAVENOUS at 06:01

## 2023-04-04 RX ADMIN — IOHEXOL 100 ML: 350 INJECTION, SOLUTION INTRAVENOUS at 10:33

## 2023-04-04 RX ADMIN — INSULIN LISPRO 2 UNITS: 100 INJECTION, SOLUTION INTRAVENOUS; SUBCUTANEOUS at 21:41

## 2023-04-04 RX ADMIN — HEPARIN SODIUM 5000 UNITS: 5000 INJECTION INTRAVENOUS; SUBCUTANEOUS at 15:35

## 2023-04-04 RX ADMIN — KETOROLAC TROMETHAMINE 15 MG: 30 INJECTION, SOLUTION INTRAMUSCULAR; INTRAVENOUS at 05:04

## 2023-04-04 RX ADMIN — CARVEDILOL 6.25 MG: 12.5 TABLET, FILM COATED ORAL at 15:35

## 2023-04-04 RX ADMIN — ASPIRIN 81 MG: 81 TABLET, CHEWABLE ORAL at 21:39

## 2023-04-04 RX ADMIN — Medication 30 MG: at 08:42

## 2023-04-04 RX ADMIN — INSULIN GLARGINE 20 UNITS: 100 INJECTION, SOLUTION SUBCUTANEOUS at 21:39

## 2023-04-04 RX ADMIN — PYRIDOXINE HCL TAB 50 MG 100 MG: 50 TAB at 08:47

## 2023-04-04 RX ADMIN — CARVEDILOL 6.25 MG: 12.5 TABLET, FILM COATED ORAL at 08:42

## 2023-04-04 RX ADMIN — MORPHINE SULFATE 2 MG: 2 INJECTION, SOLUTION INTRAMUSCULAR; INTRAVENOUS at 11:09

## 2023-04-04 NOTE — CONSULTS
Consultation - Cardiology   Michelle Branch 70 y o  female MRN: [id number]  Unit/Bed#: ED 13 Encounter: [id number]  04/04/23  12:09 PM    Assessment/ Plan:  1  Chest pain  • Endorses chest pressure radiating to back  • Troponins negative x3  • EKG reveals NSR without acute ischemic abnormality  • No events noted on telemetry, continue to monitor  • Recent cardiac catheterization revealed no significant obstructive epicardial CAD (8/19/2022)  • CTA dissection protocol negative for acute abnormalities  • TTE ordered  • Plan for exercise stress echo test  • Continue aspirin, pravastatin, and carvedilol    2  Chronic HFpEF  • Euvolemic on exam, does not appear to be in acute exacerbation of heart failure  • TTE ordered, previous TTE revealed EF 65% (11/10/2022)  • Continue carvedilol and lisinopril  • Strict I/O's and daily weights; recommend sodium restriction    3  AS s/p TAVR  • TRAY ordered, previous TTE revealed trace paravalvular regurgitation (11/10/2022)    4  LBBB  • Noted in history  • TTE and stress echo test ordered    5  T2DM  • A1c 6 9 (1/27/2023), management per primary team    6  Hypertension  • Currently 144/69, continue to monitor  • Continue carvedilol and lisinopril    7  Hyperlipidemia  • Continue pravastatin        History of Present Illness   Physician Requesting Consult: Elisha Dewitt MD    Reason for Consult / Principal Problem: Chest pain    HPI: Elle Khan is a 70y o  year old female with history of chronic HFpEF, AS s/p TAVR, LBBB, T2DM, hypertension, and hyperlipidemia who presents with chest pain  This is described as a pressure sensation with radiation into her back  Tenderness to palpation is also noted, however the tenderness is reportedly different from her pressure sensation  Patient underwent fairly recent TAVR procedure in October 2022  Cardiac catheterization on 8/19/2022 revealed no significant epicardial coronary artery disease    Since admission, troponins were found to be negative x3 and EKG was without acute ischemic abnormalities  Cardiology consulted for further evaluation and management  Denies SOB, palpitations, LE edema, or syncope  Consults    EKG: Normal sinus rhythm; cannot rule out anterior infarct      Review of Systems   Constitutional: Negative for chills, diaphoresis and fever  HENT: Negative for ear pain and sore throat  Eyes: Negative for pain and visual disturbance  Respiratory: Negative for cough and shortness of breath  Cardiovascular: Positive for chest pain (pressure)  Negative for palpitations and leg swelling  Gastrointestinal: Negative for abdominal pain and vomiting  Genitourinary: Negative for dysuria and hematuria  Musculoskeletal: Negative for arthralgias and back pain  Skin: Negative for color change and rash  Neurological: Negative for seizures and syncope  All other systems reviewed and are negative  Historical Information   Past Medical History:   Diagnosis Date   • Adhesive capsulitis of right shoulder    • CAD (coronary artery disease)    • CHF (congestive heart failure) (HCC)    • Diabetes mellitus (HCC)     type 2, insulin dependent   • Diverticulosis    • History of breast cancer     s/p right lumpectomy, s/p radiation   • Hyperlipidemia    • Hypertension    • Lymphedema of right upper extremity    • Obesity (BMI 30-39  9)    • SJ on CPAP    • Severe aortic stenosis    • Vitamin D deficiency      Past Surgical History:   Procedure Laterality Date   • APPENDECTOMY     • BREAST LUMPECTOMY Right    • CARDIAC CATHETERIZATION N/A 08/19/2022    Procedure: Cardiac RHC/LHC; Surgeon: Eve Woo DO;  Location: [facility];   Service: Cardiology   • CARDIAC CATHETERIZATION N/A 10/11/2022    Procedure: CARDIAC TAVR;  Surgeon: Eve Woo DO;  Location: [facility];  Service: Cardiology   • CHOLECYSTECTOMY     • KNEE ARTHROSCOPY Right     knee   • UT REPLACE AORTIC VALVE OPENFEMORAL ARTERY APPROACH N/A 10/11/2022    Procedure: REPLACEMENT AORTIC VALVE TRANSCATHETER (TAVR) TRANSFEMORAL W/ 23MM MILIAN ROSS S3 ULTRA VALVE(ACCESS ON LEFT) TRAY;  Surgeon: Brigido Mojica MD;  Location: [facility];  Service: Cardiac Surgery   • TONSILLECTOMY AND ADENOIDECTOMY     • TOTAL ABDOMINAL HYSTERECTOMY       Social History     Substance and Sexual Activity   Alcohol Use Yes    Comment: socially     Social History     Substance and Sexual Activity   Drug Use No     Social History     Tobacco Use   Smoking Status Never   Smokeless Tobacco Never       Family History:   Family History   Problem Relation Age of Onset   • COPD Mother    • Heart disease Father    • Heart disease Paternal Grandmother    • Cancer Family    • Colon cancer Neg Hx        Meds/Allergies   all current active meds have been reviewed  Allergies   Allergen Reactions   • Fentanyl Rash   • Niaspan [Niacin Er] Rash   • Prednisone Rash       Objective   Vitals: Blood pressure 144/69, pulse 75, temperature 98 7 °F (37 1 °C), temperature source Oral, resp  rate 20, SpO2 95 %  , There is no height or weight on file to calculate BMI ,   Orthostatic Blood Pressures    Flowsheet Row Most Recent Value   Blood Pressure 144/69 filed at 2023 0845   Patient Position - Orthostatic VS Sitting filed at 2023 5896          Systolic (10HPG), XL , Min:144 , KGT:899     Diastolic (59AYC), EOB:[birth year], Min:69, Max:70      No intake or output data in the 24 hours ending 23 1209    Invasive Devices     Peripheral Intravenous Line  Duration           Peripheral IV 23 Left Antecubital <1 day                    Physical Exam:  Physical Exam  Vitals and nursing note reviewed  Constitutional:       General: She is not in acute distress  Appearance: She is well-developed  She is not diaphoretic  HENT:      Head: Normocephalic and atraumatic        Nose: Nose normal    Eyes:      Conjunctiva/sclera: Conjunctivae normal    Cardiovascular:      Rate and Rhythm: Normal rate and regular rhythm  Pulses: Normal pulses  Heart sounds: Normal heart sounds  No murmur heard  No friction rub  Pulmonary:      Effort: Pulmonary effort is normal  No respiratory distress  Breath sounds: Normal breath sounds  No wheezing or rales  Chest:      Chest wall: Tenderness present  Abdominal:      Palpations: Abdomen is soft  Tenderness: There is no abdominal tenderness  Musculoskeletal:         General: No swelling  Cervical back: Neck supple  Right lower leg: No edema  Left lower leg: No edema  Skin:     General: Skin is warm and dry  Capillary Refill: Capillary refill takes less than 2 seconds  Neurological:      Mental Status: She is alert and oriented to person, place, and time     Psychiatric:         Mood and Affect: Mood normal          Judgment: Judgment normal           Lab Results:     Cardiac Profile:   Results from last 7 days   Lab Units 04/04/23  0639 04/04/23  0443 04/04/23  0158   HS TNI 0HR ng/L  --   --  15   HS TNI 2HR ng/L  --  13  --    HSTNI D2 ng/L  --  -2  --    HS TNI 4HR ng/L 12  --   --    HSTNI D4 ng/L -3  --   --        CBC with diff:   Results from last 7 days   Lab Units 04/04/23  0158   WBC Thousand/uL 9 50   HEMOGLOBIN g/dL 14 1   HEMATOCRIT % 42 3   MCV fL 88   PLATELETS Thousands/uL 242   MCH pg 29 2   MCHC g/dL 33 3   RDW % 13 8   MPV fL 10 5   NRBC AUTO /100 WBCs 0         CMP:   Results from last 7 days   Lab Units 04/04/23  0158   POTASSIUM mmol/L 3 8   CHLORIDE mmol/L 101   CO2 mmol/L 25   BUN mg/dL 17   CREATININE mg/dL 0 60   CALCIUM mg/dL 10 0   AST U/L 24   ALT U/L 26   ALK PHOS U/L 92   EGFR ml/min/1 73sq m 95

## 2023-04-04 NOTE — ASSESSMENT & PLAN NOTE
Wt Readings from Last 3 Encounters:   11/21/22 85 7 kg (189 lb)   11/16/22 84 1 kg (185 lb 8 oz)   11/10/22 84 7 kg (186 lb 11 2 oz)     · Appears euvolemic on admission  · 11/2022 ECHO EF 65%  · Not currently on outpatient diuretic

## 2023-04-04 NOTE — H&P
[address] [place] [address]  H&P  Name: Jessica Sykes  MRN: [id number]  Unit/Bed#: ED 13 I Date of Admission: 4/4/2023   Date of Service: 4/4/2023 I Hospital Day: 0      Assessment/Plan   Chest pain  Assessment & Plan  Patient reports being woken this AM by substernal chest pain radiating into her back  There was initially some associated diaphoresis  Pain persists at this time  Denies other symptom/complaint at this time       /70   Pulse 74   Temp 98 7 °F (37 1 °C) (Oral)   Resp 22   LMP  (LMP Unknown)   SpO2 95%     · Woken this AM by substernal chest pain radiating into back  · Had associated diaphoresis  · Pain continues at this time   · DALLAS 5  · Trop 15; 2H delta -2  · EKG with new lateral lead ST/T abnormalities  · CXR wet read without acute abnormality   · Tele monitoring  · Trend remainder of trop/EKG  · Cardiology consulted; recommendations appreciated     S/P TAVR (transcatheter aortic valve replacement)  Assessment & Plan  · Hx noted  · Continue home DAPT and statin    CAD (coronary artery disease)  Assessment & Plan  · Reporting chest pain as above  · Continue home daily DAPT, statin, and BB    SJ on CPAP  Assessment & Plan  · qHS CPAP ordered     CHF (congestive heart failure) ([facility] [address] )  Assessment & Plan  Wt Readings from Last 3 Encounters:   11/21/22 85 7 kg (189 lb)   11/16/22 84 1 kg (185 lb 8 oz)   11/10/22 84 7 kg (186 lb 11 2 oz)     · Appears euvolemic on admission  · 11/2022 ECHO EF 65%  · Not currently on outpatient diuretic         Diabetes mellitus ([facility] [address] )  Assessment & Plan    Lab Results   Component Value Date    HGBA1C 6 9 (H) 01/27/2023     · Continue home glargine 20u qHS   · Hold home oral antihyperglycemics   · Correctional SSI  · Hypoglycemia protocol  · Diabetic diet     Hyperlipidemia  Assessment & Plan  · Continue home statin    Hypertension  Assessment & Plan  · /70  · Continue prehospital HTN medications  · Monitor BP per unit protocol     VTE Pharmacologic Prophylaxis: VTE Score: 3 Moderate Risk (Score 3-4) - Pharmacological DVT Prophylaxis Ordered: heparin  Code Status: Level 1 - Full Code   Discussion with family: Updated  () at bedside  Anticipated Length of Stay: Patient will be admitted on an observation basis with an anticipated length of stay of less than 2 midnights secondary to chest pain  Total Time Spent on Date of Encounter in care of patient: 55 minutes This time was spent on one or more of the following: performing physical exam; counseling and coordination of care; obtaining or reviewing history; documenting in the medical record; reviewing/ordering tests, medications or procedures; communicating with other healthcare professionals and discussing with patient's family/caregivers  Chief Complaint: chest pain    History of Present Illness:  Gabbie Morrow is a 70 y o  female with a PMH of CAD, CHF, DM, s/p TAVR, SJ, HTN, and HLP who presents with chest pain  Patient reports being woken this AM by substernal chest pain radiating into her back  There was initially some associated diaphoresis  Pain persists at this time  Denies other symptom/complaint at this time  All questions answered at bedside the best my ability  Review of Systems:  Review of Systems   Constitutional: Positive for diaphoresis (Associated with chest pain)  Negative for activity change, appetite change, chills, fatigue and fever  HENT: Negative for congestion, ear pain, nosebleeds and trouble swallowing  Eyes: Negative for pain and visual disturbance  Respiratory: Negative for apnea, cough, chest tightness, shortness of breath and wheezing  Cardiovascular: Positive for chest pain  Negative for palpitations and leg swelling  Gastrointestinal: Negative for abdominal distention, abdominal pain, blood in stool, constipation, diarrhea, nausea and vomiting  Endocrine: Negative for cold intolerance, heat intolerance and polyuria  Genitourinary: Negative for difficulty urinating, dysuria, flank pain and hematuria  Musculoskeletal: Negative for arthralgias, neck pain and neck stiffness  Skin: Negative for color change, rash and wound  Neurological: Negative for dizziness, tremors, syncope, weakness, light-headedness, numbness and headaches  Hematological: Negative for adenopathy  All other systems reviewed and are negative  Past Medical and Surgical History:   Past Medical History:   Diagnosis Date   • Adhesive capsulitis of right shoulder    • CAD (coronary artery disease)    • CHF (congestive heart failure) ([facility])    • Diabetes mellitus ([facility])     type 2, insulin dependent   • Diverticulosis    • History of breast cancer     s/p right lumpectomy, s/p radiation   • Hyperlipidemia    • Hypertension    • Lymphedema of right upper extremity    • Obesity (BMI 30-39  9)    • SJ on CPAP    • Severe aortic stenosis    • Vitamin D deficiency        Past Surgical History:   Procedure Laterality Date   • APPENDECTOMY     • BREAST LUMPECTOMY Right    • CARDIAC CATHETERIZATION N/A 08/19/2022    Procedure: Cardiac RHC/LHC; Surgeon: Nereida Garza DO;  Location: [facility]; Service: Cardiology   • CARDIAC CATHETERIZATION N/A 10/11/2022    Procedure: CARDIAC TAVR;  Surgeon: Nereida Garza DO;  Location: [facility];  Service: Cardiology   • CHOLECYSTECTOMY     • KNEE ARTHROSCOPY Right     knee   • [place] REPLACE AORTIC VALVE OPENFEMORAL ARTERY APPROACH N/A 10/11/2022    Procedure: REPLACEMENT AORTIC VALVE TRANSCATHETER (TAVR) TRANSFEMORAL W/ 23MM MILIAN ROSS S3 ULTRA VALVE(ACCESS ON LEFT) TRAY;  Surgeon: Kinza Garcia MD;  Location: [facility];  Service: Cardiac Surgery   • TONSILLECTOMY AND ADENOIDECTOMY     • TOTAL ABDOMINAL HYSTERECTOMY         Meds/Allergies:  Prior to Admission medications    Medication Sig Start Date End Date Taking?  Authorizing Provider   amLODIPine (NORVASC) 5 mg tablet TAKE ONE TABLET BY MOUTH EVERY DAY  Patient not taking: Reported on 11/16/2022 10/20/22   Antonio Nuenz,    aspirin 81 mg chewable tablet Chew 81 mg daily    Historical Provider, MD   carvedilol (COREG) 6 25 mg tablet TAKE ONE TABLET BY MOUTH TWICE A DAY WITH MEALS 11/16/22   Arnold Rodriguez DO   Cholecalciferol (VITAMIN D3) 1000 UNITS CAPS Take 2,000 Units by mouth daily    Historical Provider, MD   clopidogrel (PLAVIX) 75 mg tablet Take 1 tablet (75 mg total) by mouth daily Do not start before October 14, 2022  10/14/22 1/12/23  Georgia Norton PA-C   Cyanocobalamin (VITAMIN B12 PO) Take 2,500 mcg by mouth daily    Historical Provider, MD   Empagliflozin-metFORMIN HCl ER (Synjardy XR)  MG TB24 Take 1 capsule by mouth daily  Indications: Type 2 Diabetes    Leidy Nj,    glimepiride (AMARYL) 2 mg tablet Take 4 mg by mouth 2 (two) times a day     Historical Provider, MD   Icosapent Ethyl (VASCEPA PO) Take 2 capsules by mouth daily    Historical Provider, MD   Insulin Glargine (TOUJEO) 300 units/mL CONCETRATED U-300 injection pen Inject 20 Units under the skin daily at bedtime    Historical Provider, MD   irbesartan (AVAPRO) 150 mg tablet  11/8/22   Historical Provider, MD   lisinopril (ZESTRIL) 5 mg tablet Take 1 tablet (5 mg total) by mouth daily 1/27/23   Sonia Anders MD   multivitamin [facility]) TABS Take 1 tablet by mouth daily    Historical Provider, MD   pravastatin (PRAVACHOL) 20 mg tablet Take 1 tablet (20 mg total) by mouth daily 11/16/22   Arnold Rodriguez DO   pyridoxine (VITAMIN B6) 100 mg tablet Take 100 mg by mouth daily    Historical Provider, MD     I have reviewed home medications with patient personally  Allergies:    Allergies   Allergen Reactions   • Fentanyl Rash   • Niaspan [Niacin Er] Rash   • Prednisone Rash       Social History:  Marital Status: /Civil Union   Occupation: N/A  Patient Pre-hospital Living Situation: Home  Patient Pre-hospital Level of Mobility: irene  Patient Pre-hospital Diet Restrictions: Diabetic   Substance Use History:   Social History     Substance and Sexual Activity   Alcohol Use Yes    Comment: socially     Social History     Tobacco Use   Smoking Status Never   Smokeless Tobacco Never     Social History     Substance and Sexual Activity   Drug Use No       Family History:  Family History   Problem Relation Age of Onset   • COPD Mother    • Heart disease Father    • Heart disease Paternal Grandmother    • Cancer Family    • Colon cancer Neg Hx        Physical Exam:     Vitals:   Blood Pressure: 158/70 (04/04/23 0145)  Pulse: 74 (04/04/23 0145)  Temperature: 98 7 °F (37 1 °C) (04/04/23 0140)  Temp Source: Oral (04/04/23 0140)  Respirations: 22 (04/04/23 0145)  SpO2: 95 % (04/04/23 0145)    Physical Exam  Vitals and nursing note reviewed  Constitutional:       General: She is not in acute distress  Appearance: Normal appearance  HENT:      Head: Normocephalic and atraumatic  Right Ear: External ear normal       Left Ear: External ear normal       Nose: Nose normal       Mouth/Throat:      Mouth: Mucous membranes are moist    Eyes:      Pupils: Pupils are equal, round, and reactive to light  Cardiovascular:      Rate and Rhythm: Normal rate and regular rhythm  Pulses: Normal pulses  Heart sounds: Normal heart sounds  No murmur heard  Pulmonary:      Effort: Pulmonary effort is normal  No respiratory distress  Breath sounds: Normal breath sounds  No wheezing or rales  Chest:      Chest wall: No tenderness  Abdominal:      General: Bowel sounds are normal  There is no distension  Palpations: Abdomen is soft  There is no mass  Tenderness: There is no abdominal tenderness  There is no guarding  Musculoskeletal:         General: No swelling or tenderness  Cervical back: Normal range of motion and neck supple  No rigidity or tenderness  Right lower leg: No edema  Left lower leg: No edema  Skin:     General: Skin is warm and dry  Capillary Refill: Capillary refill takes less than 2 seconds  Findings: No lesion or rash  Neurological:      General: No focal deficit present  Mental Status: She is alert  Psychiatric:         Mood and Affect: Mood normal           Additional Data:     Lab Results:  Results from last 7 days   Lab Units 04/04/23  0158   WBC Thousand/uL 9 50   HEMOGLOBIN g/dL 14 1   HEMATOCRIT % 42 3   PLATELETS Thousands/uL 242   NEUTROS PCT % 52   LYMPHS PCT % 38   MONOS PCT % 7   EOS PCT % 2     Results from last 7 days   Lab Units 04/04/23  0158   SODIUM mmol/L 136   POTASSIUM mmol/L 3 8   CHLORIDE mmol/L 101   CO2 mmol/L 25   BUN mg/dL 17   CREATININE mg/dL 0 60   ANION GAP mmol/L 10   CALCIUM mg/dL 10 0   ALBUMIN g/dL 4 6   TOTAL BILIRUBIN mg/dL 0 46   ALK PHOS U/L 92   ALT U/L 26   AST U/L 24   GLUCOSE RANDOM mg/dL 143*                       Lines/Drains:  Invasive Devices     Peripheral Intravenous Line  Duration           Peripheral IV 04/04/23 Left Antecubital <1 day                    Imaging: Reviewed radiology reports from this admission including: chest xray  X-ray chest 2 views   ED Interpretation by Cole Cash MD (04/04 8985)   No acute findings  EKG and Other Studies Reviewed on Admission:   · EKG: EKG reviewed   ** Please Note: This note has been constructed using a voice recognition system   **

## 2023-04-04 NOTE — ASSESSMENT & PLAN NOTE
Patient reports being woken this AM by substernal chest pain radiating into her back  There was initially some associated diaphoresis  Pain persists at this time  Denies other symptom/complaint at this time       · ECHO showing EF 56%, grade 1 diastolic dysfunction  · Plan for stress testing today  · No longer with chest pain  · Cardiology following, see recommendations

## 2023-04-04 NOTE — ASSESSMENT & PLAN NOTE
Lab Results   Component Value Date    HGBA1C 6 9 (H) 01/27/2023     · Continue home glargine 20u qHS   · Hold home oral antihyperglycemics   · Correctional SSI  · Hypoglycemia protocol  · Diabetic diet

## 2023-04-04 NOTE — ASSESSMENT & PLAN NOTE
Patient reports being woken this AM by substernal chest pain radiating into her back  There was initially some associated diaphoresis  Pain persists at this time  Denies other symptom/complaint at this time       /70   Pulse 74   Temp 98 7 °F (37 1 °C) (Oral)   Resp 22   LMP  (LMP Unknown)   SpO2 95%     · Woken this AM by substernal chest pain radiating into back  · Had associated diaphoresis  · Pain continues at this time   · [place] 5  · Trop 15; 2H delta -2  · EKG with new lateral lead ST/T abnormalities  · CXR wet read without acute abnormality   · Tele monitoring  · Trend remainder of trop/EKG  · Cardiology consulted; recommendations appreciated

## 2023-04-04 NOTE — ED PROVIDER NOTES
"History  Chief Complaint   Patient presents with   • Chest Pain     New onset mid sternal chest pressure starting at 2130 today, not improving per patient  Hx of aortic valve replacement this past October and right bundle block  Patient reports shortness of breath, denies any other symptoms  70 y o  female presents with a chief complaint of \"a lot of pressure in my chest \"    Patient affirms 3 hours of substernal chest pain with radiation to the back  Patient describes pressure that came on while resting and continues in the ER  Patient states nothing worsens the pain and nothing improves the pain  Patient denies pleuritic component to chest pain  Patient denies exertional component to the chest pain  Patient denies any episodes similar to this previously  Patient denies fever/chills  Patient affirms diaphoresis  Patient denies cough  Patient denies hemoptysis  Patient denies vomiting  Patient denies leg pain or swelling  The patient affirms a history of atherosclerotic disease (CAD/TIA/CVA/PAD)  Patient denies any use of tobacco in the past 90 days  Patient denies any use of illicit drugs, including cocaine  Patient denies any immobilization of at least 3 days or surgery in the past 4 weeks  Patient denies any history of DVT or PE  Patient denies any history or family history of thrombophilia  Patient denies any malignancy with treatment within the past 6 months  Patient denies any use of exogenous estrogen containing compounds  Focused Objective  CV:  Normal inspection with no rash, signs of infection, or trauma  Regular rate and rhythm  Peripheral pulses intact and equal   Nontender to palpitation over area of patient's reported pain  Respiratory:  Lungs clear to auscultation bilaterally without adventitious sounds  Skin:  Warm and dry  No rash or signs of herpes zoster over area of patient's reported pain    Extremities:  Non-tender lower extremities without " asymmetry; no clinical signs of DVT  No lower extremity edema  Medical Decision Making    Patient presenting with chest pain with a broad differential, including multiple emergent etiologies  Patient has a history of CAD and TAVR which increases the evaluation of the complexity of their presenting complaint  External review of the medical record including prior cardiology notes  EKG obtained and reviewed independently by myself, which was interpreted by myself as normal sinus rhythm with nonspecific findings, no acute ST segment changes  There are new inverted T waves in the lateral leads compared to previously  Patient placed on cardiac monitoring  Regarding the possibility for ACS, patient's risk stratification  HEART score:    History 2=Highly suspicious  ECG 1=Nonspecific repolarization disturbance  Age 2= > 65 years  Risk Factors 2= > 3 risk factors, or history of atherosclerotic disease  Troponin 1= Between 13-35 ng/L  Total 8       Score 0-3: 1 7% had a MACE risk    0 4% (1 patient)     36 4% of patients were in this low risk group    Score 4-6: 16 6% had a MACE risk    Score 7-10: 50 1% had a MACE risk       The patient's HEART score is 8  CXR will be obtained to evaluate for alternative pathologies, including pneumothorax or pneumonia  Laboratory analysis including troponin to evaluate for ACS, CBC to evaluate for anemia or leukocytosis, and BMP to evaluate renal function and electrolytes considering possibility of cardiac involvement  As the patient's HEART score indicates a high risk of ACS, I have recommended observation for the patient to continue to trend their troponin and further evaluate for the potential for ACS  After extensive discussion of the benefits of admission, the patient adamantly does not want to stay for observation  The patient appears competent and capable of making their medical decisions    The patient has agreed to stay for repeated delta troponin  Prior to Admission Medications   Prescriptions Last Dose Informant Patient Reported? Taking? Cholecalciferol (VITAMIN D3) 1000 UNITS CAPS 4/4/2023  Yes Yes   Sig: Take 2,000 Units by mouth daily   Cyanocobalamin (VITAMIN B12 PO) 4/4/2023  Yes Yes   Sig: Take 2,500 mcg by mouth daily   Empagliflozin-metFORMIN HCl ER (Synjardy XR)  MG TB24 4/3/2023  Yes Yes   Sig: Take 1 capsule by mouth daily  Indications: Type 2 Diabetes   Icosapent Ethyl (VASCEPA PO) 4/3/2023  Yes Yes   Sig: Take 2 capsules by mouth daily   Insulin Glargine (TOUJEO) 300 units/mL CONCETRATED U-300 injection pen 4/3/2023  Yes Yes   Sig: Inject 20 Units under the skin daily at bedtime   aspirin 81 mg chewable tablet 4/4/2023  Yes Yes   Sig: Chew 81 mg daily   carvedilol (COREG) 6 25 mg tablet 4/4/2023  No Yes   Sig: TAKE ONE TABLET BY MOUTH TWICE A DAY WITH MEALS   clopidogrel (PLAVIX) 75 mg tablet Not Taking  No No   Sig: Take 1 tablet (75 mg total) by mouth daily Do not start before October 14, 2022     Patient not taking: Reported on 4/4/2023   co-enzyme Q-10 30 mg 4/4/2023  Yes Yes   Sig: Take 30 mg by mouth daily   glimepiride (AMARYL) 2 mg tablet 4/3/2023  Yes Yes   Sig: Take 4 mg by mouth 2 (two) times a day    irbesartan (AVAPRO) 150 mg tablet Not Taking  Yes No   Patient not taking: Reported on 4/4/2023   lisinopril (ZESTRIL) 5 mg tablet 4/4/2023  No Yes   Sig: Take 1 tablet (5 mg total) by mouth daily   multivitamin (THERAGRAN) TABS 4/4/2023  Yes Yes   Sig: Take 1 tablet by mouth daily   pravastatin (PRAVACHOL) 20 mg tablet 4/3/2023  No Yes   Sig: Take 1 tablet (20 mg total) by mouth daily   Patient taking differently: Take 20 mg by mouth Patient takes every MWF   pyridoxine (VITAMIN B6) 100 mg tablet 4/4/2023  Yes Yes   Sig: Take 100 mg by mouth daily      Facility-Administered Medications: None       Past Medical History:   Diagnosis Date   • Adhesive capsulitis of right shoulder    • CAD (coronary artery disease)    • CHF (congestive heart failure) (HCC)    • Diabetes mellitus (HCC)     type 2, insulin dependent   • Diverticulosis    • History of breast cancer     s/p right lumpectomy, s/p radiation   • Hyperlipidemia    • Hypertension    • Lymphedema of right upper extremity    • Obesity (BMI 30-39  9)    • SJ on CPAP    • Severe aortic stenosis    • Vitamin D deficiency        Past Surgical History:   Procedure Laterality Date   • APPENDECTOMY     • BREAST LUMPECTOMY Right    • CARDIAC CATHETERIZATION N/A 08/19/2022    Procedure: Cardiac RHC/LHC; Surgeon: Inna Mcdermott DO;  Location: [facility]; Service: Cardiology   • CARDIAC CATHETERIZATION N/A 10/11/2022    Procedure: CARDIAC TAVR;  Surgeon: Inna Mcdermott DO;  Location: [facility];  Service: Cardiology   • CHOLECYSTECTOMY     • KNEE ARTHROSCOPY Right     knee   • SD REPLACE AORTIC VALVE OPENFEMORAL ARTERY APPROACH N/A 10/11/2022    Procedure: REPLACEMENT AORTIC VALVE TRANSCATHETER (TAVR) TRANSFEMORAL W/ 23MM MILIAN ROSS S3 ULTRA VALVE(ACCESS ON LEFT) TRAY;  Surgeon: Ananda Mcclain MD;  Location: [facility];  Service: Cardiac Surgery   • TONSILLECTOMY AND ADENOIDECTOMY     • TOTAL ABDOMINAL HYSTERECTOMY         Family History   Problem Relation Age of Onset   • COPD Mother    • Heart disease Father    • Heart disease Paternal Grandmother    • Cancer Family    • Colon cancer Neg Hx      I have reviewed and agree with the history as documented      E-Cigarette/Vaping   • E-Cigarette Use Never User      E-Cigarette/Vaping Substances   • Nicotine No    • THC No    • CBD No    • Flavoring No      Social History     Tobacco Use   • Smoking status: Never   • Smokeless tobacco: Never   Vaping Use   • Vaping Use: Never used   Substance Use Topics   • Alcohol use: Yes     Comment: socially   • Drug use: No       Review of Systems    Physical Exam  Physical Exam    Vital Signs  ED Triage Vitals   Temperature Pulse Respirations Blood Pressure SpO2   04/04/23 0140 04/04/23 0140 04/04/23 0140 04/04/23 0140 04/04/23 0140   98 7 °F (37 1 °C) 74 19 158/70 98 %      Temp Source Heart Rate Source Patient Position - Orthostatic VS BP Location FiO2 (%)   04/04/23 0140 04/04/23 0140 04/04/23 0140 04/04/23 0140 --   Oral Monitor Sitting Left arm       Pain Score       04/04/23 1109       8           Vitals:    04/04/23 2300 04/05/23 0300 04/05/23 0700 04/05/23 1525   BP: 125/60 120/56 127/63 122/56   Pulse:  73 70 70   Patient Position - Orthostatic VS:             Visual Acuity      ED Medications  Medications   ketorolac (TORADOL) injection 15 mg (15 mg Intravenous Given 4/4/23 0504)   morphine injection 2 mg (2 mg Intravenous Given 4/4/23 0601)   iohexol (OMNIPAQUE) 350 MG/ML injection (SINGLE-DOSE) 100 mL (100 mL Intravenous Given 4/4/23 1033)       Diagnostic Studies  Results Reviewed     Procedure Component Value Units Date/Time    Basic metabolic panel [[id number]]  (Abnormal) Collected: 04/05/23 0433    Lab Status: Final result Specimen: Blood from Hand, Left Updated: 04/05/23 0506     Sodium 138 mmol/L      Potassium 3 8 mmol/L      Chloride 104 mmol/L      CO2 26 mmol/L      ANION GAP 8 mmol/L      BUN 16 mg/dL      Creatinine 0 48 mg/dL      Glucose 146 mg/dL      Calcium 9 2 mg/dL      eGFR 103 ml/min/1 73sq m     Narrative:      [facility] guidelines for Chronic Kidney Disease (CKD):   •  Stage 1 with normal or high GFR (GFR > 90 mL/min/1 73 square meters)  •  Stage 2 Mild CKD (GFR = 60-89 mL/min/1 73 square meters)  •  Stage 3A Moderate CKD (GFR = 45-59 mL/min/1 73 square meters)  •  Stage 3B Moderate CKD (GFR = 30-44 mL/min/1 73 square meters)  •  Stage 4 Severe CKD (GFR = 15-29 mL/min/1 73 square meters)  •  Stage 5 End Stage CKD (GFR <15 mL/min/1 73 square meters)  Note: GFR calculation is accurate only with a steady state creatinine    Magnesium [[id number]]  (Normal) Collected: 04/05/23 0433    Lab Status: Final result Specimen: Blood from Hand, Left Updated: 04/05/23 0506     Magnesium 2 2 mg/dL     CBC and differential [[id number]] Collected: 04/05/23 0433    Lab Status: Final result Specimen: Blood from Hand, Left Updated: 04/05/23 0445     WBC 6 19 Thousand/uL      RBC 4 62 Million/uL      Hemoglobin 13 3 g/dL      Hematocrit 40 5 %      MCV 88 fL      MCH 28 8 pg      MCHC 32 8 g/dL      RDW 13 8 %      MPV 10 4 fL      Platelets 409 Thousands/uL      nRBC 0 /100 WBCs      Neutrophils Relative 53 %      Immat GRANS % 0 %      Lymphocytes Relative 34 %      Monocytes Relative 10 %      Eosinophils Relative 2 %      Basophils Relative 1 %      Neutrophils Absolute 3 22 Thousands/µL      Immature Grans Absolute 0 02 Thousand/uL      Lymphocytes Absolute 2 13 Thousands/µL      Monocytes Absolute 0 61 Thousand/µL      Eosinophils Absolute 0 15 Thousand/µL      Basophils Absolute 0 06 Thousands/µL     Fingerstick Glucose (POCT) [[id number]]  (Normal) Collected: 04/04/23 1715    Lab Status: Final result Updated: 04/04/23 1716     POC Glucose 126 mg/dl     Fingerstick Glucose (POCT) [[id number]]  (Normal) Collected: 04/04/23 1542    Lab Status: Final result Updated: 04/04/23 1543     POC Glucose 137 mg/dl     HS Troponin I 4hr [[id number]]  (Normal) Collected: 04/04/23 0639    Lab Status: Final result Specimen: Blood from Arm, Left Updated: 04/04/23 0738     hs TnI 4hr 12 ng/L      Delta 4hr hsTnI -3 ng/L     HS Troponin I 2hr [[id number]]  (Normal) Collected: 04/04/23 0443    Lab Status: Final result Specimen: Blood from Arm, Left Updated: 04/04/23 0522     hs TnI 2hr 13 ng/L      Delta 2hr hsTnI -2 ng/L     HS Troponin 0hr (reflex protocol) [[id number]]  (Normal) Collected: 04/04/23 0158    Lab Status: Final result Specimen: Blood from Arm, Left Updated: 04/04/23 0238     hs TnI 0hr 15 ng/L     Comprehensive metabolic panel [[id number]]  (Abnormal) Collected: 04/04/23 0158    Lab Status: Final result Specimen: Blood from Arm, Left Updated: 04/04/23 0231     Sodium 136 mmol/L      Potassium 3 8 mmol/L      Chloride 101 mmol/L      CO2 25 mmol/L      ANION GAP 10 mmol/L      BUN 17 mg/dL      Creatinine 0 60 mg/dL      Glucose 143 mg/dL      Calcium 10 0 mg/dL      AST 24 U/L      ALT 26 U/L      Alkaline Phosphatase 92 U/L      Total Protein 8 3 g/dL      Albumin 4 6 g/dL      Total Bilirubin 0 46 mg/dL      eGFR 95 ml/min/1 73sq m     Narrative:      [employer] guidelines for Chronic Kidney Disease (CKD):   •  Stage 1 with normal or high GFR (GFR > 90 mL/min/1 73 square meters)  •  Stage 2 Mild CKD (GFR = 60-89 mL/min/1 73 square meters)  •  Stage 3A Moderate CKD (GFR = 45-59 mL/min/1 73 square meters)  •  Stage 3B Moderate CKD (GFR = 30-44 mL/min/1 73 square meters)  •  Stage 4 Severe CKD (GFR = 15-29 mL/min/1 73 square meters)  •  Stage 5 End Stage CKD (GFR <15 mL/min/1 73 square meters)  Note: GFR calculation is accurate only with a steady state creatinine    CBC and differential [[id number]] Collected: 04/04/23 0158    Lab Status: Final result Specimen: Blood from Arm, Left Updated: 04/04/23 0204     WBC 9 50 Thousand/uL      RBC 4 83 Million/uL      Hemoglobin 14 1 g/dL      Hematocrit 42 3 %      MCV 88 fL      MCH 29 2 pg      MCHC 33 3 g/dL      RDW 13 8 %      MPV 10 5 fL      Platelets 164 Thousands/uL      nRBC 0 /100 WBCs      Neutrophils Relative 52 %      Immat GRANS % 0 %      Lymphocytes Relative 38 %      Monocytes Relative 7 %      Eosinophils Relative 2 %      Basophils Relative 1 %      Neutrophils Absolute 4 94 Thousands/µL      Immature Grans Absolute 0 03 Thousand/uL      Lymphocytes Absolute 3 57 Thousands/µL      Monocytes Absolute 0 67 Thousand/µL      Eosinophils Absolute 0 22 Thousand/µL      Basophils Absolute 0 07 Thousands/µL                  CTA dissection protocol chest/abdomen/pelvis   Final Result by Figueroa Banerjee MD (04/04 6655)         1    No acute abnormality identified in the chest, abdomen, or pelvis  In particular, no evidence for aortic dissection, aneurysm, or intramural hematoma  2   Postoperative change reflecting TAVR as described above  3   Nonemergent findings as indicated  Workstation performed: [id number]         X-ray chest 2 views   ED Interpretation by Damion Donnelly MD (04/04 8005)   No acute findings  Final Result by Emiliano Darby MD (04/04 1028)      No acute cardiopulmonary disease  This report is in agreement with the preliminary interpretation  Workstation performed: [id number]                    Procedures  Procedures         ED Course  ED Course as of 04/05/23 2112   Tue Apr 04, 2023   0308 hs TnI 0hr: 15   0505 EKG demonstrates normal sinus rhythm with no acute ST segment changes  This appears similar to the initial EKG  0541 Repeat EKG with persistent findings  Patient with persistent pain despite treatment with ketorolac  Considering this and considering patient's high risk heart score, will admit patient for continued monitoring and evaluation  SBIRT 22yo+    Flowsheet Row Most Recent Value   SBIRT (25 yo +)    In order to provide better care to our patients, we are screening all of our patients for alcohol and drug use  Would it be okay to ask you these screening questions?  No Filed at: 04/04/2023 0150        [place] Risk Score    Flowsheet Row Most Recent Value   Age >= 72 1 Filed at: 04/04/2023 0515   Known CAD (stenosis >= 50%) 1 Filed at: 04/04/2023 0515   Recent (<=24 hrs) Service Angina 1 Filed at: 04/04/2023 0515   ST Deviation >= 0 5 mm 0 Filed at: 04/04/2023 0515   3+ CAD Risk Factors (FHx, HTN, HLP, DM, Smoker) 1 Filed at: 04/04/2023 0515   Aspirin Use Past 7 Days 1 Filed at: 04/04/2023 0515   Elevated Cardiac Markers 0 Filed at: 04/04/2023 0515   [place] Risk Score (Calculated) 5 Filed at: 04/04/2023 7216                  [facility]    Disposition  Final diagnoses:   Chest pain   History of CAD (coronary artery disease)     Time reflects when diagnosis was documented in both MDM as applicable and the Disposition within this note     Time User Action Codes Description Comment    4/4/2023  5:26 AM Trenton Michel Add [R07 9] Chest pain     4/4/2023  5:26 AM Trenton Michel Add [Z86 79] History of CAD (coronary artery disease)     4/5/2023  4:13 PM Cammie Sanches Add [I35 [id number]] Aortic stenosis, moderate       ED Disposition     ED Disposition   Admit    Condition   Stable    Date/Time   Tue Apr 4, 2023  5:26 AM    Comment   Case was discussed with [facility] and the patient's admission status was agreed to be Admission Status: observation status to the service of Dr Naif Napoles    None         Discharge Medication List as of 4/5/2023  4:55 PM      CONTINUE these medications which have NOT CHANGED    Details   aspirin 81 mg chewable tablet Chew 81 mg daily, Historical Med      carvedilol (COREG) 6 25 mg tablet TAKE ONE TABLET BY MOUTH TWICE A DAY WITH MEALS, Normal      Cholecalciferol (VITAMIN D3) 1000 UNITS CAPS Take 2,000 Units by mouth daily, Historical Med      co-enzyme Q-10 30 mg Take 30 mg by mouth daily, Historical Med      Cyanocobalamin (VITAMIN B12 PO) Take 2,500 mcg by mouth daily, Historical Med      Empagliflozin-metFORMIN HCl ER (Synjardy XR)  MG TB24 Take 1 capsule by mouth daily   Indications: Type 2 Diabetes, Historical Med      glimepiride (AMARYL) 2 mg tablet Take 4 mg by mouth 2 (two) times a day , Historical Med      Icosapent Ethyl (VASCEPA PO) Take 2 capsules by mouth daily, Historical Med      Insulin Glargine (TOUJEO) 300 units/mL CONCETRATED U-300 injection pen Inject 20 Units under the skin daily at bedtime, Historical Med      lisinopril (ZESTRIL) 5 mg tablet Take 1 tablet (5 mg total) by mouth daily, Starting Fri 1/27/2023, Normal      multivitamin (THERAGRAN) TABS Take 1 tablet by mouth daily, Historical Med      pravastatin (PRAVACHOL) 20 mg tablet Take 1 tablet (20 mg total) by mouth daily, Starting Wed 11/16/2022, Normal      pyridoxine (VITAMIN B6) 100 mg tablet Take 100 mg by mouth daily, Historical Med      clopidogrel (PLAVIX) 75 mg tablet Take 1 tablet (75 mg total) by mouth daily Do not start before October 14, 2022 , Starting Fri 10/14/2022, Until u 1/12/2023, Normal      irbesartan (AVAPRO) 150 mg tablet Starting Tue 11/8/2022, Historical Med             No discharge procedures on file      PDMP Review       Value Time User    PDMP Reviewed  Yes 7/21/2021  1:37 AM Boaz Mojica MD          ED Provider  Electronically Signed by           Boaz Mojica MD  04/05/23 2133

## 2023-04-05 ENCOUNTER — APPOINTMENT (INPATIENT)
Dept: NON INVASIVE DIAGNOSTICS | Facility: HOSPITAL | Age: 63
End: 2023-04-05

## 2023-04-05 VITALS
BODY MASS INDEX: 34.78 KG/M2 | RESPIRATION RATE: 20 BRPM | HEART RATE: 70 BPM | SYSTOLIC BLOOD PRESSURE: 122 MMHG | HEIGHT: 62 IN | TEMPERATURE: 97.8 F | WEIGHT: 189 LBS | DIASTOLIC BLOOD PRESSURE: 56 MMHG | OXYGEN SATURATION: 93 %

## 2023-04-05 LAB
ANION GAP SERPL CALCULATED.3IONS-SCNC: 8 MMOL/L (ref 4–13)
BASOPHILS # BLD AUTO: 0.06 THOUSANDS/ÂΜL (ref 0–0.1)
BASOPHILS NFR BLD AUTO: 1 % (ref 0–1)
BUN SERPL-MCNC: 16 MG/DL (ref 5–25)
CALCIUM SERPL-MCNC: 9.2 MG/DL (ref 8.4–10.2)
CHLORIDE SERPL-SCNC: 104 MMOL/L (ref 96–108)
CO2 SERPL-SCNC: 26 MMOL/L (ref 21–32)
CREAT SERPL-MCNC: 0.48 MG/DL (ref 0.6–1.3)
EOSINOPHIL # BLD AUTO: 0.15 THOUSAND/ÂΜL (ref 0–0.61)
EOSINOPHIL NFR BLD AUTO: 2 % (ref 0–6)
ERYTHROCYTE [DISTWIDTH] IN BLOOD BY AUTOMATED COUNT: 13.8 % (ref 11.6–15.1)
GFR SERPL CREATININE-BSD FRML MDRD: 103 ML/MIN/1.73SQ M
GLUCOSE SERPL-MCNC: 122 MG/DL (ref 65–140)
GLUCOSE SERPL-MCNC: 135 MG/DL (ref 65–140)
GLUCOSE SERPL-MCNC: 146 MG/DL (ref 65–140)
GLUCOSE SERPL-MCNC: 154 MG/DL (ref 65–140)
HCT VFR BLD AUTO: 40.5 % (ref 34.8–46.1)
HGB BLD-MCNC: 13.3 G/DL (ref 11.5–15.4)
IMM GRANULOCYTES # BLD AUTO: 0.02 THOUSAND/UL (ref 0–0.2)
IMM GRANULOCYTES NFR BLD AUTO: 0 % (ref 0–2)
LYMPHOCYTES # BLD AUTO: 2.13 THOUSANDS/ÂΜL (ref 0.6–4.47)
LYMPHOCYTES NFR BLD AUTO: 34 % (ref 14–44)
MAGNESIUM SERPL-MCNC: 2.2 MG/DL (ref 1.9–2.7)
MAX HR PERCENT: 88 %
MAX HR: 137 BPM
MCH RBC QN AUTO: 28.8 PG (ref 26.8–34.3)
MCHC RBC AUTO-ENTMCNC: 32.8 G/DL (ref 31.4–37.4)
MCV RBC AUTO: 88 FL (ref 82–98)
MONOCYTES # BLD AUTO: 0.61 THOUSAND/ÂΜL (ref 0.17–1.22)
MONOCYTES NFR BLD AUTO: 10 % (ref 4–12)
NEUTROPHILS # BLD AUTO: 3.22 THOUSANDS/ÂΜL (ref 1.85–7.62)
NEUTS SEG NFR BLD AUTO: 53 % (ref 43–75)
NRBC BLD AUTO-RTO: 0 /100 WBCS
PLATELET # BLD AUTO: 183 THOUSANDS/UL (ref 149–390)
PMV BLD AUTO: 10.4 FL (ref 8.9–12.7)
POTASSIUM SERPL-SCNC: 3.8 MMOL/L (ref 3.5–5.3)
RATE PRESSURE PRODUCT: NORMAL
RBC # BLD AUTO: 4.62 MILLION/UL (ref 3.81–5.12)
SL CV STRESS RECOVERY BP: NORMAL MMHG
SL CV STRESS RECOVERY HR: 82 BPM
SL CV STRESS STAGE REACHED: 2
SODIUM SERPL-SCNC: 138 MMOL/L (ref 135–147)
STRESS BASELINE BP: NORMAL MMHG
STRESS BASELINE HR: 71 BPM
STRESS O2 SAT REST: 95 %
STRESS PEAK HR: 137 BPM
STRESS POST ESTIMATED WORKLOAD: 7 METS
STRESS POST EXERCISE DUR MIN: 4 MIN
STRESS POST EXERCISE DUR SEC: 30 SEC
STRESS POST O2 SAT PEAK: 97 %
STRESS POST PEAK BP: 170 MMHG
WBC # BLD AUTO: 6.19 THOUSAND/UL (ref 4.31–10.16)

## 2023-04-05 RX ADMIN — LISINOPRIL 5 MG: 5 TABLET ORAL at 09:26

## 2023-04-05 RX ADMIN — Medication 30 MG: at 09:26

## 2023-04-05 RX ADMIN — CARVEDILOL 6.25 MG: 12.5 TABLET, FILM COATED ORAL at 07:53

## 2023-04-05 RX ADMIN — HEPARIN SODIUM 5000 UNITS: 5000 INJECTION INTRAVENOUS; SUBCUTANEOUS at 05:49

## 2023-04-05 RX ADMIN — PYRIDOXINE HCL TAB 50 MG 100 MG: 50 TAB at 09:26

## 2023-04-05 NOTE — UTILIZATION REVIEW
Initial Clinical Review    Admission: Date/Time/Statement:   Admission Orders (From admission, onward)     Ordered        04/04/23 1547  Inpatient Admission  Once            04/04/23 0527  Place in Observation  Once                       Inpatient Admission     Standing Status:   Standing     Number of Occurrences:   1     Order Specific Question:   Level of Care     Answer:   Med Surg [16]     Order Specific Question:   Estimated length of stay     Answer:   More than 2 Midnights     Order Specific Question:   Certification     Answer:   I certify that inpatient services are medically necessary for this patient for a duration of greater than two midnights  See H&P and MD Progress Notes for additional information about the patient's course of treatment  ED Arrival Information     Expected   -    Arrival   4/4/2023 01:30    Acuity   Emergent            Means of arrival   Wheelchair    Escorted by   Spouse    Service   Hospitalist    Admission type   Emergency            Arrival complaint   Chest Discomfort           Chief Complaint   Patient presents with   • Chest Pain     New onset mid sternal chest pressure starting at 2130 today, not improving per patient  Hx of aortic valve replacement this past October and right bundle block  Patient reports shortness of breath, denies any other symptoms  Initial Presentation: 70 y o  female to ED from home w/ CP radiates to back   Assoc diaphoresis   PMHX CAD, CHF, DM, s/p TAVR, SJ, HTN, and HLP   EKG w/ new lateral lead ST/T abnormalities   CXR wnl   Admitted IP status w/ CP plan for tele , cardiology consult , trend trop and EKG   CAD statin, BB   DM SSI and monitor   HLD statin   HTN cont home meds  4/4 Cardiology consult   CP , chronic HF   Evaluate  W/ CTA and TTE   If unrevealing will evaluate w/ stress echo   Cont asa, statin , coreg and lisinopril   Date: 4/5  Day 2: plan for stress test today   No longer w/ CP   Cardiology following            ED Triage Vitals   Temperature Pulse Respirations Blood Pressure SpO2   04/04/23 0140 04/04/23 0140 04/04/23 0140 04/04/23 0140 04/04/23 0140   98 7 °F (37 1 °C) 74 19 158/70 98 %      Temp Source Heart Rate Source Patient Position - Orthostatic VS BP Location FiO2 (%)   04/04/23 0140 04/04/23 0140 04/04/23 0140 04/04/23 0140 --   Oral Monitor Sitting Left arm       Pain Score       04/04/23 1109       8          Wt Readings from Last 1 Encounters:   04/04/23 85 7 kg (189 lb)     Additional Vital Signs:   04/05/23 0900 -- -- -- -- -- -- None (Room air) -- --   04/05/23 07:00:03 97 7 °F (36 5 °C) 70 18 127/63 84 93 % None (Room air) -- --   04/05/23 0346 -- -- -- -- -- 99 % -- Nasal mask --   04/05/23 0300 97 7 °F (36 5 °C) 73 -- 120/56 77 -- -- -- --   04/04/23 23:00:24 97 6 °F (36 4 °C) -- 16 125/60 82 -- -- -- --   04/04/23 2148 -- -- -- -- -- 98 % -- Nasal mask --   04/04/23 1900 97 6 °F (36 4 °C) 70 18 122/64 -- 93 % None (Room air) -- Lying   04/04/23 18:31:10 -- -- -- 132/65 87 -- -- -- --   04/04/23 1545 -- 71 17 115/66 84 94 % None (Room air) -- Sitting   04/04/23 1430 -- 70 -- 144/69 -- -- -- -- --   04/04/23 0845 -- 75 20 144/69 -- 95 % None (Room air) -- Sitting   04/04/23 0145 -- 74 22 158/70 101 95 % --         Pertinent Labs/Diagnostic Test Results:   4/5 Stress test   Resting ECG: The ECG shows normal sinus rhythm  ECG demonstrates left bundle branch block  •  Stress ECG: The patient reached stage 2 0 of the protocol after exercising for 4 min and 30 sec and had a maximal HR of 137 bpm (88 % of MPHR) and 7 0 METS  •  Stress ECG: The ECG was uninterpretable due to left bundle branch block  •  Left Ventricle: Left ventricular cavity size is normal  Systolic function is normal  Wall motion is normal   •  IVS: There is abnormal septal motion consistent with left bundle branch block  •  Peak Stress Echo: Left ventricle cavity has normal reduction in size at peak stress   The left ventricle systolic function is normal at peak stress  The peak stress echo showed normal wall motion      The stress ECG was nondiagnostic due to left bundle branch block  There were no inducible wall motion abnormalities on stress echocardiography  Clinical correlation advised    4/4 Echo •  Left Ventricle: Left ventricular cavity size is normal  Wall thickness is moderately increased  The left ventricular ejection fraction is 70%  Systolic function is vigorous  Wall motion is normal  Diastolic function is mildly abnormal, consistent with grade I (abnormal) relaxation  •  Left Atrium: The atrium is mildly dilated  •  Aortic Valve: There is an Farmer ROSS 3 23 mm TAVR bioprosthetic valve  The prosthetic valve appears well-seated and appears to be functioning normally  There is trace paravalvular regurgitation  There is no evidence of transvalvular regurgitation  The aortic valve peak velocity is 1 88 m/s  The aortic valve mean gradient is 9 mmHg  •  Mitral Valve: There is moderate calcification  There is annular calcification  There is mild regurgitation  4/5 EKG Normal sinus rhythm  Cannot rule out Anterior infarct   CTA dissection protocol chest/abdomen/pelvis   Final Result by Naila Dhillon MD (04/04 1115)         1  No acute abnormality identified in the chest, abdomen, or pelvis  In particular, no evidence for aortic dissection, aneurysm, or intramural hematoma  2   Postoperative change reflecting TAVR as described above  3   Nonemergent findings as indicated  Workstation performed: [id number]         X-ray chest 2 views   ED Interpretation by Benedict Schwab, MD (04/04 5965)   No acute findings  Final Result by Makenzie Hammond MD (04/04 1028)      No acute cardiopulmonary disease  This report is in agreement with the preliminary interpretation                 Workstation performed: [id number]               Results from last 7 days   Lab Units 04/05/23  0433 04/04/23  0158   WBC Thousand/uL 6 19 9 50   HEMOGLOBIN g/dL 13 3 14 1   HEMATOCRIT % 40 5 42 3   PLATELETS Thousands/uL 183 242   NEUTROS ABS Thousands/µL 3 22 4 94         Results from last 7 days   Lab Units 04/05/23  0433 04/04/23  0158   SODIUM mmol/L 138 136   POTASSIUM mmol/L 3 8 3 8   CHLORIDE mmol/L 104 101   CO2 mmol/L 26 25   ANION GAP mmol/L 8 10   BUN mg/dL 16 17   CREATININE mg/dL 0 48* 0 60   EGFR ml/min/1 73sq m 103 95   CALCIUM mg/dL 9 2 10 0   MAGNESIUM mg/dL 2 2  --      Results from last 7 days   Lab Units 04/04/23  0158   AST U/L 24   ALT U/L 26   ALK PHOS U/L 92   TOTAL PROTEIN g/dL 8 3   ALBUMIN g/dL 4 6   TOTAL BILIRUBIN mg/dL 0 46     Results from last 7 days   Lab Units 04/05/23  0635 04/04/23  2056 04/04/23  1715 04/04/23  1542   POC GLUCOSE mg/dl 135 201* 126 137     Results from last 7 days   Lab Units 04/05/23  0433 04/04/23  0158   GLUCOSE RANDOM mg/dL 146* 143*       Results from last 7 days   Lab Units 04/04/23  0639 04/04/23  0443 04/04/23  0158   HS TNI 0HR ng/L  --   --  15   HS TNI 2HR ng/L  --  13  --    HSTNI D2 ng/L  --  -2  --    HS TNI 4HR ng/L 12  --   --    HSTNI D4 ng/L -3  --   --        ED Treatment:   Medication Administration from 04/04/2023 0130 to 04/04/2023 1825       Date/Time Order Dose Route Action     04/04/2023 0504 EDT ketorolac (TORADOL) injection 15 mg 15 mg Intravenous Given     04/04/2023 0601 EDT morphine injection 2 mg 2 mg Intravenous Given     04/04/2023 1109 EDT morphine injection 2 mg 2 mg Intravenous Given     04/04/2023 1535 EDT carvedilol (COREG) tablet 6 25 mg 6 25 mg Oral Given     04/04/2023 0842 EDT carvedilol (COREG) tablet 6 25 mg 6 25 mg Oral Given     04/04/2023 0842 EDT co-enzyme Q-10 capsule 30 mg 30 mg Oral Given     04/04/2023 0842 EDT lisinopril (ZESTRIL) tablet 5 mg 5 mg Oral Given     04/04/2023 0847 EDT pyridoxine (VITAMIN B6) tablet 100 mg 100 mg Oral Given     04/04/2023 1535 EDT heparin (porcine) subcutaneous injection 5,000 Units 5,000 Units Subcutaneous Given     04/04/2023 0601 EDT heparin (porcine) subcutaneous injection 5,000 Units 5,000 Units Subcutaneous Given        Past Medical History:   Diagnosis Date   • Adhesive capsulitis of right shoulder    • CAD (coronary artery disease)    • CHF (congestive heart failure) ([facility])    • Diabetes mellitus ([facility])     type 2, insulin dependent   • Diverticulosis    • History of breast cancer     s/p right lumpectomy, s/p radiation   • Hyperlipidemia    • Hypertension    • Lymphedema of right upper extremity    • Obesity (BMI 30-39  9)    • SJ on CPAP    • Severe aortic stenosis    • Vitamin D deficiency      Present on Admission:  • CAD (coronary artery disease)  • CHF (congestive heart failure) ([facility])  • Diabetes mellitus ([facility])  • Hypertension  • Hyperlipidemia      Admitting Diagnosis: Chest pain [R07 9]  History of CAD (coronary artery disease) [Z86 79]  Age/Sex: 70 y o  female  Admission Orders:  Scheduled Medications:  aspirin, 81 mg, Oral, HS  carvedilol, 6 25 mg, Oral, BID With Meals  co-enzyme Q-10, 30 mg, Oral, Daily  heparin (porcine), 5,000 Units, Subcutaneous, Q8H JEFF  insulin glargine, 20 Units, Subcutaneous, HS  insulin lispro, 1-6 Units, Subcutaneous, TID With Meals  insulin lispro, 1-6 Units, Subcutaneous, HS  lisinopril, 5 mg, Oral, Daily  pravastatin, 20 mg, Oral, Once per day on Mon Wed Fri  pyridoxine, 100 mg, Oral, Daily      Continuous IV Infusions:     PRN Meds:  acetaminophen, 650 mg, Oral, Q6H PRN  morphine injection, 2 mg, Intravenous, Q4H PRN  4/4  x1  morphine injection, 4 mg, Intravenous, Q4H PRN  sodium chloride (PF), 3 mL, Intravenous, Q1H PRN    Fingerstick ac and hs   Tele   Up and OOB   Cont pulse ox     IP CONSULT TO CARDIOLOGY    Network Utilization Review Department  ATTENTION: Please call with any questions or concerns to [phone] and carefully listen to the prompts so that you are directed to the right person   All voicemails are confidential   Dyana Ash all requests for admission clinical reviews, approved or denied determinations and any other requests to dedicated fax number below belonging to the campus where the patient is receiving treatment   List of dedicated fax numbers for the Facilities:  [address] [place] [address] DENIALS (Administrative/Medical Necessity) [phone]   [address] (Maternity/NICU/Pediatrics) [phone]   [address] [phone]   [facility] [address] [phone]   [address] 28 [phone]   [address] [facility] 134 815 [facility] [phone]

## 2023-04-05 NOTE — CASE MANAGEMENT
Case Management Assessment & Discharge Planning Note    Patient name Deep Zamorano  Location /-48 MRN [id number]  : 1960 Date 2023       Current Admission Date: 2023  Current Admission Diagnosis:Chest pain   Patient Active Problem List    Diagnosis Date Noted   • Chest pain 2023   • Encounter for postoperative care 11/10/2022   • S/P TAVR (transcatheter aortic valve replacement) 10/12/2022   • CHF (congestive heart failure) ([facility] [address] ) 10/11/2022   • History of breast cancer 10/11/2022   • SJ on CPAP 10/11/2022   • CAD (coronary artery disease) 10/11/2022   • LBBB (left bundle branch block) 10/11/2022   • Hyperchloremia 10/11/2022   • Diverticulitis of large intestine without perforation or abscess without bleeding 2022   • Lightheadedness 2021   • Acute pain of right shoulder 2019   • Adhesive capsulitis of right shoulder 2019   • Severe aortic stenosis 2019   • Hypertension 2019   • Hyperlipidemia 2019   • Diabetes mellitus ([facility] [address] ) 2019   • Heart palpitations 2019      LOS (days): 1  Geometric Mean LOS (GMLOS) (days):   Days to GMLOS:     OBJECTIVE:    Risk of Unplanned Readmission Score: 12 67         Current admission status: Inpatient       Preferred Pharmacy:   [facility] [address], [address] [facility]  12   Kathie Mathew [address] [address]  Phone: [phone] Fax: [phone]    [address] [place] [address] [address]  [address] [place], [place] [address] [address]  Phone: [phone] Fax: [phone]    Primary Care Provider: Marine Marsh DO    Primary Insurance: Kiara Jade [facility]  Secondary Insurance:     ASSESSMENT:  Montserrat Zhong, [address] - Daughter   Primary Phone: [phone] (Mobile)               Advance Directives  Does patient have a [address]?: Yes  Does patient have Advance Directives?: Yes  Advance Directives: Living will, Power of  for health care, Power of  for finance  Primary Contact: daughter is POA and son in law is POA financial         Readmission Root Cause  30 Day Readmission: No    Patient Information  Admitted from[de-identified] Home  Mental Status: Alert  During Assessment patient was accompanied by: Not accompanied during assessment  Assessment information provided by[de-identified] Patient  Primary Caregiver: Self  Support Systems: Spouse/significant other  [place] of Residence: [place] [address] do you live in?: [address] [facility] entry access options   Select all that apply : Ramp  Type of Current Residence: [place]  In the last 12 months, was there a time when you were not able to pay the mortgage or rent on time?: No  In the last 12 months, how many places have you lived?: 1  In the last 12 months, was there a time when you did not have a steady place to sleep or slept in a shelter (including now)?: No  Homeless/housing insecurity resource given?: No  Living Arrangements: Lives w/ Spouse/significant other  Is patient a ?: No    Activities of Daily Living Prior to Admission  Functional Status: Independent  Completes ADLs independently?: Yes  Ambulates independently?: Yes  Does patient use assisted devices?: No  Does patient currently own DME?: No  Does patient have a history of Outpatient Therapy (PT/OT)?: Yes  Does the patient have a history of Short-Term Rehab?: No  Does patient have a history of HHC?: Yes ([facility]'s)  Does patient currently have [address]?: No         Patient Information Continued  Income Source: Unemployed  Does patient have prescription coverage?: Yes  Within the past 12 months, you worried that your food would run out before you got the money to buy more : Never true  Within the past 12 months, the food you bought just didn't last and you didn't have money to get more : Never true  Food insecurity resource given?: No  Does patient receive dialysis treatments?: No  Does patient have a history of substance abuse?: No  Does patient have a history of Mental Health Diagnosis?: No         Means of Transportation  Means of Transport to Appts[de-identified] Drives Self  In the past 12 months, has lack of transportation kept you from medical appointments or from getting medications?: No  In the past 12 months, has lack of transportation kept you from meetings, work, or from getting things needed for daily living?: No  Was application for public transport provided?: No        DISCHARGE DETAILS:    Discharge planning discussed with[de-identified] Patient  Freedom of Choice: Yes  Comments - Freedom of Choice: No Dc needs anticipated  CM contacted family/caregiver?: No- see comments                  Requested [address]         Is the patient interested in [place] [address] at discharge?: No    DME Referral Provided  Referral made for DME?: No    Other Referral/Resources/Interventions Provided:  Referral Comments: No Dc needs anticipated         Treatment Team Recommendation: Home  Discharge Destination Plan[de-identified] Home  Transport at Discharge : Automobile, Family

## 2023-04-05 NOTE — RESPIRATORY THERAPY NOTE
04/04/23 2148   Respiratory Assessment   Assessment Type Assess only   General Appearance Alert; Awake   Respiratory Pattern Normal   Chest Assessment Chest expansion symmetrical   Bilateral Breath Sounds Diminished   R Breath Sounds Diminished   L Breath Sounds Diminished   Location Specific No   Cough None   Resp Comments Pt placed on cpap at this time   O2 Device RA   Non-Invasive Information   O2 Interface Device Nasal mask   Non-Invasive Ventilation Mode CPAP   $ Intermittent NIV Yes   SpO2 98 %   $ Pulse Oximetry Spot Check Charge Completed   Non-Invasive Settings   FiO2 (%) 21   PEEP/CPAP (cm H2O) 8   Rise Time 2   Non-Invasive Readings   Skin Intervention Skin intact   Total Rate 17   Spontaneous Vt (mL) 456   Spontaneous MV (mL) 14   Leak (lpm) 20     RT Ventilator Management Note  Mery Tesfaye 70 y o  female MRN: [id number]  Unit/Bed#: -02 Encounter: [id number]      Daily Screen    No data found in the last 10 encounters             Physical Exam:   Assessment Type: Assess only  General Appearance: Alert, Awake  Respiratory Pattern: Normal  Chest Assessment: Chest expansion symmetrical  Bilateral Breath Sounds: Diminished  R Breath Sounds: Diminished  L Breath Sounds: Diminished  Location Specific: No  Cough: None  O2 Device: RA      Resp Comments: Pt placed on cpap at this time

## 2023-04-05 NOTE — ASSESSMENT & PLAN NOTE
Wt Readings from Last 3 Encounters:   04/05/23 85 7 kg (189 lb)   11/21/22 85 7 kg (189 lb)   11/16/22 84 1 kg (185 lb 8 oz)     · Appears euvolemic on admission  · 11/2022 ECHO EF 76%, grade 1 diastolic dysfunction  · Not currently on outpatient diuretic

## 2023-04-05 NOTE — PROGRESS NOTES
[address] [place] [address]  Progress Note  Name: Rachael Martinez  MRN: [id number]  Unit/Bed#: -01 I Date of Admission: 4/4/2023   Date of Service: 4/5/2023 I Hospital Day: 1    Assessment/Plan   * Chest pain  Assessment & Plan  Patient reports being woken this AM by substernal chest pain radiating into her back  There was initially some associated diaphoresis  Pain persists at this time  Denies other symptom/complaint at this time  · ECHO showing EF 36%, grade 1 diastolic dysfunction  · Plan for stress testing today  · No longer with chest pain  · Cardiology following, see recommendations     CHF (congestive heart failure) ([facility] )  Assessment & Plan  Wt Readings from Last 3 Encounters:   04/05/23 85 7 kg (189 lb)   11/21/22 85 7 kg (189 lb)   11/16/22 84 1 kg (185 lb 8 oz)     · Appears euvolemic on admission  · 11/2022 ECHO EF 79%, grade 1 diastolic dysfunction  · Not currently on outpatient diuretic     Diabetes mellitus ([place] [address] )  Assessment & Plan    Lab Results   Component Value Date    HGBA1C 6 9 (H) 01/27/2023     · Continue home glargine 20u qHS   · Hold home oral antihyperglycemics   · Correctional SSI  · Hypoglycemia protocol  · Diabetic diet     Hypertension  Assessment & Plan  · Continue prehospital HTN medications  · Monitor BP per unit protocol                VTE Pharmacologic Prophylaxis: VTE Score: 3 Moderate Risk (Score 3-4) - Pharmacological DVT Prophylaxis Ordered: heparin  Patient Centered Rounds: I performed bedside rounds with nursing staff today  Discussions with Specialists or Other Care Team Provider: cardiology     Education and Discussions with Family / Patient: Patient declined call to        Total Time Spent on Date of Encounter in care of patient: 35 minutes This time was spent on one or more of the following: performing physical exam; counseling and coordination of care; obtaining or reviewing history; documenting in the medical record; reviewing/ordering tests, medications or procedures; communicating with other healthcare professionals and discussing with patient's family/caregivers  Current Length of Stay: 1 day(s)  Current Patient Status: Inpatient   Certification Statement: The patient will continue to require additional inpatient hospital stay due to stress test  Discharge Plan: Anticipate discharge tomorrow to home  Code Status: Level 1 - Full Code    Subjective:   Patient feels well    Objective:     Vitals:   Temp (24hrs), Av 7 °F (36 5 °C), Min:97 6 °F (36 4 °C), Max:97 7 °F (36 5 °C)    Temp:  [97 6 °F (36 4 °C)-97 7 °F (36 5 °C)] 97 7 °F (36 5 °C)  HR:  [70-73] 70  Resp:  [16-18] 18  BP: (115-144)/(56-69) 127/63  SpO2:  [93 %-99 %] 93 %  Body mass index is 34 57 kg/m²  Input and Output Summary (last 24 hours): Intake/Output Summary (Last 24 hours) at 2023 1338  Last data filed at 2023 0745  Gross per 24 hour   Intake 1080 ml   Output --   Net 1080 ml       Physical Exam:   Physical Exam  Constitutional:       General: She is not in acute distress  Appearance: Normal appearance  She is not toxic-appearing  Cardiovascular:      Rate and Rhythm: Normal rate and regular rhythm  Heart sounds: Normal heart sounds  No murmur heard  Pulmonary:      Effort: Pulmonary effort is normal  No respiratory distress  Breath sounds: Normal breath sounds  No wheezing  Abdominal:      General: Abdomen is flat  There is no distension  Palpations: Abdomen is soft  Tenderness: There is no abdominal tenderness  Neurological:      General: No focal deficit present  Mental Status: She is alert and oriented to person, place, and time  Mental status is at baseline  Motor: No weakness            Additional Data:     Labs:  Results from last 7 days   Lab Units 23  0433   WBC Thousand/uL 6 19   HEMOGLOBIN g/dL 13 3   HEMATOCRIT % 40 5   PLATELETS Thousands/uL 183   NEUTROS PCT % 53   LYMPHS PCT % 34   MONOS PCT % 10   EOS PCT % 2     Results from last 7 days   Lab Units 04/05/23  0433 04/04/23  0158   SODIUM mmol/L 138 136   POTASSIUM mmol/L 3 8 3 8   CHLORIDE mmol/L 104 101   CO2 mmol/L 26 25   BUN mg/dL 16 17   CREATININE mg/dL 0 48* 0 60   ANION GAP mmol/L 8 10   CALCIUM mg/dL 9 2 10 0   ALBUMIN g/dL  --  4 6   TOTAL BILIRUBIN mg/dL  --  0 46   ALK PHOS U/L  --  92   ALT U/L  --  26   AST U/L  --  24   GLUCOSE RANDOM mg/dL 146* 143*         Results from last 7 days   Lab Units 04/05/23  1106 04/05/23  0635 04/04/23  2056 04/04/23  1715 04/04/23  1542   POC GLUCOSE mg/dl 154* 135 201* 126 137               Lines/Drains:  Invasive Devices     Peripheral Intravenous Line  Duration           Peripheral IV 04/04/23 Left Antecubital 1 day                  Telemetry:  Telemetry Orders (From admission, onward)             48 Hour Telemetry Monitoring  Continuous x 48 hours        References:    Telemetry Guidelines   Question:  Reason for 48 Hour Telemetry  Answer:  Acute MI, chest pain - R/O MI, or unstable angina                              Imaging: No pertinent imaging reviewed      Recent Cultures (last 7 days):         Last 24 Hours Medication List:   Current Facility-Administered Medications   Medication Dose Route Frequency Provider Last Rate   • acetaminophen  650 mg Oral Q6H PRN Angelina Lau PA-C     • aspirin  81 mg Oral HS Angelina Lau PA-C     • carvedilol  6 25 mg Oral BID With Meals Angelina Lau PA-C     • co-enzyme Q-10  30 mg Oral Daily Angelina Lau PA-C     • heparin (porcine)  5,000 Units Subcutaneous [place], [place]     • insulin glargine  20 Units Subcutaneous HS Angelina Lau PA-C     • insulin lispro  1-6 Units Subcutaneous TID With Meals Myranda Samuel MD     • insulin lispro  1-6 Units Subcutaneous HS Myranda Samuel MD     • [facility]op[facility]  [facility]     • morphine injection  2 mg Intravenous Q4H PRN Angelina Lau PA-C     • morphine injection  4 mg Intravenous Q4H PRN Connie Roy PA-C     • pravastatin  20 mg Oral Once per day on Mon Wed Fri Connie Roy PA-C     • pyridoxine  100 mg Oral Daily [facility] [place] [place]     • sodium chloride (PF)  3 mL Intravenous Q1H PRN Tayler Paula MD          Today, Patient Was Seen By: Oly Tse MD    **Please Note: This note may have been constructed using a voice recognition system  **

## 2023-04-05 NOTE — PROGRESS NOTES
All discharge instructions reviewed with pt  Kelly Zhang scripts to give  Pt discharged to home  Telemetry d/c'd

## 2023-04-05 NOTE — PLAN OF CARE
Problem: PAIN - ADULT  Goal: Verbalizes/displays adequate comfort level or baseline comfort level  Description: Interventions:  - Encourage patient to monitor pain and request assistance  - Assess pain using appropriate pain scale  - Administer analgesics based on type and severity of pain and evaluate response  - Implement non-pharmacological measures as appropriate and evaluate response  - Consider cultural and social influences on pain and pain management  - Notify physician/advanced practitioner if interventions unsuccessful or patient reports new pain  Outcome: Progressing     Problem: INFECTION - ADULT  Goal: Absence or prevention of progression during hospitalization  Description: INTERVENTIONS:  - Assess and monitor for signs and symptoms of infection  - Monitor lab/diagnostic results  - Monitor all insertion sites, i e  indwelling lines, tubes, and drains  - Monitor endotracheal if appropriate and nasal secretions for changes in amount and color  - [place] appropriate cooling/warming therapies per order  - Administer medications as ordered  - Instruct and encourage patient and family to use good hand hygiene technique  - Identify and instruct in appropriate isolation precautions for identified infection/condition  Outcome: Progressing  Goal: Absence of fever/infection during neutropenic period  Description: INTERVENTIONS:  - Monitor WBC    Outcome: Progressing     Problem: SAFETY ADULT  Goal: Patient will remain free of falls  Description: INTERVENTIONS:  - Educate patient/family on patient safety including physical limitations  - Instruct patient to call for assistance with activity   - Consult OT/PT to assist with strengthening/mobility   - Keep Call bell within reach  - Keep bed low and locked with side rails adjusted as appropriate  - Keep care items and personal belongings within reach  - Initiate and maintain comfort rounds  - Make Fall Risk Sign visible to staff  - Offer Toileting every  Hours, in advance of need  - Initiate/Maintain alarm  - Obtain necessary fall risk management equipment:   - Apply yellow socks and bracelet for high fall risk patients  - Consider moving patient to room near nurses station  Outcome: Progressing  Goal: Maintain or return to baseline ADL function  Description: INTERVENTIONS:  -  Assess patient's ability to carry out ADLs; assess patient's baseline for ADL function and identify physical deficits which impact ability to perform ADLs (bathing, care of mouth/teeth, toileting, grooming, dressing, etc )  - Assess/evaluate cause of self-care deficits   - Assess range of motion  - Assess patient's mobility; develop plan if impaired  - Assess patient's need for assistive devices and provide as appropriate  - Encourage maximum independence but intervene and supervise when necessary  - Involve family in performance of ADLs  - Assess for home care needs following discharge   - Consider OT consult to assist with ADL evaluation and planning for discharge  - Provide patient education as appropriate  Outcome: Progressing  Goal: Maintains/Returns to pre admission functional level  Description: INTERVENTIONS:  - Perform BMAT or MOVE assessment daily    - Set and communicate daily mobility goal to care team and patient/family/caregiver  - Collaborate with rehabilitation services on mobility goals if consulted  - Perform Range of Motion  times a day  - Reposition patient every  hours    - Dangle patient  times a day  - Stand patient  times a day  - Ambulate patient  times a day  - Out of bed to chair  times a day   - Out of bed for meals times a day  - Out of bed for toileting  - Record patient progress and toleration of activity level   Outcome: Progressing     Problem: DISCHARGE PLANNING  Goal: Discharge to home or other facility with appropriate resources  Description: INTERVENTIONS:  - Identify barriers to discharge w/patient and caregiver  - Arrange for needed discharge resources and transportation as appropriate  - Identify discharge learning needs (meds, wound care, etc )  - Arrange for interpretive services to assist at discharge as needed  - Refer to Case Management Department for coordinating discharge planning if the patient needs post-hospital services based on physician/advanced practitioner order or complex needs related to functional status, cognitive ability, or social support system  Outcome: Progressing     Problem: Knowledge Deficit  Goal: Patient/family/caregiver demonstrates understanding of disease process, treatment plan, medications, and discharge instructions  Description: Complete learning assessment and assess knowledge base    Interventions:  - Provide teaching at level of understanding  - Provide teaching via preferred learning methods  Outcome: Progressing

## 2023-04-05 NOTE — PROGRESS NOTES
"Cardiology Progress Note - Emma Parra 70 y o  female MRN: [id number]    Unit/Bed#: -01 Encounter: [id number]      Assessment/Plan:  1  Chest pain  • Troponins negative x3  • EKG reveals NSR without acute ischemic abnormalities  • No events noted on telemetry, continue to monitor  • Recent cardiac catheterization revealed no significant obstructive epicardial CAD (8/19/2022)  • CTA dissection protocol negative for acute abnormalities  • TTE revealed EF 70%; moderately increased LV wall thickness; grade 1 diastolic dysfunction  • Echo stress test revealed no acute ischemic abnormalities  • Continue aspirin, pravastatin, and carvedilol     2  Chronic HFpEF  • Euvolemic on exam, does not appear to be in acute exacerbation of heart failure  • See TTE results above  • Continue carvedilol and lisinopril  • Strict I/O's and daily weights; recommend sodium restriction     3   AS s/p TAVR  • TTE revealed well-seated and well-functioning bioprosthetic AVR with trace paravalvular regurgitation     4  LBBB  • Noted in history  • TTE and stress echo test results described above     5  T2DM  • A1c 6 9 (1/27/2023), management per primary team     6  Hypertension  • Currently 122/56, continue to monitor  • Continue carvedilol and lisinopril     7  Hyperlipidemia  • Continue pravastatin       Stable for discharge from a cardiology standpoint on current medications  Plan for follow-up with primary cardiologist   Thank you for this consultation  Subjective:   Patient seen and examined  No significant events overnight  Denies any new complaints at this time  Objective:     Vitals: Blood pressure 122/56, pulse 70, temperature 97 8 °F (36 6 °C), resp  rate 20, height 5' 2\" (1 575 m), weight 85 7 kg (189 lb), SpO2 93 %  , Body mass index is 34 57 kg/m² ,   Orthostatic Blood Pressures    Flowsheet Row Most Recent Value   Blood Pressure 122/56 filed at 04/05/2023 1525   Patient Position - Orthostatic VS Lying filed " at 04/04/2023 1900            Intake/Output Summary (Last 24 hours) at 4/5/2023 1707  Last data filed at 4/5/2023 0745  Gross per 24 hour   Intake 1080 ml   Output --   Net 1080 ml         Physical Exam:  Physical Exam  Vitals and nursing note reviewed  Constitutional:       General: She is not in acute distress  Appearance: She is well-developed  She is not diaphoretic  HENT:      Head: Normocephalic and atraumatic  Nose: Nose normal    Eyes:      Conjunctiva/sclera: Conjunctivae normal    Cardiovascular:      Rate and Rhythm: Normal rate and regular rhythm  Pulses: Normal pulses  Heart sounds: Murmur heard  Systolic (ejection) murmur is present with a grade of 3/6  No friction rub  Pulmonary:      Effort: Pulmonary effort is normal  No respiratory distress  Breath sounds: Normal breath sounds  No wheezing, rhonchi or rales  Abdominal:      Palpations: Abdomen is soft  Tenderness: There is no abdominal tenderness  Musculoskeletal:         General: No swelling  Cervical back: Neck supple  Right lower leg: No edema  Left lower leg: No edema  Skin:     General: Skin is warm and dry  Capillary Refill: Capillary refill takes less than 2 seconds  Neurological:      Mental Status: She is alert and oriented to person, place, and time     Psychiatric:         Mood and Affect: Mood normal          Judgment: Judgment normal               Medications:      Current Facility-Administered Medications:   •  acetaminophen (TYLENOL) tablet 650 mg, 650 mg, Oral, Q6H PRN, Erik Friend PA-C  •  aspirin chewable tablet 81 mg, 81 mg, Oral, HS, Erik Friend PA-C, 81 mg at 04/04/23 2139  •  carvedilol (COREG) tablet 6 25 mg, 6 25 mg, Oral, BID With Meals, Erik Friend PA-C, 6 25 mg at 04/05/23 4232  •  co-enzyme Q-10 capsule 30 mg, 30 mg, Oral, Daily, Víctor Gomez PA-C, 30 mg at 04/05/23 7013  •  heparin (porcine) subcutaneous injection 5,000 Units, 5,000 Units, Subcutaneous, Q8H [facility], [place], [place], 5,000 Units at 04/05/23 0549  •  insulin glargine (LANTUS) subcutaneous injection 20 Units 0 2 mL, 20 Units, Subcutaneous, HS, Connie Roy PA-C, 20 Units at 04/04/23 2139  •  insulin lispro (HumaLOG) 100 units/mL subcutaneous injection 1-6 Units, 1-6 Units, Subcutaneous, TID With Meals **AND** Fingerstick Glucose (POCT), , , TID AC, Rebecca Castellanos MD  •  insulin lispro (HumaLOG) 100 units/mL subcutaneous injection 1-6 Units, 1-6 Units, Subcutaneous, HS, Rebecca Castellanos MD, 2 Units at 04/04/23 2141  •  lisinopril (ZESTRIL) tablet 5 mg, 5 mg, Oral, Daily, Connie Roy PA-C, 5 mg at 04/05/23 1619  •  morphine injection 2 mg, 2 mg, Intravenous, Q4H PRN, Connie Roy PA-C, 2 mg at 04/04/23 1109  •  morphine injection 4 mg, 4 mg, Intravenous, Q4H PRN, Christen Gomez PA-C  •  pravastatin (PRAVACHOL) tablet 20 mg, 20 mg, Oral, Once per day on Mon Wed Fri, Connie Roy PA-C  •  pyridoxine (VITAMIN B6) tablet 100 mg, 100 mg, Oral, Daily, Connie Roy PA-C, 100 mg at 04/05/23 4833  •  Insert peripheral IV, , , Once **AND** sodium chloride (PF) 0 9 % injection 3 mL, 3 mL, Intravenous, Q1H PRN, Tayler Paula MD     Labs & Results:     Results from last 7 days   Lab Units 04/04/23  0639 04/04/23  0443 04/04/23  0158   HS TNI 0HR ng/L  --   --  15   HS TNI 2HR ng/L  --  13  --    HSTNI D2 ng/L  --  -2  --    HS TNI 4HR ng/L 12  --   --    HSTNI D4 ng/L -3  --   --      Results from last 7 days   Lab Units 04/05/23  0433 04/04/23  0158   WBC Thousand/uL 6 19 9 50   HEMOGLOBIN g/dL 13 3 14 1   HEMATOCRIT % 40 5 42 3   PLATELETS Thousands/uL 183 242         Results from last 7 days   Lab Units 04/05/23  0433 04/04/23  0158   POTASSIUM mmol/L 3 8 3 8   CHLORIDE mmol/L 104 101   CO2 mmol/L 26 25   BUN mg/dL 16 17   CREATININE mg/dL 0 48* 0 60   CALCIUM mg/dL 9 2 10 0   ALK PHOS U/L  --  92   ALT U/L  --  26   AST U/L  --  24 "  Results from last 7 days   Lab Units 04/05/23  0433   MAGNESIUM mg/dL 2 2       Vitals: Blood pressure 122/56, pulse 70, temperature 97 8 °F (36 6 °C), resp  rate 20, height 5' 2\" (1 575 m), weight 85 7 kg (189 lb), SpO2 93 %  , Body mass index is 34 57 kg/m² ,   Orthostatic Blood Pressures    Flowsheet Row Most Recent Value   Blood Pressure 122/56 filed at 04/05/2023 1525   Patient Position - Orthostatic VS Lying filed at 04/04/2023 0535          Systolic (75WFM), XLT:218 , Min:120 , NBM:940     Diastolic (39XIR), TVV:89, Min:56, Max:65        Intake/Output Summary (Last 24 hours) at 4/5/2023 1707  Last data filed at 4/5/2023 0745  Gross per 24 hour   Intake 1080 ml   Output --   Net 1080 ml       Invasive Devices     None                   EKG: Normal sinus rhythm; cannot rule out anterior infarct    Telemetry:  Telemetry Orders (From admission, onward)             48 Hour Telemetry Monitoring  Continuous x 48 hours        References:    Telemetry Guidelines   Question:  Reason for 48 Hour Telemetry  Answer:  Acute MI, chest pain - R/O MI, or unstable angina                   BP Readings from Last 3 Encounters:   04/05/23 122/56   11/21/22 166/69   11/16/22 140/70      Wt Readings from Last 3 Encounters:   04/05/23 85 7 kg (189 lb)   11/21/22 85 7 kg (189 lb)   11/16/22 84 1 kg (185 lb 8 oz)                  "

## 2023-04-05 NOTE — RESPIRATORY THERAPY NOTE
04/05/23 0346   Respiratory Assessment   Assessment Type Assess only   General Appearance Sleeping   Respiratory Pattern Normal   Chest Assessment Chest expansion symmetrical   Bilateral Breath Sounds Diminished   R Breath Sounds Diminished   L Breath Sounds Diminished   Location Specific No   Cough None   Resp Comments Pt remains on cpap   O2 Device RA   Non-Invasive Information   O2 Interface Device Nasal mask   Non-Invasive Ventilation Mode CPAP   SpO2 99 %   $ Pulse Oximetry Spot Check Charge Completed   Non-Invasive Settings   FiO2 (%) 21   PEEP/CPAP (cm H2O) 8   Rise Time 2   Non-Invasive Readings   Skin Intervention Skin intact   Total Rate 18   Spontaneous Vt (mL) 455   Spontaneous MV (mL) 12   Leak (lpm) 20     RT Ventilator Management Note  Ronaldo Ball 70 y o  female MRN: [id number]  Unit/Bed#: -02 Encounter: [id number]      Daily Screen    No data found in the last 10 encounters             Physical Exam:   Assessment Type: Assess only  General Appearance: Sleeping  Respiratory Pattern: Normal  Chest Assessment: Chest expansion symmetrical  Bilateral Breath Sounds: Diminished  R Breath Sounds: Diminished  L Breath Sounds: Diminished  Location Specific: No  Cough: None  O2 Device: RA      Resp Comments: Pt remains on cpap

## 2023-04-06 LAB
ARRHY DURING EX: NORMAL
CHEST PAIN STATEMENT: NORMAL
MAX DIASTOLIC BP: 80 MMHG
MAX HEART RATE: 137 BPM
MAX PREDICTED HEART RATE: 155 BPM
MAX. SYSTOLIC BP: 170 MMHG
PROTOCOL NAME: NORMAL
REASON FOR TERMINATION: NORMAL
TARGET HR FORMULA: NORMAL
TEST INDICATION: NORMAL
TIME IN EXERCISE PHASE: NORMAL

## 2023-06-17 ENCOUNTER — APPOINTMENT (EMERGENCY)
Dept: CT IMAGING | Facility: HOSPITAL | Age: 63
End: 2023-06-17
Payer: COMMERCIAL

## 2023-06-17 ENCOUNTER — HOSPITAL ENCOUNTER (EMERGENCY)
Facility: HOSPITAL | Age: 63
Discharge: HOME | End: 2023-06-17
Attending: EMERGENCY MEDICINE
Payer: COMMERCIAL

## 2023-06-17 VITALS
DIASTOLIC BLOOD PRESSURE: 55 MMHG | BODY MASS INDEX: 33.84 KG/M2 | TEMPERATURE: 97.9 F | WEIGHT: 185 LBS | OXYGEN SATURATION: 96 % | HEART RATE: 63 BPM | RESPIRATION RATE: 18 BRPM | SYSTOLIC BLOOD PRESSURE: 111 MMHG

## 2023-06-17 DIAGNOSIS — R10.32 ACUTE LEFT LOWER QUADRANT PAIN: ICD-10-CM

## 2023-06-17 DIAGNOSIS — K57.92 ACUTE DIVERTICULITIS: Primary | ICD-10-CM

## 2023-06-17 LAB
ALBUMIN SERPL BCP-MCNC: 4.6 G/DL (ref 3.5–5)
ALP SERPL-CCNC: 106 U/L (ref 34–104)
ALT SERPL W P-5'-P-CCNC: 17 U/L (ref 7–52)
ANION GAP SERPL CALCULATED.3IONS-SCNC: 12 MMOL/L (ref 4–13)
AST SERPL W P-5'-P-CCNC: 22 U/L (ref 13–39)
BACTERIA UR QL AUTO: NORMAL /HPF
BASOPHILS # BLD AUTO: 0.06 THOUSANDS/ÂΜL (ref 0–0.1)
BASOPHILS NFR BLD AUTO: 1 % (ref 0–1)
BILIRUB DIRECT SERPL-MCNC: 0.15 MG/DL (ref 0–0.2)
BILIRUB SERPL-MCNC: 1.01 MG/DL (ref 0.2–1)
BILIRUB UR QL STRIP: NEGATIVE
BUN SERPL-MCNC: 10 MG/DL (ref 5–25)
CALCIUM SERPL-MCNC: 9.7 MG/DL (ref 8.4–10.2)
CHLORIDE SERPL-SCNC: 101 MMOL/L (ref 96–108)
CLARITY UR: CLEAR
CO2 SERPL-SCNC: 20 MMOL/L (ref 21–32)
COLOR UR: COLORLESS
CREAT SERPL-MCNC: 0.61 MG/DL (ref 0.6–1.3)
EOSINOPHIL # BLD AUTO: 0.1 THOUSAND/ÂΜL (ref 0–0.61)
EOSINOPHIL NFR BLD AUTO: 1 % (ref 0–6)
ERYTHROCYTE [DISTWIDTH] IN BLOOD BY AUTOMATED COUNT: 13.5 % (ref 11.6–15.1)
GFR SERPL CREATININE-BSD FRML MDRD: 95 ML/MIN/1.73SQ M
GLUCOSE SERPL-MCNC: 165 MG/DL (ref 65–140)
GLUCOSE UR STRIP-MCNC: ABNORMAL MG/DL
HCT VFR BLD AUTO: 41.5 % (ref 34.8–46.1)
HGB BLD-MCNC: 13.8 G/DL (ref 11.5–15.4)
HGB UR QL STRIP.AUTO: NEGATIVE
IMM GRANULOCYTES # BLD AUTO: 0.03 THOUSAND/UL (ref 0–0.2)
IMM GRANULOCYTES NFR BLD AUTO: 0 % (ref 0–2)
KETONES UR STRIP-MCNC: ABNORMAL MG/DL
LEUKOCYTE ESTERASE UR QL STRIP: ABNORMAL
LIPASE SERPL-CCNC: 13 U/L (ref 11–82)
LYMPHOCYTES # BLD AUTO: 1.84 THOUSANDS/ÂΜL (ref 0.6–4.47)
LYMPHOCYTES NFR BLD AUTO: 18 % (ref 14–44)
MAGNESIUM SERPL-MCNC: 2.1 MG/DL (ref 1.9–2.7)
MCH RBC QN AUTO: 29 PG (ref 26.8–34.3)
MCHC RBC AUTO-ENTMCNC: 33.3 G/DL (ref 31.4–37.4)
MCV RBC AUTO: 87 FL (ref 82–98)
MONOCYTES # BLD AUTO: 0.71 THOUSAND/ÂΜL (ref 0.17–1.22)
MONOCYTES NFR BLD AUTO: 7 % (ref 4–12)
NEUTROPHILS # BLD AUTO: 7.69 THOUSANDS/ÂΜL (ref 1.85–7.62)
NEUTS SEG NFR BLD AUTO: 73 % (ref 43–75)
NITRITE UR QL STRIP: NEGATIVE
NON-SQ EPI CELLS URNS QL MICRO: NORMAL /HPF
NRBC BLD AUTO-RTO: 0 /100 WBCS
PH UR STRIP.AUTO: 5 [PH]
PLATELET # BLD AUTO: 226 THOUSANDS/UL (ref 149–390)
PMV BLD AUTO: 10.9 FL (ref 8.9–12.7)
POTASSIUM SERPL-SCNC: 4.1 MMOL/L (ref 3.5–5.3)
PROT SERPL-MCNC: 8.5 G/DL (ref 6.4–8.4)
PROT UR STRIP-MCNC: NEGATIVE MG/DL
RBC # BLD AUTO: 4.76 MILLION/UL (ref 3.81–5.12)
RBC #/AREA URNS AUTO: NORMAL /HPF
SODIUM SERPL-SCNC: 133 MMOL/L (ref 135–147)
SP GR UR STRIP.AUTO: 1.01 (ref 1–1.03)
UROBILINOGEN UR STRIP-ACNC: <2 MG/DL
WBC # BLD AUTO: 10.43 THOUSAND/UL (ref 4.31–10.16)
WBC #/AREA URNS AUTO: NORMAL /HPF

## 2023-06-17 PROCEDURE — 85025 COMPLETE CBC W/AUTO DIFF WBC: CPT | Performed by: EMERGENCY MEDICINE

## 2023-06-17 PROCEDURE — 96361 HYDRATE IV INFUSION ADD-ON: CPT

## 2023-06-17 PROCEDURE — 99285 EMERGENCY DEPT VISIT HI MDM: CPT

## 2023-06-17 PROCEDURE — 80048 BASIC METABOLIC PNL TOTAL CA: CPT | Performed by: EMERGENCY MEDICINE

## 2023-06-17 PROCEDURE — 96375 TX/PRO/DX INJ NEW DRUG ADDON: CPT

## 2023-06-17 PROCEDURE — 74177 CT ABD & PELVIS W/CONTRAST: CPT

## 2023-06-17 PROCEDURE — 83690 ASSAY OF LIPASE: CPT | Performed by: EMERGENCY MEDICINE

## 2023-06-17 PROCEDURE — 81001 URINALYSIS AUTO W/SCOPE: CPT | Performed by: EMERGENCY MEDICINE

## 2023-06-17 PROCEDURE — 80076 HEPATIC FUNCTION PANEL: CPT | Performed by: EMERGENCY MEDICINE

## 2023-06-17 PROCEDURE — 96374 THER/PROPH/DIAG INJ IV PUSH: CPT

## 2023-06-17 PROCEDURE — 36415 COLL VENOUS BLD VENIPUNCTURE: CPT | Performed by: EMERGENCY MEDICINE

## 2023-06-17 PROCEDURE — G1004 CDSM NDSC: HCPCS

## 2023-06-17 PROCEDURE — 83735 ASSAY OF MAGNESIUM: CPT | Performed by: EMERGENCY MEDICINE

## 2023-06-17 RX ORDER — DICYCLOMINE HCL 20 MG
20 TABLET ORAL ONCE
Status: COMPLETED | OUTPATIENT
Start: 2023-06-17 | End: 2023-06-17

## 2023-06-17 RX ORDER — MORPHINE SULFATE 4 MG/ML
4 INJECTION, SOLUTION INTRAMUSCULAR; INTRAVENOUS ONCE
Status: COMPLETED | OUTPATIENT
Start: 2023-06-17 | End: 2023-06-17

## 2023-06-17 RX ORDER — MORPHINE SULFATE 15 MG/1
7.5 TABLET ORAL EVERY 4 HOURS PRN
Qty: 10 TABLET | Refills: 0 | Status: SHIPPED | OUTPATIENT
Start: 2023-06-17

## 2023-06-17 RX ORDER — AMOXICILLIN AND CLAVULANATE POTASSIUM 875; 125 MG/1; MG/1
1 TABLET, FILM COATED ORAL ONCE
Status: COMPLETED | OUTPATIENT
Start: 2023-06-17 | End: 2023-06-17

## 2023-06-17 RX ORDER — DICYCLOMINE HCL 20 MG
20 TABLET ORAL EVERY 8 HOURS PRN
Qty: 12 TABLET | Refills: 0 | Status: SHIPPED | OUTPATIENT
Start: 2023-06-17

## 2023-06-17 RX ORDER — ONDANSETRON 2 MG/ML
4 INJECTION INTRAMUSCULAR; INTRAVENOUS ONCE
Status: COMPLETED | OUTPATIENT
Start: 2023-06-17 | End: 2023-06-17

## 2023-06-17 RX ORDER — AMOXICILLIN AND CLAVULANATE POTASSIUM 875; 125 MG/1; MG/1
1 TABLET, FILM COATED ORAL EVERY 12 HOURS
Qty: 20 TABLET | Refills: 0 | Status: SHIPPED | OUTPATIENT
Start: 2023-06-17 | End: 2023-06-27

## 2023-06-17 RX ORDER — KETOROLAC TROMETHAMINE 30 MG/ML
15 INJECTION, SOLUTION INTRAMUSCULAR; INTRAVENOUS ONCE
Status: COMPLETED | OUTPATIENT
Start: 2023-06-17 | End: 2023-06-17

## 2023-06-17 RX ORDER — ONDANSETRON 4 MG/1
4 TABLET, ORALLY DISINTEGRATING ORAL EVERY 8 HOURS PRN
Qty: 12 TABLET | Refills: 0 | Status: SHIPPED | OUTPATIENT
Start: 2023-06-17

## 2023-06-17 RX ADMIN — DICYCLOMINE HYDROCHLORIDE 20 MG: 20 TABLET ORAL at 10:50

## 2023-06-17 RX ADMIN — ONDANSETRON 4 MG: 2 INJECTION INTRAMUSCULAR; INTRAVENOUS at 10:50

## 2023-06-17 RX ADMIN — IOHEXOL 100 ML: 350 INJECTION, SOLUTION INTRAVENOUS at 12:10

## 2023-06-17 RX ADMIN — SODIUM CHLORIDE 1000 ML: 0.9 INJECTION, SOLUTION INTRAVENOUS at 10:51

## 2023-06-17 RX ADMIN — MORPHINE SULFATE 4 MG: 4 INJECTION INTRAVENOUS at 13:31

## 2023-06-17 RX ADMIN — AMOXICILLIN AND CLAVULANATE POTASSIUM 1 TABLET: 875; 125 TABLET, FILM COATED ORAL at 14:27

## 2023-06-17 RX ADMIN — KETOROLAC TROMETHAMINE 15 MG: 30 INJECTION, SOLUTION INTRAMUSCULAR at 10:50

## 2023-06-17 NOTE — ED PROVIDER NOTES
History  Chief Complaint   Patient presents with   • Abdominal Pain     Pt reports abd pain and diarrhea  Hx of diverticulosis  Patient is a 42-year-old female with past medical history of CAD, CHF, type 2 insulin-dependent diabetes, history of breast cancer status post right-sided lumpectomy and radiation treatment in remission, hypertension, hyperlipidemia, sleep apnea on CPAP, aortic stenosis s/p TAVR, vitamin D deficiency, prior diverticulitis, prior appendectomy, cholecystectomy, total abdominal hysterectomy, once to the emergency department for acute left lower quadrant abdominal pain that started yesterday getting progressively worse  Patient states that 2 days ago she had multiple episodes of diarrhea and then the next day she started feeling left lower quadrant pain, sharp and nonradiating and similar to when she has had diverticulitis in the past   She states her last bout of diverticulitis was approximately 1 year ago  Since her symptoms started she has had intermittent nausea and poor appetite  She denies any fevers or shaking chills, headache, dizziness or near syncope, cough, URI symptoms, chest pain, palpitations, dyspnea, abdominal distention, vomiting, blood per rectum or melena, dysuria, change in urinary frequency, hematuria, skin rash or color change, extremity weakness or paresthesia or other focal neurologic deficits  History provided by:  Patient   used: No    Abdominal Pain  Associated symptoms: diarrhea and nausea    Associated symptoms: no chest pain, no chills, no constipation, no cough, no dysuria, no fever, no hematuria, no shortness of breath, no sore throat and no vomiting        Prior to Admission Medications   Prescriptions Last Dose Informant Patient Reported? Taking?    Cholecalciferol (VITAMIN D3) 1000 UNITS CAPS  Self Yes No   Sig: Take 2,000 Units by mouth daily   Cyanocobalamin (VITAMIN B12 PO)  Self Yes No   Sig: Take 2,500 mcg by mouth daily Empagliflozin-metFORMIN HCl ER (Synjardy XR)  MG TB24  Self Yes No   Sig: Take 1 capsule by mouth daily  Indications: Type 2 Diabetes   Icosapent Ethyl (VASCEPA PO)  Self Yes No   Sig: Take 2 capsules by mouth daily   Insulin Glargine (TOUJEO) 300 units/mL CONCETRATED U-300 injection pen  Self Yes No   Sig: Inject 20 Units under the skin daily at bedtime   aspirin 81 mg chewable tablet  Self Yes No   Sig: Chew 81 mg daily   carvedilol (COREG) 6 25 mg tablet  Self No No   Sig: TAKE ONE TABLET BY MOUTH TWICE A DAY WITH MEALS   co-enzyme Q-10 30 mg  Self Yes No   Sig: Take 30 mg by mouth daily   glimepiride (AMARYL) 2 mg tablet  Self Yes No   Sig: Take 4 mg by mouth 2 (two) times a day    lisinopril (ZESTRIL) 5 mg tablet  Self No No   Sig: Take 1 tablet (5 mg total) by mouth daily   multivitamin (THERAGRAN) TABS  Self Yes No   Sig: Take 1 tablet by mouth daily   pravastatin (PRAVACHOL) 20 mg tablet  Self No No   Sig: Take 1 tablet (20 mg total) by mouth daily   Patient taking differently: Take 20 mg by mouth Patient takes every MWF   pyridoxine (VITAMIN B6) 100 mg tablet  Self Yes No   Sig: Take 100 mg by mouth daily      Facility-Administered Medications: None       Past Medical History:   Diagnosis Date   • Adhesive capsulitis of right shoulder    • CAD (coronary artery disease)    • CHF (congestive heart failure) (HCC)    • Diabetes mellitus (HCC)     type 2, insulin dependent   • Diverticulosis    • History of breast cancer     s/p right lumpectomy, s/p radiation   • Hyperlipidemia    • Hypertension    • Lymphedema of right upper extremity    • Obesity (BMI 30-39  9)    • SJ on CPAP    • Severe aortic stenosis    • Vitamin D deficiency        Past Surgical History:   Procedure Laterality Date   • APPENDECTOMY     • BREAST LUMPECTOMY Right    • CARDIAC CATHETERIZATION N/A 08/19/2022    Procedure: Cardiac RHC/LHC; Surgeon: Km Travis DO;  Location: [facility];   Service: Cardiology   • CARDIAC CATHETERIZATION N/A 10/11/2022    Procedure: CARDIAC TAVR;  Surgeon: Allan Griffin DO;  Location: [facility];  Service: Cardiology   • CHOLECYSTECTOMY     • KNEE ARTHROSCOPY Right     knee   • NE REPLACE AORTIC VALVE OPENFEMORAL ARTERY APPROACH N/A 10/11/2022    Procedure: REPLACEMENT AORTIC VALVE TRANSCATHETER (TAVR) TRANSFEMORAL W/ 23MM MILIAN ROSS S3 ULTRA VALVE(ACCESS ON LEFT) TRAY;  Surgeon: Seble Dior MD;  Location: [facility];  Service: Cardiac Surgery   • TONSILLECTOMY AND ADENOIDECTOMY     • TOTAL ABDOMINAL HYSTERECTOMY         Family History   Problem Relation Age of Onset   • COPD Mother    • Heart disease Father    • Heart disease Paternal Grandmother    • Cancer Family    • Colon cancer Neg Hx      I have reviewed and agree with the history as documented  E-Cigarette/Vaping   • E-Cigarette Use Never User      E-Cigarette/Vaping Substances   • Nicotine No    • THC No    • CBD No    • Flavoring No      Social History     Tobacco Use   • Smoking status: Never   • Smokeless tobacco: Never   Vaping Use   • Vaping Use: Never used   Substance Use Topics   • Alcohol use: Yes     Comment: socially   • Drug use: No       Review of Systems   Constitutional: Positive for appetite change  Negative for chills and fever  HENT: Negative for congestion, ear pain, rhinorrhea and sore throat  Respiratory: Negative for cough, chest tightness, shortness of breath and wheezing  Cardiovascular: Negative for chest pain and palpitations  Gastrointestinal: Positive for abdominal pain, diarrhea and nausea  Negative for abdominal distention, blood in stool, constipation and vomiting  Genitourinary: Negative for dysuria, flank pain, frequency and hematuria  Musculoskeletal: Negative for back pain and neck pain  Skin: Negative for color change, pallor, rash and wound  Allergic/Immunologic: Negative for immunocompromised state     Neurological: Negative for dizziness, syncope, weakness, light-headedness, numbness and headaches  Hematological: Negative for adenopathy  Psychiatric/Behavioral: Negative for confusion and decreased concentration  All other systems reviewed and are negative  Physical Exam  Physical Exam  Vitals and nursing note reviewed  Constitutional:       General: She is not in acute distress  Appearance: Normal appearance  She is well-developed  She is not ill-appearing, toxic-appearing or diaphoretic  HENT:      Head: Normocephalic and atraumatic  Right Ear: External ear normal       Left Ear: External ear normal       Mouth/Throat:      Mouth: Mucous membranes are moist       Pharynx: Oropharynx is clear  Eyes:      Extraocular Movements: Extraocular movements intact  Conjunctiva/sclera: Conjunctivae normal    Neck:      Vascular: No JVD  Cardiovascular:      Rate and Rhythm: Normal rate and regular rhythm  Pulses: Normal pulses  Heart sounds: Normal heart sounds  No murmur heard  No friction rub  No gallop  Pulmonary:      Effort: Pulmonary effort is normal  No respiratory distress  Breath sounds: Normal breath sounds  No wheezing, rhonchi or rales  Abdominal:      General: There is no distension  Palpations: Abdomen is soft  Tenderness: There is abdominal tenderness  There is no guarding or rebound  Comments: +Mild RLQ and Suprapubic tenderness  +Significant LLQ abdominal tenderness  Musculoskeletal:         General: No tenderness or signs of injury  Normal range of motion  Cervical back: Normal range of motion and neck supple  No rigidity  Skin:     General: Skin is warm and dry  Coloration: Skin is not pale  Findings: No erythema or rash  Neurological:      General: No focal deficit present  Mental Status: She is alert and oriented to person, place, and time  Sensory: No sensory deficit  Motor: No weakness     Psychiatric:         Mood and Affect: Mood normal  Behavior: Behavior normal          Vital Signs  ED Triage Vitals   Temperature Pulse Respirations Blood Pressure SpO2   06/17/23 1008 06/17/23 1008 06/17/23 1008 06/17/23 1008 06/17/23 1008   97 9 °F (36 6 °C) 92 18 129/58 94 %      Temp Source Heart Rate Source Patient Position - Orthostatic VS BP Location FiO2 (%)   06/17/23 1008 06/17/23 1008 06/17/23 1008 06/17/23 1008 --   Temporal Monitor Sitting Left arm       Pain Score       06/17/23 1050       9         Vitals:    06/17/23 1008 06/17/23 1315   BP: 129/58 111/55   BP Location: Left arm Left arm   Pulse: 92 63   Resp: 18 18   Temp: 97 9 °F (36 6 °C)    TempSrc: Temporal    SpO2: 94% 96%   Weight: 83 9 kg (185 lb)        Visual Acuity      ED Medications  Medications   amoxicillin-clavulanate (AUGMENTIN) 875-125 mg per tablet 1 tablet (has no administration in time range)   sodium chloride 0 9 % bolus 1,000 mL (1,000 mL Intravenous New Bag 6/17/23 1051)   dicyclomine (BENTYL) tablet 20 mg (20 mg Oral Given 6/17/23 1050)   ketorolac (TORADOL) injection 15 mg (15 mg Intravenous Given 6/17/23 1050)   ondansetron (ZOFRAN) injection 4 mg (4 mg Intravenous Given 6/17/23 1050)   iohexol (OMNIPAQUE) 350 MG/ML injection (SINGLE-DOSE) 100 mL (100 mL Intravenous Given 6/17/23 1210)   morphine injection 4 mg (4 mg Intravenous Given 6/17/23 1331)       Diagnostic Studies  Results Reviewed     Procedure Component Value Units Date/Time    Urine Microscopic [[id number]]  (Normal) Collected: 06/17/23 1049    Lab Status: Final result Specimen: Urine, Clean Catch Updated: 06/17/23 1124     RBC, UA 1-2 /hpf      WBC, UA 1-2 /hpf      Epithelial Cells Occasional /hpf      Bacteria, UA None Seen /hpf     Hepatic function panel [[id number]]  (Abnormal) Collected: 06/17/23 1049    Lab Status: Final result Specimen: Blood from Arm, Left Updated: 06/17/23 1122     Total Bilirubin 1 01 mg/dL      Bilirubin, Direct 0 15 mg/dL      Alkaline Phosphatase 106 U/L      AST 22 U/L      ALT 17 U/L      Total Protein 8 5 g/dL      Albumin 4 6 g/dL     Lipase [[id number]]  (Normal) Collected: 06/17/23 1049    Lab Status: Final result Specimen: Blood from Arm, Left Updated: 06/17/23 1122     Lipase 13 u/L     Basic metabolic panel [[id number]]  (Abnormal) Collected: 06/17/23 1049    Lab Status: Final result Specimen: Blood from Arm, Left Updated: 06/17/23 1122     Sodium 133 mmol/L      Potassium 4 1 mmol/L      Chloride 101 mmol/L      CO2 20 mmol/L      ANION GAP 12 mmol/L      BUN 10 mg/dL      Creatinine 0 61 mg/dL      Glucose 165 mg/dL      Calcium 9 7 mg/dL      eGFR 95 ml/min/1 73sq m     Narrative:      [place] guidelines for Chronic Kidney Disease (CKD):   •  Stage 1 with normal or high GFR (GFR > 90 mL/min/1 73 square meters)  •  Stage 2 Mild CKD (GFR = 60-89 mL/min/1 73 square meters)  •  Stage 3A Moderate CKD (GFR = 45-59 mL/min/1 73 square meters)  •  Stage 3B Moderate CKD (GFR = 30-44 mL/min/1 73 square meters)  •  Stage 4 Severe CKD (GFR = 15-29 mL/min/1 73 square meters)  •  Stage 5 End Stage CKD (GFR <15 mL/min/1 73 square meters)  Note: GFR calculation is accurate only with a steady state creatinine    Magnesium [[id number]]  (Normal) Collected: 06/17/23 1049    Lab Status: Final result Specimen: Blood from Arm, Left Updated: 06/17/23 1122     Magnesium 2 1 mg/dL     UA (URINE) with reflex to Scope [[id number]]  (Abnormal) Collected: 06/17/23 1049    Lab Status: Final result Specimen: Urine, Clean Catch Updated: 06/17/23 1118     Color, UA Colorless     Clarity, UA Clear     Specific Gravity, UA 1 010     pH, UA 5 0     Leukocytes, UA Elevated glucose may cause decreased leukocyte values   See urine microscopic for UWBC result     Nitrite, UA Negative     Protein, UA Negative mg/dl      Glucose, UA >=1000 (1%) mg/dl      Ketones, UA 10 (1+) mg/dl      Urobilinogen, UA <2 0 mg/dl      Bilirubin, UA Negative     Occult Blood, UA Negative    CBC and differential [[id number]]  (Abnormal) Collected: 06/17/23 1049    Lab Status: Final result Specimen: Blood from Arm, Left Updated: 06/17/23 1106     WBC 10 43 Thousand/uL      RBC 4 76 Million/uL      Hemoglobin 13 8 g/dL      Hematocrit 41 5 %      MCV 87 fL      MCH 29 0 pg      MCHC 33 3 g/dL      RDW 13 5 %      MPV 10 9 fL      Platelets 763 Thousands/uL      nRBC 0 /100 WBCs      Neutrophils Relative 73 %      Immat GRANS % 0 %      Lymphocytes Relative 18 %      Monocytes Relative 7 %      Eosinophils Relative 1 %      Basophils Relative 1 %      Neutrophils Absolute 7 69 Thousands/µL      Immature Grans Absolute 0 03 Thousand/uL      Lymphocytes Absolute 1 84 Thousands/µL      Monocytes Absolute 0 71 Thousand/µL      Eosinophils Absolute 0 10 Thousand/µL      Basophils Absolute 0 06 Thousands/µL                  CT abdomen pelvis with contrast   Final Result by Francisco Yeboah MD (06/17 1348)   1  Acute diverticulitis involving the distal descending colon and proximal sigmoid  No evidence of extraluminal abscess, bowel obstruction or free air   2  Nonobstructing left renal calculus   3  Mild hepatomegaly with hepatic steatosis            Workstation performed: [id number]                    Procedures  Procedures         ED Course  ED Course as of 06/17/23 1425   Sat Jun 17, 2023   1314 Bacteria, UA: None Seen   1414 Updated patient about CT findings including incidental finding of left renal stone and fatty liver changes  Patient does follow with Dr Rajendra Mckeon, her gastroenterologist and I recommended she follow up with him  We will start patient on a 10-day course of Augmentin  Advised her to return immediately if she does not get relief in 48 hours of antibiotics or her symptoms are getting worse including worsening pain, new fevers, uncontrolled vomiting, blood per rectum or any other concerning symptoms                                 SBIRT 20yo+    Flowsheet Row Most Recent Value   Initial Alcohol Screen: US AUDIT-C 1  How often do you have a drink containing alcohol? 0 Filed at: 06/17/2023 1135   2  How many drinks containing alcohol do you have on a typical day you are drinking? 0 Filed at: 06/17/2023 1135   3b  FEMALE Any Age, or MALE 65+: How often do you have 4 or more drinks on one occassion? 0 Filed at: 06/17/2023 1135   Audit-C Score 0 Filed at: 06/17/2023 1135   FERNANDO: How many times in the past year have you    Used an illegal drug or used a prescription medication for non-medical reasons? Never Filed at: 06/17/2023 1135                    Medical Decision Making  12-year-old female presents to the ED for acute left lower quadrant abdominal pain, loose stool and nausea  Most likely patient has acute recurrent diverticulitis given her history of such and that her pain feels similar to prior episodes of diverticulitis  Other considerations include UTI or pyelonephritis, nonspecific enteritis or colitis  Will work-up with abdominal labs, UA and CT abdomen and pelvis with IV contrast   Will give IV fluids, Zofran, Bentyl, Toradol for symptom relief  Amount and/or Complexity of Data Reviewed  Labs: ordered  Decision-making details documented in ED Course  Radiology: ordered  Decision-making details documented in ED Course  Risk  Prescription drug management  Disposition  Final diagnoses:   Acute diverticulitis   Acute left lower quadrant pain     Time reflects when diagnosis was documented in both MDM as applicable and the Disposition within this note     Time User Action Codes Description Comment    6/17/2023  2:13 PM Eliza Rodriguez [K57 [id number]] Acute diverticulitis     6/17/2023  2:15 PM Eliza WILKINSON Add [R10 [id number]] Acute left lower quadrant pain       ED Disposition     ED Disposition   Discharge    Condition   Stable    Date/Time   Sat Jun 17, 2023  2:13 PM    Comment   Senait Ron discharge to home/self care                 Follow-up Information     Follow up With Specialties Details Why Contact Info Additional Information    Naif Rayo DO General Practice Schedule an appointment as soon as possible for a visit   [address] [facility] [address]  [facility] [address]        Valentina Thomas MD Colon and Rectal Surgery Schedule an appointment as soon as possible for a visit   [address] [facility],[address] [place] [address] [facility] Schedule an appointment as soon as possible for a visit   [address]  [address] [address]  Kathie Pryor [address] [facility], [address] [facility], [address], [place], [place], [address] [facility]     [address] [facility] Emergency Department Emergency Medicine Go to  If symptoms worsen 34 [facility] 109 [facility] Emergency Department, [address] [place], [place], [address]          Patient's Medications   Discharge Prescriptions    AMOXICILLIN-CLAVULANATE (AUGMENTIN) 875-125 MG PER TABLET    Take 1 tablet by mouth every 12 (twelve) hours for 10 days       Start Date: 6/17/2023 End Date: 6/27/2023       Order Dose: 1 tablet       Quantity: 20 tablet    Refills: 0    DICYCLOMINE (BENTYL) 20 MG TABLET    Take 1 tablet (20 mg total) by mouth every 8 (eight) hours as needed (abdominal cramping or diarrhea)       Start Date: 6/17/2023 End Date: --       Order Dose: 20 mg       Quantity: 12 tablet    Refills: 0    MORPHINE (MSIR) 15 MG TABLET    Take 0 5 tablets (7 5 mg total) by mouth every 4 (four) hours as needed for severe pain Max Daily Amount: 45 mg       Start Date: 6/17/2023 End Date: --       Order Dose: 7 5 mg       Quantity: 10 tablet    Refills: 0    ONDANSETRON (ZOFRAN-ODT) 4 MG DISINTEGRATING TABLET    Take 1 tablet (4 mg total) by mouth every 8 (eight) hours as needed for nausea or vomiting       Start Date: 6/17/2023 End Date: --       Order Dose: 4 mg       Quantity: 12 tablet    Refills: 0       No discharge procedures on file      PDMP Review       Value Time User    PDMP Reviewed  Yes 7/21/2021  1:37 AM Fiorella Epstein MD          ED Provider  Electronically Signed by           Soha Silverman DO  06/17/23 6911

## 2023-07-06 ENCOUNTER — OFFICE VISIT (OUTPATIENT)
Dept: CARDIOLOGY CLINIC | Facility: CLINIC | Age: 63
End: 2023-07-06
Payer: COMMERCIAL

## 2023-07-06 VITALS
BODY MASS INDEX: 32.76 KG/M2 | SYSTOLIC BLOOD PRESSURE: 118 MMHG | DIASTOLIC BLOOD PRESSURE: 70 MMHG | WEIGHT: 178 LBS | HEART RATE: 84 BPM | HEIGHT: 62 IN | OXYGEN SATURATION: 96 %

## 2023-07-06 DIAGNOSIS — Z99.89 OSA ON CPAP: ICD-10-CM

## 2023-07-06 DIAGNOSIS — I44.7 LBBB (LEFT BUNDLE BRANCH BLOCK): ICD-10-CM

## 2023-07-06 DIAGNOSIS — I25.10 CORONARY ARTERY DISEASE INVOLVING NATIVE HEART WITHOUT ANGINA PECTORIS, UNSPECIFIED VESSEL OR LESION TYPE: Primary | ICD-10-CM

## 2023-07-06 DIAGNOSIS — I50.9 CONGESTIVE HEART FAILURE, UNSPECIFIED HF CHRONICITY, UNSPECIFIED HEART FAILURE TYPE (HCC): ICD-10-CM

## 2023-07-06 DIAGNOSIS — G47.33 OSA ON CPAP: ICD-10-CM

## 2023-07-06 DIAGNOSIS — I10 HYPERTENSION, UNSPECIFIED TYPE: ICD-10-CM

## 2023-07-06 DIAGNOSIS — I35.0 SEVERE AORTIC STENOSIS: ICD-10-CM

## 2023-07-06 DIAGNOSIS — Z95.2 S/P TAVR (TRANSCATHETER AORTIC VALVE REPLACEMENT): ICD-10-CM

## 2023-07-06 PROCEDURE — 99214 OFFICE O/P EST MOD 30 MIN: CPT | Performed by: INTERNAL MEDICINE

## 2023-07-06 NOTE — PROGRESS NOTES
Arash Julien  1960  [id number]  [facility] [employer] [facility]  [phone]    1. Coronary artery disease involving native heart without angina pectoris, unspecified vessel or lesion type        2. Congestive heart failure, unspecified HF chronicity, unspecified heart failure type ([address])        3. Hypertension, unspecified type        4. LBBB (left bundle branch block)        5. Severe aortic stenosis        6. S/P TAVR (transcatheter aortic valve replacement)        7. SJ on CPAP            Discussion/Summary:    Overall she has been doing well. She had an episode of atypical chest pain in April and had a full work-up in the hospital.  Overall she has been doing great stress test showed no ischemia pre-TAVR cath showed no obstructive lesions. Blood pressure is well controlled lipids are doing great TAVR are recent echo is functioning perfectly. Overall doing well no further testing      Interval History:  8year-old female with a history of moderate aortic stenosis, hypertension, hyperlipidemia, diabetes, statin intolerance presents to establish care with my practice. She is transitioning from a prior cardiologist who is retiring. Overall she has been doing well from a cardiac standpoint. She has good functional capacity and works long days. She denies any exertional chest pressure heaviness. Since her last visit aortic stenosis had progressed to severe she underwent TAVR are with an excellent result. Overall she has been doing great. She had some atypical chest pain in April. Right now she has been active and doing well no further chest pain, shortness of breath, palpitations, lightheadedness, dizziness, or syncope. Is been a lower extremity edema, PND, orthopnea.   Problem List     Aortic stenosis, moderate    Essential hypertension    Mixed hyperlipidemia    Type 2 diabetes mellitus with complication, without long-term current use of insulin ([facility])    Lab Results   Component Value Date    HGBA1C 7.3 (H) 04/27/2023       No results for input(s): "POCGLU" in the last 72 hours. Blood Sugar Average: Last 72 hrs:          Heart palpitations        Past Medical History:   Diagnosis Date   • Adhesive capsulitis of right shoulder    • CAD (coronary artery disease)    • CHF (congestive heart failure) ([facility])    • Diabetes mellitus ([facility])     type 2, insulin dependent   • Diverticulosis    • History of breast cancer     s/p right lumpectomy, s/p radiation   • Hyperlipidemia    • Hypertension    • Lymphedema of right upper extremity    • Obesity (BMI 30-39. 9)    • SJ on CPAP    • Severe aortic stenosis    • Vitamin D deficiency      Social History     Socioeconomic History   • Marital status: /Civil Union     Spouse name: Not on file   • Number of children: Not on file   • Years of education: Not on file   • Highest education level: Not on file   Occupational History   • Not on file   Tobacco Use   • Smoking status: Never   • Smokeless tobacco: Never   Vaping Use   • Vaping Use: Never used   Substance and Sexual Activity   • Alcohol use: Yes     Comment: socially   • Drug use: No   • Sexual activity: Yes     Partners: Male   Other Topics Concern   • Not on file   Social History Narrative   • Not on file     Social Determinants of Health     Financial Resource Strain: Not on file   Food Insecurity: No Food Insecurity (4/5/2023)    Hunger Vital Sign    • Worried About Running Out of Food in the Last Year: Never true    • Ran Out of Food in the Last Year: Never true   Transportation Needs: No Transportation Needs (4/5/2023)    PRAPARE - Transportation    • Lack of Transportation (Medical): No    • Lack of Transportation (Non-Medical):  No   Physical Activity: Not on file   Stress: Not on file   Social Connections: Not on file   Intimate Partner Violence: Not on file   Housing Stability: Low Risk  (4/5/2023)    Housing Stability Vital Sign    • Unable to Pay for Housing in the Last Year: No    • Number of Places Lived in the Last Year: 1    • Unstable Housing in the Last Year: No      Family History   Problem Relation Age of Onset   • COPD Mother    • Heart disease Father    • Heart disease Paternal Grandmother    • Cancer Family    • Colon cancer Neg Hx      Past Surgical History:   Procedure Laterality Date   • APPENDECTOMY     • BREAST LUMPECTOMY Right    • CARDIAC CATHETERIZATION N/A 08/19/2022    Procedure: Cardiac RHC/LHC; Surgeon: Monet Almeida DO;  [facility]; Service: Cardiology   • CARDIAC CATHETERIZATION N/A 10/11/2022    Procedure: CARDIAC TAVR;  Surgeon: Monet Almeida DO;  [facility];  Service: Cardiology   • CHOLECYSTECTOMY     • KNEE ARTHROSCOPY Right     knee   • NH REPLACE AORTIC VALVE OPENFEMORAL ARTERY APPROACH N/A 10/11/2022    Procedure: REPLACEMENT AORTIC VALVE TRANSCATHETER (TAVR) TRANSFEMORAL W/ 23MM MILIAN ROSS S3 ULTRA VALVE(ACCESS ON LEFT) TRAY;  Surgeon: Clare Galeana MD;  Location[facility];  Service: Cardiac Surgery   • TONSILLECTOMY AND ADENOIDECTOMY     • TOTAL ABDOMINAL HYSTERECTOMY         Current Outpatient Medications:   •  aspirin 81 mg chewable tablet, Chew 81 mg daily, Disp: , Rfl:   •  carvedilol (COREG) 6.25 mg tablet, TAKE ONE TABLET BY MOUTH TWICE A DAY WITH MEALS, Disp: 180 tablet, Rfl: 3  •  Cholecalciferol (VITAMIN D3) 1000 UNITS CAPS, Take 2,000 Units by mouth daily, Disp: , Rfl:   •  co-enzyme Q-10 30 mg, Take 30 mg by mouth daily, Disp: , Rfl:   •  Cyanocobalamin (VITAMIN B12 PO), Take 2,500 mcg by mouth daily, Disp: , Rfl:   •  Empagliflozin-metFORMIN HCl ER (Synjardy XR)  MG TB24, Take 1 capsule by mouth daily.  Indications: Type 2 Diabetes, Disp: , Rfl:   •  glimepiride (AMARYL) 2 mg tablet, Take 4 mg by mouth 2 (two) times a day , Disp: , Rfl:   •  Icosapent Ethyl (VASCEPA PO), Take 2 capsules by mouth daily, Disp: , Rfl:   • Insulin Glargine (TOUJEO) 300 units/mL CONCETRATED U-300 injection pen, Inject 20 Units under the skin daily at bedtime, Disp: , Rfl:   •  lisinopril (ZESTRIL) 5 mg tablet, Take 1 tablet (5 mg total) by mouth daily, Disp: 90 tablet, Rfl: 2  •  multivitamin (THERAGRAN) TABS, Take 1 tablet by mouth daily, Disp: , Rfl:   •  pravastatin (PRAVACHOL) 20 mg tablet, Take 1 tablet (20 mg total) by mouth daily (Patient taking differently: Take 20 mg by mouth Patient takes every MWF), Disp: 90 tablet, Rfl: 3  •  pyridoxine (VITAMIN B6) 100 mg tablet, Take 100 mg by mouth daily, Disp: , Rfl:   Allergies   Allergen Reactions   • Fentanyl Rash   • Niaspan [Niacin Er] Rash   • Prednisone Rash       Labs:     Chemistry        Component Value Date/Time    K 4.1 06/17/2023 1049     06/17/2023 1049    CO2 20 (L) 06/17/2023 1049    CO2 26 10/11/2022 1025    BUN 10 06/17/2023 1049    CREATININE 0.61 06/17/2023 1049        Component Value Date/Time    CALCIUM 9.7 06/17/2023 1049    ALKPHOS 106 (H) 06/17/2023 1049    AST 22 06/17/2023 1049    ALT 17 06/17/2023 1049            No results found for: "CHOL"  No results found for: "HDL"  No results found for: "LDLCALC"  No results found for: "TRIG"  No results found for: "CHOLHDL"    Imaging: No results found. ECG:  Sinus rhythm nonspecific T-wave changes      Review of Systems   Constitutional: Negative. HENT: Negative. Eyes: Negative. Cardiovascular: Negative for chest pain and palpitations. Respiratory: Negative. Endocrine: Negative. Hematologic/Lymphatic: Negative. Skin: Negative. Musculoskeletal: Negative. Gastrointestinal: Negative. Genitourinary: Negative. Neurological: Negative. Psychiatric/Behavioral: Negative. Vitals:    07/06/23 0844   BP: 118/70   Pulse: 84   SpO2: 96%     Vitals:    07/06/23 0844   Weight: 80.7 kg (178 lb)     Height: 5' 2" (157.5 cm)   Body mass index is 32.56 kg/m².     Physical Exam:  Vital signs reviewed  General:  Alert and cooperative, appears stated age, no acute distress  HEENT:  PERRLA, EOMI, no scleral icterus, no conjunctival pallor  Neck:  No lymphadenopathy, no thyromegaly, no carotid bruits, no elevated JVP  Heart:  Regular rate and rhythm, normal S1/S2, no S3/S4, no murmur, rubs or gallops. PMI nondisplaced  Lungs:  Clear to auscultation bilaterally, no wheezes rales or rhonchi  Abdomen:  Soft, non-tender, positive bowel sounds, no rebound or guarding,   no organomegaly   Extremities:  Normal range of motion.   No clubbing, cyanosis or edema   Vascular:  2+ pedal pulses  Skin:  No rashes or lesions on exposed skin  Neurologic:  Cranial nerves II-XII grossly intact without focal deficits  Psych:  Normal mood and affect

## 2023-09-08 DIAGNOSIS — Z95.2 S/P TAVR (TRANSCATHETER AORTIC VALVE REPLACEMENT): ICD-10-CM

## 2023-09-11 RX ORDER — LISINOPRIL 5 MG/1
5 TABLET ORAL DAILY
Qty: 90 TABLET | Refills: 2 | Status: SHIPPED | OUTPATIENT
Start: 2023-09-11

## 2023-09-30 DIAGNOSIS — Z95.2 S/P TAVR (TRANSCATHETER AORTIC VALVE REPLACEMENT): ICD-10-CM

## 2023-10-02 RX ORDER — LISINOPRIL 5 MG/1
5 TABLET ORAL DAILY
Qty: 90 TABLET | Refills: 2 | Status: SHIPPED | OUTPATIENT
Start: 2023-10-02

## 2023-10-20 DIAGNOSIS — Z95.2 S/P TAVR (TRANSCATHETER AORTIC VALVE REPLACEMENT): Primary | ICD-10-CM

## 2023-10-20 DIAGNOSIS — I35.0 SEVERE AORTIC STENOSIS: ICD-10-CM

## 2023-10-30 ENCOUNTER — HOSPITAL ENCOUNTER (OUTPATIENT)
Dept: NON INVASIVE DIAGNOSTICS | Facility: CLINIC | Age: 63
Discharge: HOME | End: 2023-10-30
Payer: COMMERCIAL

## 2023-10-30 VITALS
BODY MASS INDEX: 32.76 KG/M2 | SYSTOLIC BLOOD PRESSURE: 118 MMHG | DIASTOLIC BLOOD PRESSURE: 70 MMHG | HEIGHT: 62 IN | HEART RATE: 84 BPM | WEIGHT: 178 LBS

## 2023-10-30 DIAGNOSIS — Z95.2 S/P TAVR (TRANSCATHETER AORTIC VALVE REPLACEMENT): ICD-10-CM

## 2023-10-30 DIAGNOSIS — I35.0 SEVERE AORTIC STENOSIS: ICD-10-CM

## 2023-10-30 LAB
AORTIC ROOT: 2.9 CM
AORTIC VALVE MEAN VELOCITY: 15.9 M/S
APICAL FOUR CHAMBER EJECTION FRACTION: 66 %
ASCENDING AORTA: 3.2 CM
AV AREA BY CONTINUOUS VTI: 1.6 CM2
AV AREA PEAK VELOCITY: 1.5 CM2
AV LVOT MEAN GRADIENT: 3 MMHG
AV LVOT PEAK GRADIENT: 5 MMHG
AV MEAN GRADIENT: 12 MMHG
AV PEAK GRADIENT: 23 MMHG
AV VALVE AREA: 1.63 CM2
AV VELOCITY RATIO: 0.47
DOP CALC AO PEAK VEL: 2.4 M/S
DOP CALC AO VTI: 47.2 CM
DOP CALC LVOT AREA: 3.14 CM2
DOP CALC LVOT CARDIAC INDEX: 3.19 L/MIN/M2
DOP CALC LVOT CARDIAC OUTPUT: 5.81 L/MIN
DOP CALC LVOT DIAMETER: 2 CM
DOP CALC LVOT PEAK VEL VTI: 24.55 CM
DOP CALC LVOT PEAK VEL: 1.13 M/S
DOP CALC LVOT STROKE INDEX: 41.8 ML/M2
DOP CALC LVOT STROKE VOLUME: 77.09
E WAVE DECELERATION TIME: 285 MS
E/A RATIO: 0.74
FRACTIONAL SHORTENING: 34 (ref 28–44)
INTERVENTRICULAR SEPTUM IN DIASTOLE (PARASTERNAL SHORT AXIS VIEW): 1.5 CM
INTERVENTRICULAR SEPTUM: 1.5 CM (ref 0.6–1.1)
LAAS-AP2: 17.9 CM2
LAAS-AP4: 21.1 CM2
LEFT ATRIUM SIZE: 4 CM
LEFT ATRIUM VOLUME (MOD BIPLANE): 52 ML
LEFT ATRIUM VOLUME INDEX (MOD BIPLANE): 28.6 ML/M2
LEFT INTERNAL DIMENSION IN SYSTOLE: 2.5 CM (ref 2.1–4)
LEFT VENTRICULAR INTERNAL DIMENSION IN DIASTOLE: 3.8 CM (ref 3.5–6)
LEFT VENTRICULAR POSTERIOR WALL IN END DIASTOLE: 1.1 CM
LEFT VENTRICULAR STROKE VOLUME: 39 ML
LVSV (TEICH): 39 ML
MV E'TISSUE VEL-SEP: 5 CM/S
MV PEAK A VEL: 0.98 M/S
MV PEAK E VEL: 73 CM/S
MV STENOSIS PRESSURE HALF TIME: 83 MS
MV VALVE AREA P 1/2 METHOD: 2.65
RIGHT ATRIUM AREA SYSTOLE A4C: 12.6 CM2
RIGHT VENTRICLE ID DIMENSION: 2.9 CM
SL CV LEFT ATRIUM LENGTH A2C: 5.6 CM
SL CV LV EF: 65
SL CV PED ECHO LEFT VENTRICLE DIASTOLIC VOLUME (MOD BIPLANE) 2D: 62 ML
SL CV PED ECHO LEFT VENTRICLE SYSTOLIC VOLUME (MOD BIPLANE) 2D: 23 ML
TR MAX PG: 19 MMHG
TR PEAK VELOCITY: 2.2 M/S
TRICUSPID ANNULAR PLANE SYSTOLIC EXCURSION: 2 CM
TRICUSPID VALVE PEAK REGURGITATION VELOCITY: 2.16 M/S

## 2023-10-30 PROCEDURE — 93306 TTE W/DOPPLER COMPLETE: CPT

## 2023-10-30 PROCEDURE — 93306 TTE W/DOPPLER COMPLETE: CPT | Performed by: INTERNAL MEDICINE

## 2023-10-30 PROCEDURE — 93005 ELECTROCARDIOGRAM TRACING: CPT | Performed by: THORACIC SURGERY (CARDIOTHORACIC VASCULAR SURGERY)

## 2023-11-01 LAB
ATRIAL RATE: 83 BPM
P AXIS: 50 DEGREES
PR INTERVAL: 164 MS
QRS AXIS: 47 DEGREES
QRSD INTERVAL: 138 MS
QT INTERVAL: 410 MS
QTC INTERVAL: 481 MS
T WAVE AXIS: 160 DEGREES
VENTRICULAR RATE: 83 BPM

## 2023-11-01 PROCEDURE — 93010 ELECTROCARDIOGRAM REPORT: CPT | Performed by: PHYSICIAN ASSISTANT

## 2023-11-14 ENCOUNTER — OFFICE VISIT (OUTPATIENT)
Dept: CARDIAC SURGERY | Facility: CLINIC | Age: 63
End: 2023-11-14
Payer: COMMERCIAL

## 2023-11-14 VITALS
SYSTOLIC BLOOD PRESSURE: 142 MMHG | HEIGHT: 62 IN | OXYGEN SATURATION: 97 % | BODY MASS INDEX: 33.77 KG/M2 | WEIGHT: 183.5 LBS | DIASTOLIC BLOOD PRESSURE: 63 MMHG | HEART RATE: 79 BPM | TEMPERATURE: 97.6 F

## 2023-11-14 DIAGNOSIS — Z95.2 S/P TAVR (TRANSCATHETER AORTIC VALVE REPLACEMENT): Primary | ICD-10-CM

## 2023-11-14 PROCEDURE — 99214 OFFICE O/P EST MOD 30 MIN: CPT | Performed by: PHYSICIAN ASSISTANT

## 2023-11-14 RX ORDER — AMOXICILLIN 500 MG/1
500 CAPSULE ORAL
COMMUNITY
Start: 2023-08-27

## 2023-11-14 RX ORDER — METHIMAZOLE 5 MG/1
1 TABLET ORAL EVERY OTHER DAY
COMMUNITY
Start: 2023-10-26 | End: 2024-10-25

## 2023-11-14 NOTE — PROGRESS NOTES
1 YEAR FOLLOW UP VISIT S/P TAVR    Procedure: S/P transcatheter aortic valve replacement, performed on 10/11/23    History of Present Illness: Peewee Dorman is a 70y.o. year old female who presents to our office today for one year follow up care from transcatheter aortic valve replacement. She was last seen in our office in November 2022 for her postop visit. She completed 36 sessions of cardiac rehab, and did well with that. She continues to follow with Dr. Nathan Flynn - last visit in July of this year. Overall, Kuldeep Monge reports that she feels well - denies chest pain, ZAMARRIPA, dizziness, lower extremity edema. She maintains close follow up with her physicians at [facility]. She retired last year prior to her TAVR.      Review of Systems:  Review of Systems - General ROS: negative  Psychological ROS: negative  Ophthalmic ROS: negative  ENT ROS: negative  Allergy and Immunology ROS: negative  Hematological and Lymphatic ROS: negative  Endocrine ROS: negative  Respiratory ROS: no cough, shortness of breath, or wheezing  Cardiovascular ROS: no chest pain or dyspnea on exertion  Gastrointestinal ROS: no abdominal pain, change in bowel habits, or black or bloody stools  Genito-Urinary ROS: no dysuria, trouble voiding, or hematuria  Musculoskeletal ROS: negative  Neurological ROS: no TIA or stroke symptoms  Dermatological ROS: negative     Past Medical History:    Past Medical History:   Diagnosis Date    Adhesive capsulitis of right shoulder     CAD (coronary artery disease)     CHF (congestive heart failure) (HCC)     Diabetes mellitus (HCC)     type 2, insulin dependent    Diverticulosis     History of breast cancer     s/p right lumpectomy, s/p radiation    Hyperlipidemia     Hypertension     Lymphedema of right upper extremity     Obesity (BMI 30-39.9)     SJ on CPAP     Severe aortic stenosis     Vitamin D deficiency        Past Surgical History:    Past Surgical History:   Procedure Laterality Date    APPENDECTOMY      BREAST LUMPECTOMY Right     CARDIAC CATHETERIZATION N/A 08/19/2022    Procedure: Cardiac RHC/LHC; Surgeon: Sari Schmitz DO;  [facility]; Service: Cardiology    CARDIAC CATHETERIZATION N/A 10/11/2022    Procedure: CARDIAC TAVR;  Surgeon: Sari Schmitz DO;  [facility];  Service: Cardiology    CHOLECYSTECTOMY      KNEE ARTHROSCOPY Right     knee    DE REPLACE AORTIC VALVE OPENFEMORAL ARTERY APPROACH N/A 10/11/2022    Procedure: REPLACEMENT AORTIC VALVE TRANSCATHETER (TAVR) TRANSFEMORAL W/ 23MM MILIAN ROSS S3 ULTRA VALVE(ACCESS ON LEFT) TRAY;  Surgeon: Falguni Vazquez MD;  [facility];  Service: Cardiac Surgery    TONSILLECTOMY AND ADENOIDECTOMY      TOTAL ABDOMINAL HYSTERECTOMY         Vital Signs:     Vitals:    11/14/23 1358   BP: 142/63   BP Location: Left arm   Patient Position: Sitting   Cuff Size: Standard   Pulse: 79   Temp: 97.6 °F (36.4 °C)   TempSrc: Tympanic   SpO2: 97%   Weight: 83.2 kg (183 lb 8 oz)   Height: 5' 2" (1.575 m)         Home Medications:     Prior to Admission medications    Medication Sig Start Date End Date Taking? Authorizing Provider   aspirin 81 mg chewable tablet Chew 81 mg daily   Yes Historical Provider, MD   carvedilol (COREG) 6.25 mg tablet TAKE ONE TABLET BY MOUTH TWICE A DAY WITH MEALS 11/16/22  Yes Arnold Rodriguez DO   Cholecalciferol (VITAMIN D3) 1000 UNITS CAPS Take 2,000 Units by mouth daily   Yes Historical Provider, MD   co-enzyme Q-10 30 mg Take 30 mg by mouth daily   Yes Historical Provider, MD   Cyanocobalamin (VITAMIN B12 PO) Take 2,500 mcg by mouth daily   Yes Historical Provider, MD   Empagliflozin-metFORMIN HCl ER (Synjardy XR)  MG TB24 Take 1 capsule by mouth daily.  Indications: Type 2 Diabetes   Yes Leidy Nj DO   glimepiride (AMARYL) 2 mg tablet Take 4 mg by mouth 2 (two) times a day    Yes Historical Provider, MD   Icosapent Ethyl (VASCEPA PO) Take 2 capsules by mouth daily   Yes Historical Provider, MD   Insulin Glargine (TOUJEO) 300 units/mL CONCETRATED U-300 injection pen Inject 20 Units under the skin daily at bedtime   Yes Historical Provider, MD   lisinopril (ZESTRIL) 5 mg tablet TAKE ONE TABLET BY MOUTH EVERY DAY 10/2/23  Yes July Dillon MD   multivitamin [facility]) TABS Take 1 tablet by mouth daily   Yes Historical Provider, MD   pyridoxine (VITAMIN B6) 100 mg tablet Take 100 mg by mouth daily   Yes Historical Provider, MD   pravastatin (PRAVACHOL) 20 mg tablet Take 1 tablet (20 mg total) by mouth daily  Patient taking differently: Take 20 mg by mouth Patient takes every MWF 11/16/22   Bhavna Patel DO       Allergies: Allergies   Allergen Reactions    Fentanyl Rash    Niaspan [Niacin Er] Rash    Prednisone Rash       Physical Exam:    General: NAD, normal appearance, well groomed   HEENT/NECK:  PERRLA. No jugular venous distention. Cardiac:Regular rate and rhythm. Pulmonary:Breath sounds clear bilaterally  Abdomen:  Non-tender, Non-distended. Positive bowel sounds. Upper extremities: 2+ radial pulses; brisk capillary refill  Lower extremities: Extremities warm/dry. PT/DP pulses 2+ bilaterally. No edema B/L  Musculoskeletal: LYNN  Neuro: Alert and oriented X 3. Sensation is grossly intact. No focal deficits  Skin: Warm/Dry, without rashes or lesions. Lab Results:   Lab Results   Component Value Date    WBC 10.43 (H) 06/17/2023    HGB 13.8 06/17/2023    HCT 41.5 06/17/2023    MCV 87 06/17/2023     06/17/2023     Lab Results   Component Value Date    GLUCOSE 119 10/11/2022    CALCIUM 9.7 06/17/2023    K 4.1 06/17/2023    CO2 20 (L) 06/17/2023     06/17/2023    BUN 10 06/17/2023    CREATININE 0.61 06/17/2023       Imaging Studies:     Echocardiogram: 10/30/23  Left Ventricle Left ventricular cavity size is normal. Wall thickness is normal. The left ventricular ejection fraction is 65%. Systolic function is normal.  Asynchronous septal motion noted. Although no diagnostic regional wall motion abnormality was identified, this possibility cannot be completely excluded on the basis of this study. Diastolic function is mildly abnormal, consistent with grade I (abnormal) relaxation. Right Ventricle Right ventricular cavity size is normal. Systolic function is normal. Wall thickness is normal.   Left Atrium The atrium is mildly dilated. Right Atrium The atrium is normal in size. Aortic Valve There is an Farmer ROSS 3 23 mm TAVR bioprosthetic valve. It is not well visualized. The prosthetic valve appears well-seated and appears to be functioning normally. There is trace paravalvular regurgitation. There is no evidence of transvalvular regurgitation. Peak gradient is 23 mmHg and mean gradient is 13 mmHg. Calculated aortic valve area is approximately 1.4 to 1.5 cm². Mitral Valve There is mild to moderate annular calcification. There is mild regurgitation. There is no evidence of stenosis. Tricuspid Valve Tricuspid valve structure is normal. There is no evidence of regurgitation. There is no evidence of stenosis. Pulmonic Valve Pulmonic valve structure is normal. There is no evidence of regurgitation. There is no evidence of stenosis. Ascending Aorta The aortic root is normal in size. Pericardium There is no pericardial effusion. The pericardium is normal in appearance. EKG: 10/30/23  NSR with LBBB     I have personally reviewed pertinent reports. TAVR evaluation Assessment:     [address]: I    [id number] completed     Assessment:     Aortic stenosis, Non-Rheumatic. S/P transcatheter aortic valve replacement;      Kody Ivy returns to our office today for 1 year routine follow up s/p  transcatheter aortic valve replacement . Their NYHA functional status is class I. Recent echocardiogram demonstrates well seated prosthetic valve. ECG, CBC and BMP are reviewed and WNL. Plan:     I reviewed test results with patient.   I reviewed medications and made no changes. Continue Aspirin 81 mg daily, lifelong, for antiplatelet therapy for bioprosthetic valve. Kody Ivy does not need to return to our office for follow up in the future but will continue to be managed by their primary care physician and cardiologist for ongoing medical care. We recommend yearly echocardiograms which can be ordered and monitored by their cardiologist.  Kody Ivy was comfortable with our recommendations and their questions were answered to their satisfaction. The patient recently had a screening colonoscopy in April 2022. Therefore GI referral is not indicated at this time.      Carlos Trevino PA-C  [unfilled]  2:07 PM

## 2023-12-11 DIAGNOSIS — I35.0 AORTIC STENOSIS, MODERATE: ICD-10-CM

## 2023-12-11 RX ORDER — CARVEDILOL 6.25 MG/1
TABLET ORAL
Qty: 180 TABLET | Refills: 3 | Status: SHIPPED | OUTPATIENT
Start: 2023-12-11

## 2024-01-26 ENCOUNTER — OFFICE VISIT (OUTPATIENT)
Dept: URGENT CARE | Facility: CLINIC | Age: 64
End: 2024-01-26
Payer: COMMERCIAL

## 2024-01-26 ENCOUNTER — APPOINTMENT (OUTPATIENT)
Dept: RADIOLOGY | Facility: CLINIC | Age: 64
End: 2024-01-26
Payer: COMMERCIAL

## 2024-01-26 VITALS
TEMPERATURE: 97.7 F | WEIGHT: 184.25 LBS | OXYGEN SATURATION: 95 % | RESPIRATION RATE: 16 BRPM | SYSTOLIC BLOOD PRESSURE: 140 MMHG | BODY MASS INDEX: 33.7 KG/M2 | DIASTOLIC BLOOD PRESSURE: 60 MMHG | HEART RATE: 75 BPM

## 2024-01-26 DIAGNOSIS — S99.921A INJURY OF RIGHT GREAT TOE, INITIAL ENCOUNTER: Primary | ICD-10-CM

## 2024-01-26 DIAGNOSIS — S99.921A INJURY OF RIGHT GREAT TOE, INITIAL ENCOUNTER: ICD-10-CM

## 2024-01-26 PROCEDURE — 99213 OFFICE O/P EST LOW 20 MIN: CPT | Performed by: ORTHOPAEDIC SURGERY

## 2024-01-26 PROCEDURE — S9083 URGENT CARE CENTER GLOBAL: HCPCS | Performed by: ORTHOPAEDIC SURGERY

## 2024-01-26 PROCEDURE — 73630 X-RAY EXAM OF FOOT: CPT

## 2024-01-26 NOTE — PATIENT INSTRUCTIONS
Pain/Swelling Control:   - You may take over-the-counter Tylenol/NSAIDs as needed.    - Elevation of the affected lower extremity above the level of your heart   - You may ice the area for up to 20 minutes at a time. Do not place ice directly onto skin.     Weight bearing:   - You may weight bear as tolerated on the affect lower extremity  - Please wear your post op shoe at all times with activity. You may remove your shoe for rest, hygiene, and range of motion exercises.    *If you prefer supportive foot wear such as sneakers, that is OK    Follow-up:   - May follow up with Dr. Atwood within 7-10 days   - Proceed to the ED if symptoms worsen

## 2024-01-26 NOTE — PROGRESS NOTES
[facility]        NAME: Michelle Julien is a 63 y.o. female  : 1960    MRN: [id number]  DATE: 2024  TIME: 10:41 AM    Assessment and Plan   Injury of right great toe, initial encounter [S99.921A]  1. Injury of right great toe, initial encounter  XR foot 3+ vw right        X-rays of the right foot reviewed and discussed with the patient, which shows no signs of fractures or dislocations. No bony lesions.     Fitted patient with a post-op shoe. She does follow with Sports Medicine specialist Dr. Atwood, and will contact his office to follow up within two weeks.     Patient Instructions     Pain/Swelling Control:   - You may take over-the-counter Tylenol/NSAIDs as needed.    - Elevation of the affected lower extremity above the level of your heart   - You may ice the area for up to 20 minutes at a time. Do not place ice directly onto skin.     Weight bearing:   - You may weight bear as tolerated on the affect lower extremity  - Please wear your post op shoe at all times with activity. You may remove your shoe for rest, hygiene, and range of motion exercises.    *If you prefer supportive foot wear such as sneakers, that is OK    Follow-up:   - May follow up with Dr. Atwood within 7-10 days   - Proceed to the ED if symptoms worsen     Chief Complaint     Chief Complaint   Patient presents with    Foot Pain     Right foot pain yesterday. Pt states she tripped over an air compressor. Painful with pressure and walking. Discoloration to great toe and medial aspect of foot.          History of Present Illness       63 YOF presents to the urgent care for evaluation of an acute right foot injury. The patient reports yesterday she tripped over an air compressor, and in her anger she kicked the compressor with her right foot. She felt immediate pain along the right great toe, along with subsequent bruising and swelling. The patient has never injured her foot or toe before. She denies any numbness  or tingling. The pain is made worse with weight bearing and movement of the toe.         Review of Systems   Review of Systems   Constitutional:  Negative for chills and fever.   HENT:  Negative for ear pain and sore throat.    Eyes:  Negative for pain and visual disturbance.   Respiratory:  Negative for cough and shortness of breath.    Cardiovascular:  Negative for chest pain and palpitations.   Gastrointestinal:  Negative for abdominal pain and vomiting.   Genitourinary:  Negative for dysuria and hematuria.   Musculoskeletal:  Positive for arthralgias, gait problem and joint swelling. Negative for back pain.   Skin:  Positive for color change. Negative for rash.   Neurological:  Negative for seizures and syncope.   Psychiatric/Behavioral: Negative.     All other systems reviewed and are negative.        Current Medications       Current Outpatient Medications:     aspirin 81 mg chewable tablet, Chew 81 mg daily, Disp: , Rfl:     carvedilol (COREG) 6.25 mg tablet, TAKE ONE TABLET BY MOUTH TWICE A DAY WITH MEALS, Disp: 180 tablet, Rfl: 3    Cholecalciferol (VITAMIN D3) 1000 UNITS CAPS, Take 2,000 Units by mouth daily, Disp: , Rfl:     co-enzyme Q-10 30 mg, Take 30 mg by mouth daily, Disp: , Rfl:     Cyanocobalamin (VITAMIN B12 PO), Take 2,500 mcg by mouth daily, Disp: , Rfl:     Empagliflozin-metFORMIN HCl ER (Synjardy XR)  MG TB24, Take 1 capsule by mouth daily. Indications: Type 2 Diabetes, Disp: , Rfl:     glimepiride (AMARYL) 2 mg tablet, Take 4 mg by mouth 2 (two) times a day , Disp: , Rfl:     Icosapent Ethyl (VASCEPA PO), Take 2 capsules by mouth daily, Disp: , Rfl:     Insulin Glargine (TOUJEO) 300 units/mL CONCETRATED U-300 injection pen, Inject 20 Units under the skin daily at bedtime, Disp: , Rfl:     lisinopril (ZESTRIL) 5 mg tablet, TAKE ONE TABLET BY MOUTH EVERY DAY, Disp: 90 tablet, Rfl: 2    methimazole (TAPAZOLE) 5 mg tablet, Take 1 tablet by mouth every other day, Disp: , Rfl:      multivitamin (THERAGRAN) TABS, Take 1 tablet by mouth daily, Disp: , Rfl:     pravastatin (PRAVACHOL) 20 mg tablet, Take 1 tablet (20 mg total) by mouth daily (Patient taking differently: Take 20 mg by mouth Patient takes every MWF), Disp: 90 tablet, Rfl: 3    pyridoxine (VITAMIN B6) 100 mg tablet, Take 100 mg by mouth daily, Disp: , Rfl:     amoxicillin (AMOXIL) 500 mg capsule, Take 500 mg by mouth 4 tablets an hour prior to dental wokr, Disp: , Rfl:     Current Allergies     Allergies as of 01/26/2024 - Reviewed 01/26/2024   Allergen Reaction Noted    Fentanyl Rash 06/05/2019    Niaspan [niacin er] Rash 10/26/2016    Prednisone Rash 02/17/2022            The following portions of the patient's history were reviewed and updated as appropriate: allergies, current medications, past family history, past medical history, past social history, past surgical history and problem list.     Past Medical History:   Diagnosis Date    Adhesive capsulitis of right shoulder     CAD (coronary artery disease)     CHF (congestive heart failure) (HCC)     Diabetes mellitus (HCC)     type 2, insulin dependent    Diverticulosis     History of breast cancer     s/p right lumpectomy, s/p radiation    Hyperlipidemia     Hypertension     Lymphedema of right upper extremity     Obesity (BMI 30-39.9)     SJ on CPAP     Severe aortic stenosis     Vitamin D deficiency        Past Surgical History:   Procedure Laterality Date    APPENDECTOMY      BREAST LUMPECTOMY Right     CARDIAC CATHETERIZATION N/A 08/19/2022    Procedure: Cardiac RHC/LHC;  Surgeon: Arnold Mooney DO;  Location: [facility];  Service: Cardiology    CARDIAC CATHETERIZATION N/A 10/11/2022    Procedure: CARDIAC TAVR;  Surgeon: Arnold Mooney DO;  Location: [facility];  Service: Cardiology    CHOLECYSTECTOMY      KNEE ARTHROSCOPY Right     knee    TX REPLACE AORTIC VALVE OPENFEMORAL ARTERY APPROACH N/A 10/11/2022    Procedure: REPLACEMENT AORTIC VALVE  TRANSCATHETER (TAVR) TRANSFEMORAL W/ 23MM MILIAN ROSS S3 ULTRA VALVE(ACCESS ON LEFT) TRAY;  Surgeon: Handy Rushing MD;  Location: [facility];  Service: Cardiac Surgery    TONSILLECTOMY AND ADENOIDECTOMY      TOTAL ABDOMINAL HYSTERECTOMY         Family History   Problem Relation Age of Onset    COPD Mother     Heart disease Father     Heart disease Paternal Grandmother     Cancer Family     Colon cancer Neg Hx          Medications have been verified.        Objective   /60   Pulse 75   Temp 97.7 °F (36.5 °C)   Resp 16   Wt 83.6 kg (184 lb 4 oz)   LMP  (LMP Unknown)   SpO2 95%   BMI 33.70 kg/m²        Physical Exam     Physical Exam  Vitals and nursing note reviewed.   Constitutional:       Appearance: Normal appearance.   HENT:      Head: Normocephalic and atraumatic.      Nose: Nose normal.      Mouth/Throat:      Pharynx: Oropharynx is clear.   Eyes:      Extraocular Movements: Extraocular movements intact.      Pupils: Pupils are equal, round, and reactive to light.   Cardiovascular:      Rate and Rhythm: Normal rate and regular rhythm.      Pulses: Normal pulses.   Pulmonary:      Effort: Pulmonary effort is normal. No respiratory distress.      Breath sounds: Normal breath sounds.   Abdominal:      Palpations: Abdomen is soft.   Musculoskeletal:      Cervical back: Normal range of motion.   Feet:      Comments: Right foot:  The patient is able to stand and ambulate well on her own, though with an antalgic limp. There is ecchymosis present throughout the great toe into the medial aspect of the first metatarsal, distally. There is tenderness throughout the great toe and into the distal aspect of the first met. The patient has limited active ROM of the great toe due to pain. Passive ROM of the IP and MTP joint causes pain. Sensation intact to light touch throughout the DP/Sp/SA/ESPINOZA/T nerve distributions. Strong pedal pulse. Brisk cap refill.   Skin:     General: Skin is warm and dry.      Capillary  Refill: Capillary refill takes less than 2 seconds.   Neurological:      General: No focal deficit present.      Mental Status: She is alert and oriented to person, place, and time.   Psychiatric:         Mood and Affect: Mood normal.         Behavior: Behavior normal.

## 2024-01-27 DIAGNOSIS — I35.0 AORTIC STENOSIS, MODERATE: ICD-10-CM

## 2024-01-29 RX ORDER — PRAVASTATIN SODIUM 20 MG
20 TABLET ORAL DAILY
Qty: 90 TABLET | Refills: 3 | Status: SHIPPED | OUTPATIENT
Start: 2024-01-29

## 2024-02-14 ENCOUNTER — OFFICE VISIT (OUTPATIENT)
Dept: OBGYN CLINIC | Facility: CLINIC | Age: 64
End: 2024-02-14
Payer: COMMERCIAL

## 2024-02-14 VITALS
DIASTOLIC BLOOD PRESSURE: 71 MMHG | WEIGHT: 185 LBS | BODY MASS INDEX: 34.04 KG/M2 | HEART RATE: 66 BPM | SYSTOLIC BLOOD PRESSURE: 130 MMHG | HEIGHT: 62 IN

## 2024-02-14 DIAGNOSIS — S92.414A CLOSED NONDISPLACED FRACTURE OF PROXIMAL PHALANX OF RIGHT GREAT TOE, INITIAL ENCOUNTER: Primary | ICD-10-CM

## 2024-02-14 DIAGNOSIS — M19.079 ARTHRITIS OF METATARSOPHALANGEAL (MTP) JOINT OF GREAT TOE: ICD-10-CM

## 2024-02-14 PROCEDURE — 99213 OFFICE O/P EST LOW 20 MIN: CPT | Performed by: FAMILY MEDICINE

## 2024-02-14 NOTE — PROGRESS NOTES
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  Discussed with patient physical exam, radiographs, impression, and plan.  X-rays of the right foot are unremarkable for acute osseous abnormality but noted for great toe MTP joint degenerative changes.  Physical exam noted for mild swelling about the great toe MTP joint.  There is tenderness to the great toe MTP joint and at the base of the first proximal phalanx.  She has limited range of motion with flexion and extension at the MTP joint.  She has decreased sensation at the tuft of the first digit.  She has normal dorsalis pedis pulse.  She has intact range of motion and strength of the ankle.  Clinical impression is that she may have sustained occult fracture of the great toe and aggravation of MTP joint arthritis.  I advised patient that invasive management and mobilization not warranted at this time.  She may continue with applying topical diclofenac gel and she may increase to 3-4 times a day over the next 2 weeks.  She is to continue wearing supportive footwear.  She was advised that if symptoms linger we may consider steroid injection.  She will follow-up as needed.      Subjective:   Patient ID: Michelle Julien is a 63 y.o. female.  Chief Complaint   Patient presents with    Right Foot - Pain        63-year-old female presents for evaluation of right great toe pain and swelling from injury on 1/25/2024.  She tripped over a compressor and then kicked it while wearing a sneaker.  She had pain described as sudden in onset, localized to the great toe MTP joint, severe in intensity, associated with swelling, worse with bearing weight and bending the toe, and improved with resting.  " She had difficulty bearing weight due to symptoms.  She started having associated bruising.  She presented to urgent care where x-ray evaluation was unremarkable and she was provided postop shoe.  She started treating with alternating between Tylenol and ibuprofen and applying ice and topical diclofenac.  After about 10 days she transition out of postop shoe into supportive footwear and symptoms have been gradually improving.                Review of Systems   Musculoskeletal:  Positive for arthralgias and joint swelling.   Neurological:  Negative for weakness and numbness.       Objective:  Vitals:    02/14/24 0955   BP: 130/71   Pulse: 66   Weight: 83.9 kg (185 lb)   Height: 5' 2\" (1.575 m)      Right Ankle Exam     Range of Motion   The patient has normal right ankle ROM.    Muscle Strength   Dorsiflexion:  5/5  Plantar flexion:  5/5    Other   Pulse: present     Comments:  Decreased sensation to light touch to the great toe          Observations     Right Ankle/Foot   Negative for deformity.     Tenderness     Right Ankle/Foot   Tenderness in the first metatarsal head. No tenderness in the Achilles insertion, anterior ankle, anterior talofibular ligament, fifth metatarsal base, calcaneofibular ligament, deltoid ligament, dorsum foot, lateral malleolus, medial malleolus, navicular, peroneal tendon, plantar fascia, posterior tibial tendon, posterior talofibular ligament and proximal Achilles.     Additional Tenderness Details  Right  - First proximal phalanx    Strength/Myotome Testing     Right Ankle/Foot   Dorsiflexion: 5  Plantar flexion: 5  Inversion: 5  Eversion: 5    Tests     Right Ankle/Foot   Negative for metatarsal squeeze.       Physical Exam  Vitals and nursing note reviewed.   Constitutional:       Appearance: Normal appearance. She is well-developed. She is not ill-appearing or diaphoretic.   HENT:      Head: Normocephalic and atraumatic.      Right Ear: External ear normal.      Left Ear: External " ear normal.   Eyes:      Conjunctiva/sclera: Conjunctivae normal.   Neck:      Trachea: No tracheal deviation.   Cardiovascular:      Rate and Rhythm: Normal rate.   Pulmonary:      Effort: Pulmonary effort is normal. No respiratory distress.   Abdominal:      General: There is no distension.   Musculoskeletal:         General: Swelling and tenderness present. No deformity or signs of injury.      Right ankle: No lateral malleolus, medial malleolus, ATF ligament, CF ligament, posterior TF ligament or base of 5th metatarsal tenderness.      Right foot: No deformity.   Skin:     General: Skin is warm and dry.      Coloration: Skin is not jaundiced or pale.   Neurological:      Mental Status: She is alert and oriented to person, place, and time.   Psychiatric:         Mood and Affect: Mood normal.         Behavior: Behavior normal.         Thought Content: Thought content normal.         Judgment: Judgment normal.         I have personally reviewed pertinent films in PACS and my interpretation is  .  X-rays of the right foot are unremarkable for acute osseous abnormality but noted for great toe MTP joint degenerative changes.

## 2024-03-14 ENCOUNTER — PREP FOR PROCEDURE (OUTPATIENT)
Dept: GASTROENTEROLOGY | Facility: CLINIC | Age: 64
End: 2024-03-14

## 2024-03-14 ENCOUNTER — OFFICE VISIT (OUTPATIENT)
Dept: GASTROENTEROLOGY | Facility: CLINIC | Age: 64
End: 2024-03-14
Payer: MEDICARE

## 2024-03-14 VITALS
OXYGEN SATURATION: 97 % | HEART RATE: 71 BPM | BODY MASS INDEX: 33.01 KG/M2 | DIASTOLIC BLOOD PRESSURE: 78 MMHG | SYSTOLIC BLOOD PRESSURE: 132 MMHG | HEIGHT: 62 IN | TEMPERATURE: 97.8 F | WEIGHT: 179.4 LBS

## 2024-03-14 DIAGNOSIS — R11.0 NAUSEA: ICD-10-CM

## 2024-03-14 DIAGNOSIS — R10.13 EPIGASTRIC PAIN: Primary | ICD-10-CM

## 2024-03-14 DIAGNOSIS — R14.0 BLOATING: ICD-10-CM

## 2024-03-14 PROCEDURE — 99213 OFFICE O/P EST LOW 20 MIN: CPT | Performed by: PHYSICIAN ASSISTANT

## 2024-03-14 RX ORDER — PANTOPRAZOLE SODIUM 40 MG/1
40 TABLET, DELAYED RELEASE ORAL DAILY
Qty: 90 TABLET | Refills: 3 | Status: SHIPPED | OUTPATIENT
Start: 2024-03-14

## 2024-03-14 NOTE — PATIENT INSTRUCTIONS
Scheduled date of EGD(as of today):3/20/24  Physician performing EGD:Carlos A  Location of EGD:[facility]  Instructions reviewed with patient by:Cleveland montiel  Clearances:  none  GERD (Gastroesophageal Reflux Disease)   WHAT YOU NEED TO KNOW:   Gastroesophageal reflux disease (GERD) is reflux that happens more than 2 times a week for a few weeks. Reflux means acid and food in your stomach back up into your esophagus. GERD can cause other health problems over time if it is not treated.       DISCHARGE INSTRUCTIONS:   Call your local emergency number (911 in the [place]) if:   You have severe chest pain and sudden trouble breathing.      Return to the emergency department if:   You have trouble breathing after you vomit.    You have trouble swallowing, or pain with swallowing.    Your bowel movements are black, bloody, or tarry-looking.    Your vomit looks like coffee grounds or has blood in it.    Call your doctor or gastroenterologist if:   You feel full and cannot burp or vomit.    You vomit large amounts, or you vomit often.    You are losing weight without trying.    Your symptoms get worse or do not improve with treatment.    You have questions or concerns about your condition or care.    Medicines:   Medicines  are used to decrease stomach acid. Medicine may also be used to help your lower esophageal sphincter and stomach contract (tighten) more.    Take your medicine as directed.  Contact your healthcare provider if you think your medicine is not helping or if you have side effects. Tell your provider if you are allergic to any medicine. Keep a list of the medicines, vitamins, and herbs you take. Include the amounts, and when and why you take them. Bring the list or the pill bottles to follow-up visits. Carry your medicine list with you in case of an emergency.    Manage GERD:       Do not have foods or drinks that may increase heartburn.  These include chocolate, peppermint, fried or fatty foods, drinks that contain caffeine, or  carbonated drinks (soda). Other foods include spicy foods, onions, tomatoes, and tomato-based foods. Do not have foods or drinks that can irritate your esophagus, such as citrus fruits, juices, and alcohol.    Do not eat large meals.  When you eat a lot of food at one time, your stomach needs more acid to digest it. Eat 6 small meals each day instead of 3 large ones, and eat slowly. Do not eat meals 2 to 3 hours before bedtime.    Elevate the head of your bed.  Place 6-inch blocks under the head of your bed frame. You may also use more than one pillow under your head and shoulders while you sleep.    Maintain a healthy weight.  If you are overweight, weight loss may help relieve symptoms of GERD.    Do not smoke.  Smoking weakens the lower esophageal sphincter and increases the risk of GERD. Ask your healthcare provider for information if you currently smoke and need help to quit. E-cigarettes or smokeless tobacco still contain nicotine. Talk to your healthcare provider before you use these products.    Do not put pressure on your abdomen.  Pressure pushes acid up into your esophagus. Do not wear clothing that is tight around your waist. Do not bend over. Bend at the knees if you need to pick something up.  Follow up with your doctor or gastroenterologist as directed:  Write down your questions so you remember to ask them during your visits.  © Copyright [employer] 2023 Information is for End User's use only and may not be sold, redistributed or otherwise used for commercial purposes.  The above information is an  only. It is not intended as medical advice for individual conditions or treatments. Talk to your doctor, nurse or pharmacist before following any medical regimen to see if it is safe and effective for you.

## 2024-03-14 NOTE — PROGRESS NOTES
Answers submitted by the patient for this visit:  Abdominal Pain Questionnaire (Submitted on 3/13/2024)  Chief Complaint: Abdominal pain  Chronicity: new  Onset: in the past 7 days  Onset quality: sudden  Frequency: daily  Episode duration: 1 Hours  Progression since onset: gradually worsening  Pain location: epigastric region  Pain - numeric: 7/10  Pain quality: sharp  Radiates to: chest, back  anorexia: Yes  arthralgias: No  belching: Yes  constipation: Yes  diarrhea: Yes  flatus: Yes  nausea: Yes  Aggravated by: certain positions, eating  Relieved by: being still, belching, certain positions, standing  Diagnostic workup: lower endoscopy  [facility] - Outpatient Follow-up Note  Michelle Julien 64 y.o. female MRN: [id number]  Encounter: [id number]          ASSESSMENT AND PLAN:      1. Epigastric pain  2. Nausea  3. Bloating  Will plan abdominal ultrasound.    Will update laboratories to include CBC, CMP, lipase.    Will plan EGD with biopsy.    Patient will begin pantoprazole 40 mg every morning.    Encouraged a very bland diet for now.    Patient is up-to-date with routine screening colonoscopy.  ______________________________________________________________________    SUBJECTIVE:    64-year-old female with a past medical history significant for diabetes mellitus well-controlled, diverticular disease, history of breast cancer, CHF, coronary artery disease, hypertension, aortic stenosis status postTAVR who presents to the GI clinic today for follow-up.  Patient reports that for about a week she has been experiencing epigastric abdominal pain with eating with associated bloating and nausea.  Patient denies any episodes of vomiting.  Patient reports that her symptoms have just persisted since they began about a week and 1/2 to 2 weeks ago.  She reports that her blood sugars have been very well-controlled.  She denies any NSAID use.  She reports that she has tried Pepto-Bismol and Tums  without any benefit in symptoms.  Patient is up-to-date with routine screening colonoscopy as she did have this performed in 2022 and this did note diverticular disease.  Patient is status post cholecystectomy.    REVIEW OF SYSTEMS IS OTHERWISE NEGATIVE.      Historical Information   Past Medical History:   Diagnosis Date    Adhesive capsulitis of right shoulder     CAD (coronary artery disease)     CHF (congestive heart failure) (HCC)     Diabetes mellitus (HCC)     type 2, insulin dependent    Diverticulosis     History of breast cancer     s/p right lumpectomy, s/p radiation    Hyperlipidemia     Hypertension     Lymphedema of right upper extremity     Obesity (BMI 30-39.9)     SJ on CPAP     Severe aortic stenosis     Vitamin D deficiency      Past Surgical History:   Procedure Laterality Date    APPENDECTOMY      BREAST LUMPECTOMY Right     CARDIAC CATHETERIZATION N/A 08/19/2022    Procedure: Cardiac RHC/LHC;  Surgeon: Arnold Mooney DO;  [facility];  Service: Cardiology    CARDIAC CATHETERIZATION N/A 10/11/2022    Procedure: CARDIAC TAVR;  Surgeon: Arnold Mooney DO;  Location: [facility];  Service: Cardiology    CHOLECYSTECTOMY      KNEE ARTHROSCOPY Right     knee    CA REPLACE AORTIC VALVE OPENFEMORAL ARTERY APPROACH N/A 10/11/2022    Procedure: REPLACEMENT AORTIC VALVE TRANSCATHETER (TAVR) TRANSFEMORAL W/ 23MM MILIAN ROSS S3 ULTRA VALVE(ACCESS ON LEFT) TRAY;  Surgeon: Handy Rushing MD;  Location: [facility];  Service: Cardiac Surgery    TONSILLECTOMY AND ADENOIDECTOMY      TOTAL ABDOMINAL HYSTERECTOMY       Social History   Social History     Substance and Sexual Activity   Alcohol Use Yes    Comment: socially     Social History     Substance and Sexual Activity   Drug Use No     Social History     Tobacco Use   Smoking Status Never   Smokeless Tobacco Never     Family History   Problem Relation Age of Onset    COPD Mother     Heart disease Father     Heart disease  "Paternal Grandmother     Cancer Family     Colon cancer Neg Hx        Meds/Allergies       Current Outpatient Medications:     aspirin 81 mg chewable tablet    carvedilol (COREG) 6.25 mg tablet    Cholecalciferol (VITAMIN D3) 1000 UNITS CAPS    co-enzyme Q-10 30 mg    Cyanocobalamin (VITAMIN B12 PO)    Empagliflozin-metFORMIN HCl ER (Synjardy XR)  MG TB24    glimepiride (AMARYL) 2 mg tablet    Icosapent Ethyl (VASCEPA PO)    Insulin Glargine (TOUJEO) 300 units/mL CONCETRATED U-300 injection pen    lisinopril (ZESTRIL) 5 mg tablet    methimazole (TAPAZOLE) 5 mg tablet    multivitamin (THERAGRAN) TABS    pantoprazole (PROTONIX) 40 mg tablet    pravastatin (PRAVACHOL) 20 mg tablet    pyridoxine (VITAMIN B6) 100 mg tablet    Allergies   Allergen Reactions    Fentanyl Rash    Niaspan [Niacin Er] Rash    Prednisone Rash           Objective     Blood pressure 132/78, pulse 71, temperature 97.8 °F (36.6 °C), height 5' 2\" (1.575 m), weight 81.4 kg (179 lb 6.4 oz), SpO2 97%. Body mass index is 32.81 kg/m².      PHYSICAL EXAM:      General Appearance:   Alert, cooperative, no distress   HEENT:   Normocephalic, atraumatic, anicteric.     Neck:  Supple, symmetrical, trachea midline   Lungs:   Clear to auscultation bilaterally; no rales, rhonchi or wheezing; respirations unlabored    Heart::   Regular rate and rhythm; no murmur, rub, or gallop.   Abdomen:   Soft, non-tender, non-distended; normal bowel sounds; no masses, no organomegaly    Genitalia:   Deferred    Rectal:   Deferred    Extremities:  No cyanosis, clubbing or edema    Pulses:  2+ and symmetric    Skin:  No jaundice, rashes, or lesions    Lymph nodes:  No palpable cervical lymphadenopathy        Lab Results:   No visits with results within 1 Day(s) from this visit.   Latest known visit with results is:   Hospital Outpatient Visit on 10/30/2023   Component Date Value    Triscuspid Valve Regurgi* 10/30/2023 19.0     RAA A4C 10/30/2023 12.6     LA Volume Index " (BP) 10/30/2023 28.6     MV Peak A Sid 10/30/2023 0.98     MV stenosis pressure 1/2* 10/30/2023 83     MV Peak E Sid 10/30/2023 73     AV peak gradient 10/30/2023 23     LVOT stroke volume 10/30/2023 77.09     Ao VTI 10/30/2023 47.2     Aortic valve peak veloci* 10/30/2023 2.4     LVOT peak VTI 10/30/2023 24.55     LVOT peak sid 10/30/2023 1.13     LVOT diameter 10/30/2023 2.0     E wave deceleration time 10/30/2023 285     E/A ratio 10/30/2023 0.74     MV valve area p 1/2 meth* 10/30/2023 2.65     AV LVOT peak gradient 10/30/2023 5     AV mean gradient 10/30/2023 12     TR Peak Sid 10/30/2023 2.2     AV area peak sid 10/30/2023 1.5     AV area by cont VTI 10/30/2023 1.6     LVOT mn grad 10/30/2023 3.0     RVID d 10/30/2023 2.9     A4C EF 10/30/2023 66     Aortic valve mean veloci* 10/30/2023 15.90     Tricuspid valve peak reg* 10/30/2023 2.16     Left ventricular stroke * 10/30/2023 39.00     IVSd 10/30/2023 1.50     Tricuspid annular plane * 10/30/2023 2.00     Ao root 10/30/2023 2.90     LVPWd 10/30/2023 1.10     LA size 10/30/2023 4     Asc Ao 10/30/2023 3.2     LA volume (BP) 10/30/2023 52     FS 10/30/2023 34     LVIDS 10/30/2023 2.50     IVS 10/30/2023 1.5     LVIDd 10/30/2023 3.80     LA length (A2C) 10/30/2023 5.60     LEFT VENTRICLE SYSTOLIC * 10/30/2023 23     LV DIASTOLIC VOLUME (MOD* 10/30/2023 62     LVOT Cardiac Index 10/30/2023 3.19     LVOT stroke volume index 10/30/2023 41.80     LVOT Cardiac Output 10/30/2023 5.81     Left Atrium Area-systoli* 10/30/2023 21.1     Left Atrium Area-systoli* 10/30/2023 17.9     MV E' Tissue Velocity Se* 10/30/2023 5     LVSV, 2D 10/30/2023 39     LVOT area 10/30/2023 3.14     DVI 10/30/2023 0.47     AV valve area 10/30/2023 1.63     LV EF 10/30/2023 65          Radiology Results:   No results found.

## 2024-03-14 NOTE — H&P (VIEW-ONLY)
Answers submitted by the patient for this visit:  Abdominal Pain Questionnaire (Submitted on 3/13/2024)  Chief Complaint: Abdominal pain  Chronicity: new  Onset: in the past 7 days  Onset quality: sudden  Frequency: daily  Episode duration: 1 Hours  Progression since onset: gradually worsening  Pain location: epigastric region  Pain - numeric: 7/10  Pain quality: sharp  Radiates to: chest, back  anorexia: Yes  arthralgias: No  belching: Yes  constipation: Yes  diarrhea: Yes  flatus: Yes  nausea: Yes  Aggravated by: certain positions, eating  Relieved by: being still, belching, certain positions, standing  Diagnostic workup: lower endoscopy  [facility] - Outpatient Follow-up Note  Michelle Julien 64 y.o. female MRN: [id number]  Encounter: [id number]          ASSESSMENT AND PLAN:      1. Epigastric pain  2. Nausea  3. Bloating  Will plan abdominal ultrasound.    Will update laboratories to include CBC, CMP, lipase.    Will plan EGD with biopsy.    Patient will begin pantoprazole 40 mg every morning.    Encouraged a very bland diet for now.    Patient is up-to-date with routine screening colonoscopy.  ______________________________________________________________________    SUBJECTIVE:    64-year-old female with a past medical history significant for diabetes mellitus well-controlled, diverticular disease, history of breast cancer, CHF, coronary artery disease, hypertension, aortic stenosis status postTAVR who presents to the GI clinic today for follow-up.  Patient reports that for about a week she has been experiencing epigastric abdominal pain with eating with associated bloating and nausea.  Patient denies any episodes of vomiting.  Patient reports that her symptoms have just persisted since they began about a week and 1/2 to 2 weeks ago.  She reports that her blood sugars have been very well-controlled.  She denies any NSAID use.  She reports that she has tried Pepto-Bismol and Tums  without any benefit in symptoms.  Patient is up-to-date with routine screening colonoscopy as she did have this performed in 2022 and this did note diverticular disease.  Patient is status post cholecystectomy.    REVIEW OF SYSTEMS IS OTHERWISE NEGATIVE.      Historical Information   Past Medical History:   Diagnosis Date    Adhesive capsulitis of right shoulder     CAD (coronary artery disease)     CHF (congestive heart failure) (HCC)     Diabetes mellitus (HCC)     type 2, insulin dependent    Diverticulosis     History of breast cancer     s/p right lumpectomy, s/p radiation    Hyperlipidemia     Hypertension     Lymphedema of right upper extremity     Obesity (BMI 30-39.9)     SJ on CPAP     Severe aortic stenosis     Vitamin D deficiency      Past Surgical History:   Procedure Laterality Date    APPENDECTOMY      BREAST LUMPECTOMY Right     CARDIAC CATHETERIZATION N/A 08/19/2022    Procedure: Cardiac RHC/LHC;  Surgeon: Arnold Mooney DO;  [facility];  Service: Cardiology    CARDIAC CATHETERIZATION N/A 10/11/2022    Procedure: CARDIAC TAVR;  Surgeon: Arnold Mooney DO;  [facility];  Service: Cardiology    CHOLECYSTECTOMY      KNEE ARTHROSCOPY Right     knee    LA REPLACE AORTIC VALVE OPENFEMORAL ARTERY APPROACH N/A 10/11/2022    Procedure: REPLACEMENT AORTIC VALVE TRANSCATHETER (TAVR) TRANSFEMORAL W/ 23MM MILIAN ROSS S3 ULTRA VALVE(ACCESS ON LEFT) TRAY;  Surgeon: Handy Rushing MD;  Location: [facility];  Service: Cardiac Surgery    TONSILLECTOMY AND ADENOIDECTOMY      TOTAL ABDOMINAL HYSTERECTOMY       Social History   Social History     Substance and Sexual Activity   Alcohol Use Yes    Comment: socially     Social History     Substance and Sexual Activity   Drug Use No     Social History     Tobacco Use   Smoking Status Never   Smokeless Tobacco Never     Family History   Problem Relation Age of Onset    COPD Mother     Heart disease Father     Heart disease  "Paternal Grandmother     Cancer Family     Colon cancer Neg Hx        Meds/Allergies       Current Outpatient Medications:     aspirin 81 mg chewable tablet    carvedilol (COREG) 6.25 mg tablet    Cholecalciferol (VITAMIN D3) 1000 UNITS CAPS    co-enzyme Q-10 30 mg    Cyanocobalamin (VITAMIN B12 PO)    Empagliflozin-metFORMIN HCl ER (Synjardy XR)  MG TB24    glimepiride (AMARYL) 2 mg tablet    Icosapent Ethyl (VASCEPA PO)    Insulin Glargine (TOUJEO) 300 units/mL CONCETRATED U-300 injection pen    lisinopril (ZESTRIL) 5 mg tablet    methimazole (TAPAZOLE) 5 mg tablet    multivitamin (THERAGRAN) TABS    pantoprazole (PROTONIX) 40 mg tablet    pravastatin (PRAVACHOL) 20 mg tablet    pyridoxine (VITAMIN B6) 100 mg tablet    Allergies   Allergen Reactions    Fentanyl Rash    Niaspan [Niacin Er] Rash    Prednisone Rash           Objective     Blood pressure 132/78, pulse 71, temperature 97.8 °F (36.6 °C), height 5' 2\" (1.575 m), weight 81.4 kg (179 lb 6.4 oz), SpO2 97%. Body mass index is 32.81 kg/m².      PHYSICAL EXAM:      General Appearance:   Alert, cooperative, no distress   HEENT:   Normocephalic, atraumatic, anicteric.     Neck:  Supple, symmetrical, trachea midline   Lungs:   Clear to auscultation bilaterally; no rales, rhonchi or wheezing; respirations unlabored    Heart::   Regular rate and rhythm; no murmur, rub, or gallop.   Abdomen:   Soft, non-tender, non-distended; normal bowel sounds; no masses, no organomegaly    Genitalia:   Deferred    Rectal:   Deferred    Extremities:  No cyanosis, clubbing or edema    Pulses:  2+ and symmetric    Skin:  No jaundice, rashes, or lesions    Lymph nodes:  No palpable cervical lymphadenopathy        Lab Results:   No visits with results within 1 Day(s) from this visit.   Latest known visit with results is:   Hospital Outpatient Visit on 10/30/2023   Component Date Value    Triscuspid Valve Regurgi* 10/30/2023 19.0     RAA A4C 10/30/2023 12.6     LA Volume Index " (BP) 10/30/2023 28.6     MV Peak A Sid 10/30/2023 0.98     MV stenosis pressure 1/2* 10/30/2023 83     MV Peak E Sid 10/30/2023 73     AV peak gradient 10/30/2023 23     LVOT stroke volume 10/30/2023 77.09     Ao VTI 10/30/2023 47.2     Aortic valve peak veloci* 10/30/2023 2.4     LVOT peak VTI 10/30/2023 24.55     LVOT peak sid 10/30/2023 1.13     LVOT diameter 10/30/2023 2.0     E wave deceleration time 10/30/2023 285     E/A ratio 10/30/2023 0.74     MV valve area p 1/2 meth* 10/30/2023 2.65     AV LVOT peak gradient 10/30/2023 5     AV mean gradient 10/30/2023 12     TR Peak Sid 10/30/2023 2.2     AV area peak sid 10/30/2023 1.5     AV area by cont VTI 10/30/2023 1.6     LVOT mn grad 10/30/2023 3.0     RVID d 10/30/2023 2.9     A4C EF 10/30/2023 66     Aortic valve mean veloci* 10/30/2023 15.90     Tricuspid valve peak reg* 10/30/2023 2.16     Left ventricular stroke * 10/30/2023 39.00     IVSd 10/30/2023 1.50     Tricuspid annular plane * 10/30/2023 2.00     Ao root 10/30/2023 2.90     LVPWd 10/30/2023 1.10     LA size 10/30/2023 4     Asc Ao 10/30/2023 3.2     LA volume (BP) 10/30/2023 52     FS 10/30/2023 34     LVIDS 10/30/2023 2.50     IVS 10/30/2023 1.5     LVIDd 10/30/2023 3.80     LA length (A2C) 10/30/2023 5.60     LEFT VENTRICLE SYSTOLIC * 10/30/2023 23     LV DIASTOLIC VOLUME (MOD* 10/30/2023 62     LVOT Cardiac Index 10/30/2023 3.19     LVOT stroke volume index 10/30/2023 41.80     LVOT Cardiac Output 10/30/2023 5.81     Left Atrium Area-systoli* 10/30/2023 21.1     Left Atrium Area-systoli* 10/30/2023 17.9     MV E' Tissue Velocity Se* 10/30/2023 5     LVSV, 2D 10/30/2023 39     LVOT area 10/30/2023 3.14     DVI 10/30/2023 0.47     AV valve area 10/30/2023 1.63     LV EF 10/30/2023 65          Radiology Results:   No results found.

## 2024-03-15 LAB
ALBUMIN SERPL-MCNC: 4.6 G/DL (ref 3.5–5.7)
ALP SERPL-CCNC: 79 U/L (ref 35–120)
ALT SERPL-CCNC: 19 U/L
ANION GAP SERPL CALCULATED.3IONS-SCNC: 11 MMOL/L (ref 3–11)
AST SERPL-CCNC: 18 U/L
BILIRUB SERPL-MCNC: 0.4 MG/DL (ref 0.2–1)
BUN SERPL-MCNC: 18 MG/DL (ref 7–25)
CALCIUM SERPL-MCNC: 9.5 MG/DL (ref 8.5–10.1)
CHLORIDE SERPL-SCNC: 107 MMOL/L (ref 100–109)
CO2 SERPL-SCNC: 26 MMOL/L (ref 21–31)
CREAT SERPL-MCNC: 0.66 MG/DL (ref 0.4–1.1)
CYTOLOGY CMNT CVX/VAG CYTO-IMP: ABNORMAL
ERYTHROCYTE [DISTWIDTH] IN BLOOD BY AUTOMATED COUNT: 14.3 % (ref 12–16)
GFR/BSA.PRED SERPLBLD CYS-BASED-ARV: 97 ML/MIN/{1.73_M2}
GLUCOSE SERPL-MCNC: 113 MG/DL (ref 65–99)
HCT VFR BLD AUTO: 39.4 % (ref 35–43)
HGB BLD-MCNC: 12.9 G/DL (ref 11.5–14.5)
LIPASE SERPL-CCNC: 34 U/L (ref 11–82)
MCH RBC QN AUTO: 29.4 PG (ref 26–34)
MCHC RBC AUTO-ENTMCNC: 32.8 G/DL (ref 32–37)
MCV RBC AUTO: 90 FL (ref 80–100)
PLATELET # BLD AUTO: 225 THOU/CMM (ref 140–350)
PMV BLD REES-ECKER: 9.2 FL (ref 7.5–11.3)
POTASSIUM SERPL-SCNC: 4.3 MMOL/L (ref 3.5–5.2)
PROT SERPL-MCNC: 7.4 G/DL (ref 6.3–8.3)
RBC # BLD AUTO: 4.39 MILL/CMM (ref 3.7–4.7)
SODIUM SERPL-SCNC: 144 MMOL/L (ref 135–145)
WBC # BLD AUTO: 7.7 THOU/CMM (ref 4–10)

## 2024-03-16 ENCOUNTER — HOSPITAL ENCOUNTER (OUTPATIENT)
Dept: RADIOLOGY | Facility: HOSPITAL | Age: 64
Discharge: HOME | End: 2024-03-16
Payer: COMMERCIAL

## 2024-03-16 DIAGNOSIS — R10.13 EPIGASTRIC PAIN: ICD-10-CM

## 2024-03-16 PROCEDURE — 76700 US EXAM ABDOM COMPLETE: CPT

## 2024-03-20 ENCOUNTER — HOSPITAL ENCOUNTER (OUTPATIENT)
Dept: GASTROENTEROLOGY | Facility: HOSPITAL | Age: 64
Setting detail: OUTPATIENT SURGERY
Discharge: HOME | End: 2024-03-20
Payer: COMMERCIAL

## 2024-03-20 ENCOUNTER — ANESTHESIA EVENT (OUTPATIENT)
Dept: GASTROENTEROLOGY | Facility: HOSPITAL | Age: 64
End: 2024-03-20

## 2024-03-20 ENCOUNTER — ANESTHESIA (OUTPATIENT)
Dept: GASTROENTEROLOGY | Facility: HOSPITAL | Age: 64
End: 2024-03-20

## 2024-03-20 VITALS
WEIGHT: 183.2 LBS | TEMPERATURE: 97.2 F | RESPIRATION RATE: 16 BRPM | BODY MASS INDEX: 33.71 KG/M2 | OXYGEN SATURATION: 95 % | HEART RATE: 59 BPM | HEIGHT: 62 IN | DIASTOLIC BLOOD PRESSURE: 64 MMHG | SYSTOLIC BLOOD PRESSURE: 137 MMHG

## 2024-03-20 DIAGNOSIS — R10.13 EPIGASTRIC PAIN: ICD-10-CM

## 2024-03-20 LAB — GLUCOSE SERPL-MCNC: 149 MG/DL (ref 65–140)

## 2024-03-20 PROCEDURE — 88342 IMHCHEM/IMCYTCHM 1ST ANTB: CPT | Performed by: PATHOLOGY

## 2024-03-20 PROCEDURE — 82948 REAGENT STRIP/BLOOD GLUCOSE: CPT

## 2024-03-20 PROCEDURE — 88305 TISSUE EXAM BY PATHOLOGIST: CPT | Performed by: PATHOLOGY

## 2024-03-20 RX ORDER — LIDOCAINE HYDROCHLORIDE 20 MG/ML
INJECTION, SOLUTION EPIDURAL; INFILTRATION; INTRACAUDAL; PERINEURAL AS NEEDED
Status: DISCONTINUED | OUTPATIENT
Start: 2024-03-20 | End: 2024-03-20

## 2024-03-20 RX ORDER — SODIUM CHLORIDE, SODIUM LACTATE, POTASSIUM CHLORIDE, CALCIUM CHLORIDE 600; 310; 30; 20 MG/100ML; MG/100ML; MG/100ML; MG/100ML
INJECTION, SOLUTION INTRAVENOUS CONTINUOUS PRN
Status: DISCONTINUED | OUTPATIENT
Start: 2024-03-20 | End: 2024-03-20

## 2024-03-20 RX ORDER — PROPOFOL 10 MG/ML
INJECTION, EMULSION INTRAVENOUS AS NEEDED
Status: DISCONTINUED | OUTPATIENT
Start: 2024-03-20 | End: 2024-03-20

## 2024-03-20 RX ADMIN — LIDOCAINE HYDROCHLORIDE 100 MG: 20 INJECTION, SOLUTION EPIDURAL; INFILTRATION; INTRACAUDAL; PERINEURAL at 08:29

## 2024-03-20 RX ADMIN — PROPOFOL 100 MG: 10 INJECTION, EMULSION INTRAVENOUS at 08:29

## 2024-03-20 RX ADMIN — PROPOFOL 30 MG: 10 INJECTION, EMULSION INTRAVENOUS at 08:31

## 2024-03-20 RX ADMIN — SODIUM CHLORIDE, SODIUM LACTATE, POTASSIUM CHLORIDE, AND CALCIUM CHLORIDE: .6; .31; .03; .02 INJECTION, SOLUTION INTRAVENOUS at 08:26

## 2024-03-20 NOTE — INTERVAL H&P NOTE
H&P reviewed. After examining the patient I find no changes in the patients condition since the H&P had been written.    Vitals:    03/20/24 0737   BP: 139/63   Pulse: 61   Resp: 18   Temp: 98.6 °F (37 °C)   SpO2: 96%

## 2024-03-20 NOTE — ANESTHESIA POSTPROCEDURE EVALUATION
Post-Op Assessment Note    CV Status:  Stable    Pain management: adequate       Mental Status:  Sleepy   Hydration Status:  Euvolemic   PONV Controlled:  Controlled   Airway Patency:  Patent  Two or more mitigation strategies used for obstructive sleep apnea   Post Op Vitals Reviewed: Yes    No anethesia notable event occurred.    Staff: Anesthesiologist, CRNA               /60 (03/20/24 0839)    Temp (!) 97.2 °F (36.2 °C) (03/20/24 0839)    Pulse 61 (03/20/24 0839)   Resp 16 (03/20/24 0839)    SpO2 95 % (03/20/24 0839)

## 2024-03-20 NOTE — ANESTHESIA PREPROCEDURE EVALUATION
Procedure:  EGD    Relevant Problems   CARDIO   (+) CAD (coronary artery disease)   (+) CHF (congestive heart failure) (HCC)   (+) Chest pain   (+) Hyperlipidemia   (+) Hypertension   (+) LBBB (left bundle branch block)   (+) Severe aortic stenosis      MUSCULOSKELETAL   (+) Adhesive capsulitis of right shoulder      PULMONARY   (+) SJ on CPAP      Surgery/Wound/Pain   (+) S/P TAVR (transcatheter aortic valve replacement)        Left Ventricle: Left ventricular cavity size is normal. Wall thickness  is normal. The left ventricular ejection fraction is 65%. Systolic  function is normal.  Asynchronous septal motion noted. Although no  diagnostic regional wall motion abnormality was identified, this  possibility cannot be completely excluded on the basis of this study.  Diastolic function is mildly abnormal, consistent with grade I (abnormal)  relaxation.    Left Atrium: The atrium is mildly dilated.    Aortic Valve: There is an Farmer ROSS 3 23 mm TAVR bioprosthetic  valve. It is not well visualized.  The prosthetic valve appears  well-seated and appears to be functioning normally. There is trace  paravalvular regurgitation. There is no evidence of transvalvular  regurgitation. Peak gradient is 23 mmHg and mean gradient is 13 mmHg.    Calculated aortic valve area is approximately 1.4 to 1.5 cm².    Mitral Valve: There is mild to moderate annular calcification. There is  mild regurgitation.    Physical Exam    Airway    Mallampati score: III  TM Distance: >3 FB  Neck ROM: full     Dental       Cardiovascular  Cardiovascular exam normal    Pulmonary  Pulmonary exam normal     Other Findings  post-pubertal.      Anesthesia Plan  ASA Score- 2     Anesthesia Type- IV sedation with anesthesia with ASA Monitors.         Additional Monitors:     Airway Plan:            Plan Factors-Exercise tolerance (METS): >4 METS.    Chart reviewed. EKG reviewed. Imaging results reviewed. Existing labs reviewed. Patient summary  reviewed.                  Induction- intravenous.    Postoperative Plan-     Informed Consent- Anesthetic plan and risks discussed with patient.  I personally reviewed this patient with the CRNA. Discussed and agreed on the Anesthesia Plan with the CRNA..

## 2024-03-25 PROCEDURE — 88342 IMHCHEM/IMCYTCHM 1ST ANTB: CPT | Performed by: PATHOLOGY

## 2024-03-25 PROCEDURE — 88305 TISSUE EXAM BY PATHOLOGIST: CPT | Performed by: PATHOLOGY

## 2024-03-27 ENCOUNTER — TELEPHONE (OUTPATIENT)
Dept: GASTROENTEROLOGY | Facility: CLINIC | Age: 64
End: 2024-03-27

## 2024-03-27 NOTE — TELEPHONE ENCOUNTER
----- Message from Daniela Osborne PA-C sent at 3/26/2024  1:46 PM EDT -----  Please inform patient that her US shows fatty liver. TY

## 2024-03-27 NOTE — TELEPHONE ENCOUNTER
Called and spoke with pt in regards to her US results as per Daniela. Pt verbalized understanding.

## 2024-06-10 DIAGNOSIS — Z95.2 S/P TAVR (TRANSCATHETER AORTIC VALVE REPLACEMENT): ICD-10-CM

## 2024-06-10 RX ORDER — LISINOPRIL 5 MG/1
5 TABLET ORAL DAILY
Qty: 90 TABLET | Refills: 1 | Status: SHIPPED | OUTPATIENT
Start: 2024-06-10

## 2024-07-01 ENCOUNTER — HOSPITAL ENCOUNTER (OUTPATIENT)
Facility: HOSPITAL | Age: 64
Setting detail: OBSERVATION
Discharge: HOME | End: 2024-07-02
Attending: EMERGENCY MEDICINE | Admitting: STUDENT IN AN ORGANIZED HEALTH CARE EDUCATION/TRAINING PROGRAM
Payer: COMMERCIAL

## 2024-07-01 ENCOUNTER — APPOINTMENT (EMERGENCY)
Dept: CT IMAGING | Facility: HOSPITAL | Age: 64
End: 2024-07-01
Payer: COMMERCIAL

## 2024-07-01 DIAGNOSIS — I35.0 SEVERE AORTIC STENOSIS: ICD-10-CM

## 2024-07-01 DIAGNOSIS — R26.89 BALANCE PROBLEM: Primary | ICD-10-CM

## 2024-07-01 PROBLEM — R29.90 STROKE-LIKE SYMPTOMS: Status: ACTIVE | Noted: 2024-07-01

## 2024-07-01 PROBLEM — R42 DIZZINESS: Status: ACTIVE | Noted: 2024-07-01

## 2024-07-01 LAB
2HR DELTA HS TROPONIN: 2 NG/L
4HR DELTA HS TROPONIN: 1 NG/L
ANION GAP SERPL CALCULATED.3IONS-SCNC: 9 MMOL/L (ref 4–13)
APTT PPP: 33 SECONDS (ref 23–37)
BUN SERPL-MCNC: 15 MG/DL (ref 5–25)
CALCIUM SERPL-MCNC: 9.6 MG/DL (ref 8.4–10.2)
CARDIAC TROPONIN I PNL SERPL HS: 10 NG/L
CARDIAC TROPONIN I PNL SERPL HS: 8 NG/L
CARDIAC TROPONIN I PNL SERPL HS: 9 NG/L
CHLORIDE SERPL-SCNC: 103 MMOL/L (ref 96–108)
CHOLEST SERPL-MCNC: 201 MG/DL
CO2 SERPL-SCNC: 27 MMOL/L (ref 21–32)
CREAT SERPL-MCNC: 0.63 MG/DL (ref 0.6–1.3)
ERYTHROCYTE [DISTWIDTH] IN BLOOD BY AUTOMATED COUNT: 13.4 % (ref 11.6–15.1)
FLUAV RNA RESP QL NAA+PROBE: NEGATIVE
FLUBV RNA RESP QL NAA+PROBE: NEGATIVE
GFR SERPL CREATININE-BSD FRML MDRD: 93 ML/MIN/1.73SQ M
GLUCOSE SERPL-MCNC: 100 MG/DL (ref 65–140)
GLUCOSE SERPL-MCNC: 111 MG/DL (ref 65–140)
GLUCOSE SERPL-MCNC: 190 MG/DL (ref 65–140)
GLUCOSE SERPL-MCNC: 194 MG/DL (ref 65–140)
HCT VFR BLD AUTO: 39.8 % (ref 34.8–46.1)
HDLC SERPL-MCNC: 51 MG/DL
HGB BLD-MCNC: 13.3 G/DL (ref 11.5–15.4)
INR PPP: 0.96 (ref 0.84–1.19)
LDLC SERPL CALC-MCNC: 101 MG/DL (ref 0–100)
MCH RBC QN AUTO: 29.3 PG (ref 26.8–34.3)
MCHC RBC AUTO-ENTMCNC: 33.4 G/DL (ref 31.4–37.4)
MCV RBC AUTO: 88 FL (ref 82–98)
PLATELET # BLD AUTO: 210 THOUSANDS/UL (ref 149–390)
PMV BLD AUTO: 10.8 FL (ref 8.9–12.7)
POTASSIUM SERPL-SCNC: 4.2 MMOL/L (ref 3.5–5.3)
PROTHROMBIN TIME: 13.3 SECONDS (ref 11.6–14.5)
RBC # BLD AUTO: 4.54 MILLION/UL (ref 3.81–5.12)
RSV RNA RESP QL NAA+PROBE: NEGATIVE
SARS-COV-2 RNA RESP QL NAA+PROBE: NEGATIVE
SODIUM SERPL-SCNC: 139 MMOL/L (ref 135–147)
TRIGL SERPL-MCNC: 246 MG/DL
WBC # BLD AUTO: 7.75 THOUSAND/UL (ref 4.31–10.16)

## 2024-07-01 PROCEDURE — 80061 LIPID PANEL: CPT | Performed by: STUDENT IN AN ORGANIZED HEALTH CARE EDUCATION/TRAINING PROGRAM

## 2024-07-01 PROCEDURE — 94660 CPAP INITIATION&MGMT: CPT

## 2024-07-01 PROCEDURE — 83036 HEMOGLOBIN GLYCOSYLATED A1C: CPT | Performed by: STUDENT IN AN ORGANIZED HEALTH CARE EDUCATION/TRAINING PROGRAM

## 2024-07-01 PROCEDURE — 36415 COLL VENOUS BLD VENIPUNCTURE: CPT | Performed by: NURSE PRACTITIONER

## 2024-07-01 PROCEDURE — 84484 ASSAY OF TROPONIN QUANT: CPT | Performed by: NURSE PRACTITIONER

## 2024-07-01 PROCEDURE — 82948 REAGENT STRIP/BLOOD GLUCOSE: CPT

## 2024-07-01 PROCEDURE — 85730 THROMBOPLASTIN TIME PARTIAL: CPT | Performed by: NURSE PRACTITIONER

## 2024-07-01 PROCEDURE — 70496 CT ANGIOGRAPHY HEAD: CPT

## 2024-07-01 PROCEDURE — 99223 1ST HOSP IP/OBS HIGH 75: CPT | Performed by: STUDENT IN AN ORGANIZED HEALTH CARE EDUCATION/TRAINING PROGRAM

## 2024-07-01 PROCEDURE — 94760 N-INVAS EAR/PLS OXIMETRY 1: CPT

## 2024-07-01 PROCEDURE — 70498 CT ANGIOGRAPHY NECK: CPT

## 2024-07-01 PROCEDURE — 99285 EMERGENCY DEPT VISIT HI MDM: CPT | Performed by: NURSE PRACTITIONER

## 2024-07-01 PROCEDURE — 99291 CRITICAL CARE FIRST HOUR: CPT | Performed by: PSYCHIATRY & NEUROLOGY

## 2024-07-01 PROCEDURE — 85610 PROTHROMBIN TIME: CPT | Performed by: NURSE PRACTITIONER

## 2024-07-01 PROCEDURE — 85027 COMPLETE CBC AUTOMATED: CPT | Performed by: NURSE PRACTITIONER

## 2024-07-01 PROCEDURE — 99285 EMERGENCY DEPT VISIT HI MDM: CPT

## 2024-07-01 PROCEDURE — 80048 BASIC METABOLIC PNL TOTAL CA: CPT | Performed by: NURSE PRACTITIONER

## 2024-07-01 PROCEDURE — 0241U HB NFCT DS VIR RESP RNA 4 TRGT: CPT | Performed by: NURSE PRACTITIONER

## 2024-07-01 PROCEDURE — 93005 ELECTROCARDIOGRAM TRACING: CPT

## 2024-07-01 RX ORDER — HEPARIN SODIUM 5000 [USP'U]/ML
5000 INJECTION, SOLUTION INTRAVENOUS; SUBCUTANEOUS EVERY 8 HOURS SCHEDULED
Status: DISCONTINUED | OUTPATIENT
Start: 2024-07-01 | End: 2024-07-02 | Stop reason: HOSPADM

## 2024-07-01 RX ORDER — ATORVASTATIN CALCIUM 40 MG/1
40 TABLET, FILM COATED ORAL EVERY EVENING
Status: DISCONTINUED | OUTPATIENT
Start: 2024-07-01 | End: 2024-07-02 | Stop reason: HOSPADM

## 2024-07-01 RX ORDER — CLOPIDOGREL BISULFATE 75 MG/1
300 TABLET ORAL ONCE
Status: COMPLETED | OUTPATIENT
Start: 2024-07-01 | End: 2024-07-01

## 2024-07-01 RX ORDER — ASPIRIN 81 MG/1
324 TABLET, CHEWABLE ORAL ONCE
Status: COMPLETED | OUTPATIENT
Start: 2024-07-01 | End: 2024-07-01

## 2024-07-01 RX ORDER — MECLIZINE HCL 12.5 MG/1
12.5 TABLET ORAL EVERY 8 HOURS SCHEDULED
Status: DISCONTINUED | OUTPATIENT
Start: 2024-07-01 | End: 2024-07-02 | Stop reason: HOSPADM

## 2024-07-01 RX ORDER — INSULIN LISPRO 100 [IU]/ML
1-6 INJECTION, SOLUTION INTRAVENOUS; SUBCUTANEOUS
Status: DISCONTINUED | OUTPATIENT
Start: 2024-07-01 | End: 2024-07-02 | Stop reason: HOSPADM

## 2024-07-01 RX ORDER — ASPIRIN 81 MG/1
81 TABLET, CHEWABLE ORAL DAILY
Status: DISCONTINUED | OUTPATIENT
Start: 2024-07-02 | End: 2024-07-02 | Stop reason: HOSPADM

## 2024-07-01 RX ORDER — ACETAMINOPHEN 325 MG/1
650 TABLET ORAL EVERY 6 HOURS PRN
Status: DISCONTINUED | OUTPATIENT
Start: 2024-07-01 | End: 2024-07-02 | Stop reason: HOSPADM

## 2024-07-01 RX ORDER — ONDANSETRON 2 MG/ML
4 INJECTION INTRAMUSCULAR; INTRAVENOUS EVERY 4 HOURS PRN
Status: DISCONTINUED | OUTPATIENT
Start: 2024-07-01 | End: 2024-07-02 | Stop reason: HOSPADM

## 2024-07-01 RX ADMIN — MECLIZINE HCL 12.5 MG 12.5 MG: 12.5 TABLET ORAL at 15:19

## 2024-07-01 RX ADMIN — ONDANSETRON 4 MG: 2 INJECTION INTRAMUSCULAR; INTRAVENOUS at 17:35

## 2024-07-01 RX ADMIN — IOHEXOL 85 ML: 350 INJECTION, SOLUTION INTRAVENOUS at 13:49

## 2024-07-01 RX ADMIN — MECLIZINE HCL 12.5 MG 12.5 MG: 12.5 TABLET ORAL at 21:34

## 2024-07-01 RX ADMIN — HEPARIN SODIUM 5000 UNITS: 5000 INJECTION INTRAVENOUS; SUBCUTANEOUS at 21:34

## 2024-07-01 RX ADMIN — HEPARIN SODIUM 5000 UNITS: 5000 INJECTION INTRAVENOUS; SUBCUTANEOUS at 15:19

## 2024-07-01 RX ADMIN — ASPIRIN 324 MG: 81 TABLET, CHEWABLE ORAL at 14:41

## 2024-07-01 RX ADMIN — ACETAMINOPHEN 650 MG: 325 TABLET, FILM COATED ORAL at 19:30

## 2024-07-01 RX ADMIN — ONDANSETRON 4 MG: 2 INJECTION INTRAMUSCULAR; INTRAVENOUS at 21:35

## 2024-07-01 RX ADMIN — CLOPIDOGREL 300 MG: 75 TABLET ORAL at 14:41

## 2024-07-01 RX ADMIN — ATORVASTATIN CALCIUM 40 MG: 40 TABLET, FILM COATED ORAL at 17:35

## 2024-07-01 NOTE — ASSESSMENT & PLAN NOTE
Lab Results   Component Value Date    HGBA1C 6.9 (H) 02/10/2024       Recent Labs     07/01/24  1308   POCGLU 194*       Blood Sugar Average: Last 72 hrs:  (P) 194    Goal euglycemic  Management per primary service

## 2024-07-01 NOTE — ASSESSMENT & PLAN NOTE
Lab Results   Component Value Date    HGBA1C 6.9 (H) 02/10/2024       Recent Labs     07/01/24  1308   POCGLU 194*       Blood Sugar Average: Last 72 hrs:  (P) 194  Start sliding scale  Monitor glucose levels

## 2024-07-01 NOTE — H&P
[facility]  H&P  Name: Michelle Julien 64 y.o. female I MRN: [id number]  Unit/Bed#: ED 20 I Date of Admission: 7/1/2024   Date of Service: 7/1/2024 I Hospital Day: 0      Assessment & Plan   * Dizziness  Assessment & Plan  Not a TNK candidate due to last known well  CTA imaging did show some chronic findings, refer to report  No obvious imaging findings of acute CVA  Neurology recommending stroke pathway  Loaded with aspirin/plavix  Start statin, follow up MRI brain  Add meclizine    CAD (coronary artery disease)  Assessment & Plan  Continue home meds    Diabetes mellitus (HCC)  Assessment & Plan  Lab Results   Component Value Date    HGBA1C 6.9 (H) 02/10/2024       Recent Labs     07/01/24  1308   POCGLU 194*       Blood Sugar Average: Last 72 hrs:  (P) 194  Start sliding scale  Monitor glucose levels            VTE Pharmacologic Prophylaxis:   Moderate Risk (Score 3-4) - Pharmacological DVT Prophylaxis Ordered: heparin.  Code Status: Level 1 - Full Code   Discussion with family: Patient declined call to .     Anticipated Length of Stay: Patient will be admitted on an observation basis with an anticipated length of stay of less than 2 midnights secondary to stroke pathway .    Total Time Spent on Date of Encounter in care of patient: 30+ mins. This time was spent on one or more of the following: performing physical exam; counseling and coordination of care; obtaining or reviewing history; documenting in the medical record; reviewing/ordering tests, medications or procedures; communicating with other healthcare professionals and discussing with patient's family/caregivers.    Chief Complaint: dizziness    History of Present Illness:  Michelle Julien is a 64 y.o. female with a PMH of chf, dm, cad, chf who presents with dizziness first noted this AM. Patient reports going to bed at her baseline. Reports she stoof up and fell off balance this morning. Since then she has been  experiencing persistence of symptoms, exacerbated by positional changes. She came to the ED for further evaluation. Neurology evaluated patient and recommended stroke pathway.     Review of Systems:  Review of Systems   Constitutional:  Negative for chills and fever.   HENT:  Negative for ear pain and sore throat.    Eyes:  Negative for pain and visual disturbance.   Respiratory:  Negative for cough and shortness of breath.    Cardiovascular:  Negative for chest pain and palpitations.   Gastrointestinal:  Negative for abdominal pain and vomiting.   Genitourinary:  Negative for dysuria and hematuria.   Musculoskeletal:  Negative for arthralgias and back pain.   Skin:  Negative for color change and rash.   Neurological:  Positive for dizziness. Negative for seizures and syncope.   All other systems reviewed and are negative.      Past Medical and Surgical History:   Past Medical History:   Diagnosis Date    Adhesive capsulitis of right shoulder     CAD (coronary artery disease)     CHF (congestive heart failure) (HCC)     Diabetes mellitus (HCC)     type 2, insulin dependent    Diverticulosis     History of breast cancer     s/p right lumpectomy, s/p radiation    Hyperlipidemia     Hypertension     Lymphedema of right upper extremity     Obesity (BMI 30-39.9)     SJ on CPAP     Severe aortic stenosis     Vitamin D deficiency        Past Surgical History:   Procedure Laterality Date    APPENDECTOMY      BREAST LUMPECTOMY Right     CARDIAC CATHETERIZATION N/A 08/19/2022    Procedure: Cardiac RHC/LHC;  Surgeon: Arnold Mooney DO;  [facility];  Service: Cardiology    CARDIAC CATHETERIZATION N/A 10/11/2022    Procedure: CARDIAC TAVR;  Surgeon: Arnold Mooney DO;  Location: [facility];  Service: Cardiology    CHOLECYSTECTOMY      KNEE ARTHROSCOPY Right     knee    [place] REPLACE AORTIC VALVE OPENFEMORAL ARTERY APPROACH N/A 10/11/2022    Procedure: REPLACEMENT AORTIC VALVE TRANSCATHETER (TAVR)  TRANSFEMORAL W/ 23MM MILIAN ROSS S3 ULTRA VALVE(ACCESS ON LEFT) TRAY;  Surgeon: Hanyd Rushing MD;  Location: [facility];  Service: Cardiac Surgery    TONSILLECTOMY AND ADENOIDECTOMY      TOTAL ABDOMINAL HYSTERECTOMY         Meds/Allergies:  Prior to Admission medications    Medication Sig Start Date End Date Taking? Authorizing Provider   aspirin 81 mg chewable tablet Chew 81 mg daily    Historical Provider, MD   carvedilol (COREG) 6.25 mg tablet TAKE ONE TABLET BY MOUTH TWICE A DAY WITH MEALS 12/11/23   Arnold Rodriguez DO   Cholecalciferol (VITAMIN D3) 1000 UNITS CAPS Take 2,000 Units by mouth daily    Historical Provider, MD   co-enzyme Q-10 30 mg Take 30 mg by mouth daily    Historical Provider, MD   Cyanocobalamin (VITAMIN B12 PO) Take 2,500 mcg by mouth daily    Historical Provider, MD   Empagliflozin-metFORMIN HCl ER (Synjardy XR)  MG TB24 Take 1 capsule by mouth daily. Indications: Type 2 Diabetes    Leidy Nj,    glimepiride (AMARYL) 2 mg tablet Take 4 mg by mouth 2 (two) times a day     Historical Provider, MD   Icosapent Ethyl (VASCEPA PO) Take 2 capsules by mouth daily    Historical Provider, MD   Insulin Glargine (TOUJEO) 300 units/mL CONCETRATED U-300 injection pen Inject 20 Units under the skin daily at bedtime    Historical Provider, MD   lisinopril (ZESTRIL) 5 mg tablet TAKE ONE TABLET BY MOUTH EVERY DAY 6/10/24   Jasen Wan MD   methimazole (TAPAZOLE) 5 mg tablet Take 1 tablet by mouth every other day 10/26/23 10/25/24  Historical Provider, MD   multivitamin (THERAGRAN) TABS Take 1 tablet by mouth daily    Historical Provider, MD   pantoprazole (PROTONIX) 40 mg tablet Take 1 tablet (40 mg total) by mouth daily 3/14/24   Daniela Osborne PA-C   pravastatin (PRAVACHOL) 20 mg tablet TAKE ONE TABLET BY MOUTH EVERY DAY 1/29/24   Arnold Rodriguez DO   pyridoxine (VITAMIN B6) 100 mg tablet Take 100 mg by mouth daily    Historical Provider, MD CARRILLO have reviewed home  medications using recent Epic encounter.    Allergies:   Allergies   Allergen Reactions    Fentanyl Rash    Niaspan [Niacin Er] Rash    Prednisone Rash       Social History:  Marital Status: /Civil Union   Occupation: na  Patient Pre-hospital Living Situation: Home  Patient Pre-hospital Level of Mobility: walks  Patient Pre-hospital Diet Restrictions: na  Substance Use History:   Social History     Substance and Sexual Activity   Alcohol Use Yes    Comment: socially     Social History     Tobacco Use   Smoking Status Never   Smokeless Tobacco Never     Social History     Substance and Sexual Activity   Drug Use No       Family History:  Family History   Problem Relation Age of Onset    COPD Mother     Heart disease Father     Heart disease Paternal Grandmother     Cancer Family     Colon cancer Neg Hx        Physical Exam:     Vitals:   Blood Pressure: 152/55 (07/01/24 1445)  Pulse: 72 (07/01/24 1445)  Temperature: 98.4 °F (36.9 °C) (07/01/24 1251)  Temp Source: Oral (07/01/24 1445)  Respirations: 19 (07/01/24 1445)  Weight - Scale: 83.5 kg (184 lb) (07/01/24 1251)  SpO2: 97 % (07/01/24 1445)    Physical Exam  Constitutional:       General: She is not in acute distress.     Appearance: Normal appearance. She is not toxic-appearing.   Cardiovascular:      Rate and Rhythm: Normal rate and regular rhythm.      Heart sounds: Normal heart sounds. No murmur heard.  Pulmonary:      Effort: Pulmonary effort is normal. No respiratory distress.      Breath sounds: Normal breath sounds. No wheezing.   Abdominal:      General: Abdomen is flat. There is no distension.      Palpations: Abdomen is soft.      Tenderness: There is no abdominal tenderness.   Neurological:      General: No focal deficit present.      Mental Status: She is alert and oriented to person, place, and time. Mental status is at baseline.      Motor: No weakness.          Additional Data:     Lab Results:  Results from last 7 days   Lab Units  07/01/24  1331   WBC Thousand/uL 7.75   HEMOGLOBIN g/dL 13.3   HEMATOCRIT % 39.8   PLATELETS Thousands/uL 210     Results from last 7 days   Lab Units 07/01/24  1331   SODIUM mmol/L 139   POTASSIUM mmol/L 4.2   CHLORIDE mmol/L 103   CO2 mmol/L 27   BUN mg/dL 15   CREATININE mg/dL 0.63   ANION GAP mmol/L 9   CALCIUM mg/dL 9.6   GLUCOSE RANDOM mg/dL 190*     Results from last 7 days   Lab Units 07/01/24  1331   INR  0.96     Results from last 7 days   Lab Units 07/01/24  1308   POC GLUCOSE mg/dl 194*     Lab Results   Component Value Date    HGBA1C 6.9 (H) 02/10/2024    HGBA1C 6.7 (H) 10/20/2023    HGBA1C 7.3 (H) 04/27/2023           Lines/Drains:  Invasive Devices       Peripheral Intravenous Line  Duration             Peripheral IV 07/01/24 Left Antecubital <1 day                        Imaging: Reviewed radiology reports from this admission including: CT head  CTA stroke alert (head/neck)   Final Result by Anderson Olmos MD (07/01 1434)      CTA head:   -No large vessel occlusion.   -Atherosclerotic calcifications of the intracranial ICAs with moderate to severe right supraclinoid segment narrowing.   -Focal moderate stenosis of the proximal right inferior M2 branch.   -Moderate focal narrowing of the mid left intradural vertebral artery.      CTA neck:   -No high-grade stenosis, dissection or aneurysm.               Findings were directly discussed with Felisa Cuadra  at 2:27 p.m. on 7/1/2024.                           Workstation performed: [id number]         CT stroke alert brain   Final Result by Anderson Olmos MD (07/01 1435)      -Small hypodensity within the right thalamus (2:22) may represent age-indeterminate infarct however appears more subacute to chronic on coronal and sagittal sequences (401:59, 402:48). Correlate with MRI.      Findings were directly discussed with Felisa Cuadra  at approximately 1:55 p.m. on 7/1/2024.      Workstation performed: [id number]         MRI Inpatient Order    (Results  Pending)       EKG and Other Studies Reviewed on Admission:   EKG:  pending upload into epic chart.    ** Please Note: This note has been constructed using a voice recognition system. **

## 2024-07-01 NOTE — CONSULTS
Consultation - Stroke   Michelle Julien 64 y.o. female MRN: [id number]  Unit/Bed#: 2 E 256-01 Encounter: [id number]      Assessment & Plan     Stroke-like symptoms  Assessment & Plan   64 y.o.  female with HTN, HLD, DM, SJ, HFpEF, AS s/p TAVR 2022 on asa, history of breast cancer s/p surgery and radiation who presented to [facility] ED 7/1/24 with gait instability and imbalance. Stroke alert initiated in the ED with NIHSS 0. Initial /74 with . CTH/CTA without acute intracranial findings, right thalamic hypodensity and intracranial ICA atherosclerosis/stenosis noted. Not a TNK candidate secondary to time window. Loaded with ASA 325mg and Plavix 300mg x1 and admitted on the stroke pathway for additional work-up  Neuroimaging/work-up:  7/1/24 CTH:-Small hypodensity within the right thalamus (2:22) may represent age-indeterminate infarct however appears more subacute to chronic on coronal and sagittal sequences (401:59, 402:48). Correlate with MRI.   7/1/24 CTA head:  -No large vessel occlusion.  -Atherosclerotic calcifications of the intracranial ICAs with moderate to severe right supraclinoid segment narrowing.  -Focal moderate stenosis of the proximal right inferior M2 branch.  -Moderate focal narrowing of the mid left intradural vertebral artery.  7/1/24 CTA neck:  -No high-grade stenosis, dissection or aneurysm.  Recommendations:  - Stroke pathway  MRI brain  Echo  Lipid Panel  Hemoglobin A1c  TSH  Continue DAPT with Aspirin 81 mg and Plavix 75mg daily starting 7/2/24  Atorvastatin 40 mg  Permissive HTN  Euglycemic  Continue telemetry  PT/OT/ST  Stroke education  Continue to monitor and notify neurology with any changes.  STAT CT head for any acute change in neuro exam  - Medical management and correction of any metabolic or infectious disturbances per primary service. PRN meclizine/zofran      Diabetes mellitus (HCC)  Assessment & Plan  Lab Results   Component Value Date    HGBA1C 6.9 (H) 02/10/2024  "      Recent Labs     07/01/24  1308   POCGLU 194*       Blood Sugar Average: Last 72 hrs:  (P) 194    Goal euglycemic  A1c pending  Management per primary service    Hyperlipidemia  Assessment & Plan  Continue statin per stroke pathway  Lipid panel pending    Hypertension  Assessment & Plan  Permissive htn per stroke pathway  Management per primary service      Thrombolytic Decision: Patient not a candidate. Unclear time of onset outside appropriate time window.    Recommendations for outpatient neurological follow up have yet to be determined.    Reason for Consult / Principal Problem: stroke alert  Hx and PE limited by: na  Patient last known well: 2230 6/30/24  Stroke alert called: 1322 7/1/24  Neurology time of arrival: 1325 7/1/24  HPI: Michelle Julien is a 64 y.o.  female with HTN, HLD, DM, SJ, HFpEF, AS s/p TAVR 2022 on asa, history of breast cancer s/p surgery and radiation who presented to [facility] ED 7/1/24 with gait instability and imbalance. Stroke alert initiated in the ED with NIHSS 0. Initial /74 with . CTH/CTA without acute intracranial findings, right thalamic hypodensity and intracranial ICA atherosclerosis/stenosis noted. Not a TNK candidate secondary to time window. Loaded with ASA 325mg and Plavix 300mg x1 and admitted on the stroke pathway for additional work-up    Patient states that she was in her usual state of health when she went to sleep last evening around 2230. She woke up this morning around 0830  and was immediately off balance, falling to her left side. She denies any recent illness or change in medication, dizziness, headache, focal motor/sensory deficits, speech or visual disturbance. She has intermittent \"floaters\" at baseline and reports that she feels like her eyes are twitching when she looks to the left. She additionally reports persistent nausea since waking up this morning    Consults    Review of Systems  See HPI    Historical Information   Past Medical History: "   Diagnosis Date    Adhesive capsulitis of right shoulder     CAD (coronary artery disease)     CHF (congestive heart failure) (HCC)     Diabetes mellitus (HCC)     type 2, insulin dependent    Diverticulosis     History of breast cancer     s/p right lumpectomy, s/p radiation    Hyperlipidemia     Hypertension     Lymphedema of right upper extremity     Obesity (BMI 30-39.9)     SJ on CPAP     Severe aortic stenosis     Vitamin D deficiency      Past Surgical History:   Procedure Laterality Date    APPENDECTOMY      BREAST LUMPECTOMY Right     CARDIAC CATHETERIZATION N/A 08/19/2022    Procedure: Cardiac RHC/LHC;  Surgeon: Arnold Mooney DO;  [facility];  Service: Cardiology    CARDIAC CATHETERIZATION N/A 10/11/2022    Procedure: CARDIAC TAVR;  Surgeon: Arnold Mooney DO;  [facility];  Service: Cardiology    CHOLECYSTECTOMY      KNEE ARTHROSCOPY Right     knee    [place] REPLACE AORTIC VALVE OPENFEMORAL ARTERY APPROACH N/A 10/11/2022    Procedure: REPLACEMENT AORTIC VALVE TRANSCATHETER (TAVR) TRANSFEMORAL W/ 23MM MILIAN ROSS S3 ULTRA VALVE(ACCESS ON LEFT) TRAY;  Surgeon: Handy Rushing MD;  [facility];  Service: Cardiac Surgery    TONSILLECTOMY AND ADENOIDECTOMY      TOTAL ABDOMINAL HYSTERECTOMY       Social History   Social History     Substance and Sexual Activity   Alcohol Use Yes    Comment: socially     Social History     Substance and Sexual Activity   Drug Use No     E-Cigarette/Vaping    E-Cigarette Use Never User      E-Cigarette/Vaping Substances    Nicotine No     THC No     CBD No     Flavoring No      Social History     Tobacco Use   Smoking Status Never   Smokeless Tobacco Never     Family History:   Family History   Problem Relation Age of Onset    COPD Mother     Heart disease Father     Heart disease Paternal Grandmother     Cancer Family     Colon cancer Neg Hx        Review of previous medical records was completed.     Meds/Allergies   all  current active meds have been reviewed    Allergies   Allergen Reactions    Fentanyl Rash    Niaspan [Niacin Er] Rash    Prednisone Rash       Objective   Vitals:Blood pressure 160/68, pulse 70, temperature 98.4 °F (36.9 °C), temperature source Oral, resp. rate 20, weight 83.5 kg (184 lb), SpO2 95%.,Body mass index is 33.65 kg/m².  No intake or output data in the 24 hours ending 07/01/24 1607    Invasive Devices:   Invasive Devices       Peripheral Intravenous Line  Duration             Peripheral IV 07/01/24 Left Antecubital <1 day                    Physical Exam  Vitals reviewed.   Constitutional:       General: She is not in acute distress.  HENT:      Head: Normocephalic and atraumatic.   Pulmonary:      Effort: Pulmonary effort is normal.   Skin:     General: Skin is warm and dry.   Neurological:      Mental Status: She is alert and oriented to person, place, and time.      Cranial Nerves: Cranial nerves 2-12 are intact.      Coordination: Romberg Test abnormal. Finger-Nose-Finger Test and Heel to Shin Test normal.   Psychiatric:         Speech: Speech normal.       Neurologic Exam     Mental Status   Oriented to person, place, and time.   Follows 1 step commands.   Attention: normal. Concentration: normal.   Speech: speech is normal   Level of consciousness: alert  Able to name object. Able to repeat.     Cranial Nerves   Cranial nerves II through XII intact.     CN III, IV, VI   Nystagmus: none     Motor Exam     Strength   Strength 5/5 except as noted. LUE 5-/5 (at baseline per patient secondary to prior injury)  LLE knee flexion 5-/5(also at baseline per patient)     Sensory Exam   Light touch normal.   Pinprick normal.     Gait, Coordination, and Reflexes     Coordination   Romberg: positive  Finger to nose coordination: normal  Heel to shin coordination: normal    Tremor   Resting tremor: absentGait assisted with +romberg, falling to the left       NIHSS:  1a.Level of Consciousness: 0 = Alert   1b. LOC  Questions: 0 = Answers both correctly   1c. LOC Commands: 0 = Obeys both correctly   2. Best Gaze: 0 = Normal   3. Visual: 0 = No visual field loss   4. Facial Palsy: 0=Normal symmetric movement   5a. Motor Right Arm: 0=No drift, limb holds 90 (or 45) degrees for full 10 seconds   5b. Motor Left Arm: 0=No drift, limb holds 90 (or 45) degrees for full 10 seconds   6a. Motor Right Le=No drift, limb holds 90 (or 45) degrees for full 10 seconds   6b. Motor Left Le=No drift, limb holds 90 (or 45) degrees for full 10 seconds   7. Limb Ataxia:  0=Absent   8. Sensory: 0=Normal; no sensory loss   9. Best Language:  0=No aphasia, normal   10. Dysarthria: 0=Normal articulation   11. Extinction and Inattention (formerly Neglect): 0=No abnormality   Total Score: 0     Time NIHSS was completed: 1345    Modified Jose Antonio Score:  0 (No baseline symptoms/disability)    Lab Results: I have personally reviewed pertinent reports.      Imaging Studies: I have personally reviewed pertinent reports.   and I have personally reviewed pertinent films in PACS    EKG, Pathology, and Other Studies: I have personally reviewed pertinent reports.      Code Status: Level 1 - Full Code    Counseling / Coordination of Care  Assessment, images and plan reviewed with Dr. Cuadra. Plan discussed with patient and  at bedside, and ED provider. Please refer to attending attestation for additional recommendations  Critical Care Time Statement: Upon my evaluation, this patient had a high probability of imminent or life-threatening deterioration due to suspected AIS, which required my direct attention, intervention, and personal management.  I spent a total of 35 minutes directly providing critical care services, including interpretation of complex medical databases and evaluating for the presence of life-threatening injuries or illnesses. This time is exclusive of procedures, teaching, family meetings, and any prior time recorded by providers  other than myself.

## 2024-07-01 NOTE — PLAN OF CARE
Problem: PAIN - ADULT  Goal: Verbalizes/displays adequate comfort level or baseline comfort level  Description: Interventions:  - Encourage patient to monitor pain and request assistance  - Assess pain using appropriate pain scale  - Administer analgesics based on type and severity of pain and evaluate response  - Implement non-pharmacological measures as appropriate and evaluate response  - Consider cultural and social influences on pain and pain management  - Notify physician/advanced practitioner if interventions unsuccessful or patient reports new pain  Outcome: Progressing     Problem: SAFETY ADULT  Goal: Patient will remain free of falls  Description: INTERVENTIONS:  - Educate patient/family on patient safety including physical limitations  - Instruct patient to call for assistance with activity   - Consult OT/PT to assist with strengthening/mobility   - Keep Call bell within reach  - Keep bed low and locked with side rails adjusted as appropriate  - Keep care items and personal belongings within reach  - Initiate and maintain comfort rounds  - Make Fall Risk Sign visible to staff  - Offer Toileting in advance of need  - Initiate/Maintain alarm  - Obtain necessary fall risk management equipment.  - Apply yellow socks and bracelet for high fall risk patients  - Consider moving patient to room near nurses station  Outcome: Progressing  Goal: Maintain or return to baseline ADL function  Description: INTERVENTIONS:  -  Assess patient's ability to carry out ADLs; assess patient's baseline for ADL function and identify physical deficits which impact ability to perform ADLs (bathing, care of mouth/teeth, toileting, grooming, dressing, etc.)  - Assess/evaluate cause of self-care deficits   - Assess range of motion  - Assess patient's mobility; develop plan if impaired  - Assess patient's need for assistive devices and provide as appropriate  - Encourage maximum independence but intervene and supervise when  necessary  - Involve family in performance of ADLs  - Assess for home care needs following discharge   - Consider OT consult to assist with ADL evaluation and planning for discharge  - Provide patient education as appropriate  Outcome: Progressing  Goal: Maintains/Returns to pre admission functional level  Description: INTERVENTIONS:  - Perform AM-PAC 6 Click Basic Mobility/ Daily Activity assessment daily.  - Set and communicate daily mobility goal to care team and patient/family/caregiver.   - Collaborate with rehabilitation services on mobility goals if consulted  - Perform Range of Motion 3 times a day.  - Ambulate patient 3 times a day  - Out of bed to chair 3 times a day   - Out of bed for meals 3 times a day  - Out of bed for toileting  - Record patient progress and toleration of activity level   Outcome: Progressing     Problem: DISCHARGE PLANNING  Goal: Discharge to home or other facility with appropriate resources  Description: INTERVENTIONS:  - Identify barriers to discharge w/patient and caregiver  - Arrange for needed discharge resources and transportation as appropriate  - Identify discharge learning needs (meds, wound care, etc.)  - Arrange for interpretive services to assist at discharge as needed  - Refer to Case Management Department for coordinating discharge planning if the patient needs post-hospital services based on physician/advanced practitioner order or complex needs related to functional status, cognitive ability, or social support system  Outcome: Progressing     Problem: Knowledge Deficit  Goal: Patient/family/caregiver demonstrates understanding of disease process, treatment plan, medications, and discharge instructions  Description: Complete learning assessment and assess knowledge base.  Interventions:  - Provide teaching at level of understanding  - Provide teaching via preferred learning methods  Outcome: Progressing     Problem: NEUROSENSORY - ADULT  Goal: Achieves stable or  improved neurological status  Description: INTERVENTIONS  - Monitor and report changes in neurological status  - Monitor vital signs such as temperature, blood pressure, glucose, and any other labs ordered   - Initiate measures to prevent increased intracranial pressure  - Monitor for seizure activity and implement precautions if appropriate      Outcome: Progressing  Goal: Remains free of injury related to seizures activity  Description: INTERVENTIONS  - Maintain airway, patient safety  and administer oxygen as ordered  - Monitor patient for seizure activity, document and report duration and description of seizure to physician/advanced practitioner  - If seizure occurs,  ensure patient safety during seizure  - Reorient patient post seizure  - Seizure pads on all 4 side rails  - Instruct patient/family to notify RN of any seizure activity including if an aura is experienced  - Instruct patient/family to call for assistance with activity based on nursing assessment  - Administer anti-seizure medications if ordered    Outcome: Progressing  Goal: Achieves maximal functionality and self care  Description: INTERVENTIONS  - Monitor swallowing and airway patency with patient fatigue and changes in neurological status  - Encourage and assist patient to increase activity and self care.   - Encourage visually impaired, hearing impaired and aphasic patients to use assistive/communication devices  Outcome: Progressing

## 2024-07-01 NOTE — ASSESSMENT & PLAN NOTE
Not a TNK candidate due to last known well  CTA imaging did show some chronic findings, refer to report  No obvious imaging findings of acute CVA  Neurology recommending stroke pathway  Loaded with aspirin/plavix  Start statin, follow up MRI brain  Add meclizine

## 2024-07-01 NOTE — ED PROVIDER NOTES
History  Chief Complaint   Patient presents with    Dizziness     Pt reports dizziness that began around 0830 this am. Went to bed last night asymptomatic. Denies hx of CVA or vertigo. C/ feeling flushed and nauseous. Reports slight weakness of left arm.      This is a 64-year-old female presenting to the department with a chief complaint of balance problems.  She woke up around 08 30 reports when she stood up she felt very off balance as if she was going to fall to the left.  Since that time she has had persistent symptoms symptoms are exacerbated by position change but never truly go away.  She has some associated nausea.  No fever no trauma.  She denies any sense of weakness.          Prior to Admission Medications   Prescriptions Last Dose Informant Patient Reported? Taking?   Cholecalciferol (VITAMIN D3) 1000 UNITS CAPS  Self Yes No   Sig: Take 2,000 Units by mouth daily   Cyanocobalamin (VITAMIN B12 PO)  Self Yes No   Sig: Take 2,500 mcg by mouth daily   Empagliflozin-metFORMIN HCl ER (Synjardy XR)  MG TB24  Self Yes No   Sig: Take 1 capsule by mouth daily. Indications: Type 2 Diabetes   Icosapent Ethyl (VASCEPA PO)  Self Yes No   Sig: Take 2 capsules by mouth daily   Insulin Glargine (TOUJEO) 300 units/mL CONCETRATED U-300 injection pen  Self Yes No   Sig: Inject 20 Units under the skin daily at bedtime   aspirin 81 mg chewable tablet  Self Yes No   Sig: Chew 81 mg daily   carvedilol (COREG) 6.25 mg tablet  Self No No   Sig: TAKE ONE TABLET BY MOUTH TWICE A DAY WITH MEALS   co-enzyme Q-10 30 mg  Self Yes No   Sig: Take 30 mg by mouth daily   glimepiride (AMARYL) 2 mg tablet  Self Yes No   Sig: Take 4 mg by mouth 2 (two) times a day    lisinopril (ZESTRIL) 5 mg tablet   No No   Sig: TAKE ONE TABLET BY MOUTH EVERY DAY   methimazole (TAPAZOLE) 5 mg tablet  Self Yes No   Sig: Take 1 tablet by mouth every other day   multivitamin (THERAGRAN) TABS  Self Yes No   Sig: Take 1 tablet by mouth daily    pantoprazole (PROTONIX) 40 mg tablet   No No   Sig: Take 1 tablet (40 mg total) by mouth daily   pravastatin (PRAVACHOL) 20 mg tablet  Self No No   Sig: TAKE ONE TABLET BY MOUTH EVERY DAY   pyridoxine (VITAMIN B6) 100 mg tablet  Self Yes No   Sig: Take 100 mg by mouth daily      Facility-Administered Medications: None       Past Medical History:   Diagnosis Date    Adhesive capsulitis of right shoulder     CAD (coronary artery disease)     CHF (congestive heart failure) (HCC)     Diabetes mellitus (HCC)     type 2, insulin dependent    Diverticulosis     History of breast cancer     s/p right lumpectomy, s/p radiation    Hyperlipidemia     Hypertension     Lymphedema of right upper extremity     Obesity (BMI 30-39.9)     SJ on CPAP     Severe aortic stenosis     Vitamin D deficiency        Past Surgical History:   Procedure Laterality Date    APPENDECTOMY      BREAST LUMPECTOMY Right     CARDIAC CATHETERIZATION N/A 08/19/2022    Procedure: Cardiac RHC/LHC;  Surgeon: Arnold Mooney DO;  Location: [facility];  Service: Cardiology    CARDIAC CATHETERIZATION N/A 10/11/2022    Procedure: CARDIAC TAVR;  Surgeon: Arnold Mooney DO;  Location: [facility];  Service: Cardiology    CHOLECYSTECTOMY      KNEE ARTHROSCOPY Right     knee    LA REPLACE AORTIC VALVE OPENFEMORAL ARTERY APPROACH N/A 10/11/2022    Procedure: REPLACEMENT AORTIC VALVE TRANSCATHETER (TAVR) TRANSFEMORAL W/ 23MM MILIAN ROSS S3 ULTRA VALVE(ACCESS ON LEFT) TRAY;  Surgeon: Handy Rushing MD;  Location: [facility];  Service: Cardiac Surgery    TONSILLECTOMY AND ADENOIDECTOMY      TOTAL ABDOMINAL HYSTERECTOMY         Family History   Problem Relation Age of Onset    COPD Mother     Heart disease Father     Heart disease Paternal Grandmother     Cancer Family     Colon cancer Neg Hx      I have reviewed and agree with the history as documented.    E-Cigarette/Vaping    E-Cigarette Use Never User      E-Cigarette/Vaping Substances     Nicotine No     THC No     CBD No     Flavoring No      Social History     Tobacco Use    Smoking status: Never    Smokeless tobacco: Never   Vaping Use    Vaping status: Never Used   Substance Use Topics    Alcohol use: Yes     Comment: socially    Drug use: No       Review of Systems   Constitutional:  Negative for diaphoresis, fatigue and fever.   HENT:  Negative for congestion, ear pain, nosebleeds and sore throat.    Eyes:  Negative for photophobia, pain, discharge and visual disturbance.   Respiratory:  Negative for cough, choking, chest tightness, shortness of breath and wheezing.    Cardiovascular:  Negative for chest pain and palpitations.   Gastrointestinal:  Negative for abdominal distention, abdominal pain, diarrhea and vomiting.   Genitourinary:  Negative for dysuria, flank pain and frequency.   Musculoskeletal:  Negative for back pain, gait problem and joint swelling.   Skin:  Negative for color change and rash.   Neurological:  Positive for dizziness. Negative for syncope and headaches.   Psychiatric/Behavioral:  Negative for behavioral problems and confusion. The patient is not nervous/anxious.    All other systems reviewed and are negative.      Physical Exam  Physical Exam  Vitals and nursing note reviewed.   Constitutional:       General: She is not in acute distress.     Appearance: She is well-developed. She is not ill-appearing or toxic-appearing.   HENT:      Head: Normocephalic and atraumatic.      Nose: No rhinorrhea.      Mouth/Throat:      Mouth: Mucous membranes are moist.      Dentition: Normal dentition.   Eyes:      General: No visual field deficit.        Right eye: No discharge.         Left eye: No discharge.   Cardiovascular:      Rate and Rhythm: Normal rate and regular rhythm.   Pulmonary:      Effort: Pulmonary effort is normal. No accessory muscle usage or respiratory distress.   Abdominal:      General: There is no distension.      Tenderness: There is no guarding.    Musculoskeletal:         General: Normal range of motion.      Cervical back: Normal range of motion and neck supple. No rigidity.   Skin:     General: Skin is warm and dry.   Neurological:      Mental Status: She is alert and oriented to person, place, and time.      Cranial Nerves: No dysarthria or facial asymmetry.      Sensory: No sensory deficit.      Motor: No weakness, tremor, abnormal muscle tone or pronator drift.      Coordination: Romberg sign positive. Coordination normal. Finger-Nose-Finger Test and Heel to Shin Test normal. Rapid alternating movements normal.   Psychiatric:         Behavior: Behavior is cooperative.         Vital Signs  ED Triage Vitals [07/01/24 1251]   Temperature Pulse Respirations Blood Pressure SpO2   98.4 °F (36.9 °C) 73 18 (!) 187/74 96 %      Temp Source Heart Rate Source Patient Position - Orthostatic VS BP Location FiO2 (%)   Oral Monitor Sitting Left arm --      Pain Score       No Pain           Vitals:    07/01/24 1435 07/01/24 1440 07/01/24 1445 07/01/24 1500   BP:   152/55 160/68   Pulse: 70 71 72 70   Patient Position - Orthostatic VS:   Lying Lying         Visual Acuity  Visual Acuity      Flowsheet Row Most Recent Value   L Pupil Size (mm) 3   R Pupil Size (mm) 3            ED Medications  Medications   aspirin chewable tablet 81 mg (has no administration in time range)   atorvastatin (LIPITOR) tablet 40 mg (has no administration in time range)   heparin (porcine) subcutaneous injection 5,000 Units (5,000 Units Subcutaneous Given 7/1/24 1519)   meclizine (ANTIVERT) tablet 12.5 mg (12.5 mg Oral Given 7/1/24 1519)   insulin lispro (HumALOG/ADMELOG) 100 units/mL subcutaneous injection 1-6 Units (has no administration in time range)   insulin lispro (HumALOG/ADMELOG) 100 units/mL subcutaneous injection 1-6 Units (has no administration in time range)   iohexol (OMNIPAQUE) 350 MG/ML injection (MULTI-DOSE) 85 mL (85 mL Intravenous Given 7/1/24 1349)   clopidogrel (PLAVIX)  tablet 300 mg (300 mg Oral Given 7/1/24 1441)   aspirin chewable tablet 324 mg (324 mg Oral Given 7/1/24 1441)       Diagnostic Studies  Results Reviewed       Procedure Component Value Units Date/Time    Lipid Panel with Direct LDL reflex [[id number]] Collected: 07/01/24 1331    Lab Status: In process Specimen: Blood from Arm, Left Updated: 07/01/24 1518    Hemoglobin A1c w/EAG Estimation [[id number]] Collected: 07/01/24 1331    Lab Status: In process Specimen: Blood from Arm, Left Updated: 07/01/24 1518    FLU/RSV/COVID - if FLU/RSV clinically relevant [[id number]] Collected: 07/01/24 1453    Lab Status: In process Specimen: Nares from Nose Updated: 07/01/24 1457    HS Troponin I 2hr [[id number]]     Lab Status: No result Specimen: Blood     HS Troponin 0hr (reflex protocol) [[id number]]  (Normal) Collected: 07/01/24 1331    Lab Status: Final result Specimen: Blood from Arm, Left Updated: 07/01/24 1437     hs TnI 0hr 8 ng/L     Protime-INR [[id number]]  (Normal) Collected: 07/01/24 1331    Lab Status: Final result Specimen: Blood from Arm, Left Updated: 07/01/24 1431     Protime 13.3 seconds      INR 0.96    APTT [[id number]]  (Normal) Collected: 07/01/24 1331    Lab Status: Final result Specimen: Blood from Arm, Left Updated: 07/01/24 1431     PTT 33 seconds     Basic metabolic panel [[id number]]  (Abnormal) Collected: 07/01/24 1331    Lab Status: Final result Specimen: Blood from Arm, Left Updated: 07/01/24 1429     Sodium 139 mmol/L      Potassium 4.2 mmol/L      Chloride 103 mmol/L      CO2 27 mmol/L      ANION GAP 9 mmol/L      BUN 15 mg/dL      Creatinine 0.63 mg/dL      Glucose 190 mg/dL      Calcium 9.6 mg/dL      eGFR 93 ml/min/1.73sq m     Narrative:      [employer] guidelines for Chronic Kidney Disease (CKD):     Stage 1 with normal or high GFR (GFR > 90 mL/min/1.73 square meters)    Stage 2 Mild CKD (GFR = 60-89 mL/min/1.73 square meters)    Stage 3A Moderate CKD (GFR = 45-59  mL/min/1.73 square meters)    Stage 3B Moderate CKD (GFR = 30-44 mL/min/1.73 square meters)    Stage 4 Severe CKD (GFR = 15-29 mL/min/1.73 square meters)    Stage 5 End Stage CKD (GFR <15 mL/min/1.73 square meters)  Note: GFR calculation is accurate only with a steady state creatinine    CBC and Platelet [[id number]]  (Normal) Collected: 07/01/24 1331    Lab Status: Final result Specimen: Blood from Arm, Left Updated: 07/01/24 1403     WBC 7.75 Thousand/uL      RBC 4.54 Million/uL      Hemoglobin 13.3 g/dL      Hematocrit 39.8 %      MCV 88 fL      MCH 29.3 pg      MCHC 33.4 g/dL      RDW 13.4 %      Platelets 210 Thousands/uL      MPV 10.8 fL     Fingerstick Glucose (POCT) [[id number]]  (Abnormal) Collected: 07/01/24 1308    Lab Status: Final result Specimen: Blood Updated: 07/01/24 1310     POC Glucose 194 mg/dl                    CTA stroke alert (head/neck)   Final Result by Anderson Olmos MD (07/01 1434)      CTA head:   -No large vessel occlusion.   -Atherosclerotic calcifications of the intracranial ICAs with moderate to severe right supraclinoid segment narrowing.   -Focal moderate stenosis of the proximal right inferior M2 branch.   -Moderate focal narrowing of the mid left intradural vertebral artery.      CTA neck:   -No high-grade stenosis, dissection or aneurysm.               Findings were directly discussed with Felisa Cuadra  at 2:27 p.m. on 7/1/2024.                           Workstation performed: [id number]         CT stroke alert brain   Final Result by Anderson Olmos MD (07/01 1435)      -Small hypodensity within the right thalamus (2:22) may represent age-indeterminate infarct however appears more subacute to chronic on coronal and sagittal sequences (401:59, 402:48). Correlate with MRI.      Findings were directly discussed with Felisa Cuadra  at approximately 1:55 p.m. on 7/1/2024.      Workstation performed: [id number]         MRI Inpatient Order    (Results Pending)               Procedures  Procedures         ED Course  ED Course as of 07/01/24 1524   Mon Jul 01, 2024   1335 Neurology at the bedside doing evaluation.                  Stroke Assessment       Row Name 07/01/24 1430             NIH Stroke Scale    Interval Baseline      Level of Consciousness (1a.) 0      LOC Questions (1b.) 0      LOC Commands (1c.) 0      Best Gaze (2.) 0      Visual (3.) 0      Facial Palsy (4.) 0      Motor Arm, Left (5a.) 0      Motor Arm, Right (5b.) 0      Motor Leg, Left (6a.) 0      Motor Leg, Right (6b.) 0      Limb Ataxia (7.) 0      Sensory (8.) 0      Best Language (9.) 0      Dysarthria (10.) 0      Extinction and Inattention (11.) (Formerly Neglect) 0      Total 0                     Stroke Assessment       Row Name 07/01/24 1430             NIH Stroke Scale    Interval Baseline      Level of Consciousness (1a.) 0      LOC Questions (1b.) 0      LOC Commands (1c.) 0      Best Gaze (2.) 0      Visual (3.) 0      Facial Palsy (4.) 0      Motor Arm, Left (5a.) 0      Motor Arm, Right (5b.) 0      Motor Leg, Left (6a.) 0      Motor Leg, Right (6b.) 0      Limb Ataxia (7.) 0      Sensory (8.) 0      Best Language (9.) 0      Dysarthria (10.) 0      Extinction and Inattention (11.) (Formerly Neglect) 0      Total 0                                SBIRT 20yo+      Flowsheet Row Most Recent Value   Initial Alcohol Screen: US AUDIT-C     1. How often do you have a drink containing alcohol? 0 Filed at: 07/01/2024 1316   2. How many drinks containing alcohol do you have on a typical day you are drinking?  0 Filed at: 07/01/2024 1316   3b. FEMALE Any Age, or MALE 65+: How often do you have 4 or more drinks on one occassion? 0 Filed at: 07/01/2024 1316   Audit-C Score 0 Filed at: 07/01/2024 1316   FERNANDO: How many times in the past year have you...    Used an illegal drug or used a prescription medication for non-medical reasons? Never Filed at: 07/01/2024 1316                      Medical Decision  Making  Patient with positive Romberg test is concerning for posterior stroke.  Will bring in for continued stroke workup.    Amount and/or Complexity of Data Reviewed  Labs: ordered.  Radiology: ordered.    Risk  OTC drugs.  Prescription drug management.  Decision regarding hospitalization.             Disposition  Final diagnoses:   Balance problem   Severe aortic stenosis     Time reflects when diagnosis was documented in both MDM as applicable and the Disposition within this note       Time User Action Codes Description Comment    7/1/2024  1:19 PM Rodrigo Witt Add [R26.89] Balance problem     7/1/2024  1:19 PM Rodrigo Witt Add [I35.0] Severe aortic stenosis           ED Disposition       ED Disposition   Admit    Condition   Stable    Date/Time   Mon Jul 1, 2024 3682    Comment   Case was discussed with Jayshree and the patient's admission status was agreed to be Admission Status: observation status to the service of Dr. Leonardo .               Follow-up Information    None         Patient's Medications   Discharge Prescriptions    No medications on file       No discharge procedures on file.    PDMP Review         Value Time User    PDMP Reviewed  Yes 7/21/2021  1:37 AM Kiran Nugent MD            ED Provider  Electronically Signed by             AUSTIN Mann  07/01/24 6259

## 2024-07-01 NOTE — ASSESSMENT & PLAN NOTE
64 y.o.  female with HTN, HLD, DM, SJ, HFpEF, AS s/p TAVR 2022 on asa, history of breast cancer s/p surgery and radiation who presented to [facility] ED 7/1/24 with gait instability and imbalance. Stroke alert initiated in the ED with NIHSS 0. Initial /74 with . CTH/CTA without acute intracranial findings, right thalamic hypodensity and intracranial ICA atherosclerosis/stenosis noted. Not a TNK candidate secondary to time window. Loaded with ASA 325mg and Plavix 300mg x1 and admitted on the stroke pathway for additional work-up  Neuroimaging/work-up:  7/1/24 CTH:-Small hypodensity within the right thalamus (2:22) may represent age-indeterminate infarct however appears more subacute to chronic on coronal and sagittal sequences (401:59, 402:48). Correlate with MRI.   7/1/24 CTA head:  -No large vessel occlusion.  -Atherosclerotic calcifications of the intracranial ICAs with moderate to severe right supraclinoid segment narrowing.  -Focal moderate stenosis of the proximal right inferior M2 branch.  -Moderate focal narrowing of the mid left intradural vertebral artery.  7/1/24 CTA neck:  -No high-grade stenosis, dissection or aneurysm.  7/2/24 MRI brain:No acute intracranial pathology. Mild chronic microangiopathy.   7/2/24 ECHO:    Left Ventricle: Left ventricular cavity size is normal. Wall thickness is normal. The left ventricular ejection fraction is 55%. Systolic function is normal. Wall motion is normal. Diastolic function is mildly abnormal, consistent with grade I (abnormal) relaxation.    Aortic Valve: There is a TAVR bioprosthetic valve. There is no evidence of paravalvular regurgitation. The gradient recorded across the prosthetic aortic valve is within the expected range. Mean aortic valve gradient of 13 mmHg    Mitral Valve: There is moderate annular calcification. There is mild regurgitation.      A1c 6.8  Recommendations:  MRI negative for acute stroke; exam consistent with BPPV  D/C  plavix; continue home asa/statin as previously prescribed  PT/OT  Continue meclizine PRN; Consider vestibular therapy if symptoms persist  No additional inpatient neurology recommendations

## 2024-07-01 NOTE — PROGRESS NOTES
On-Duty met with the patient's  while pt was in CT scan. He is calm and supportive of the need to RO any concerns. Provided hospitality and will remain available as needed.        07/01/24 1340   Clinical Encounter Type   Visited With Family   Crisis Visit ED   Referral From Nurse   Referral To

## 2024-07-02 ENCOUNTER — APPOINTMENT (OUTPATIENT)
Dept: MRI IMAGING | Facility: HOSPITAL | Age: 64
End: 2024-07-02
Payer: COMMERCIAL

## 2024-07-02 ENCOUNTER — APPOINTMENT (OUTPATIENT)
Dept: NON INVASIVE DIAGNOSTICS | Facility: HOSPITAL | Age: 64
End: 2024-07-02
Payer: COMMERCIAL

## 2024-07-02 VITALS
OXYGEN SATURATION: 91 % | HEIGHT: 63 IN | HEART RATE: 75 BPM | DIASTOLIC BLOOD PRESSURE: 61 MMHG | RESPIRATION RATE: 18 BRPM | TEMPERATURE: 98 F | WEIGHT: 171 LBS | BODY MASS INDEX: 30.3 KG/M2 | SYSTOLIC BLOOD PRESSURE: 134 MMHG

## 2024-07-02 PROBLEM — E11.65 TYPE 2 DIABETES MELLITUS WITH HYPERGLYCEMIA, WITHOUT LONG-TERM CURRENT USE OF INSULIN (HCC): Status: ACTIVE | Noted: 2019-06-05

## 2024-07-02 LAB
ALBUMIN SERPL BCG-MCNC: 4 G/DL (ref 3.5–5)
ALP SERPL-CCNC: 87 U/L (ref 34–104)
ALT SERPL W P-5'-P-CCNC: 17 U/L (ref 7–52)
ANION GAP SERPL CALCULATED.3IONS-SCNC: 9 MMOL/L (ref 4–13)
AORTIC ROOT: 3.3 CM
AORTIC VALVE MEAN VELOCITY: 17.4 M/S
APICAL FOUR CHAMBER EJECTION FRACTION: 56 %
ASCENDING AORTA: 3.5 CM
AST SERPL W P-5'-P-CCNC: 16 U/L (ref 13–39)
ATRIAL RATE: 75 BPM
AV AREA BY CONTINUOUS VTI: 1.7 CM2
AV AREA PEAK VELOCITY: 180.6 CM2
AV LVOT MEAN GRADIENT: 3 MMHG
AV LVOT PEAK GRADIENT: 5 MMHG
AV MEAN GRADIENT: 13 MMHG
AV PEAK GRADIENT: 23 MMHG
AV VALVE AREA: 1.68 CM2
AV VELOCITY RATIO: 0.48
BASOPHILS # BLD AUTO: 0.06 THOUSANDS/ÂΜL (ref 0–0.1)
BASOPHILS NFR BLD AUTO: 1 % (ref 0–1)
BILIRUB SERPL-MCNC: 0.39 MG/DL (ref 0.2–1)
BSA FOR ECHO PROCEDURE: 1.81 M2
BUN SERPL-MCNC: 14 MG/DL (ref 5–25)
CALCIUM SERPL-MCNC: 9.1 MG/DL (ref 8.4–10.2)
CHLORIDE SERPL-SCNC: 104 MMOL/L (ref 96–108)
CO2 SERPL-SCNC: 26 MMOL/L (ref 21–32)
CREAT SERPL-MCNC: 0.63 MG/DL (ref 0.6–1.3)
DOP CALC AO PEAK VEL: 2.4 M/S
DOP CALC AO VTI: 56.84 CM
DOP CALC LVOT AREA: 3.14 CM2
DOP CALC LVOT CARDIAC INDEX: 3.84 L/MIN/M2
DOP CALC LVOT CARDIAC OUTPUT: 6.95 L/MIN
DOP CALC LVOT DIAMETER: 2 CM
DOP CALC LVOT PEAK VEL VTI: 30.38 CM
DOP CALC LVOT PEAK VEL: 1.15 M/S
DOP CALC LVOT STROKE INDEX: 53 ML/M2
DOP CALC LVOT STROKE VOLUME: 95.39
E WAVE DECELERATION TIME: 238 MS
E/A RATIO: 0.76
EOSINOPHIL # BLD AUTO: 0.15 THOUSAND/ÂΜL (ref 0–0.61)
EOSINOPHIL NFR BLD AUTO: 3 % (ref 0–6)
ERYTHROCYTE [DISTWIDTH] IN BLOOD BY AUTOMATED COUNT: 13.5 % (ref 11.6–15.1)
EST. AVERAGE GLUCOSE BLD GHB EST-MCNC: 148 MG/DL
FRACTIONAL SHORTENING: 37 (ref 28–44)
GFR SERPL CREATININE-BSD FRML MDRD: 93 ML/MIN/1.73SQ M
GLUCOSE P FAST SERPL-MCNC: 99 MG/DL (ref 65–99)
GLUCOSE SERPL-MCNC: 124 MG/DL (ref 65–140)
GLUCOSE SERPL-MCNC: 258 MG/DL (ref 65–140)
GLUCOSE SERPL-MCNC: 99 MG/DL (ref 65–140)
HBA1C MFR BLD: 6.8 %
HCT VFR BLD AUTO: 38.1 % (ref 34.8–46.1)
HGB BLD-MCNC: 12.5 G/DL (ref 11.5–15.4)
IMM GRANULOCYTES # BLD AUTO: 0.02 THOUSAND/UL (ref 0–0.2)
IMM GRANULOCYTES NFR BLD AUTO: 0 % (ref 0–2)
INTERVENTRICULAR SEPTUM IN DIASTOLE (PARASTERNAL SHORT AXIS VIEW): 1.3 CM
INTERVENTRICULAR SEPTUM: 1.3 CM (ref 0.6–1.1)
LAAS-AP2: 18.5 CM2
LAAS-AP4: 18.1 CM2
LEFT ATRIUM SIZE: 3.8 CM
LEFT ATRIUM VOLUME (MOD BIPLANE): 53 ML
LEFT ATRIUM VOLUME INDEX (MOD BIPLANE): 29.3 ML/M2
LEFT INTERNAL DIMENSION IN SYSTOLE: 3.1 CM (ref 2.1–4)
LEFT VENTRICULAR INTERNAL DIMENSION IN DIASTOLE: 4.9 CM (ref 3.5–6)
LEFT VENTRICULAR POSTERIOR WALL IN END DIASTOLE: 1.2 CM
LEFT VENTRICULAR STROKE VOLUME: 75 ML
LVSV (TEICH): 75 ML
LYMPHOCYTES # BLD AUTO: 1.05 THOUSANDS/ÂΜL (ref 0.6–4.47)
LYMPHOCYTES NFR BLD AUTO: 20 % (ref 14–44)
MCH RBC QN AUTO: 28.9 PG (ref 26.8–34.3)
MCHC RBC AUTO-ENTMCNC: 32.8 G/DL (ref 31.4–37.4)
MCV RBC AUTO: 88 FL (ref 82–98)
MONOCYTES # BLD AUTO: 0.48 THOUSAND/ÂΜL (ref 0.17–1.22)
MONOCYTES NFR BLD AUTO: 9 % (ref 4–12)
MV E'TISSUE VEL-SEP: 5 CM/S
MV PEAK A VEL: 1.05 M/S
MV PEAK E VEL: 80 CM/S
MV STENOSIS PRESSURE HALF TIME: 69 MS
MV VALVE AREA P 1/2 METHOD: 3.19
NEUTROPHILS # BLD AUTO: 3.54 THOUSANDS/ÂΜL (ref 1.85–7.62)
NEUTS SEG NFR BLD AUTO: 67 % (ref 43–75)
NRBC BLD AUTO-RTO: 0 /100 WBCS
P AXIS: 39 DEGREES
PLATELET # BLD AUTO: 182 THOUSANDS/UL (ref 149–390)
PMV BLD AUTO: 11 FL (ref 8.9–12.7)
POTASSIUM SERPL-SCNC: 3.9 MMOL/L (ref 3.5–5.3)
PR INTERVAL: 172 MS
PROT SERPL-MCNC: 7 G/DL (ref 6.4–8.4)
QRS AXIS: 67 DEGREES
QRSD INTERVAL: 146 MS
QT INTERVAL: 430 MS
QTC INTERVAL: 480 MS
RBC # BLD AUTO: 4.33 MILLION/UL (ref 3.81–5.12)
RIGHT ATRIUM AREA SYSTOLE A4C: 14.1 CM2
RIGHT VENTRICLE ID DIMENSION: 3.3 CM
SL CV LEFT ATRIUM LENGTH A2C: 5.4 CM
SL CV LV EF: 55
SL CV PED ECHO LEFT VENTRICLE DIASTOLIC VOLUME (MOD BIPLANE) 2D: 114 ML
SL CV PED ECHO LEFT VENTRICLE SYSTOLIC VOLUME (MOD BIPLANE) 2D: 39 ML
SODIUM SERPL-SCNC: 139 MMOL/L (ref 135–147)
T WAVE AXIS: 104 DEGREES
TRICUSPID ANNULAR PLANE SYSTOLIC EXCURSION: 2.4 CM
VENTRICULAR RATE: 75 BPM
WBC # BLD AUTO: 5.3 THOUSAND/UL (ref 4.31–10.16)

## 2024-07-02 PROCEDURE — 82948 REAGENT STRIP/BLOOD GLUCOSE: CPT

## 2024-07-02 PROCEDURE — 94760 N-INVAS EAR/PLS OXIMETRY 1: CPT

## 2024-07-02 PROCEDURE — 99232 SBSQ HOSP IP/OBS MODERATE 35: CPT | Performed by: PSYCHIATRY & NEUROLOGY

## 2024-07-02 PROCEDURE — 85025 COMPLETE CBC W/AUTO DIFF WBC: CPT | Performed by: STUDENT IN AN ORGANIZED HEALTH CARE EDUCATION/TRAINING PROGRAM

## 2024-07-02 PROCEDURE — 93306 TTE W/DOPPLER COMPLETE: CPT | Performed by: INTERNAL MEDICINE

## 2024-07-02 PROCEDURE — 99239 HOSP IP/OBS DSCHRG MGMT >30: CPT | Performed by: STUDENT IN AN ORGANIZED HEALTH CARE EDUCATION/TRAINING PROGRAM

## 2024-07-02 PROCEDURE — 97166 OT EVAL MOD COMPLEX 45 MIN: CPT

## 2024-07-02 PROCEDURE — 93306 TTE W/DOPPLER COMPLETE: CPT

## 2024-07-02 PROCEDURE — 97110 THERAPEUTIC EXERCISES: CPT

## 2024-07-02 PROCEDURE — 70551 MRI BRAIN STEM W/O DYE: CPT

## 2024-07-02 PROCEDURE — 80053 COMPREHEN METABOLIC PANEL: CPT | Performed by: STUDENT IN AN ORGANIZED HEALTH CARE EDUCATION/TRAINING PROGRAM

## 2024-07-02 PROCEDURE — 97161 PT EVAL LOW COMPLEX 20 MIN: CPT

## 2024-07-02 PROCEDURE — 93010 ELECTROCARDIOGRAM REPORT: CPT | Performed by: INTERNAL MEDICINE

## 2024-07-02 RX ORDER — MECLIZINE HCL 12.5 MG/1
12.5 TABLET ORAL EVERY 8 HOURS PRN
Qty: 10 TABLET | Refills: 0 | Status: SHIPPED | OUTPATIENT
Start: 2024-07-02

## 2024-07-02 RX ORDER — ONDANSETRON 4 MG/1
4 TABLET, FILM COATED ORAL EVERY 8 HOURS PRN
Qty: 10 TABLET | Refills: 0 | Status: SHIPPED | OUTPATIENT
Start: 2024-07-02

## 2024-07-02 RX ADMIN — HEPARIN SODIUM 5000 UNITS: 5000 INJECTION INTRAVENOUS; SUBCUTANEOUS at 14:08

## 2024-07-02 RX ADMIN — ACETAMINOPHEN 650 MG: 325 TABLET, FILM COATED ORAL at 14:08

## 2024-07-02 RX ADMIN — ACETAMINOPHEN 650 MG: 325 TABLET, FILM COATED ORAL at 05:43

## 2024-07-02 RX ADMIN — ASPIRIN 81 MG: 81 TABLET, CHEWABLE ORAL at 08:40

## 2024-07-02 RX ADMIN — ONDANSETRON 4 MG: 2 INJECTION INTRAMUSCULAR; INTRAVENOUS at 14:17

## 2024-07-02 RX ADMIN — INSULIN LISPRO 3 UNITS: 100 INJECTION, SOLUTION INTRAVENOUS; SUBCUTANEOUS at 12:14

## 2024-07-02 RX ADMIN — HEPARIN SODIUM 5000 UNITS: 5000 INJECTION INTRAVENOUS; SUBCUTANEOUS at 05:43

## 2024-07-02 RX ADMIN — MECLIZINE HCL 12.5 MG 12.5 MG: 12.5 TABLET ORAL at 14:08

## 2024-07-02 RX ADMIN — MECLIZINE HCL 12.5 MG 12.5 MG: 12.5 TABLET ORAL at 05:43

## 2024-07-02 RX ADMIN — ONDANSETRON 4 MG: 2 INJECTION INTRAMUSCULAR; INTRAVENOUS at 05:43

## 2024-07-02 NOTE — ASSESSMENT & PLAN NOTE
Not a TNK candidate due to last known well  CTA imaging did show some chronic findings, refer to report  MRI brain negative  PT/OT-no acute needs  Discussed with neurology, symptoms likely due to peripheral vertigo.  Will discharge home on as needed meclizine, referral to vestibular rehab

## 2024-07-02 NOTE — CASE MANAGEMENT
Case Management Progress Note    Patient name Michelle Julien  Location 2 EAST 256/2 E 256-01 MRN [id number]  : 1960 Date 2024       LOS (days): 0  Geometric Mean LOS (GMLOS) (days):   Days to GMLOS:        OBJECTIVE:     Current admission status: Observation  Preferred Pharmacy:   [id number] - [place], [place] - [address] [facility] [address]  [address] [address]  Phone: [phone] Fax: [phone]    [facility] [place], [place] - [address]  [address]  [address]  [facility] [address]  Phone: [phone] Fax: [phone]    Primary Care Provider: Steffen Carrizales DO  Primary Insurance: [facility]  Secondary Insurance:     PROGRESS NOTE:  Patient reviewed during Interdisciplinary Rounds with SLIM today.  Patient anticipated for d/c within 24hrs.  (D/C now written.)  No needs per PT/OT.  No CM needs for d/c identified.  CM will remain available through to d/c for planning.

## 2024-07-02 NOTE — ASSESSMENT & PLAN NOTE
Lab Results   Component Value Date    HGBA1C 6.8 (H) 07/01/2024       Recent Labs     07/01/24  1633 07/01/24  2148 07/02/24  0751 07/02/24  1151   POCGLU 100 111 124 258*         Blood Sugar Average: Last 72 hrs:  (P) 157.4  Resume home medications on discharge

## 2024-07-02 NOTE — PLAN OF CARE
Problem: PAIN - ADULT  Goal: Verbalizes/displays adequate comfort level or baseline comfort level  Description: Interventions:  - Encourage patient to monitor pain and request assistance  - Assess pain using appropriate pain scale  - Administer analgesics based on type and severity of pain and evaluate response  - Implement non-pharmacological measures as appropriate and evaluate response  - Consider cultural and social influences on pain and pain management  - Notify physician/advanced practitioner if interventions unsuccessful or patient reports new pain  Outcome: Progressing     Problem: SAFETY ADULT  Goal: Patient will remain free of falls  Description: INTERVENTIONS:  - Educate patient/family on patient safety including physical limitations  - Instruct patient to call for assistance with activity   - Consult OT/PT to assist with strengthening/mobility   - Keep Call bell within reach  - Keep bed low and locked with side rails adjusted as appropriate  - Keep care items and personal belongings within reach  - Initiate and maintain comfort rounds  - Make Fall Risk Sign visible to staff  - Offer Toileting every 2 Hours, in advance of need  - Initiate/Maintain bed alarm  - Obtain necessary fall risk management equipment  - Apply yellow socks and bracelet for high fall risk patients  - Consider moving patient to room near nurses station  Outcome: Progressing  Goal: Maintain or return to baseline ADL function  Description: INTERVENTIONS:  -  Assess patient's ability to carry out ADLs; assess patient's baseline for ADL function and identify physical deficits which impact ability to perform ADLs (bathing, care of mouth/teeth, toileting, grooming, dressing, etc.)  - Assess/evaluate cause of self-care deficits   - Assess range of motion  - Assess patient's mobility; develop plan if impaired  - Assess patient's need for assistive devices and provide as appropriate  - Encourage maximum independence but intervene and  supervise when necessary  - Involve family in performance of ADLs  - Assess for home care needs following discharge   - Consider OT consult to assist with ADL evaluation and planning for discharge  - Provide patient education as appropriate  Outcome: Progressing  Goal: Maintains/Returns to pre admission functional level  Description: INTERVENTIONS:  - Perform AM-PAC 6 Click Basic Mobility/ Daily Activity assessment daily.  - Set and communicate daily mobility goal to care team and patient/family/caregiver.   - Collaborate with rehabilitation services on mobility goals if consulted  - Perform Range of Motion 4 times a day.  - Reposition patient every 2 hours.  - Dangle patient 4 times a day  - Stand patient 4 times a day  - Ambulate patient 4 times a day  - Out of bed to chair 4 times a day   - Out of bed for meals 4 times a day  - Out of bed for toileting  - Record patient progress and toleration of activity level   Outcome: Progressing     Problem: DISCHARGE PLANNING  Goal: Discharge to home or other facility with appropriate resources  Description: INTERVENTIONS:  - Identify barriers to discharge w/patient and caregiver  - Arrange for needed discharge resources and transportation as appropriate  - Identify discharge learning needs (meds, wound care, etc.)  - Arrange for interpretive services to assist at discharge as needed  - Refer to Case Management Department for coordinating discharge planning if the patient needs post-hospital services based on physician/advanced practitioner order or complex needs related to functional status, cognitive ability, or social support system  Outcome: Progressing     Problem: Knowledge Deficit  Goal: Patient/family/caregiver demonstrates understanding of disease process, treatment plan, medications, and discharge instructions  Description: Complete learning assessment and assess knowledge base.  Interventions:  - Provide teaching at level of understanding  - Provide teaching via  preferred learning methods  Outcome: Progressing     Problem: NEUROSENSORY - ADULT  Goal: Achieves stable or improved neurological status  Description: INTERVENTIONS  - Monitor and report changes in neurological status  - Monitor vital signs such as temperature, blood pressure, glucose, and any other labs ordered   - Initiate measures to prevent increased intracranial pressure  - Monitor for seizure activity and implement precautions if appropriate      Outcome: Progressing  Goal: Remains free of injury related to seizures activity  Description: INTERVENTIONS  - Maintain airway, patient safety  and administer oxygen as ordered  - Monitor patient for seizure activity, document and report duration and description of seizure to physician/advanced practitioner  - If seizure occurs,  ensure patient safety during seizure  - Reorient patient post seizure  - Seizure pads on all 4 side rails  - Instruct patient/family to notify RN of any seizure activity including if an aura is experienced  - Instruct patient/family to call for assistance with activity based on nursing assessment  - Administer anti-seizure medications if ordered    Outcome: Progressing  Goal: Achieves maximal functionality and self care  Description: INTERVENTIONS  - Monitor swallowing and airway patency with patient fatigue and changes in neurological status  - Encourage and assist patient to increase activity and self care.   - Encourage visually impaired, hearing impaired and aphasic patients to use assistive/communication devices  Outcome: Progressing

## 2024-07-02 NOTE — RESPIRATORY THERAPY NOTE
07/01/24 6612   Respiratory Assessment   Resp Comments placed pt on cpap for HS use, RN aware   O2 Device v60 [place]   Non-Invasive Information   O2 Interface Device Nasal mask  (per pt request, size small)   Non-Invasive Ventilation Mode CPAP   $ Intermittent NIV Yes   SpO2 95 %   $ Pulse Oximetry Spot Check Charge Completed   Non-Invasive Settings   FiO2 (%) 21   PEEP/CPAP (cm H2O) 10   Rise Time 2  (20 min ramp applied)   Humidification   (n/a)   Non-Invasive Readings   Skin Intervention Skin intact   Total Rate 23   Spontaneous Vt (mL) 336   Spontaneous MV (mL) 7.8   Heater Temperature (Obs)   (n/a)   Leak (lpm) 1   Non-Invasive Alarms   Insp Pressure High (cm H20) 18   Insp Pressure Low (cm H20) 4   Low Insp Pressure Time (sec) 20 sec   MV Low (L/min) 2   Vt High (mL) 1200   Vt Low (mL) 200   High Resp Rate (BPM) 40 BPM   Low Resp Rate (BPM) 8 BPM     Per 10-3-18 e chart documentation pt's cpap prescription is auto 9-15 cmH2o, all hospital auto units are in use at this time, placed pt on cpap +10 per current order

## 2024-07-02 NOTE — PROGRESS NOTES
Progress Note - Neurology   Michelle Julien 64 y.o. female [id number]  Unit/Bed#: 2 [facility] 256/2 E 256-01    Assessment/Plan:  Michelel Julien is a 64 y.o. female    Stroke-like symptoms  Assessment & Plan   64 y.o.  female with HTN, HLD, DM, SJ, HFpEF, AS s/p TAVR 2022 on asa, history of breast cancer s/p surgery and radiation who presented to [facility] ED 7/1/24 with gait instability and imbalance. Stroke alert initiated in the ED with NIHSS 0. Initial /74 with . CTH/CTA without acute intracranial findings, right thalamic hypodensity and intracranial ICA atherosclerosis/stenosis noted. Not a TNK candidate secondary to time window. Loaded with ASA 325mg and Plavix 300mg x1 and admitted on the stroke pathway for additional work-up  Neuroimaging/work-up:  7/1/24 CTH:-Small hypodensity within the right thalamus (2:22) may represent age-indeterminate infarct however appears more subacute to chronic on coronal and sagittal sequences (401:59, 402:48). Correlate with MRI.   7/1/24 CTA head:  -No large vessel occlusion.  -Atherosclerotic calcifications of the intracranial ICAs with moderate to severe right supraclinoid segment narrowing.  -Focal moderate stenosis of the proximal right inferior M2 branch.  -Moderate focal narrowing of the mid left intradural vertebral artery.  7/1/24 CTA neck:  -No high-grade stenosis, dissection or aneurysm.  7/2/24 MRI brain:No acute intracranial pathology. Mild chronic microangiopathy.   7/2/24 ECHO:    Left Ventricle: Left ventricular cavity size is normal. Wall thickness is normal. The left ventricular ejection fraction is 55%. Systolic function is normal. Wall motion is normal. Diastolic function is mildly abnormal, consistent with grade I (abnormal) relaxation.    Aortic Valve: There is a TAVR bioprosthetic valve. There is no evidence of paravalvular regurgitation. The gradient recorded across the prosthetic aortic valve is within the expected range. Mean aortic valve gradient  of 13 mmHg    Mitral Valve: There is moderate annular calcification. There is mild regurgitation.      A1c 6.8  Recommendations:  MRI negative for acute stroke; exam consistent with BPPV  D/C plavix; continue home asa/statin as previously prescribed  PT/OT  Continue meclizine PRN; Consider vestibular therapy if symptoms persist  No additional inpatient neurology recommendations      Diabetes mellitus (HCC)  Assessment & Plan  Lab Results   Component Value Date    HGBA1C 6.9 (H) 02/10/2024       Recent Labs     07/01/24  1308   POCGLU 194*       Blood Sugar Average: Last 72 hrs:  (P) 194    Goal euglycemic  Management per primary service    Hyperlipidemia  Assessment & Plan  Continue home statin; outpatient follow up with PCP    Hypertension  Assessment & Plan  Goal normotension        Michelle Julien will not need outpatient follow up with Neurology.  She will not require outpatient neurological testing.       Subjective:   Better today, still feels off balance with standing up. Occasional nausea  No hearing loss or pain      Past Medical History:   Diagnosis Date    Adhesive capsulitis of right shoulder     CAD (coronary artery disease)     CHF (congestive heart failure) ([facility])     Diabetes mellitus ([facility])     type 2, insulin dependent    Diverticulosis     History of breast cancer     s/p right lumpectomy, s/p radiation    Hyperlipidemia     Hypertension     Lymphedema of right upper extremity     Obesity (BMI 30-39.9)     SJ on CPAP     Severe aortic stenosis     Vitamin D deficiency      Past Surgical History:   Procedure Laterality Date    APPENDECTOMY      BREAST LUMPECTOMY Right     CARDIAC CATHETERIZATION N/A 08/19/2022    Procedure: Cardiac RHC/LHC;  Surgeon: Arnold Mooney DO;  Location: [facility];  Service: Cardiology    CARDIAC CATHETERIZATION N/A 10/11/2022    Procedure: CARDIAC TAVR;  Surgeon: Arnold Mooney DO;  Location: [facility] MAIN OR;  Service: Cardiology     CHOLECYSTECTOMY      KNEE ARTHROSCOPY Right     knee    IN REPLACE AORTIC VALVE OPENFEMORAL ARTERY APPROACH N/A 10/11/2022    Procedure: REPLACEMENT AORTIC VALVE TRANSCATHETER (TAVR) TRANSFEMORAL W/ 23MM MILIAN ROSS S3 ULTRA VALVE(ACCESS ON LEFT) TRAY;  Surgeon: Handy Rushing MD;  Location: [facility] MAIN OR;  Service: Cardiac Surgery    TONSILLECTOMY AND ADENOIDECTOMY      TOTAL ABDOMINAL HYSTERECTOMY       Family History   Problem Relation Age of Onset    COPD Mother     Heart disease Father     Heart disease Paternal Grandmother     Cancer Family     Colon cancer Neg Hx      Social History     Socioeconomic History    Marital status: /Civil Union     Spouse name: None    Number of children: None    Years of education: None    Highest education level: None   Occupational History    None   Tobacco Use    Smoking status: Never    Smokeless tobacco: Never   Vaping Use    Vaping status: Never Used   Substance and Sexual Activity    Alcohol use: Yes     Comment: socially    Drug use: No    Sexual activity: Yes     Partners: Male   Other Topics Concern    None   Social History Narrative    None     Social Determinants of Health     Financial Resource Strain: Not on file   Food Insecurity: No Food Insecurity (4/5/2023)    Hunger Vital Sign     Worried About Running Out of Food in the Last Year: Never true     Ran Out of Food in the Last Year: Never true   Transportation Needs: No Transportation Needs (4/5/2023)    PRAPARE - Transportation     Lack of Transportation (Medical): No     Lack of Transportation (Non-Medical): No   Physical Activity: Not on file   Stress: Not on file   Social Connections: Not on file   Intimate Partner Violence: Not on file   Housing Stability: Low Risk  (4/5/2023)    Housing Stability Vital Sign     Unable to Pay for Housing in the Last Year: No     Number of Places Lived in the Last Year: 1     Unstable Housing in the Last Year: No     E-Cigarette/Vaping    E-Cigarette Use Never User   "    E-Cigarette/Vaping Substances    Nicotine No     THC No     CBD No     Flavoring No          Medications:  All current active meds have been reviewed and current meds:  Scheduled Meds:  Current Facility-Administered Medications   Medication Dose Route Frequency Provider Last Rate    acetaminophen  650 mg Oral Q6H PRN Robert Martell MD      aspirin  81 mg Oral Daily Robert Martell MD      atorvastatin  40 mg Oral QPM Robert Martell MD      heparin (porcine)  5,000 Units Subcutaneous Q8H [facility] Robert Martell MD      insulin lispro  1-6 Units Subcutaneous TID With Meals Robert Martell MD      insulin lispro  1-6 Units Subcutaneous HS Robert Martell MD      meclizine  12.5 mg Oral Q8H [facility] Robert Martell MD      ondansetron  4 mg Intravenous Q4H PRN Robert Martell MD       Continuous Infusions:   PRN Meds:.  acetaminophen    ondansetron       ROS:   Review of Systems    Vitals:   /61   Pulse 75   Temp 98 °F (36.7 °C) (Oral)   Resp 18   Ht 5' 3\" (1.6 m)   Wt 77.6 kg (171 lb)   LMP  (LMP Unknown)   SpO2 91%   BMI 30.29 kg/m²     Physical Exam:   Physical Exam  Vitals reviewed.   Constitutional:       General: She is not in acute distress.  HENT:      Head: Normocephalic and atraumatic.   Pulmonary:      Effort: Pulmonary effort is normal.   Skin:     General: Skin is warm and dry.   Neurological:      Mental Status: She is alert and oriented to person, place, and time.      Cranial Nerves: Cranial nerves 2-12 are intact.      Motor: Motor strength is normal.     Coordination: Finger-Nose-Finger Test and Heel to Shin Test normal.   Psychiatric:         Speech: Speech normal.       Neurologic Exam     Mental Status   Oriented to person, place, and time.   Attention: normal. Concentration: normal.   Speech: speech is normal   Level of consciousness: alert    Cranial Nerves   Cranial nerves II through XII intact.     Motor Exam     Strength   Strength 5/5 throughout.     Sensory " "Exam   Light touch normal.     Gait, Coordination, and Reflexes     Coordination   Finger to nose coordination: normal  Heel to shin coordination: normal    Tremor   Resting tremor: absent          Labs: I have personally reviewed pertinent reports.   Recent Results (from the past 24 hour(s))   FLU/RSV/COVID - if FLU/RSV clinically relevant    Collection Time: 07/01/24  2:53 PM    Specimen: Nose; Nares   Result Value Ref Range    SARS-CoV-2 Negative Negative    INFLUENZA A PCR Negative Negative    INFLUENZA B PCR Negative Negative    RSV PCR Negative Negative   HS Troponin I 2hr    Collection Time: 07/01/24  3:25 PM   Result Value Ref Range    hs TnI 2hr 10 \"Refer to ACS Flowchart\"- see link ng/L    Delta 2hr hsTnI 2 <20 ng/L   Fingerstick Glucose (POCT)    Collection Time: 07/01/24  4:33 PM   Result Value Ref Range    POC Glucose 100 65 - 140 mg/dl   HS Troponin I 4hr    Collection Time: 07/01/24  6:39 PM   Result Value Ref Range    hs TnI 4hr 9 \"Refer to ACS Flowchart\"- see link ng/L    Delta 4hr hsTnI 1 <20 ng/L   Fingerstick Glucose (POCT)    Collection Time: 07/01/24  9:48 PM   Result Value Ref Range    POC Glucose 111 65 - 140 mg/dl   CBC and differential    Collection Time: 07/02/24  5:26 AM   Result Value Ref Range    WBC 5.30 4.31 - 10.16 Thousand/uL    RBC 4.33 3.81 - 5.12 Million/uL    Hemoglobin 12.5 11.5 - 15.4 g/dL    Hematocrit 38.1 34.8 - 46.1 %    MCV 88 82 - 98 fL    MCH 28.9 26.8 - 34.3 pg    MCHC 32.8 31.4 - 37.4 g/dL    RDW 13.5 11.6 - 15.1 %    MPV 11.0 8.9 - 12.7 fL    Platelets 182 149 - 390 Thousands/uL    nRBC 0 /100 WBCs    Segmented % 67 43 - 75 %    Immature Grans % 0 0 - 2 %    Lymphocytes % 20 14 - 44 %    Monocytes % 9 4 - 12 %    Eosinophils Relative 3 0 - 6 %    Basophils Relative 1 0 - 1 %    Absolute Neutrophils 3.54 1.85 - 7.62 Thousands/µL    Absolute Immature Grans 0.02 0.00 - 0.20 Thousand/uL    Absolute Lymphocytes 1.05 0.60 - 4.47 Thousands/µL    Absolute Monocytes 0.48 0.17 " - 1.22 Thousand/µL    Eosinophils Absolute 0.15 0.00 - 0.61 Thousand/µL    Basophils Absolute 0.06 0.00 - 0.10 Thousands/µL   Comprehensive metabolic panel    Collection Time: 07/02/24  5:27 AM   Result Value Ref Range    Sodium 139 135 - 147 mmol/L    Potassium 3.9 3.5 - 5.3 mmol/L    Chloride 104 96 - 108 mmol/L    CO2 26 21 - 32 mmol/L    ANION GAP 9 4 - 13 mmol/L    BUN 14 5 - 25 mg/dL    Creatinine 0.63 0.60 - 1.30 mg/dL    Glucose 99 65 - 140 mg/dL    Glucose, Fasting 99 65 - 99 mg/dL    Calcium 9.1 8.4 - 10.2 mg/dL    AST 16 13 - 39 U/L    ALT 17 7 - 52 U/L    Alkaline Phosphatase 87 34 - 104 U/L    Total Protein 7.0 6.4 - 8.4 g/dL    Albumin 4.0 3.5 - 5.0 g/dL    Total Bilirubin 0.39 0.20 - 1.00 mg/dL    eGFR 93 ml/min/1.73sq m   Fingerstick Glucose (POCT)    Collection Time: 07/02/24  7:51 AM   Result Value Ref Range    POC Glucose 124 65 - 140 mg/dl   Echo complete w/ contrast if indicated    Collection Time: 07/02/24 11:17 AM   Result Value Ref Range    BSA 1.81 m2    A4C EF 56 %    LVOT stroke volume 95.39     LVOT stroke volume index 53.00 ml/m2    LVOT Cardiac Output 6.95 l/min    LVOT Cardiac Index 3.84 l/min/m2    LVIDd 4.90 cm    LVIDS 3.10 cm    IVSd 1.30 cm    LVPWd 1.20 cm    LVOT diameter 2.0 cm    LVOT peak VTI 30.38 cm    FS 37 28 - 44    MV E' Tissue Velocity Septal 5 cm/s    LA Volume Index (BP) 29.3 mL/m2    E/A ratio 0.76     E wave deceleration time 238 ms    MV Peak E Sid 80 cm/s    MV Peak A Sid 1.05 m/s    AV LVOT peak gradient 5 mmHg    LVOT peak sid 1.15 m/s    RVID d 3.3 cm    Tricuspid annular plane systolic excursion 2.40 cm    LA size 3.8 cm    LA length (A2C) 5.40 cm    LA volume (BP) 53 mL    RAA A4C 14.1 cm2    Aortic valve peak velocity 2.4 m/s    Ao VTI 56.84 cm    AV mean gradient 13 mmHg    LVOT mn grad 3.0 mmHg    AV peak gradient 23 mmHg    AV area by cont VTI 1.7 cm2    AV area peak sid 180.6 cm2    MV stenosis pressure 1/2 time 69 ms    MV valve area p 1/2 method 3.19      Ao root 3.30 cm    Asc Ao 3.5 cm    Aortic valve mean velocity 17.40 m/s    Left ventricular stroke volume (2D) 75.00 mL    IVS 1.3 cm    LEFT VENTRICLE SYSTOLIC VOLUME (MOD BIPLANE) 2D 39 mL    LV DIASTOLIC VOLUME (MOD BIPLANE) 2D 114 mL    Left Atrium Area-systolic Four Chamber 18.1 cm2    Left Atrium Area-systolic Apical Two Chamber 18.5 cm2    LVSV, 2D 75 mL    LVOT area 3.14 cm2    DVI 0.48     AV valve area 1.68 cm2    LV EF 55    Fingerstick Glucose (POCT)    Collection Time: 07/02/24 11:51 AM   Result Value Ref Range    POC Glucose 258 (H) 65 - 140 mg/dl       Imaging: I have personally reviewed pertinent imaging in PACS and I have personally reviewed PACS reports.     EKG, Pathology, and Other Studies: I have personally reviewed pertinent reports.     Counseling / Coordination of Care  Assessment completed by Dr. Cuadra. Images and plan reviewed with Dr. Cuadra. Plan discussed with patient,  at bedside and primary service. Please refer to attending attestation for additional recommendations

## 2024-07-02 NOTE — PHYSICAL THERAPY NOTE
Physical Therapy Evaluation    Patient's Name: Michelle Julien    Admitting Diagnosis  Dizziness [R42]  Balance problem [R26.89]  Severe aortic stenosis [I35.0]    Problem List  Patient Active Problem List   Diagnosis    Severe aortic stenosis    Hypertension    Hyperlipidemia    Diabetes mellitus (HCC)    Heart palpitations    Acute pain of right shoulder    Adhesive capsulitis of right shoulder    Lightheadedness    Diverticulitis of large intestine without perforation or abscess without bleeding    CHF (congestive heart failure) (HCC)    History of breast cancer    SJ on CPAP    CAD (coronary artery disease)    LBBB (left bundle branch block)    Hyperchloremia    S/P TAVR (transcatheter aortic valve replacement)    Encounter for postoperative care    Chest pain    Dizziness    Stroke-like symptoms       Past Medical History  Past Medical History:   Diagnosis Date    Adhesive capsulitis of right shoulder     CAD (coronary artery disease)     CHF (congestive heart failure) ([facility])     Diabetes mellitus (HCC)     type 2, insulin dependent    Diverticulosis     History of breast cancer     s/p right lumpectomy, s/p radiation    Hyperlipidemia     Hypertension     Lymphedema of right upper extremity     Obesity (BMI 30-39.9)     SJ on CPAP     Severe aortic stenosis     Vitamin D deficiency        Past Surgical History  Past Surgical History:   Procedure Laterality Date    APPENDECTOMY      BREAST LUMPECTOMY Right     CARDIAC CATHETERIZATION N/A 08/19/2022    Procedure: Cardiac RHC/LHC;  Surgeon: Arnold Mooney DO;  Location: [facility];  Service: Cardiology    CARDIAC CATHETERIZATION N/A 10/11/2022    Procedure: CARDIAC TAVR;  Surgeon: Arnold Mooney DO;  Location: [facility] MA[facility];  Service: Cardiology    CHOLECYSTECTOMY      KNEE ARTHROSCOPY Right     knee    MT REPLACE AORTIC VALVE OPENFEMORAL ARTERY APPROACH N/A 10/11/2022    Procedure: REPLACEMENT AORTIC VALVE TRANSCATHETER (TAVR) TRANSFEMORAL  "W/ 23MM MILIAN ROSS S3 ULTRA VALVE(ACCESS ON LEFT) TRAY;  Surgeon: Handy Rushing MD;  Location: [facility] MAIN [facility];  Service: Cardiac Surgery    TONSILLECTOMY AND ADENOIDECTOMY      TOTAL ABDOMINAL HYSTERECTOMY         Recent Imaging  MRI brain wo contrast   Final Result by E. Alec Schoenberger, MD (07/02 0915)      No acute intracranial pathology. Mild chronic microangiopathy.      Workstation performed: [id number]         CTA stroke alert (head/neck)   Final Result by Anderson Olmos MD (07/01 1434)      CTA head:   -No large vessel occlusion.   -Atherosclerotic calcifications of the intracranial ICAs with moderate to severe right supraclinoid segment narrowing.   -Focal moderate stenosis of the proximal right inferior M2 branch.   -Moderate focal narrowing of the mid left intradural vertebral artery.      CTA neck:   -No high-grade stenosis, dissection or aneurysm.               Findings were directly discussed with Felisa Cuadra  at 2:27 p.m. on 7/1/2024.                           Workstation performed: [id number]         CT stroke alert brain   Final Result by Anderson Olmos MD (07/01 1435)      -Small hypodensity within the right thalamus (2:22) may represent age-indeterminate infarct however appears more subacute to chronic on coronal and sagittal sequences (401:59, 402:48). Correlate with MRI.      Findings were directly discussed with Felisa Cuadra  at approximately 1:55 p.m. on 7/1/2024.      Workstation performed: [id number]             Recent Vital Signs  Vitals:    07/02/24 0749 07/02/24 1100 07/02/24 1116 07/02/24 1149   BP: 133/55 143/61 133/55 134/61   BP Location: Left arm Left arm     Pulse: 74 75 76 75   Resp: 18 18     Temp:       TempSrc:       SpO2: 92% 91%  91%   Weight:   77.6 kg (171 lb)    Height:   5' 3\" (1.6 m)         07/02/24 0919   PT Last Visit   PT Visit Date 07/02/24   Note Type   Note type Evaluation   Pain Assessment   Pain Assessment Tool 0-10   Pain Score 5   Pain " Location/Orientation Location: Head   Pain Radiating Towards headache   Pain Onset/Description Onset: Gradual   Patient's Stated Pain Goal No pain   Hospital Pain Intervention(s) Repositioned;Ambulation/increased activity   Restrictions/Precautions   Weight Bearing Precautions Per Order No   Braces or Orthoses Other (Comment)  (none reported)   Other Precautions Chair Alarm;Bed Alarm;Telemetry   Home Living   Type of Home House   Home Layout One level;Performs ADLs on one level;Able to live on main level with bedroom/bathroom;Ramped entrance;Stairs to enter with rails  (1 YAYA)   Bathroom Shower/Tub Tub/shower unit   Bathroom Toilet Raised   Bathroom Equipment Grab bars in shower   Bathroom Accessibility Accessible   Additional Comments no AD at baseline   Prior Function   Level of [place] Independent with ADLs;Independent with functional mobility;Independent with IADLS   Lives With Spouse   Receives Help From Family   IADLs Independent with driving;Independent with meal prep;Independent with medication management   Falls in the last 6 months 0   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   General   Family/Caregiver Present No   Cognition   Overall Cognitive Status WFL   Arousal/Participation Alert   Orientation Level Oriented X4   Memory Within functional limits   Following Commands Follows all commands and directions without difficulty   RLE Assessment   RLE Assessment WNL  (4/5)   LLE Assessment   LLE Assessment WNL  (4/5)   Vision-Basic Assessment   Current Vision Wears glasses all the time   Coordination   Sensation WFL   Bed Mobility   Supine to Sit 6  Modified independent   Additional items Increased time required;Bedrails   Transfers   Sit to Stand 5  Supervision   Additional items Assist x 1;Bedrails;Increased time required;Verbal cues   Stand to Sit 5  Supervision   Additional items Assist x 1;Armrests;Increased time required;Verbal cues   Toilet  transfer 6  Modified independent   Additional items Increased time required;Standard toilet  (grab bars)   Additional Comments Pt denied any lightheadedness/dizziness during session   Ambulation/Elevation   Gait pattern Short stride;Decreased foot clearance   Gait Assistance 5  Supervision   Additional items Assist x 1;Verbal cues   Assistive Device Rolling walker   Distance 30'   Balance   Static Sitting Good   Dynamic Sitting Good   Static Standing Fair +   Dynamic Standing Fair   Ambulatory Fair   Activity Tolerance   Activity Tolerance Patient tolerated treatment well   Nurse Made Aware Spoke to RN   Assessment   Prognosis Good   Problem List Decreased strength;Decreased endurance;Impaired balance;Decreased mobility   Assessment Michelle Julien is a 64 y.o. female admitted to [facility] on 7/1/2024 for Dizziness. Pt  has a past medical history of Adhesive capsulitis of right shoulder, CAD (coronary artery disease), CHF (congestive heart failure) ([facility]), Diabetes mellitus (HCC), Diverticulosis, History of breast cancer, Hyperlipidemia, Hypertension, Lymphedema of right upper extremity, Obesity (BMI 30-39.9), SJ on CPAP, Severe aortic stenosis, and Vitamin D deficiency.. Order placed for PT eval and tx. PT was consulted and pt was seen on 7/2/2024 for mobility assessment and d/c planning. Chart review and two person identifiers were completed.   Currently pt presents with decreased strength , decreased static standing balance, decreased dynamic standing balance , decreased gait speed, decreased step length , and decreased muscular endurance . Due to these impairments, they will require assistance to perform ambulation and stair negotiation. Pt is currently functioning at a modified independent assistance level for bed mobility, supervision assistance x1 level for transfers, supervision assistance x1 level for ambulation with Rolling Walker. Pt greeted supine in bed and reported 5/10 pain due to headache. Pt completed  supine to sitting EOB at a mod I level. Pt denies any lightheadedness/dizziness when changing positions. Pt completed STS with supervision assistance and RW. Pt ambulated 10' to bathroom with RW and supervision. Pt completed toilet transfer at a mod I level. Pt ambulated 20' to recliner with RW and supervision. Pt completed seated exercises well with no complaints of dizziness. Pt ended session seated in recliner with alarm on and all needs within reach. These activity limitations significantly impact their ability to participate in previous home and community roles and responsibilities . The patient's AM-PAC Basic Mobility Inpatient Short Form Raw Score is 20. PT recommends no post acute rehabilitation needs, D/C orders at this time. Please re-consult if needs arise.   Discharge Recommendation   Rehab Resource Intensity Level, PT No post-acute rehabilitation needs   AM-PAC Basic Mobility Inpatient   Turning in Flat Bed Without Bedrails 4   Lying on Back to Sitting on Edge of Flat Bed Without Bedrails 4   Moving Bed to Chair 3   Standing Up From Chair Using Arms 3   Walk in Room 3   Climb 3-5 Stairs With Railing 3   Basic Mobility Inpatient Raw Score 20   Basic Mobility Standardized Score 43.99   [facility] Highest Level Of Mobility   -HLM Goal 6: Walk 10 steps or more   -HLM Achieved 7: Walk 25 feet or more   Additional Treatment Session   Start Time 0934   End Time 0946   Exercises   Glute Sets Sitting;20 reps;AROM;Bilateral   Hip Abduction Sitting;10 reps;AROM;Bilateral   Hip Adduction Sitting;20 reps;AROM;Bilateral   Knee AROM Long Arc Quad Sitting;20 reps;AROM   Ankle Pumps Sitting;10 reps;AROM;Bilateral   End of Consult   Patient Position at End of Consult Bedside chair;Bed/Chair alarm activated;All needs within reach       Recommendations                                                                                                              PT recommends no post acute rehabilitation needs, D/C  orders at this time. Please re-consult if needs arise.    PT Evaluation Time: 0919-0933  PT Treatment Time: 0934-0946    Chan Hartley PT, DPT

## 2024-07-02 NOTE — UTILIZATION REVIEW
Initial Clinical Review    Admission: Date/Time/Statement:   Admission Orders (From admission, onward)       Ordered        07/01/24 1449  Place in Observation  Once                          Orders Placed This Encounter   Procedures    Place in Observation     Standing Status:   Standing     Number of Occurrences:   1     Order Specific Question:   Level of Care     Answer:   Med Surg [16]     ED Arrival Information       Expected   -    Arrival   7/1/2024 12:45    Acuity   Emergent              Means of arrival   Walk-In    Escorted by   Spouse    Service   Hospitalist    Admission type   Emergency              Arrival complaint   light headed and dizzy             Chief Complaint   Patient presents with    Dizziness     Pt reports dizziness that began around 0830 this am. Went to bed last night asymptomatic. Denies hx of CVA or vertigo. C/ feeling flushed and nauseous. Reports slight weakness of left arm.        Initial Presentation: 64 y.o. female who presented self from home to [facility]. Admitted as Observation for evaluation and treatment of Dizziness and L arm weakness.     PMHx:  has a past medical history of Adhesive capsulitis of right shoulder, CAD (coronary artery disease), CHF (congestive heart failure) ([facility]), Diabetes mellitus (HCC), Diverticulosis, History of breast cancer, Hyperlipidemia, Hypertension, Lymphedema of right upper extremity, Obesity (BMI 30-39.9), SJ on CPAP, Severe aortic stenosis, and Vitamin D deficiency.      Presented w/ Balance problems with dizziness and feeling flushed and nauseous with slight L arm weakness. On exam, positive for dizziness, Romberg sign positive.     Plan: Start statin, Add meclizine, MRI brain. Neurology consulted.       ED Triage Vitals [07/01/24 1251]   Temperature Pulse Respirations Blood Pressure SpO2 Pain Score   98.4 °F (36.9 °C) 73 18 (!) 187/74 96 % No Pain     Weight (last 2 days)       Date/Time Weight    07/01/24 1606 78 (171.96)     07/01/24 1251 83.5 (184)            Vital Signs (last 3 days)       Date/Time Temp Pulse Resp BP MAP (mmHg) SpO2 O2 Device O2 Interface Device Patient Position - Orthostatic VS [place] Coma Scale Score Pain    07/02/24 07:49:41 -- 74 18 133/55 81 92 % None (Room air) -- Lying -- --    07/02/24 0700 -- -- -- -- -- -- -- -- -- 15 --    07/02/24 0543 -- -- -- -- -- -- -- -- -- -- 4 07/02/24 03:16:04 -- 62 -- -- -- 93 % -- -- -- -- --    07/02/24 0300 -- 63 18 120/56 77 95 % None (Room air) -- Lying 15 --    07/02/24 0255 -- -- -- -- -- 94 % -- Nasal mask -- -- --    07/02/24 00:00:17 -- 73 20 130/61 84 93 % -- -- -- -- --    07/02/24 0000 -- -- -- -- -- -- -- -- -- 15 --    07/01/24 2354 -- -- -- -- -- 95 % -- Nasal mask -- -- --    07/01/24 2200 98 °F (36.7 °C) 71 18 115/57 76 92 % None (Room air) -- Lying 15 --    07/01/24 21:50:52 -- 72 -- -- -- 92 % -- -- -- -- --    07/01/24 2000 -- 73 18 154/77 103 97 % None (Room air) -- Lying 15 3    07/01/24 19:58:25 -- 75 -- -- -- 94 % -- -- -- -- --    07/01/24 1930 -- -- -- -- -- -- -- -- -- -- 4 07/01/24 1800 97.8 °F (36.6 °C) 71 19 157/71 -- 94 % None (Room air) -- Sitting -- 3    07/01/24 16:17:38 -- 66 -- 144/67 93 93 % -- -- -- -- --    07/01/24 1606 97.5 °F (36.4 °C) 65 -- 155/70 98 95 % None (Room air) -- Lying 15 4    07/01/24 1500 -- 70 20 160/68 98 95 % None (Room air) -- Lying 15 4    07/01/24 1445 -- 72 19 152/55 -- 97 % None (Room air) -- Lying 15 4    07/01/24 1440 -- 71 23 -- -- 94 % -- -- -- -- --    07/01/24 1435 -- 70 26 -- -- 94 % -- -- -- -- --    07/01/24 1430 -- 69 20 148/65 -- 94 % None (Room air) -- Lying -- --    07/01/24 1425 -- 69 21 -- -- 95 % -- -- -- -- --    07/01/24 1420 -- 70 18 -- -- 94 % -- -- -- -- --    07/01/24 1415 -- 72 19 156/67 -- 94 % None (Room air) -- Lying 15 4    07/01/24 1410 -- 70 46 -- -- 96 % -- -- -- -- --    07/01/24 1405 -- 72 27 -- -- 95 % -- -- -- -- --    07/01/24 1404 -- -- -- 152/67 -- -- -- -- -- -- --     07/01/24 1400 -- 74 22 152/67 -- 96 % None (Room air) -- Lying 15 4    07/01/24 1350 -- 81 27 -- -- 95 % -- -- -- -- --    07/01/24 1346 -- -- 20 -- -- -- -- -- -- -- --    07/01/24 1345 -- 69 20 155/70 -- 95 % None (Room air) -- Lying 15 4 07/01/24 1340 -- 69 22 -- -- 94 % -- -- -- -- --    07/01/24 1335 -- 72 41 -- -- 94 % -- -- -- -- --    07/01/24 1330 -- 74 21 137/61 -- 95 % None (Room air) -- Lying 15 4 07/01/24 1325 -- 73 17 -- -- 94 % -- -- -- -- --    07/01/24 13:23:28 -- 72 17 137/61 -- 94 % None (Room air) -- Lying 15 4 07/01/24 1320 -- 74 24 -- -- 95 % -- -- -- -- --    07/01/24 1316 -- -- -- -- -- -- None (Room air) -- -- -- --    07/01/24 1259 -- -- -- -- -- -- -- -- -- 15 4    07/01/24 1251 98.4 °F (36.9 °C) 73 18 187/74 -- 96 % None (Room air) -- Sitting -- No Pain              Pertinent Labs/Diagnostic Test Results:   Radiology:  MRI brain wo contrast   Final Interpretation by E. Alec Schoenberger, MD (07/02 0915)      No acute intracranial pathology. Mild chronic microangiopathy.      Workstation performed: [id number]         CTA stroke alert (head/neck)   Final Interpretation by Anderson Olmos MD (07/01 1434)      CTA head:   -No large vessel occlusion.   -Atherosclerotic calcifications of the intracranial ICAs with moderate to severe right supraclinoid segment narrowing.   -Focal moderate stenosis of the proximal right inferior M2 branch.   -Moderate focal narrowing of the mid left intradural vertebral artery.      CTA neck:   -No high-grade stenosis, dissection or aneurysm.               Findings were directly discussed with Felisa Cuadra  at 2:27 p.m. on 7/1/2024.                           Workstation performed: [id number]         CT stroke alert brain   Final Interpretation by Anderson Olmos MD (07/01 3924)      -Small hypodensity within the right thalamus (2:22) may represent age-indeterminate infarct however appears more subacute to chronic on coronal and sagittal sequences (401:59,  "402:48). Correlate with MRI.      Findings were directly discussed with Felisa Cuadra  at approximately 1:55 p.m. on 7/1/2024.      Workstation performed: [id number]           Cardiology:  No orders to display     GI:  No orders to display       Results from last 7 days   Lab Units 07/01/24  1453   SARS-COV-2  Negative     Results from last 7 days   Lab Units 07/02/24  0526 07/01/24  1331   WBC Thousand/uL 5.30 7.75   HEMOGLOBIN g/dL 12.5 13.3   HEMATOCRIT % 38.1 39.8   PLATELETS Thousands/uL 182 210   TOTAL NEUT ABS Thousands/µL 3.54  --          Results from last 7 days   Lab Units 07/02/24  0527 07/01/24  1331   SODIUM mmol/L 139 139   POTASSIUM mmol/L 3.9 4.2   CHLORIDE mmol/L 104 103   CO2 mmol/L 26 27   ANION GAP mmol/L 9 9   BUN mg/dL 14 15   CREATININE mg/dL 0.63 0.63   EGFR ml/min/1.73sq m 93 93   CALCIUM mg/dL 9.1 9.6     Results from last 7 days   Lab Units 07/02/24  0527   AST U/L 16   ALT U/L 17   ALK PHOS U/L 87   TOTAL PROTEIN g/dL 7.0   ALBUMIN g/dL 4.0   TOTAL BILIRUBIN mg/dL 0.39     Results from last 7 days   Lab Units 07/02/24  0751 07/01/24  2148 07/01/24  1633 07/01/24  1308   POC GLUCOSE mg/dl 124 111 100 194*     Results from last 7 days   Lab Units 07/02/24  0527 07/01/24  1331   GLUCOSE RANDOM mg/dL 99 190*         Results from last 7 days   Lab Units 07/01/24  1331   HEMOGLOBIN A1C % 6.8*   EAG mg/dl 148     No results found for: \"BETA-HYDROXYBUTYRATE\"                   Results from last 7 days   Lab Units 07/01/24  1839 07/01/24  1525 07/01/24  1331   HS TNI 0HR ng/L  --   --  8   HS TNI 2HR ng/L  --  10  --    HSTNI D2 ng/L  --  2  --    HS TNI 4HR ng/L 9  --   --    HSTNI D4 ng/L 1  --   --          Results from last 7 days   Lab Units 07/01/24  1331   PROTIME seconds 13.3   INR  0.96   PTT seconds 33                                                                 Results from last 7 days   Lab Units 07/01/24  1453   INFLUENZA A PCR  Negative   INFLUENZA B PCR  Negative   RSV PCR  " Negative                                               ED Treatment-Medication Administration from 07/01/2024 1245 to 07/01/2024 1553         Date/Time Order Dose Route Action     07/01/2024 1349 iohexol (OMNIPAQUE) 350 MG/ML injection (MULTI-DOSE) 85 mL 85 mL Intravenous Given     07/01/2024 1441 clopidogrel (PLAVIX) tablet 300 mg 300 mg Oral Given     07/01/2024 1441 aspirin chewable tablet 324 mg 324 mg Oral Given     07/01/2024 1519 heparin (porcine) subcutaneous injection 5,000 Units 5,000 Units Subcutaneous Given     07/01/2024 1519 meclizine (ANTIVERT) tablet 12.5 mg 12.5 mg Oral Given            Past Medical History:   Diagnosis Date    Adhesive capsulitis of right shoulder     CAD (coronary artery disease)     CHF (congestive heart failure) (HCC)     Diabetes mellitus (HCC)     type 2, insulin dependent    Diverticulosis     History of breast cancer     s/p right lumpectomy, s/p radiation    Hyperlipidemia     Hypertension     Lymphedema of right upper extremity     Obesity (BMI 30-39.9)     SJ on CPAP     Severe aortic stenosis     Vitamin D deficiency      Present on Admission:   Diabetes mellitus (HCC)   CAD (coronary artery disease)   Hypertension   Hyperlipidemia      Admitting Diagnosis: Dizziness [R42]  Balance problem [R26.89]  Severe aortic stenosis [I35.0]  Age/Sex: 64 y.o. female  Admission Orders:  Scheduled Medications:  aspirin, 81 mg, Oral, Daily  atorvastatin, 40 mg, Oral, QPM  heparin (porcine), 5,000 Units, Subcutaneous, Q8H JEFF  insulin lispro, 1-6 Units, Subcutaneous, TID With Meals  insulin lispro, 1-6 Units, Subcutaneous, HS  meclizine, 12.5 mg, Oral, Q8H JEFF      Continuous IV Infusions:     PRN Meds:  acetaminophen, 650 mg, Oral, Q6H PRN  ondansetron, 4 mg, Intravenous, Q4H PRN        IP CONSULT TO NEUROLOGY  IP CONSULT TO NEUROLOGY  IP CONSULT TO NUTRITION SERVICES    Network Utilization Review Department  ATTENTION: Please call with any questions or concerns to [phone] and  carefully listen to the prompts so that you are directed to the right person. All voicemails are confidential.   For Discharge needs, contact Care Management DC Support Team at [phone] opt. [phone]  Send all requests for admission clinical reviews, approved or denied determinations and any other requests to dedicated fax number below belonging to the campus where the patient is receiving treatment. List of dedicated fax numbers for the Facilities:  FACILITY NAME UR FAX NUMBER   ADMISSION DENIALS (Administrative/Medical Necessity) [phone]   DISCHARGE SUPPORT TEAM (NETWORK) [phone]   PARENT CHILD HEALTH (Maternity/NICU/Pediatrics) [phone]   [facility] [phone]   [facility] [phone]   [facility] [phone]   [facility] [phone]   [facility] [phone]   [facility] [phone]   [facility] [phone]   [facility] [phone]   [facility] [phone]   [facility] [phone]   [facility] [phone]   [facility] [phone]

## 2024-07-02 NOTE — OCCUPATIONAL THERAPY NOTE
"    Occupational Therapy Evaluation     Patient Name: Michelle Julien  Today's Date: 7/2/2024  Problem List  Principal Problem:    Dizziness  Active Problems:    Hypertension    Hyperlipidemia    Diabetes mellitus (HCC)    CAD (coronary artery disease)    Stroke-like symptoms    Past Medical History  Past Medical History:   Diagnosis Date    Adhesive capsulitis of right shoulder     CAD (coronary artery disease)     CHF (congestive heart failure) (HCC)     Diabetes mellitus (HCC)     type 2, insulin dependent    Diverticulosis     History of breast cancer     s/p right lumpectomy, s/p radiation    Hyperlipidemia     Hypertension     Lymphedema of right upper extremity     Obesity (BMI 30-39.9)     SJ on CPAP     Severe aortic stenosis     Vitamin D deficiency      Past Surgical History  Past Surgical History:   Procedure Laterality Date    APPENDECTOMY      BREAST LUMPECTOMY Right     CARDIAC CATHETERIZATION N/A 08/19/2022    Procedure: Cardiac RHC/LHC;  Surgeon: Arnold Mooney DO;  Location: [facility];  Service: Cardiology    CARDIAC CATHETERIZATION N/A 10/11/2022    Procedure: CARDIAC TAVR;  Surgeon: Arnold Mooney DO;  Location: [facility];  Service: Cardiology    CHOLECYSTECTOMY      KNEE ARTHROSCOPY Right     knee    CO REPLACE AORTIC VALVE OPENFEMORAL ARTERY APPROACH N/A 10/11/2022    Procedure: REPLACEMENT AORTIC VALVE TRANSCATHETER (TAVR) TRANSFEMORAL W/ 23MM MILIAN ROSS S3 ULTRA VALVE(ACCESS ON LEFT) TRAY;  Surgeon: Handy Rushing MD;  Location: [facility];  Service: Cardiac Surgery    TONSILLECTOMY AND ADENOIDECTOMY      TOTAL ABDOMINAL HYSTERECTOMY               07/02/24 1100   OT Last Visit   OT Visit Date 07/02/24   Note Type   Note type Evaluation   Additional Comments Pt was seated in recliner upon arrival. Pt agreeable to OT eval.   Pain Assessment   Pain Assessment Tool 0-10   Pain Score 5  (\"4-5\" per pt report)   Pain Location/Orientation Location: Head   Pain Radiating " Towards headache   Pain Onset/Description Onset: Gradual   Hospital Pain Intervention(s) Medication (See MAR);Repositioned;Ambulation/increased activity   Restrictions/Precautions   Weight Bearing Precautions Per Order No   Other Precautions Chair Alarm;Telemetry;Fall Risk;Pain   Home Living   Type of Home House   Home Layout One level;Performs ADLs on one level;Able to live on main level with bedroom/bathroom;Ramped entrance;Stairs to enter with rails  (1 YAYA)   Bathroom Shower/Tub Tub/shower unit   Bathroom Toilet Raised   Bathroom Equipment Grab bars in shower   Bathroom Accessibility Accessible   Home Equipment Other (Comment)  (no AD used at baseline.)   Prior Function   Level of [place] Independent with ADLs;Independent with functional mobility;Independent with IADLS   Lives With Spouse   Receives Help From Family   IADLs Independent with driving;Independent with meal prep;Independent with medication management   Falls in the last 6 months 0   Vocational Retired   Lifestyle   Autonomy At baseline, pt lives in 1 story house with spouse. Pt reports prior to admission being independent with ADLs and IADLs. Upon initial evaluation, personal factors affecting pt include 1 YAYA, decreased ability to complete ADLs, and decreased ability to complete IADLs.   Reciprocal Relationships Support from family   Service to Others Retired   Intrinsic Gratification on the local school baord, likes to do Mixers   General   Family/Caregiver Present No   ADL   Eating Assistance 7  Independent   Grooming Assistance 7  Independent   UB Bathing Assistance 5  Supervision/Setup   LB Bathing Assistance 4  Minimal Assistance   UB Dressing Assistance 5  Supervision/Setup   LB Dressing Assistance 4  Minimal Assistance  (Pt able to pull up underwear during toileting.)   Toileting Assistance  5  Supervision/Setup  (Pt able to complete bonifacio care and clothing management with no assistance.)   Additional Comments ADL levels based on  "functional performance during session.   Bed Mobility   Additional Comments DNT; pt seated in recliner upon arrival and at end of session.   Transfers   Sit to Stand   (CGA)   Additional items Armrests;Increased time required;Verbal cues;Assist x 1   Stand to Sit   (CGA)   Additional items Assist x 1;Armrests;Increased time required;Verbal cues   Toilet transfer   (CGA)   Additional items Assist x 1;Standard toilet;Verbal cues   Additional Comments Pt required verbal cueing for proper positioning and sequencing with RW. Pt completed toilet transfer from standard toilet with use of grab bars and RW. Upon standing, pt denied any dizziness, SOB, or weakness. Pt denied symptoms at end of session as well.   Functional Mobility   Functional Mobility   (CGA)   Additional Comments Pt ambulated household distance to bathroom to complete toileting and hand hygiene at sink. Pt used a RW and required verbal cueing for sequencing and positioning. Pt was steady and reported no weakness or dizziness.   Additional items Rolling walker   Balance   Static Sitting Good   Dynamic Sitting Fair +   Static Standing Fair +   Dynamic Standing Fair   Activity Tolerance   Activity Tolerance Patient tolerated treatment well   Nurse Made Aware JUAN RAMON Almeida made aware.   RUE Assessment   RUE Assessment WFL  (full shoulder ROM, 4/5 MMT)   LUE Assessment   LUE Assessment X  (full shoulder ROM, 4/5 MMT; pt unable to bend elbow to touch finger to nose on L side; pt reports this is due to \"an old elbow injury\"; limited elbow flexion)   Hand Function   Gross Motor Coordination Functional   Fine Motor Coordination Functional   Sensation   Light Touch No apparent deficits  (per pt report)   Vision-Basic Assessment   Current Vision Wears glasses all the time   Cognition   Overall Cognitive Status WFL   Arousal/Participation Alert;Responsive;Cooperative   Attention Within functional limits   Orientation Level Oriented X4   Memory Within functional limits "   Following Commands Follows all commands and directions without difficulty   Comments very cooperative and pleasant   Assessment   Limitation Decreased ADL status;Decreased UE strength;Decreased self-care trans;Decreased high-level ADLs;Decreased endurance   Prognosis Good   Assessment Pt is a 64 y.o. female  who [facility] to [facility]  with Dizziness. Pt's PMH indicates HTN, HLD, diabetes mellitus, CAD, stroke-like symptoms, severe aortic stenosis, acute pain of R shoulder, lightheadedness, diverticulitis, chest pain, and breast cancer. Orders placed for OT eval and treat, up and OOB as tolerated. At baseline, pt lives in 1 story house with spouse. Pt reports prior to admission being independent with ADLs and IADLs. Upon initial evaluation, personal factors affecting pt include 1 YAYA, decreased ability to complete ADLs, and decreased ability to complete IADLs. Functional assessment indicates pt requires Min A with LB ADLs, supervision with UB ADLs/toileting, and independent with feeding/grooming. Pt requires CGA x 1 for functional transfers and mobility. Pt deficits at this time include decreased LUE elbow ROM , increased pain , impaired balance , decreased endurance, and decreased functional reach  . These deficits have an impact on pt's ability to participate in daily activities. Throughout session, pt required verbal cueing from therapist to address sequencing and proper positioning during ADLs. Occupational performance areas to address include dressing, bathing, functional mobility , functional transfers, home management, medication management, and community mobility. Pt would benefit from continued skilled OT services while hospitalized to address the above impairments and activity limitations to improve functional independence with meaningful activity. From an OT standpoint, d/c recommendation at this time is a level III minimum resource intensity.   Goals   Patient Goals to go  home   Plan   Treatment Interventions ADL retraining;Functional transfer training;UE strengthening/ROM;Patient/family training;Equipment evaluation/education;Compensatory technique education;Energy conservation;Activityengagement;Endurance training   Goal Expiration Date 07/17/24   OT Treatment Day 0   OT Frequency 1-2x/wk   Discharge Recommendation   Rehab Resource Intensity Level, OT III (Minimum Resource Intensity)   AM-PAC Daily Activity Inpatient   Lower Body Dressing 3   Bathing 3   Toileting 3   Upper Body Dressing 3   Grooming 4   Eating 4   Daily Activity Raw Score 20   Daily Activity Standardized Score (Calc for Raw Score >=11) 42.03   AM-PAC Applied Cognition Inpatient   Following a Speech/Presentation 4   Understanding Ordinary Conversation 4   Taking Medications 4   Remembering Where Things Are Placed or Put Away 4   Remembering List of 4-5 Errands 4   Taking Care of Complicated Tasks 4   Applied Cognition Raw Score 24   Applied Cognition Standardized Score 62.21   Barthel Index   Feeding 10   Bathing 0   Grooming Score 5   Dressing Score 5   Bladder Score 10   Bowels Score 10   Toilet Use Score 10   Transfers (Bed/Chair) Score 10   Mobility (Level Surface) Score 0   Stairs Score 0   Barthel Index Score 60   Modified Earlham Scale   Modified Earlham Scale 3   End of Consult   Patient Position at End of Consult Bedside chair;Bed/Chair alarm activated;All needs within reach  (Pt in recliner awaiting ECHO.)   Nurse Communication Nurse aware of consult     GOALS    Pt will complete functional transfers including a toilet transfer, with independently in order to prepare self for ADL tasks.    Pt will complete UB ADLs, including dressing, with independently  in order to increase functional independence with feeding, grooming, and bathing.    Pt will complete LB ADLs with independently in order to increase functional independence with meaningful activities.     Pt will increase standing tolerance to 10 min. in  order to decrease fall risk and increase endurance to engage in upright ADL tasks.     Pt will utilize and/or verbalize 3 energy conservation techniques in order to increase activity tolerance for functional tasks.     Pt will engage in UE therapeutic exercise for 10 min in order to increase strength and endurance for purposeful tasks.     Gabriela Cabezas, OTS

## 2024-07-02 NOTE — DISCHARGE SUMMARY
[facility]  Discharge- Michelle Julien 1960, 64 y.o. female MRN: [id number]  Unit/Bed#: 2 E 256-01 Encounter: [id number]  Primary Care Provider: Steffen Carrizales DO   Date and time admitted to hospital: 7/1/2024 12:47 PM    CAD (coronary artery disease)  Assessment & Plan  Continue home meds    Type 2 diabetes mellitus with hyperglycemia, without long-term current use of insulin (HCC)  Assessment & Plan  Lab Results   Component Value Date    HGBA1C 6.8 (H) 07/01/2024       Recent Labs     07/01/24  1633 07/01/24  2148 07/02/24  0751 07/02/24  1151   POCGLU 100 111 124 258*         Blood Sugar Average: Last 72 hrs:  (P) 157.4  Resume home medications on discharge    * Dizziness  Assessment & Plan  Not a TNK candidate due to last known well  CTA imaging did show some chronic findings, refer to report  MRI brain negative  PT/OT-no acute needs  Discussed with neurology, symptoms likely due to peripheral vertigo.  Will discharge home on as needed meclizine, referral to vestibular rehab      Medical Problems       Resolved Problems  Date Reviewed: 3/20/2024   None       Discharging Physician / Practitioner: Ron Moore MD  PCP: Steffen Carrizales DO  Admission Date:   Admission Orders (From admission, onward)       Ordered        07/01/24 1449  Place in Observation  Once                          Discharge Date: 07/02/24    Consultations During Hospital Stay:  Neurology  PT/OT    Procedures Performed:   None    Significant Findings / Test Results:   MRI brain wo contrast   Final Result by E. Alec Schoenberger, MD (07/02 0915)      No acute intracranial pathology. Mild chronic microangiopathy.      Workstation performed: [id number]         CTA stroke alert (head/neck)   Final Result by Anderson Olmos MD (07/01 1434)      CTA head:   -No large vessel occlusion.   -Atherosclerotic calcifications of the intracranial ICAs with moderate to severe right supraclinoid segment  "narrowing.   -Focal moderate stenosis of the proximal right inferior M2 branch.   -Moderate focal narrowing of the mid left intradural vertebral artery.      CTA neck:   -No high-grade stenosis, dissection or aneurysm.               Findings were directly discussed with Felisa Cuadra  at 2:27 p.m. on 7/1/2024.                           Workstation performed: [id number]         CT stroke alert brain   Final Result by Anderson Olmos MD (07/01 1435)      -Small hypodensity within the right thalamus (2:22) may represent age-indeterminate infarct however appears more subacute to chronic on coronal and sagittal sequences (401:59, 402:48). Correlate with MRI.      Findings were directly discussed with Felisa Cuadra  at approximately 1:55 p.m. on 7/1/2024.      Workstation performed: [id number]             Incidental Findings:   None    Test Results Pending at Discharge (will require follow up):   None     Outpatient Tests Requested:  None    Complications:  None    Reason for Admission: Dizziness/balance issues    Hospital Course:   Michelle Julien is a 64 y.o. female patient who originally presented to the hospital on 7/1/2024 due to dizziness and balance issues.  She was admitted under the stroke pathway.  MRI brain was ultimately negative for any acute infarct.  Symptoms improved during hospitalization.  Neurology suspected peripheral vertigo as etiology.  She was discharged home with as needed meclizine and a referral for vestibular rehab.    Please see above list of diagnoses and related plan for additional information.     Condition at Discharge: good    Discharge Day Visit / Exam:   Subjective: Patient reports that her presenting symptoms have been improving.  No new symptoms overnight.  Vitals: Blood Pressure: 134/61 (07/02/24 1149)  Pulse: 75 (07/02/24 1149)  Temperature: 98 °F (36.7 °C) (07/01/24 2200)  Temp Source: Oral (07/01/24 2200)  Respirations: 18 (07/02/24 1100)  Height: 5' 3\" (160 cm) (07/02/24 " 1116)  Weight - Scale: 77.6 kg (171 lb) (07/02/24 1116)  SpO2: 91 % (07/02/24 1149)  Exam:   Physical Exam  Vitals and nursing note reviewed.   Constitutional:       Appearance: Normal appearance.   HENT:      Head: Normocephalic and atraumatic.      Mouth/Throat:      Mouth: Mucous membranes are moist.   Eyes:      Conjunctiva/sclera: Conjunctivae normal.      Pupils: Pupils are equal, round, and reactive to light.   Cardiovascular:      Rate and Rhythm: Normal rate and regular rhythm.      Pulses: Normal pulses.      Heart sounds: Normal heart sounds. No murmur heard.     No gallop.   Pulmonary:      Effort: Pulmonary effort is normal. No respiratory distress.      Breath sounds: Normal breath sounds. No wheezing or rales.   Abdominal:      General: Abdomen is flat. Bowel sounds are normal. There is no distension.      Palpations: Abdomen is soft.      Tenderness: There is no abdominal tenderness. There is no guarding or rebound.   Musculoskeletal:         General: No swelling. Normal range of motion.   Skin:     General: Skin is warm and dry.      Capillary Refill: Capillary refill takes less than 2 seconds.   Neurological:      General: No focal deficit present.      Mental Status: She is alert. Mental status is at baseline.      Cranial Nerves: Cranial nerves 2-12 are intact. No cranial nerve deficit or facial asymmetry.      Sensory: Sensation is intact.      Motor: Motor function is intact. No weakness.      Coordination: Coordination is intact. Finger-Nose-Finger Test normal.      Comments: She did report some vertigo with left gaze deviation.  No nystagmus noted   Psychiatric:         Mood and Affect: Mood normal.          Discussion with Family: Updated  () at bedside.    Discharge instructions/Information to patient and family:   See after visit summary for information provided to patient and family.      Provisions for Follow-Up Care:  See after visit summary for information related  to follow-up care and any pertinent home health orders.      Mobility at time of Discharge:   Basic Mobility Inpatient Raw Score: 19  JH-HLM Goal: 6: Walk 10 steps or more  JH-HLM Achieved: 4: Move to chair/commode  HLM Goal NOT achieved. Continue to encourage mobility in post discharge setting.     Disposition:   Home    Planned Readmission: None     Discharge Statement:  I spent 35 minutes discharging the patient. This time was spent on the day of discharge. I had direct contact with the patient on the day of discharge. Greater than 50% of the total time was spent examining patient, answering all patient questions, arranging and discussing plan of care with patient as well as directly providing post-discharge instructions.  Additional time then spent on discharge activities.    Discharge Medications:  See after visit summary for reconciled discharge medications provided to patient and/or family.      **Please Note: This note may have been constructed using a voice recognition system**

## 2024-07-02 NOTE — PLAN OF CARE
Problem: OCCUPATIONAL THERAPY ADULT  Goal: Performs self-care activities at highest level of function for planned discharge setting.  See evaluation for individualized goals.  Description: Treatment Interventions: ADL retraining, Functional transfer training, UE strengthening/ROM, Patient/family training, Equipment evaluation/education, Compensatory technique education, Energy conservation, Activityengagement, Endurance training          See flowsheet documentation for full assessment, interventions and recommendations.   Note: Limitation: Decreased ADL status, Decreased UE strength, Decreased self-care trans, Decreased high-level ADLs, Decreased endurance  Prognosis: Good  Assessment: Pt is a 64 y.o. female  who presents to [facility] on 7/1/2024  with Dizziness. Pt's PMH indicates HTN, HLD, diabetes mellitus, CAD, stroke-like symptoms, severe aortic stenosis, acute pain of R shoulder, lightheadedness, diverticulitis, chest pain, and breast cancer. Orders placed for OT eval and treat, up and OOB as tolerated. At baseline, pt lives in 1 story house with spouse. Pt reports prior to admission being independent with ADLs and IADLs. Upon initial evaluation, personal factors affecting pt include 1 YAYA, decreased ability to complete ADLs, and decreased ability to complete IADLs. Functional assessment indicates pt requires Min A with LB ADLs, supervision with UB ADLs/toileting, and independent with feeding/grooming. Pt requires CGA x 1 for functional transfers and mobility. Pt deficits at this time include decreased LUE elbow ROM , increased pain , impaired balance , decreased endurance, and decreased functional reach  . These deficits have an impact on pt's ability to participate in daily activities. Throughout session, pt required verbal cueing from therapist to address sequencing and proper positioning during ADLs. Occupational performance areas to address include dressing, bathing, functional mobility  , functional transfers, home management, medication management, and community mobility. Pt would benefit from continued skilled OT services while hospitalized to address the above impairments and activity limitations to improve functional independence with meaningful activity. From an OT standpoint, d/c recommendation at this time is a level III minimum resource intensity.     Rehab Resource Intensity Level, OT: III (Minimum Resource Intensity)    PARVIZ Patel

## 2024-07-17 NOTE — PROGRESS NOTES
Cardiology Follow Up    Michelle Julien  1960  [id number]  [facility] [place]  [address]  [place] [id number] [phone]    1. Dizziness        2. S/P TAVR (transcatheter aortic valve replacement)  amoxicillin (AMOXIL) 500 mg capsule      3. Hypertension, unspecified type        4. Hyperlipidemia, unspecified hyperlipidemia type        5. SJ on CPAP            Interval History:   Ms Michelle Julien was admitted to [facility] on 7/01 - 7/02/24 with Dizziness.  She presented to the ED with dizziness.  Stroke alert called.  MRI of the brain showed no CVA.  MRI of the brain showed mild chronic microangiopathy.  CTA showed atherosclerotic calcifications of the intracranial ICAs with moderate severe right supraclinoid segment narrowing.  Focal moderate stenosis of the proximal right inferior M2 branch.  Moderate focal narrowing of the mid left intradural vertebral artery.  No high grade ICA stenosis,  TTE: LVEF 55%, grade 1 abnormal relaxation.  Right ventricular size systolic function normal.  Valve TAVR bioprosthetic valve no evidence of paravalvular regurgitation gradient across the prosthetic aortic valve within expected range.  Mean aortic valve gradient 13 mmHg.  Neurology felt Vertigo and placed Michelle on Meclizine PRN and recommended vestibular therapy.      Michelle presents to the office for a recent hospitalization follow up visit.  Michelle denies recurrence of dizziness.  According to Michelle she woke up in the morning of presentation to the emergency room was dizziness,  gait deviated to the left.  She denies room spinning.  Upon presentation to the ED she treated with ASA and Plavix, symptoms resolved.        Medical History  Primary Cardiologist Dr Rodriguez  Severe AS, 10/11/22  sp TAVR # 23 mm Farmer Vazquez S3 Ultra valve by Dr Rushing.    Hypertension  Hyperlipidemia 7/01/24 , , HDL 51,      Chronic HFpEF LVEF 55%, grade 1 abnormal relaxation   LBBB  DM2 HgbA1C 6.8 on 7/01/24   SJ on CPAP  Right Breast CA sp Lumpectomy sp radiation  RUE Lymphedema  BPPV     LHC on 8/19/22 no sig epicardial disease.      /04/23 TTE showed LVEF 70% diastolic function mildly abnormal grade 1 abnormal relaxation.  Left atrium mildly dilated.  Aortic valve Farmer ROSS 323 mm TAVR bioprosthetic valve.  Via prosthetic valve appeared well-seated and functioning normally.  Trace perivalvular regurgitation.  No evidence of transvalvular regurgitation.  Aortic valve mean gradient 9 mmHg.  Mild MR.  Echo stress test showed no inducible wall motion abnormality on the stress echocardiogram.                 Patient Active Problem List   Diagnosis    Severe aortic stenosis    Hypertension    Hyperlipidemia    Type 2 diabetes mellitus with hyperglycemia, without long-term current use of insulin ([facility])    Heart palpitations    Acute pain of right shoulder    Adhesive capsulitis of right shoulder    Lightheadedness    Diverticulitis of large intestine without perforation or abscess without bleeding    CHF (congestive heart failure) ([facility])    History of breast cancer    SJ on CPAP    CAD (coronary artery disease)    LBBB (left bundle branch block)    Hyperchloremia    S/P TAVR (transcatheter aortic valve replacement)    Encounter for postoperative care    Chest pain    Dizziness    Stroke-like symptoms     Past Medical History:   Diagnosis Date    Adhesive capsulitis of right shoulder     CAD (coronary artery disease)     CHF (congestive heart failure) ([facility])     Diabetes mellitus ([facility])     type 2, insulin dependent    Diverticulosis     History of breast cancer     s/p right lumpectomy, s/p radiation    Hyperlipidemia     Hypertension     Lymphedema of right upper extremity     Obesity (BMI 30-39.9)     SJ on CPAP     Severe aortic stenosis     Vitamin D deficiency      Social History     Socioeconomic History    Marital status:  /Civil Union     Spouse name: Not on file    Number of children: Not on file    Years of education: Not on file    Highest education level: Not on file   Occupational History    Not on file   Tobacco Use    Smoking status: Never    Smokeless tobacco: Never   Vaping Use    Vaping status: Never Used   Substance and Sexual Activity    Alcohol use: Yes     Comment: socially    Drug use: No    Sexual activity: Yes     Partners: Male   Other Topics Concern    Not on file   Social History Narrative    Not on file     Social Determinants of Health     Financial Resource Strain: Not on file   Food Insecurity: No Food Insecurity (7/2/2024)    Hunger Vital Sign     Worried About Running Out of Food in the Last Year: Never true     Ran Out of Food in the Last Year: Never true   Transportation Needs: No Transportation Needs (7/2/2024)    PRAPARE - Transportation     Lack of Transportation (Medical): No     Lack of Transportation (Non-Medical): No   Physical Activity: Not on file   Stress: Not on file   Social Connections: Not on file   Intimate Partner Violence: Not on file   Housing Stability: Low Risk  (7/2/2024)    Housing Stability Vital Sign     Unable to Pay for Housing in the Last Year: No     Number of Times Moved in the Last Year: 0     Homeless in the Last Year: No      Family History   Problem Relation Age of Onset    COPD Mother     Heart disease Father     Heart disease Paternal Grandmother     Cancer Family     Colon cancer Neg Hx      Past Surgical History:   Procedure Laterality Date    APPENDECTOMY      BREAST LUMPECTOMY Right     CARDIAC CATHETERIZATION N/A 08/19/2022    Procedure: Cardiac RHC/LHC;  Surgeon: Arnold Mooney DO;  Location: [facility];  Service: Cardiology    CARDIAC CATHETERIZATION N/A 10/11/2022    Procedure: CARDIAC TAVR;  Surgeon: Arnold Mooney DO;  Location: [facility];  Service: Cardiology    CHOLECYSTECTOMY      KNEE ARTHROSCOPY Right     knee    MN REPLACE  AORTIC VALVE OPENFEMORAL ARTERY APPROACH N/A 10/11/2022    Procedure: REPLACEMENT AORTIC VALVE TRANSCATHETER (TAVR) TRANSFEMORAL W/ 23MM MILIAN ROSS S3 ULTRA VALVE(ACCESS ON LEFT) TRAY;  Surgeon: Handy Rushing MD;  Location: [facility];  Service: Cardiac Surgery    TONSILLECTOMY AND ADENOIDECTOMY      TOTAL ABDOMINAL HYSTERECTOMY         Current Outpatient Medications:     aspirin 81 mg chewable tablet, Chew 81 mg daily, Disp: , Rfl:     carvedilol (COREG) 6.25 mg tablet, TAKE ONE TABLET BY MOUTH TWICE A DAY WITH MEALS, Disp: 180 tablet, Rfl: 3    Cholecalciferol (VITAMIN D3) 1000 UNITS CAPS, Take 2,000 Units by mouth daily, Disp: , Rfl:     co-enzyme Q-10 30 mg, Take 30 mg by mouth daily, Disp: , Rfl:     Cyanocobalamin (VITAMIN B12 PO), Take 2,500 mcg by mouth daily, Disp: , Rfl:     Empagliflozin-metFORMIN HCl ER (Synjardy XR)  MG TB24, Take 1 capsule by mouth daily. Indications: Type 2 Diabetes, Disp: , Rfl:     glimepiride (AMARYL) 2 mg tablet, Take 4 mg by mouth 2 (two) times a day , Disp: , Rfl:     Icosapent Ethyl (VASCEPA PO), Take 2 capsules by mouth daily, Disp: , Rfl:     Insulin Glargine (TOUJEO) 300 units/mL CONCETRATED U-300 injection pen, Inject 20 Units under the skin daily at bedtime, Disp: , Rfl:     lisinopril (ZESTRIL) 5 mg tablet, TAKE ONE TABLET BY MOUTH EVERY DAY, Disp: 90 tablet, Rfl: 1    meclizine (ANTIVERT) 12.5 MG tablet, Take 1 tablet (12.5 mg total) by mouth every 8 (eight) hours as needed for dizziness, Disp: 10 tablet, Rfl: 0    methimazole (TAPAZOLE) 5 mg tablet, Take 1 tablet by mouth every other day, Disp: , Rfl:     multivitamin (THERAGRAN) TABS, Take 1 tablet by mouth daily, Disp: , Rfl:     ondansetron (ZOFRAN) 4 mg tablet, Take 1 tablet (4 mg total) by mouth every 8 (eight) hours as needed for nausea or vomiting, Disp: 10 tablet, Rfl: 0    pantoprazole (PROTONIX) 40 mg tablet, Take 1 tablet (40 mg total) by mouth daily, Disp: 90 tablet, Rfl: 3    pravastatin  (PRAVACHOL) 20 mg tablet, TAKE ONE TABLET BY MOUTH EVERY DAY, Disp: 90 tablet, Rfl: 3    pyridoxine (VITAMIN B6) 100 mg tablet, Take 100 mg by mouth daily, Disp: , Rfl:   Allergies   Allergen Reactions    Fentanyl Rash    Niaspan [Niacin Er] Rash    Prednisone Rash       Labs:  Admission on 07/01/2024, Discharged on 07/02/2024   Component Date Value    Ventricular Rate 07/01/2024 75     Atrial Rate 07/01/2024 75     HI Interval 07/01/2024 172     QRSD Interval 07/01/2024 146     QT Interval 07/01/2024 430     QTC Interval 07/01/2024 480     P Axis 07/01/2024 39     QRS [place] 07/01/2024 67     T Wave Huslia 07/01/2024 104     POC Glucose 07/01/2024 194 (H)     Sodium 07/01/2024 139     Potassium 07/01/2024 4.2     Chloride 07/01/2024 103     CO2 07/01/2024 27     ANION GAP 07/01/2024 9     BUN 07/01/2024 15     Creatinine 07/01/2024 0.63     Glucose 07/01/2024 190 (H)     Calcium 07/01/2024 9.6     eGFR 07/01/2024 93     WBC 07/01/2024 7.75     RBC 07/01/2024 4.54     Hemoglobin 07/01/2024 13.3     Hematocrit 07/01/2024 39.8     MCV 07/01/2024 88     MCH 07/01/2024 29.3     MCHC 07/01/2024 33.4     RDW 07/01/2024 13.4     Platelets 07/01/2024 210     MPV 07/01/2024 10.8     Protime 07/01/2024 13.3     INR 07/01/2024 0.96     PTT 07/01/2024 33     hs TnI 0hr 07/01/2024 8     SARS-CoV-2 07/01/2024 Negative     INFLUENZA A PCR 07/01/2024 Negative     INFLUENZA B PCR 07/01/2024 Negative     RSV PCR 07/01/2024 Negative     hs TnI 2hr 07/01/2024 10     Delta 2hr hsTnI 07/01/2024 2     Cholesterol 07/01/2024 201 (H)     Triglycerides 07/01/2024 246 (H)     HDL, Direct 07/01/2024 51     LDL Calculated 07/01/2024 101 (H)     Hemoglobin A1C 07/01/2024 6.8 (H)     EAG 07/01/2024 148     BSA 07/02/2024 1.81     A4C EF 07/02/2024 56     LVOT stroke volume 07/02/2024 95.39     LVOT stroke volume index 07/02/2024 53.00     LVOT Cardiac Output 07/02/2024 6.95     LVOT Cardiac Index 07/02/2024 3.84     LVIDd 07/02/2024 4.90     LVIDS  07/02/2024 3.10     IVSd 07/02/2024 1.30     LVPWd 07/02/2024 1.20     LVOT diameter 07/02/2024 2.0     LVOT peak VTI 07/02/2024 30.38     FS 07/02/2024 37     MV E' Tissue Velocity Se* 07/02/2024 5     LA Volume Index (BP) 07/02/2024 29.3     E/A ratio 07/02/2024 0.76     E wave deceleration time 07/02/2024 238     MV Peak E Sid 07/02/2024 80     MV Peak A Sid 07/02/2024 1.05     AV LVOT peak gradient 07/02/2024 5     LVOT peak sid 07/02/2024 1.15     RVID d 07/02/2024 3.3     Tricuspid annular plane * 07/02/2024 2.40     LA size 07/02/2024 3.8     LA length (A2C) 07/02/2024 5.40     LA volume (BP) 07/02/2024 53     RAA A4C 07/02/2024 14.1     Aortic valve peak veloci* 07/02/2024 2.4     Ao VTI 07/02/2024 56.84     AV mean gradient 07/02/2024 13     LVOT mn grad 07/02/2024 3.0     AV peak gradient 07/02/2024 23     AV area by cont VTI 07/02/2024 1.7     AV area peak sid 07/02/2024 180.6     MV stenosis pressure 1/2* 07/02/2024 69     MV valve area p 1/2 meth* 07/02/2024 3.19     Ao root 07/02/2024 3.30     Asc Ao 07/02/2024 3.5     Aortic valve mean veloci* 07/02/2024 17.40     Left ventricular stroke * 07/02/2024 75.00     IVS 07/02/2024 1.3     LEFT VENTRICLE SYSTOLIC * 07/02/2024 39     LV DIASTOLIC VOLUME (MOD* 07/02/2024 114     Left Atrium Area-systoli* 07/02/2024 18.1     Left Atrium Area-systoli* 07/02/2024 18.5     LVSV, 2D 07/02/2024 75     LVOT area 07/02/2024 3.14     DVI 07/02/2024 0.48     AV valve area 07/02/2024 1.68     LV EF 07/02/2024 55     hs TnI 4hr 07/01/2024 9     Delta 4hr hsTnI 07/01/2024 1     POC Glucose 07/01/2024 100     POC Glucose 07/01/2024 111     WBC 07/02/2024 5.30     RBC 07/02/2024 4.33     Hemoglobin 07/02/2024 12.5     Hematocrit 07/02/2024 38.1     MCV 07/02/2024 88     MCH 07/02/2024 28.9     MCHC 07/02/2024 32.8     RDW 07/02/2024 13.5     MPV 07/02/2024 11.0     Platelets 07/02/2024 182     nRBC 07/02/2024 0     Segmented % 07/02/2024 67     Immature Grans % 07/02/2024 0      Lymphocytes % 07/02/2024 20     Monocytes % 07/02/2024 9     Eosinophils Relative 07/02/2024 3     Basophils Relative 07/02/2024 1     Absolute Neutrophils 07/02/2024 3.54     Absolute Immature Grans 07/02/2024 0.02     Absolute Lymphocytes 07/02/2024 1.05     Absolute Monocytes 07/02/2024 0.48     Eosinophils Absolute 07/02/2024 0.15     Basophils Absolute 07/02/2024 0.06     Sodium 07/02/2024 139     Potassium 07/02/2024 3.9     Chloride 07/02/2024 104     CO2 07/02/2024 26     ANION GAP 07/02/2024 9     BUN 07/02/2024 14     Creatinine 07/02/2024 0.63     Glucose 07/02/2024 99     Glucose, Fasting 07/02/2024 99     Calcium 07/02/2024 9.1     AST 07/02/2024 16     ALT 07/02/2024 17     Alkaline Phosphatase 07/02/2024 87     Total Protein 07/02/2024 7.0     Albumin 07/02/2024 4.0     Total Bilirubin 07/02/2024 0.39     eGFR 07/02/2024 93     POC Glucose 07/02/2024 124     POC Glucose 07/02/2024 258 (H)      Imaging: Echo complete w/ contrast if indicated    Result Date: 7/2/2024  Narrative:   Left Ventricle: Left ventricular cavity size is normal. Wall thickness is normal. The left ventricular ejection fraction is 55%. Systolic function is normal. Wall motion is normal. Diastolic function is mildly abnormal, consistent with grade I (abnormal) relaxation.   Aortic Valve: There is a TAVR bioprosthetic valve. There is no evidence of paravalvular regurgitation. The gradient recorded across the prosthetic aortic valve is within the expected range. Mean aortic valve gradient of 13 mmHg   Mitral Valve: There is moderate annular calcification. There is mild regurgitation.     MRI brain wo contrast    Result Date: 7/2/2024  Narrative: MRI BRAIN WITHOUT CONTRAST INDICATION: stroke like symptoms. COMPARISON:   CT/CTA performed yesterday. TECHNIQUE:  Multiplanar, multisequence imaging of the brain was performed. IMAGE QUALITY:  Diagnostic. FINDINGS: BRAIN PARENCHYMA:  There is no discrete mass, mass effect or midline shift.  There is no intracranial hemorrhage.  There is no evidence of acute infarction and diffusion imaging is unremarkable. Small scattered foci of T2/FLAIR hyperintensity in the periventricular and subcortical matter are nonspecific but likely present mild chronic microangiopathy. VENTRICLES:  Normal for the patient's age. SELLA AND PITUITARY GLAND:  Normal. ORBITS:  Normal. PARANASAL SINUSES: Mild mucosal thickening. VASCULATURE:  Evaluation of the major intracranial vasculature demonstrates appropriate flow voids. CALVARIUM AND SKULL BASE:  Normal. EXTRACRANIAL SOFT TISSUES:  Normal.     Impression: No acute intracranial pathology. Mild chronic microangiopathy. Workstation performed: [id number]     CT stroke alert brain    Result Date: 7/1/2024  Narrative: CT BRAIN - STROKE ALERT PROTOCOL INDICATION:   Stroke Alert. COMPARISON:  None. TECHNIQUE:  CT examination of the brain was performed.  In addition to axial images, coronal reformatted images were created and submitted for interpretation. Radiation dose length product (DLP) for this visit:  787 mGy-cm .  This examination, like all CT scans performed in the [facility], was performed utilizing techniques to minimize radiation dose exposure, including the use of iterative reconstruction and automated exposure control. IMAGE QUALITY:  Diagnostic. FINDINGS: PARENCHYMA: Small hypodensity within the right thalamus (2:22) may represent age-indeterminate infarct however appears more subacute to chronic on coronal and sagittal sequences (401:59, 402:48). No intracranial mass, mass effect or midline shift. No CT signs of acute acute vascular territory infarction.  No acute parenchymal hemorrhage. Hypodensity within the periventricular and deep white matter, nonspecific however favored to represent mild microangiopathic changes. Atherosclerotic calcifications of the carotid siphons and intradural vertebral arteries. VENTRICLES AND EXTRA-AXIAL SPACES:  Normal  for the patient's age. VISUALIZED ORBITS: Normal visualized orbits. PARANASAL SINUSES: Mild mucosal thickening of the left frontal sinus. CALVARIUM AND EXTRACRANIAL SOFT TISSUES:   Normal.     Impression: -Small hypodensity within the right thalamus (2:22) may represent age-indeterminate infarct however appears more subacute to chronic on coronal and sagittal sequences (401:59, 402:48). Correlate with MRI. Findings were directly discussed with Felisa Cuadra  at approximately 1:55 p.m. on 7/1/2024. Workstation performed: [id number]     CTA stroke alert (head/neck)    Result Date: 7/1/2024  Narrative: CTA NECK AND BRAIN WITH CONTRAST INDICATION: Stroke Alert COMPARISON:   Same day CT head TECHNIQUE:   Post contrast imaging was performed after administration of iodinated contrast through the neck and brain. Post contrast axial 0.625 mm images timed to opacify the arterial system. 3D rendering was performed on an independent workstation.   MIP reconstructions performed. Coronal reconstructions were performed of the noncontrast portion of the brain. Radiation dose length product (DLP) for this visit:  508 mGy-cm .  This examination, like all CT scans performed in the [facility], was performed utilizing techniques to minimize radiation dose exposure, including the use of iterative reconstruction and automated exposure control. IV Contrast:  85 mL of iohexol (OMNIPAQUE) IMAGE QUALITY:   Diagnostic FINDINGS: CERVICAL VASCULATURE AORTIC ARCH AND GREAT VESSELS: Mild atherosclerotic disease of the arch, proximal great vessels and visualized subclavian vessels.  No significant stenosis. RIGHT VERTEBRAL ARTERY CERVICAL SEGMENT:  Normal origin. The vessel is normal in caliber throughout the neck. LEFT VERTEBRAL ARTERY CERVICAL SEGMENT:  Normal origin. The vessel is normal in caliber throughout the neck. RIGHT EXTRACRANIAL CAROTID SEGMENT: Mild atherosclerotic disease of the distal common carotid artery and  proximal cervical internal carotid artery without significant stenosis compared to the more distal ICA. LEFT EXTRACRANIAL CAROTID SEGMENT: Mild atherosclerotic disease of the distal common carotid artery and proximal cervical internal carotid artery without significant stenosis compared to the more distal ICA. NASCET criteria was used to determine the degree of internal carotid artery diameter stenosis. INTRACRANIAL VASCULATURE INTERNAL CAROTID ARTERIES: Atherosclerotic calcifications of the cavernous and supraclinoid ICAs resulting in moderate to severe right and moderate left supraclinoid stenosis. Normal ophthalmic artery origins. ANTERIOR CIRCULATION:  Symmetric A1 segments and anterior cerebral arteries with normal enhancement.  Normal anterior communicating artery. MIDDLE CEREBRAL ARTERY CIRCULATION:  Focal moderate stenosis of the proximal right inferior M2 branch. Otherwise M1 segment and middle cerebral artery branches demonstrate normal enhancement bilaterally. DISTAL VERTEBRAL ARTERIES: Atherosclerotic calcification of bilateral intradural vertebral arteries. Moderate focal narrowing of the mid left intradural vertebral artery, otherwise no high-grade stenosis. Posterior inferior cerebellar artery origins are normal. BASILAR ARTERY:  Basilar artery is normal in caliber.  Normal superior cerebellar arteries. POSTERIOR CEREBRAL ARTERIES: Both posterior cerebral arteries arises from the basilar tip.  Both arteries demonstrate normal enhancement.   Posterior communicating arteries are not visualized and may be hypoplastic or absent. VENOUS STRUCTURES:  Normal. NON VASCULAR ANATOMY BONY STRUCTURES:  No acute osseous abnormality. Spondylosis of the cervical spine. SOFT TISSUES OF THE NECK: Heterogeneous enhancement of the thyroid with 1.5 cm left thyroid lobe nodule. Incidental discovery of one or more thyroid nodule(s) measuring more than 1.5 cm and without suspicious features is noted in this patient who is  above 35 years old; according to guidelines published in the February 2015 white paper on incidental thyroid nodules in the Journal of the American College of Radiology (JACR), further characterization with thyroid ultrasound is recommended. THORACIC INLET:  Unremarkable.     Impression: CTA head: -No large vessel occlusion. -Atherosclerotic calcifications of the intracranial ICAs with moderate to severe right supraclinoid segment narrowing. -Focal moderate stenosis of the proximal right inferior M2 branch. -Moderate focal narrowing of the mid left intradural vertebral artery. CTA neck: -No high-grade stenosis, dissection or aneurysm. Findings were directly discussed with Felisa Cuadra  at 2:27 p.m. on 7/1/2024. Workstation performed: [id number]       Review of Systems:  Review of Systems   Musculoskeletal:  Positive for arthralgias and myalgias.   All other systems reviewed and are negative.      Physical Exam:  Physical Exam  Vitals reviewed.   Constitutional:       Appearance: She is obese.   Cardiovascular:      Rate and Rhythm: Normal rate and regular rhythm.      Pulses: Normal pulses.      Heart sounds: Normal heart sounds.   Pulmonary:      Effort: Pulmonary effort is normal.      Breath sounds: Normal breath sounds.   Musculoskeletal:         General: Normal range of motion.      Cervical back: Normal range of motion and neck supple.      Right lower leg: No edema.      Left lower leg: No edema.   Skin:     General: Skin is warm and dry.      Capillary Refill: Capillary refill takes less than 2 seconds.   Neurological:      General: No focal deficit present.      Mental Status: She is alert and oriented to person, place, and time.   Psychiatric:         Mood and Affect: Mood normal.         Behavior: Behavior normal.         Discussion/Summary:  # Dizziness per neurology felt to be vertigo , no re occurrence. She was prescribed meclizine for dizziness and felt if symptoms persisted undergo vestibular  therapy  # Severe AS, 10/11/22  sp TAVR # 23 mm Farmer Vazquez S3 Ultra valve by Dr Rushing./02/2024 TTE showed TAVR bioprosthetic valve no evidence of paravalvular regurgitation gradient across the prosthetic aortic valve within expected range mean aortic valve gradient 13 mmHg.  Continue on aspirin  81 mg daily, pravastatin 20 mg daily, lisinopril 5 mg daily, Coreg 6.25 mg twice daily.  Amoxicillin 2 g 1 hour prior to her to proceed in the near future.  # Hypertension RUE sitting 140/70, home /65 continue on lisinopril 5 mg daily, Coreg 6.25 mg twice daily DASH diet   # Hyperlipidemia 7/01/24 , , HDL 51,  -continue pravastatin 20 mg daily, heart healthy diet  # SJ compliant with CPAP

## 2024-07-18 ENCOUNTER — OFFICE VISIT (OUTPATIENT)
Dept: CARDIOLOGY CLINIC | Facility: CLINIC | Age: 64
End: 2024-07-18
Payer: COMMERCIAL

## 2024-07-18 VITALS
HEIGHT: 63 IN | HEART RATE: 72 BPM | SYSTOLIC BLOOD PRESSURE: 130 MMHG | DIASTOLIC BLOOD PRESSURE: 66 MMHG | BODY MASS INDEX: 32.32 KG/M2 | WEIGHT: 182.4 LBS | OXYGEN SATURATION: 97 %

## 2024-07-18 DIAGNOSIS — I10 HYPERTENSION, UNSPECIFIED TYPE: ICD-10-CM

## 2024-07-18 DIAGNOSIS — Z95.2 S/P TAVR (TRANSCATHETER AORTIC VALVE REPLACEMENT): ICD-10-CM

## 2024-07-18 DIAGNOSIS — E78.5 HYPERLIPIDEMIA, UNSPECIFIED HYPERLIPIDEMIA TYPE: ICD-10-CM

## 2024-07-18 DIAGNOSIS — R42 DIZZINESS: Primary | ICD-10-CM

## 2024-07-18 DIAGNOSIS — G47.33 OSA ON CPAP: ICD-10-CM

## 2024-07-18 PROCEDURE — 99215 OFFICE O/P EST HI 40 MIN: CPT | Performed by: NURSE PRACTITIONER

## 2024-07-18 RX ORDER — AMOXICILLIN 500 MG/1
CAPSULE ORAL
Qty: 4 CAPSULE | Refills: 2 | Status: SHIPPED | OUTPATIENT
Start: 2024-07-18 | End: 2024-07-22

## 2024-10-29 ENCOUNTER — OFFICE VISIT (OUTPATIENT)
Dept: CARDIOLOGY CLINIC | Facility: CLINIC | Age: 64
End: 2024-10-29
Payer: COMMERCIAL

## 2024-10-29 VITALS
BODY MASS INDEX: 32.6 KG/M2 | OXYGEN SATURATION: 95 % | HEIGHT: 63 IN | SYSTOLIC BLOOD PRESSURE: 132 MMHG | WEIGHT: 184 LBS | HEART RATE: 83 BPM | DIASTOLIC BLOOD PRESSURE: 64 MMHG

## 2024-10-29 DIAGNOSIS — I25.10 CORONARY ARTERY DISEASE INVOLVING NATIVE HEART WITHOUT ANGINA PECTORIS, UNSPECIFIED VESSEL OR LESION TYPE: ICD-10-CM

## 2024-10-29 DIAGNOSIS — E11.69 DYSLIPIDEMIA ASSOCIATED WITH TYPE 2 DIABETES MELLITUS  (HCC): ICD-10-CM

## 2024-10-29 DIAGNOSIS — I10 HYPERTENSION, UNSPECIFIED TYPE: ICD-10-CM

## 2024-10-29 DIAGNOSIS — E78.5 HYPERLIPIDEMIA, UNSPECIFIED HYPERLIPIDEMIA TYPE: ICD-10-CM

## 2024-10-29 DIAGNOSIS — I47.10 SUPRAVENTRICULAR TACHYCARDIA (HCC): ICD-10-CM

## 2024-10-29 DIAGNOSIS — I50.9 CONGESTIVE HEART FAILURE, UNSPECIFIED HF CHRONICITY, UNSPECIFIED HEART FAILURE TYPE (HCC): ICD-10-CM

## 2024-10-29 DIAGNOSIS — R00.2 HEART PALPITATIONS: ICD-10-CM

## 2024-10-29 DIAGNOSIS — E78.5 DYSLIPIDEMIA ASSOCIATED WITH TYPE 2 DIABETES MELLITUS  (HCC): ICD-10-CM

## 2024-10-29 DIAGNOSIS — Z95.2 S/P TAVR (TRANSCATHETER AORTIC VALVE REPLACEMENT): Primary | ICD-10-CM

## 2024-10-29 PROCEDURE — 99214 OFFICE O/P EST MOD 30 MIN: CPT | Performed by: INTERNAL MEDICINE

## 2024-10-29 RX ORDER — AMOXICILLIN 500 MG/1
2000 CAPSULE ORAL AS NEEDED
Qty: 8 CAPSULE | Refills: 3 | Status: SHIPPED | OUTPATIENT
Start: 2024-10-29 | End: 2024-11-02

## 2024-10-29 NOTE — PROGRESS NOTES
Michelle Bishopstanleys  1960  [id number]  [facility] [place]  [address]  [address]  [facility] [id number] [phone]    1. S/P TAVR (transcatheter aortic valve replacement)  amoxicillin (AMOXIL) 500 mg capsule      2. Supraventricular tachycardia (HCC)        3. Dyslipidemia associated with type 2 diabetes mellitus  (HCC)  Lipid Panel with Direct LDL reflex      4. Coronary artery disease involving native heart without angina pectoris, unspecified vessel or lesion type        5. Congestive heart failure, unspecified HF chronicity, unspecified heart failure type (HCC)        6. Hypertension, unspecified type        7. Heart palpitations        8. Hyperlipidemia, unspecified hyperlipidemia type            Discussion/Summary:    Overall she is doing well.  Coronary disease stable no complaints of angina.  TAVR are functioning well.  No further SVT.  Blood pressure is well-controlled lipids have been doing well they due to be checked towards the end of the year last LDL was nonfasting.  Continue with diet and exercise will see her back yearly    Interval History:  4-year-old female with a history of moderate aortic stenosis, hypertension, hyperlipidemia, diabetes, statin intolerance presents to establish care with my practice.  She is transitioning from a prior cardiologist who is retiring.  Overall she has been doing well from a cardiac standpoint.  She has good functional capacity and works long days.  She denies any exertional chest pressure heaviness.      Overall she is doing well denies any chest pain, shortness of breath, palpitations, lightheadedness, dizziness, or syncope.  There is been a lower extreme edema, PND, orthopnea.  Been taking all medications as prescribed.  Staying hydrated recent hospital stay was reviewed        Problem List       Aortic stenosis, moderate    Essential hypertension    Mixed hyperlipidemia    Type 2 diabetes mellitus with complication,  "without long-term current use of insulin ([facility])    Lab Results   Component Value Date    HGBA1C 6.8 (H) 07/01/2024       No results for input(s): \"POCGLU\" in the last 72 hours.    Blood Sugar Average: Last 72 hrs:          Heart palpitations          Past Medical History:   Diagnosis Date    Adhesive capsulitis of right shoulder     CAD (coronary artery disease)     CHF (congestive heart failure) ([facility])     Diabetes mellitus ([facility])     type 2, insulin dependent    Diverticulosis     History of breast cancer     s/p right lumpectomy, s/p radiation    Hyperlipidemia     Hypertension     Lymphedema of right upper extremity     Obesity (BMI 30-39.9)     SJ on CPAP     Severe aortic stenosis     Vitamin D deficiency      Social History     Socioeconomic History    Marital status: /Civil Union     Spouse name: Not on file    Number of children: Not on file    Years of education: Not on file    Highest education level: Not on file   Occupational History    Not on file   Tobacco Use    Smoking status: Never    Smokeless tobacco: Never   Vaping Use    Vaping status: Never Used   Substance and Sexual Activity    Alcohol use: Yes     Comment: socially    Drug use: No    Sexual activity: Yes     Partners: Male   Other Topics Concern    Not on file   Social History Narrative    Not on file     Social Determinants of Health     Financial Resource Strain: Not on file   Food Insecurity: No Food Insecurity (7/2/2024)    Nursing - Inadequate Food Risk Classification     Worried About Running Out of Food in the Last Year: Never true     Ran Out of Food in the Last Year: Never true     Ran Out of Food in the Last Year: Not on file   Transportation Needs: No Transportation Needs (7/2/2024)    PRAPARE - Transportation     Lack of Transportation (Medical): No     Lack of Transportation (Non-Medical): No   Physical Activity: Not on file   Stress: Not on file   Social Connections: Not on file   Intimate Partner Violence: Not on file "   Housing Stability: Low Risk  (7/2/2024)    Housing Stability Vital Sign     Unable to Pay for Housing in the Last Year: No     Number of Times Moved in the Last Year: 0     Homeless in the Last Year: No      Family History   Problem Relation Age of Onset    COPD Mother     Heart disease Father     Heart disease Paternal Grandmother     Cancer Family     Colon cancer Neg Hx      Past Surgical History:   Procedure Laterality Date    APPENDECTOMY      BREAST LUMPECTOMY Right     CARDIAC CATHETERIZATION N/A 08/19/2022    Procedure: Cardiac RHC/LHC;  Surgeon: Arnold Mooney DO;  Location: [facility];  Service: Cardiology    CARDIAC CATHETERIZATION N/A 10/11/2022    Procedure: CARDIAC TAVR;  Surgeon: Arnold Mooney DO;  Location: [facility];  Service: Cardiology    CHOLECYSTECTOMY      KNEE ARTHROSCOPY Right     knee    VT REPLACE AORTIC VALVE OPENFEMORAL ARTERY APPROACH N/A 10/11/2022    Procedure: REPLACEMENT AORTIC VALVE TRANSCATHETER (TAVR) TRANSFEMORAL W/ 23MM MILIAN ROSS S3 ULTRA VALVE(ACCESS ON LEFT) TRAY;  Surgeon: Handy Rushing MD;  Location: [facility];  Service: Cardiac Surgery    TONSILLECTOMY AND ADENOIDECTOMY      TOTAL ABDOMINAL HYSTERECTOMY         Current Outpatient Medications:     amoxicillin (AMOXIL) 500 mg capsule, Take 4 capsules (2,000 mg total) by mouth as needed (take 4 pills one hour prior to dental work) for up to 4 days, Disp: 8 capsule, Rfl: 3    aspirin 81 mg chewable tablet, Chew 81 mg daily, Disp: , Rfl:     carvedilol (COREG) 6.25 mg tablet, TAKE ONE TABLET BY MOUTH TWICE A DAY WITH MEALS, Disp: 180 tablet, Rfl: 3    Cholecalciferol (VITAMIN D3) 1000 UNITS CAPS, Take 2,000 Units by mouth daily, Disp: , Rfl:     co-enzyme Q-10 30 mg, Take 30 mg by mouth daily, Disp: , Rfl:     Cyanocobalamin (VITAMIN B12 PO), Take 2,500 mcg by mouth daily, Disp: , Rfl:     Empagliflozin-metFORMIN HCl ER (Synjardy XR)  MG TB24, Take 1 capsule by mouth daily. Indications:  Type 2 Diabetes, Disp: , Rfl:     glimepiride (AMARYL) 2 mg tablet, Take 4 mg by mouth 2 (two) times a day , Disp: , Rfl:     Insulin Glargine (TOUJEO) 300 units/mL CONCETRATED U-300 injection pen, Inject 20 Units under the skin daily at bedtime, Disp: , Rfl:     lisinopril (ZESTRIL) 5 mg tablet, TAKE ONE TABLET BY MOUTH EVERY DAY, Disp: 90 tablet, Rfl: 1    methimazole (TAPAZOLE) 5 mg tablet, Take 1 tablet by mouth every other day, Disp: , Rfl:     multivitamin (THERAGRAN) TABS, Take 1 tablet by mouth daily, Disp: , Rfl:     pantoprazole (PROTONIX) 40 mg tablet, Take 1 tablet (40 mg total) by mouth daily, Disp: 90 tablet, Rfl: 3    pravastatin (PRAVACHOL) 20 mg tablet, TAKE ONE TABLET BY MOUTH EVERY DAY, Disp: 90 tablet, Rfl: 3    pyridoxine (VITAMIN B6) 100 mg tablet, Take 100 mg by mouth daily, Disp: , Rfl:     Icosapent Ethyl (VASCEPA PO), Take 2 capsules by mouth daily, Disp: , Rfl:     meclizine (ANTIVERT) 12.5 MG tablet, Take 1 tablet (12.5 mg total) by mouth every 8 (eight) hours as needed for dizziness (Patient not taking: Reported on 7/18/2024), Disp: 10 tablet, Rfl: 0    ondansetron (ZOFRAN) 4 mg tablet, Take 1 tablet (4 mg total) by mouth every 8 (eight) hours as needed for nausea or vomiting (Patient not taking: Reported on 7/18/2024), Disp: 10 tablet, Rfl: 0  Allergies   Allergen Reactions    Fentanyl Rash    Niaspan [Niacin Er (Antihyperlipidemic)] Rash    Prednisone Rash       Labs:     Chemistry        Component Value Date/Time    K 3.9 07/02/2024 0527    K 4.3 03/15/2024 0906    K 4.4 02/10/2024 0952     07/02/2024 0527     03/15/2024 0906     02/10/2024 0952    CO2 26 07/02/2024 0527    CO2 26 03/15/2024 0906    CO2 26 02/10/2024 0952    BUN 14 07/02/2024 0527    BUN 18 03/15/2024 0906    BUN 15 02/10/2024 0952    CREATININE 0.63 07/02/2024 0527    CREATININE 0.64 02/10/2024 0952        Component Value Date/Time    CALCIUM 9.1 07/02/2024 0527    CALCIUM 9.5 03/15/2024 0906     "CALCIUM 9.7 02/10/2024 0952    ALKPHOS 87 07/02/2024 0527    ALKPHOS 79 03/15/2024 0906    ALKPHOS 78 02/10/2024 0952    AST 16 07/02/2024 0527    AST 18 03/15/2024 0906    AST 20 02/10/2024 0952    ALT 17 07/02/2024 0527    ALT 19 03/15/2024 0906    ALT 24 02/10/2024 0952            No results found for: \"CHOL\"  Lab Results   Component Value Date    HDL 51 07/01/2024     Lab Results   Component Value Date    LDLCALC 101 (H) 07/01/2024     Lab Results   Component Value Date    TRIG 246 (H) 07/01/2024     No results found for: \"CHOLHDL\"    Imaging: No results found.    ECG:  Sinus rhythm nonspecific T-wave changes      Review of Systems   Constitutional: Negative.   HENT: Negative.     Eyes: Negative.    Cardiovascular:  Negative for chest pain and palpitations.   Respiratory: Negative.     Endocrine: Negative.    Hematologic/Lymphatic: Negative.    Skin: Negative.    Musculoskeletal: Negative.    Gastrointestinal: Negative.    Genitourinary: Negative.    Neurological: Negative.    Psychiatric/Behavioral: Negative.         Vitals:    10/29/24 1322   BP: 132/64   Pulse: 83   SpO2: 95%     Vitals:    10/29/24 1322   Weight: 83.5 kg (184 lb)     Height: 5' 3\" (160 cm)   Body mass index is 32.59 kg/m².    Physical Exam:  Vital signs reviewed  General:  Alert and cooperative, appears stated age, no acute distress  HEENT:  PERRLA, EOMI, no scleral icterus, no conjunctival pallor  Neck:  No lymphadenopathy, no thyromegaly, no carotid bruits, no elevated JVP  Heart:  Regular rate and rhythm, normal S1/S2, no S3/S4, no murmur, rubs or gallops.  PMI nondisplaced  Lungs:  Clear to auscultation bilaterally, no wheezes rales or rhonchi  Abdomen:  Soft, non-tender, positive bowel sounds, no rebound or guarding,   no organomegaly   Extremities:  Normal range of motion.  No clubbing, cyanosis or edema   Vascular:  2+ pedal pulses  Skin:  No rashes or lesions on exposed skin  Neurologic:  Cranial nerves II-XII grossly intact without " focal deficits  Psych:  Normal mood and affect

## 2024-12-03 DIAGNOSIS — I35.0 AORTIC STENOSIS, MODERATE: ICD-10-CM

## 2024-12-03 DIAGNOSIS — Z95.2 S/P TAVR (TRANSCATHETER AORTIC VALVE REPLACEMENT): ICD-10-CM

## 2024-12-04 RX ORDER — CARVEDILOL 6.25 MG/1
6.25 TABLET ORAL 2 TIMES DAILY WITH MEALS
Qty: 180 TABLET | Refills: 1 | Status: SHIPPED | OUTPATIENT
Start: 2024-12-04

## 2024-12-04 RX ORDER — LISINOPRIL 5 MG/1
5 TABLET ORAL DAILY
Qty: 90 TABLET | Refills: 1 | Status: SHIPPED | OUTPATIENT
Start: 2024-12-04

## 2025-02-10 DIAGNOSIS — I35.0 AORTIC STENOSIS, MODERATE: ICD-10-CM

## 2025-02-11 RX ORDER — PRAVASTATIN SODIUM 20 MG
20 TABLET ORAL DAILY
Qty: 90 TABLET | Refills: 1 | Status: SHIPPED | OUTPATIENT
Start: 2025-02-11

## 2025-03-06 DIAGNOSIS — R10.13 EPIGASTRIC PAIN: ICD-10-CM

## 2025-03-06 RX ORDER — PANTOPRAZOLE SODIUM 40 MG/1
40 TABLET, DELAYED RELEASE ORAL DAILY
Qty: 90 TABLET | Refills: 1 | Status: SHIPPED | OUTPATIENT
Start: 2025-03-06

## 2025-05-07 ENCOUNTER — APPOINTMENT (OUTPATIENT)
Dept: RADIOLOGY | Facility: CLINIC | Age: 65
End: 2025-05-07
Attending: FAMILY MEDICINE
Payer: COMMERCIAL

## 2025-05-07 ENCOUNTER — OFFICE VISIT (OUTPATIENT)
Dept: OBGYN CLINIC | Facility: CLINIC | Age: 65
End: 2025-05-07
Payer: COMMERCIAL

## 2025-05-07 VITALS — BODY MASS INDEX: 32.96 KG/M2 | HEIGHT: 63 IN | WEIGHT: 186 LBS

## 2025-05-07 DIAGNOSIS — M19.072 ARTHRITIS OF LEFT FOOT: ICD-10-CM

## 2025-05-07 DIAGNOSIS — S96.912A STRAIN OF LEFT FOOT, INITIAL ENCOUNTER: Primary | ICD-10-CM

## 2025-05-07 DIAGNOSIS — M79.672 PAIN IN LEFT FOOT: ICD-10-CM

## 2025-05-07 PROCEDURE — 99213 OFFICE O/P EST LOW 20 MIN: CPT | Performed by: FAMILY MEDICINE

## 2025-05-07 PROCEDURE — 73630 X-RAY EXAM OF FOOT: CPT

## 2025-05-07 NOTE — PATIENT INSTRUCTIONS
Epsom Salt Soaks    Over the counter diclofenac gel/voltaren  - 3 times daily for 10 days    Heat in the AM    Ice in the PM    Sneakers/supportive wear for the next 3 weeks

## 2025-05-07 NOTE — PROGRESS NOTES
Name: Michelle Julien      : 1960      MRN: [id number]  Encounter Provider: Radha Atwood DO  Encounter Date: 2025   Encounter department: [facility] [place]  :  Assessment & Plan  Strain of left foot, initial encounter  65-year-old female with left foot pain 1 week duration.  X-rays left foot noted for midfoot degenerative changes and plantar calcaneal spur, without acute osseous abnormality.  Clinical impression is that she sustained strain to second toe flexor.  I discussed treatment regimen of supportive wear and topical anti-inflammatory.  I recommend wearing supportive sneakers/footwear for the next 3 weeks.  Apply topical diclofenac gel 3 times a day for 10 days.  May do Epsom salt soaks.  Alternate with ice and heat.  Follow-up as needed.  Orders:    XR foot 3+ vw left; Future    Arthritis of left foot             History of Present Illness   Chief Complaint   Patient presents with    Left Foot - Pain      HPI  Michelle Julien is a 65 y.o. adult who presents for evaluation left foot pain 1 week duration.  She states she was stepping out of bed and planted on her left foot when she felt a pop at the plantar aspect of the forefoot.  She had pain described as sudden in onset, localized to the plantar aspect forefoot at the head of the second metatarsal, throbbing and burning, none radiating, worse with direct pressure/bearing weight, and improved with resting.  She still having associated swelling and bruising.  She started treating with taking Tylenol and icing the area.  She states that her gait has been affected since she has been walking with the foot supinated.  She states when she walks barefoot there is bothersome pain, but when wearing sneakers there is a very little pain.    History obtained from: patient    Review of Systems   Musculoskeletal:  Positive for arthralgias and joint swelling.   Neurological:  Negative for weakness and numbness.  "         Objective   Ht 5' 3\" (1.6 m)   Wt 84.4 kg (186 lb)   LMP  (LMP Unknown)   BMI 32.95 kg/m²      Physical Exam  Vitals and nursing note reviewed.   Constitutional:       Appearance: Normal appearance. She is well-developed. She is not ill-appearing or diaphoretic.   HENT:      Head: Normocephalic and atraumatic.      Right Ear: External ear normal.      Left Ear: External ear normal.   Eyes:      Conjunctiva/sclera: Conjunctivae normal.   Neck:      Trachea: No tracheal deviation.   Pulmonary:      Effort: Pulmonary effort is normal. No respiratory distress.   Abdominal:      General: There is no distension.   Musculoskeletal:         General: Tenderness present. No swelling, deformity or signs of injury.   Skin:     General: Skin is warm and dry.      Coloration: Skin is not jaundiced or pale.   Neurological:      Mental Status: She is alert and oriented to person, place, and time.   Psychiatric:         Mood and Affect: Mood normal.         Behavior: Behavior normal.         Thought Content: Thought content normal.         Judgment: Judgment normal.         Left Ankle Exam   Swelling: none    Range of Motion   The patient has normal left ankle ROM.     Muscle Strength   Dorsiflexion:  5/5   Plantar flexion:  5/5     Other   Sensation: normal  Pulse: present    Comments:  5/5 inversion and eversion    Foot  -Moderate tenderness plantar aspect second metatarsal head, mild tenderness dorsum second metatarsal head  -Plantar aspect pain with resisted second toe flexion  - Negative metatarsal squeeze           I have personally reviewed pertinent films in PACS and my interpretation is  .   X-rays left foot noted for midfoot degenerative changes and plantar calcaneal spur, without acute osseous abnormality.      "

## 2025-06-03 DIAGNOSIS — I35.0 AORTIC STENOSIS, MODERATE: ICD-10-CM

## 2025-06-03 RX ORDER — CARVEDILOL 6.25 MG/1
6.25 TABLET ORAL 2 TIMES DAILY WITH MEALS
Qty: 180 TABLET | Refills: 1 | Status: SHIPPED | OUTPATIENT
Start: 2025-06-03

## 2025-06-08 ENCOUNTER — HOSPITAL ENCOUNTER (EMERGENCY)
Facility: HOSPITAL | Age: 65
Discharge: HOME | End: 2025-06-09
Attending: EMERGENCY MEDICINE | Admitting: EMERGENCY MEDICINE
Payer: COMMERCIAL

## 2025-06-08 DIAGNOSIS — K57.92 DIVERTICULITIS: Primary | ICD-10-CM

## 2025-06-08 LAB
BASOPHILS # BLD AUTO: 0.07 THOUSANDS/ÂΜL (ref 0–0.1)
BASOPHILS NFR BLD AUTO: 1 % (ref 0–1)
EOSINOPHIL # BLD AUTO: 0.19 THOUSAND/ÂΜL (ref 0–0.61)
EOSINOPHIL NFR BLD AUTO: 2 % (ref 0–6)
ERYTHROCYTE [DISTWIDTH] IN BLOOD BY AUTOMATED COUNT: 13.2 % (ref 11.6–15.1)
HCT VFR BLD AUTO: 37.9 % (ref 34.8–46.1)
HGB BLD-MCNC: 12.8 G/DL (ref 11.5–15.4)
IMM GRANULOCYTES # BLD AUTO: 0.03 THOUSAND/UL (ref 0–0.2)
IMM GRANULOCYTES NFR BLD AUTO: 0 % (ref 0–2)
LYMPHOCYTES # BLD AUTO: 2.75 THOUSANDS/ÂΜL (ref 0.6–4.47)
LYMPHOCYTES NFR BLD AUTO: 30 % (ref 14–44)
MCH RBC QN AUTO: 29 PG (ref 26.8–34.3)
MCHC RBC AUTO-ENTMCNC: 33.8 G/DL (ref 31.4–37.4)
MCV RBC AUTO: 86 FL (ref 82–98)
MONOCYTES # BLD AUTO: 0.78 THOUSAND/ÂΜL (ref 0.17–1.22)
MONOCYTES NFR BLD AUTO: 8 % (ref 4–12)
NEUTROPHILS # BLD AUTO: 5.47 THOUSANDS/ÂΜL (ref 1.85–7.62)
NEUTS SEG NFR BLD AUTO: 59 % (ref 43–75)
NRBC BLD AUTO-RTO: 0 /100 WBCS
PLATELET # BLD AUTO: 256 THOUSANDS/UL (ref 149–390)
PMV BLD AUTO: 10.9 FL (ref 8.9–12.7)
RBC # BLD AUTO: 4.41 MILLION/UL (ref 3.81–5.12)
WBC # BLD AUTO: 9.29 THOUSAND/UL (ref 4.31–10.16)

## 2025-06-08 PROCEDURE — 99285 EMERGENCY DEPT VISIT HI MDM: CPT | Performed by: EMERGENCY MEDICINE

## 2025-06-08 PROCEDURE — 99284 EMERGENCY DEPT VISIT MOD MDM: CPT

## 2025-06-08 PROCEDURE — 85025 COMPLETE CBC W/AUTO DIFF WBC: CPT | Performed by: EMERGENCY MEDICINE

## 2025-06-08 PROCEDURE — 83690 ASSAY OF LIPASE: CPT | Performed by: EMERGENCY MEDICINE

## 2025-06-08 PROCEDURE — 84484 ASSAY OF TROPONIN QUANT: CPT | Performed by: EMERGENCY MEDICINE

## 2025-06-08 PROCEDURE — 36415 COLL VENOUS BLD VENIPUNCTURE: CPT | Performed by: EMERGENCY MEDICINE

## 2025-06-08 PROCEDURE — 96374 THER/PROPH/DIAG INJ IV PUSH: CPT

## 2025-06-08 PROCEDURE — 80053 COMPREHEN METABOLIC PANEL: CPT | Performed by: EMERGENCY MEDICINE

## 2025-06-08 PROCEDURE — 83605 ASSAY OF LACTIC ACID: CPT | Performed by: EMERGENCY MEDICINE

## 2025-06-08 PROCEDURE — 93005 ELECTROCARDIOGRAM TRACING: CPT

## 2025-06-08 RX ORDER — KETOROLAC TROMETHAMINE 30 MG/ML
15 INJECTION, SOLUTION INTRAMUSCULAR; INTRAVENOUS ONCE
Status: COMPLETED | OUTPATIENT
Start: 2025-06-08 | End: 2025-06-08

## 2025-06-08 RX ADMIN — KETOROLAC TROMETHAMINE 15 MG: 30 INJECTION, SOLUTION INTRAMUSCULAR at 23:47

## 2025-06-09 ENCOUNTER — APPOINTMENT (EMERGENCY)
Dept: CT IMAGING | Facility: HOSPITAL | Age: 65
End: 2025-06-09
Payer: COMMERCIAL

## 2025-06-09 VITALS
HEART RATE: 78 BPM | SYSTOLIC BLOOD PRESSURE: 110 MMHG | TEMPERATURE: 97.6 F | OXYGEN SATURATION: 96 % | RESPIRATION RATE: 18 BRPM | DIASTOLIC BLOOD PRESSURE: 54 MMHG

## 2025-06-09 LAB
2HR DELTA HS TROPONIN: 0 NG/L
ALBUMIN SERPL BCG-MCNC: 4.3 G/DL (ref 3.5–5)
ALP SERPL-CCNC: 76 U/L (ref 34–104)
ALT SERPL W P-5'-P-CCNC: 15 U/L (ref 7–52)
ANION GAP SERPL CALCULATED.3IONS-SCNC: 10 MMOL/L (ref 4–13)
AST SERPL W P-5'-P-CCNC: 16 U/L (ref 13–39)
ATRIAL RATE: 76 BPM
BILIRUB SERPL-MCNC: 0.51 MG/DL (ref 0.2–1)
BUN SERPL-MCNC: 26 MG/DL (ref 5–25)
CALCIUM SERPL-MCNC: 9.7 MG/DL (ref 8.4–10.2)
CARDIAC TROPONIN I PNL SERPL HS: 10 NG/L (ref ?–50)
CARDIAC TROPONIN I PNL SERPL HS: 10 NG/L (ref ?–50)
CHLORIDE SERPL-SCNC: 99 MMOL/L (ref 96–108)
CO2 SERPL-SCNC: 23 MMOL/L (ref 21–32)
CREAT SERPL-MCNC: 0.88 MG/DL (ref 0.6–1.3)
GFR SERPL CREATININE-BSD FRML MDRD: 68 ML/MIN/1.73SQ M
GLUCOSE SERPL-MCNC: 163 MG/DL (ref 65–140)
LACTATE SERPL-SCNC: 0.9 MMOL/L (ref 0.5–2)
LIPASE SERPL-CCNC: 25 U/L (ref 11–82)
P AXIS: 61 DEGREES
POTASSIUM SERPL-SCNC: 4.1 MMOL/L (ref 3.5–5.3)
PR INTERVAL: 182 MS
PROT SERPL-MCNC: 7.9 G/DL (ref 6.4–8.4)
QRS AXIS: 24 DEGREES
QRSD INTERVAL: 142 MS
QT INTERVAL: 422 MS
QTC INTERVAL: 474 MS
SODIUM SERPL-SCNC: 132 MMOL/L (ref 135–147)
T WAVE AXIS: 122 DEGREES
VENTRICULAR RATE: 76 BPM

## 2025-06-09 PROCEDURE — 96375 TX/PRO/DX INJ NEW DRUG ADDON: CPT

## 2025-06-09 PROCEDURE — 36415 COLL VENOUS BLD VENIPUNCTURE: CPT | Performed by: EMERGENCY MEDICINE

## 2025-06-09 PROCEDURE — 74177 CT ABD & PELVIS W/CONTRAST: CPT

## 2025-06-09 PROCEDURE — 84484 ASSAY OF TROPONIN QUANT: CPT | Performed by: EMERGENCY MEDICINE

## 2025-06-09 PROCEDURE — 93010 ELECTROCARDIOGRAM REPORT: CPT | Performed by: INTERNAL MEDICINE

## 2025-06-09 RX ORDER — ONDANSETRON 4 MG/1
4 TABLET, ORALLY DISINTEGRATING ORAL EVERY 8 HOURS PRN
Qty: 10 TABLET | Refills: 0 | Status: SHIPPED | OUTPATIENT
Start: 2025-06-09

## 2025-06-09 RX ORDER — MORPHINE SULFATE 15 MG/1
15 TABLET ORAL EVERY 6 HOURS PRN
Qty: 5 TABLET | Refills: 0 | Status: SHIPPED | OUTPATIENT
Start: 2025-06-09 | End: 2025-06-16

## 2025-06-09 RX ADMIN — MORPHINE SULFATE 2 MG: 2 INJECTION, SOLUTION INTRAMUSCULAR; INTRAVENOUS at 01:37

## 2025-06-09 RX ADMIN — AMOXICILLIN AND CLAVULANATE POTASSIUM 1 TABLET: 875; 125 TABLET, FILM COATED ORAL at 03:09

## 2025-06-09 RX ADMIN — IOHEXOL 100 ML: 350 INJECTION, SOLUTION INTRAVENOUS at 00:47

## 2025-06-09 NOTE — ED PROVIDER NOTES
"Time reflects when diagnosis was documented in both MDM as applicable and the Disposition within this note       Time User Action Codes Description Comment    6/9/2025  3:10 AM Kiran Nugent Add [K57.92] Diverticulitis           ED Disposition       ED Disposition   Discharge    Condition   Stable    Date/Time   Mon Jun 9, 2025  3:09 AM    Comment   Michelleurban Julien discharge to home/self care.                   Assessment & Plan       Medical Decision Making  I'm having pain in my left lower quadrant  diverticulitis    65 y.o. female presents with a chief complaint of \"I'm having pain in my left lower quadrant.\"    Patient affirms 2 days of left lower abdominal pain without radiation.   Patient describes \"like a gas bubble\" that came on gradually, worsening and continujes in the ER.      Patient states this is similar to prior episodes of diverticulitis.    Patient denies nausea or vomiting.    Patient affirms diarrhea.  Patient notes last bowel movement was around 2100 hours.  Patient denies urinary symptoms.  Patient denies vaginal bleeding or discharge.    Patient denies contacts with similar symptoms.      Patient affirms recent use of keflex for a thumb infection that improved; denies any recent international travel or trauma.    Patient notes history of her appendectomy, cholecystectomy, and hysterectomy.      Focused Objective Exam:  Abdomen:  Inspection of an obese abdomen with previous abdominal surgical incisions noted without erythema, rashes or ecchymosis noted.  No abdominal pulsations noted.  Normal bowel sounds with no bruit auscultated.  Soft abdomen.  Palpitation noted tenderness mainly in the left lower quadrant with lesser tenderness in the right upper quadrant with no in the remainder.  No masses or pulsatile aorta noted on examination.  No rebound or guarding noted on examination, non-peritoneal exam.    Back:  No rash or signs of herpes zoster.  No CVA tenderness noted.   Skin:  No ecchymosis " of the umbilicus (negative Diaz's sign) or flank (negative Espitia Brunson's sign). Warm and dry.    Medical Decision Making    Abdominal pain with a broad differential.  Will establish IV access and make patient NPO considering possibility of surgical intervention required.  Will initiate symptomatic management including intravenous fluids.    As patient has history of risk factors for ACS, will obtain EKG and troponin to evaluate for potential referred pain.      Differential is broad and includes significant likelihood of intra-abdominal pathology, including concerns for potential diverticulitis.    Will obtain CBC to evaluate for anemia and leukocytosis.  Will obtain CMP to evaluate electrolytes, renal, biliary, and hepatic function.  Will obtain lipase to evaluate for potential pancreatitis.    Will obtain lactate to evaluate for mesenteric ischemia and sepsis.      Based on patient's age, history, physical exam and presenting complaint, will obtain contrast enhanced CT imaging of patient's abdomen and pelvis to further evaluate for possible intraabdominal pathology.      Amount and/or Complexity of Data Reviewed  Labs: ordered.  Radiology: ordered.    Risk  Prescription drug management.        ED Course as of 06/09/25 0555   Mon Jun 09, 2025 0314 Patient's laboratory testing is reassuring.  No leukocytosis, normal lactate.  Patient CT findings with acute uncomplicated diverticulitis which I discussed with the patient.  Patient did require narcotic pain medication while in the emergency room so I discussed the risks of continued use of narcotic pain medication will prescribe short course.   reviewed without suspicious findings.  Discussed risks of this medication in detail with the patient.  Discussed close follow-up with gastroenterology or colorectal she has had colonoscopies from previously to discuss whether to repeat colonoscopy.  Discussed and emphasized follow-up.  Discussed return precautions in  detail.       Medications   ketorolac (TORADOL) injection 15 mg (15 mg Intravenous Given 6/8/25 9627)   iohexol (OMNIPAQUE) 350 MG/ML injection (MULTI-DOSE) 100 mL (100 mL Intravenous Given 6/9/25 0047)   morphine injection 2 mg (2 mg Intravenous Given 6/9/25 0137)   amoxicillin-clavulanate (AUGMENTIN) 875-125 mg per tablet 1 tablet (1 tablet Oral Given 6/9/25 0309)       ED Risk Strat Scores                    No data recorded        SBIRT 22yo+      Flowsheet Row Most Recent Value   Initial Alcohol Screen: US AUDIT-C     1. How often do you have a drink containing alcohol? 0 Filed at: 06/08/2025 2241   2. How many drinks containing alcohol do you have on a typical day you are drinking?  0 Filed at: 06/08/2025 2241   3a. Male UNDER 65: How often do you have five or more drinks on one occasion? 0 Filed at: 06/08/2025 2241   3b. FEMALE Any Age, or MALE 65+: How often do you have 4 or more drinks on one occassion? 0 Filed at: 06/08/2025 2241   Audit-C Score 0 Filed at: 06/08/2025 2241   FERNANDO: How many times in the past year have you...    Used an illegal drug or used a prescription medication for non-medical reasons? Never Filed at: 06/08/2025 2241                            History of Present Illness       Chief Complaint   Patient presents with    Abdominal Pain     Patient c.o LLQ pain since yesterday, hx of diverticulitis. Patient also reports diarrhea, on cephalexin 500mg       Past Medical History[1]   Past Surgical History[2]   Family History[3]   Social History[4]   E-Cigarette/Vaping    E-Cigarette Use Never User       E-Cigarette/Vaping Substances    Nicotine No     THC No     CBD No     Flavoring No       I have reviewed and agree with the history as documented.     HPI    Review of Systems        Objective       ED Triage Vitals   Temperature Pulse Blood Pressure Respirations SpO2 Patient Position - Orthostatic VS   06/08/25 2240 06/08/25 2240 06/08/25 2240 06/08/25 2240 06/08/25 2240 06/08/25 2240   97.6  °F (36.4 °C) 86 124/59 14 98 % Sitting      Temp Source Heart Rate Source BP Location FiO2 (%) Pain Score    06/08/25 2240 06/08/25 2240 06/08/25 2240 -- 06/08/25 2347    Temporal Monitor Left arm  9      Vitals      Date and Time Temp Pulse SpO2 Resp BP Pain Score FACES Pain Rating User   06/09/25 0318 -- -- -- -- -- 2 -- BS   06/09/25 0137 -- -- -- -- -- 7 -- BS   06/09/25 0131 -- 78 96 % 18 110/54 6 -- BS   06/08/25 2347 -- -- -- -- -- 9 -- BS   06/08/25 2240 97.6 °F (36.4 °C) 86 98 % 14 124/59 -- -- NO            Physical Exam    Results Reviewed       Procedure Component Value Units Date/Time    HS Troponin I 2hr [[id number]]  (Normal) Collected: 06/09/25 0207    Lab Status: Final result Specimen: Blood from Arm, Left Updated: 06/09/25 0240     hs TnI 2hr 10 ng/L      Delta 2hr hsTnI 0 ng/L     HS Troponin 0hr (reflex protocol) [[id number]]  (Normal) Collected: 06/08/25 2348    Lab Status: Final result Specimen: Blood from Arm, Left Updated: 06/09/25 0019     hs TnI 0hr 10 ng/L     Lactic acid, plasma (w/reflex if result > 2.0) [[id number]]  (Normal) Collected: 06/08/25 2348    Lab Status: Final result Specimen: Blood from Arm, Left Updated: 06/09/25 0012     LACTIC ACID 0.9 mmol/L     Narrative:      Result may be elevated if tourniquet was used during collection.    CMP [[id number]]  (Abnormal) Collected: 06/08/25 2348    Lab Status: Final result Specimen: Blood from Arm, Left Updated: 06/09/25 0012     Sodium 132 mmol/L      Potassium 4.1 mmol/L      Chloride 99 mmol/L      CO2 23 mmol/L      ANION GAP 10 mmol/L      BUN 26 mg/dL      Creatinine 0.88 mg/dL      Glucose 163 mg/dL      Calcium 9.7 mg/dL      AST 16 U/L      ALT 15 U/L      Alkaline Phosphatase 76 U/L      Total Protein 7.9 g/dL      Albumin 4.3 g/dL      Total Bilirubin 0.51 mg/dL      eGFR 68 ml/min/1.73sq m     Narrative:      [employer] guidelines for Chronic Kidney Disease (CKD):     Stage 1 with normal or high GFR (GFR  > 90 mL/min/1.73 square meters)    Stage 2 Mild CKD (GFR = 60-89 mL/min/1.73 square meters)    Stage 3A Moderate CKD (GFR = 45-59 mL/min/1.73 square meters)    Stage 3B Moderate CKD (GFR = 30-44 mL/min/1.73 square meters)    Stage 4 Severe CKD (GFR = 15-29 mL/min/1.73 square meters)    Stage 5 End Stage CKD (GFR <15 mL/min/1.73 square meters)  Note: GFR calculation is accurate only with a steady state creatinine    Lipase [[id number]]  (Normal) Collected: 06/08/25 2348    Lab Status: Final result Specimen: Blood from Arm, Left Updated: 06/09/25 0012     Lipase 25 u/L     CBC and differential [[id number]] Collected: 06/08/25 2348    Lab Status: Final result Specimen: Blood from Arm, Left Updated: 06/08/25 0215     WBC 9.29 Thousand/uL      RBC 4.41 Million/uL      Hemoglobin 12.8 g/dL      Hematocrit 37.9 %      MCV 86 fL      MCH 29.0 pg      MCHC 33.8 g/dL      RDW 13.2 %      MPV 10.9 fL      Platelets 256 Thousands/uL      nRBC 0 /100 WBCs      Segmented % 59 %      Immature Grans % 0 %      Lymphocytes % 30 %      Monocytes % 8 %      Eosinophils Relative 2 %      Basophils Relative 1 %      Absolute Neutrophils 5.47 Thousands/µL      Absolute Immature Grans 0.03 Thousand/uL      Absolute Lymphocytes 2.75 Thousands/µL      Absolute Monocytes 0.78 Thousand/µL      Eosinophils Absolute 0.19 Thousand/µL      Basophils Absolute 0.07 Thousands/µL             CT abdomen pelvis with contrast   Final Interpretation by Diamond Webster MD (06/09 0238)      Acute uncomplicated sigmoid diverticulosis.      Hepatic steatosis.      Nonobstructing left renal calculus.      The study was marked in EPIC for immediate notification.         Workstation performed: [id number]             Procedures    ED Medication and Procedure Management   Prior to Admission Medications   Prescriptions Last Dose Informant Patient Reported? Taking?   Cholecalciferol (VITAMIN D3) 1000 UNITS CAPS  Self Yes No   Sig: Take 2,000 Units by mouth daily    Cyanocobalamin (VITAMIN B12 PO)  Self Yes No   Sig: Take 2,500 mcg by mouth daily   Empagliflozin-metFORMIN HCl ER (Synjardy XR)  MG TB24  Self Yes No   Sig: Take 1 capsule by mouth daily. Indications: Type 2 Diabetes   Insulin Glargine (TOUJEO) 300 units/mL CONCETRATED U-300 injection pen  Self Yes No   Sig: Inject 20 Units under the skin daily at bedtime   aspirin 81 mg chewable tablet  Self Yes No   Sig: Chew 81 mg daily   carvedilol (COREG) 6.25 mg tablet   No No   Sig: TAKE ONE TABLET BY MOUTH TWICE A DAY WITH MEALS   co-enzyme Q-10 30 mg  Self Yes No   Sig: Take 30 mg by mouth daily   glimepiride (AMARYL) 2 mg tablet  Self Yes No   Sig: Take 4 mg by mouth 2 (two) times a day    lisinopril (ZESTRIL) 5 mg tablet  Self No No   Sig: TAKE ONE TABLET BY MOUTH EVERY DAY   methimazole (TAPAZOLE) 5 mg tablet  Self Yes No   Sig: Take 1 tablet by mouth every other day   multivitamin (THERAGRAN) TABS  Self Yes No   Sig: Take 1 tablet by mouth daily   pantoprazole (PROTONIX) 40 mg tablet  Self No No   Sig: TAKE ONE TABLET BY MOUTH EVERY DAY   pravastatin (PRAVACHOL) 20 mg tablet  Self No No   Sig: TAKE ONE TABLET BY MOUTH EVERY DAY   pyridoxine (VITAMIN B6) 100 mg tablet  Self Yes No   Sig: Take 100 mg by mouth daily      Facility-Administered Medications: None     Discharge Medication List as of 6/9/2025  3:14 AM        START taking these medications    Details   amoxicillin-clavulanate (AUGMENTIN) 875-125 mg per tablet Take 1 tablet by mouth every 12 (twelve) hours for 7 days, Starting Mon 6/9/2025, Until Mon 6/16/2025, Normal      morphine (MSIR) 15 mg tablet Take 1 tablet (15 mg total) by mouth every 6 (six) hours as needed for severe pain for up to 7 days Max Daily Amount: 60 mg, Starting Mon 6/9/2025, Until Mon 6/16/2025 at 2359, Normal      ondansetron (ZOFRAN-ODT) 4 mg disintegrating tablet Take 1 tablet (4 mg total) by mouth every 8 (eight) hours as needed for nausea or vomiting, Starting Mon 6/9/2025,  Normal           CONTINUE these medications which have NOT CHANGED    Details   aspirin 81 mg chewable tablet Chew 81 mg daily, Historical Med      carvedilol (COREG) 6.25 mg tablet TAKE ONE TABLET BY MOUTH TWICE A DAY WITH MEALS, Starting Tue 6/3/2025, Normal      Cholecalciferol (VITAMIN D3) 1000 UNITS CAPS Take 2,000 Units by mouth daily, Historical Med      co-enzyme Q-10 30 mg Take 30 mg by mouth daily, Historical Med      Cyanocobalamin (VITAMIN B12 PO) Take 2,500 mcg by mouth daily, Historical Med      Empagliflozin-metFORMIN HCl ER (Synjardy XR)  MG TB24 Take 1 capsule by mouth daily. Indications: Type 2 Diabetes, Historical Med      glimepiride (AMARYL) 2 mg tablet Take 4 mg by mouth 2 (two) times a day , Historical Med      Insulin Glargine (TOUJEO) 300 units/mL CONCETRATED U-300 injection pen Inject 20 Units under the skin daily at bedtime, Historical Med      lisinopril (ZESTRIL) 5 mg tablet TAKE ONE TABLET BY MOUTH EVERY DAY, Starting Wed 12/4/2024, Normal      methimazole (TAPAZOLE) 5 mg tablet Take 1 tablet by mouth every other day, Starting Thu 10/26/2023, Until Tue 10/29/2024, Historical Med      multivitamin (THERAGRAN) TABS Take 1 tablet by mouth daily, Historical Med      pantoprazole (PROTONIX) 40 mg tablet TAKE ONE TABLET BY MOUTH EVERY DAY, Starting Thu 3/6/2025, Normal      pravastatin (PRAVACHOL) 20 mg tablet TAKE ONE TABLET BY MOUTH EVERY DAY, Starting Tue 2/11/2025, Normal      pyridoxine (VITAMIN B6) 100 mg tablet Take 100 mg by mouth daily, Historical Med           No discharge procedures on file.  ED SEPSIS DOCUMENTATION   Time reflects when diagnosis was documented in both MDM as applicable and the Disposition within this note       Time User Action Codes Description Comment    6/9/2025  3:10 AM Kiran Nugent Add [K57.92] Diverticulitis                      [1]   Past Medical History:  Diagnosis Date    Adhesive capsulitis of right shoulder     CAD (coronary artery disease)      CHF (congestive heart failure) (HCC)     Diabetes mellitus (HCC)     type 2, insulin dependent    Diverticulosis     History of breast cancer     s/p right lumpectomy, s/p radiation    Hyperlipidemia     Hypertension     Lymphedema of right upper extremity     Obesity (BMI 30-39.9)     SJ on CPAP     Severe aortic stenosis     Vitamin D deficiency    [2]   Past Surgical History:  Procedure Laterality Date    APPENDECTOMY      BREAST LUMPECTOMY Right     CARDIAC CATHETERIZATION N/A 08/19/2022    Procedure: Cardiac RHC/LHC;  Surgeon: Arnold Mooney DO;  Location: [facility];  Service: Cardiology    CARDIAC CATHETERIZATION N/A 10/11/2022    Procedure: CARDIAC TAVR;  Surgeon: Arnold Mooney DO;  Location: [facility];  Service: Cardiology    CHOLECYSTECTOMY      KNEE ARTHROSCOPY Right     knee    KY REPLACE AORTIC VALVE OPENFEMORAL ARTERY APPROACH N/A 10/11/2022    Procedure: REPLACEMENT AORTIC VALVE TRANSCATHETER (TAVR) TRANSFEMORAL W/ 23MM MILIAN ROSS S3 ULTRA VALVE(ACCESS ON LEFT) TRAY;  Surgeon: Handy Rushing MD;  Location: [facility];  Service: Cardiac Surgery    TONSILLECTOMY AND ADENOIDECTOMY      TOTAL ABDOMINAL HYSTERECTOMY     [3]   Family History  Problem Relation Name Age of Onset    COPD Mother      Heart disease Father      Heart disease Paternal Grandmother      Cancer Family      Colon cancer Neg Hx     [4]   Social History  Tobacco Use    Smoking status: Never    Smokeless tobacco: Never   Vaping Use    Vaping status: Never Used   Substance Use Topics    Alcohol use: Yes     Comment: socially    Drug use: No        Kiran Nugent MD  06/09/25 0547

## 2025-06-09 NOTE — DISCHARGE INSTRUCTIONS
CT IMPRESSION:     Acute uncomplicated sigmoid diverticulosis.     Hepatic steatosis.     Nonobstructing left renal calculus.

## 2025-07-30 DIAGNOSIS — I35.0 AORTIC STENOSIS, MODERATE: ICD-10-CM

## 2025-07-31 RX ORDER — PRAVASTATIN SODIUM 20 MG
20 TABLET ORAL DAILY
Qty: 90 TABLET | Refills: 0 | Status: SHIPPED | OUTPATIENT
Start: 2025-07-31

## (undated) DEVICE — DEFIB ADULT ELECTRODE CARDINAL

## (undated) DEVICE — SWAN-GANZ BIPOLAR PACING CATHETER: Brand: SWAN-GANZ

## (undated) DEVICE — AMPLATZ EXTRA STIFF WIRE GUIDE: Brand: AMPLATZ

## (undated) DEVICE — RADIFOCUS OPTITORQUE ANGIOGRAPHIC CATHETER: Brand: OPTITORQUE

## (undated) DEVICE — GLOVE SRG BIOGEL ECLIPSE 7.5

## (undated) DEVICE — CATH DIAG 5FR IMPULSE 110CM 6S PIG 145 ANG

## (undated) DEVICE — TR BAND RADIAL ARTERY COMPRESSION DEVICE: Brand: TR BAND

## (undated) DEVICE — HEAVY DUTY TABLE COVER: Brand: CONVERTORS

## (undated) DEVICE — DGW .035 FC J3MM 150CM TEF: Brand: EMERALD

## (undated) DEVICE — GUIDEWIRE LUNDERQUIST TSCMG-35-300-LESDC

## (undated) DEVICE — GAUZE SPONGES,USP TYPE VII GAUZE, 12 PLY: Brand: CURITY

## (undated) DEVICE — SPONGE 4 X 4 XRAY 16 PLY STRL LF RFD

## (undated) DEVICE — INTENDED FOR TISSUE SEPARATION, AND OTHER PROCEDURES THAT REQUIRE A SHARP SURGICAL BLADE TO PUNCTURE OR CUT.: Brand: BARD-PARKER ® CARBON RIB-BACK BLADES

## (undated) DEVICE — Device

## (undated) DEVICE — PINNACLE INTRODUCER SHEATH: Brand: PINNACLE

## (undated) DEVICE — DGW .035 FC J3MM 260CM TEF: Brand: EMERALD

## (undated) DEVICE — ADHESIVE SKIN HIGH VISCOSITY EXOFIN 1ML

## (undated) DEVICE — DRESSING ALLEVYN LIFE SACRAL 6.75 X 6.5 IN

## (undated) DEVICE — THERMOFLECT BLANKET, L, 25EA                               TS THERMOFLECT BLANKET, 48" X 84", SILVER, 5/BG, 5 BG/CS NW: Brand: THERMOFLECT

## (undated) DEVICE — CARDIO PERI-GROIN: Brand: CONVERTORS

## (undated) DEVICE — 3000CC GUARDIAN II: Brand: GUARDIAN

## (undated) DEVICE — SUT SILK 0 CT-1 30 IN 424H

## (undated) DEVICE — PAD GROUNDING ADULT

## (undated) DEVICE — GUIDEWIRE WHOLEY HI TORQUE INTERM MOD J .035 145CM

## (undated) DEVICE — TRAY FOLEY 16FR SURESTEP TEMP SENS URIMETER STAT LOK

## (undated) DEVICE — GLIDESHEATH SLENDER STAINLESS STEEL KIT: Brand: GLIDESHEATH SLENDER

## (undated) DEVICE — BETHLEHEM MAJOR GENERAL PACK: Brand: CARDINAL HEALTH

## (undated) DEVICE — CARDIOVASCULAR SPLIT DRAPE: Brand: CONVERTORS

## (undated) DEVICE — 3M™ TEGADERM™ TRANSPARENT FILM DRESSING FRAME STYLE, 1626W, 4 IN X 4-3/4 IN (10 CM X 12 CM), 50/CT 4CT/CASE: Brand: 3M™ TEGADERM™

## (undated) DEVICE — CATH DIAG 5FR IMPULSE 100CM FR4

## (undated) DEVICE — GLOVE INDICATOR PI UNDERGLOVE SZ 8 BLUE

## (undated) DEVICE — DGW .035 FC STR 260CM TEF: Brand: EMERALD